# Patient Record
Sex: FEMALE | Race: WHITE | NOT HISPANIC OR LATINO | Employment: UNEMPLOYED | URBAN - METROPOLITAN AREA
[De-identification: names, ages, dates, MRNs, and addresses within clinical notes are randomized per-mention and may not be internally consistent; named-entity substitution may affect disease eponyms.]

---

## 2019-05-17 ENCOUNTER — APPOINTMENT (OUTPATIENT)
Dept: RADIOLOGY | Facility: IMAGING CENTER | Age: 54
End: 2019-05-17
Attending: NURSE PRACTITIONER
Payer: COMMERCIAL

## 2019-05-17 ENCOUNTER — OCCUPATIONAL MEDICINE (OUTPATIENT)
Dept: URGENT CARE | Facility: CLINIC | Age: 54
End: 2019-05-17
Payer: COMMERCIAL

## 2019-05-17 VITALS
BODY MASS INDEX: 21.27 KG/M2 | SYSTOLIC BLOOD PRESSURE: 136 MMHG | OXYGEN SATURATION: 92 % | TEMPERATURE: 99.1 F | DIASTOLIC BLOOD PRESSURE: 78 MMHG | RESPIRATION RATE: 16 BRPM | WEIGHT: 144 LBS | HEART RATE: 101 BPM

## 2019-05-17 DIAGNOSIS — S29.011A CHEST WALL MUSCLE STRAIN, INITIAL ENCOUNTER: Primary | ICD-10-CM

## 2019-05-17 DIAGNOSIS — R07.89 LEFT-SIDED CHEST WALL PAIN: ICD-10-CM

## 2019-05-17 PROCEDURE — 71046 X-RAY EXAM CHEST 2 VIEWS: CPT | Mod: TC,29 | Performed by: NURSE PRACTITIONER

## 2019-05-17 PROCEDURE — 99204 OFFICE O/P NEW MOD 45 MIN: CPT | Mod: 29 | Performed by: NURSE PRACTITIONER

## 2019-05-17 ASSESSMENT — ENCOUNTER SYMPTOMS
CONSTITUTIONAL NEGATIVE: 1
RESPIRATORY NEGATIVE: 1
NEUROLOGICAL NEGATIVE: 1
CARDIOVASCULAR NEGATIVE: 1
PALPITATIONS: 0
SHORTNESS OF BREATH: 0

## 2019-05-17 NOTE — LETTER
[facility] [place] [address] [address]  Phone:  [phone] - Fax:  [phone]   [facility] Progress Report and Disability Certification  Date of Service: 5/17/2019   No Show:  No  Date / Time of Next Visit: 5/21/2019 10:40 am   Claim Information   Patient Name: Chiara Pacheco  Claim Number:     Employer:   [employer] Date of Injury: 5/15/2019     Insurer / TPA: [employer]  ID / SSN:     Occupation: Lead  Diagnosis: The encounter diagnosis was Left-sided chest wall pain.    Medical Information   Related to Industrial Injury?   Comments:?    Subjective Complaints:  DOI 5/15/2019:    Patient reporting reproducible left-sided chest wall pain with deep breathing and   coughing.  She states she works for [employer] and was lifting some boxes.  She started   to feel pain at the left side of her chest the following morning.  Has not taken anything   for the pain.  She states she went to [facility] yesterday and was cleared medically but   Worker's Comp paperwork was not initiated.  Reports that the pain has been improving since   yesterday.  Denies previous injury or second job.  Reports history of chronic cough, daily smoker.   Objective Findings: Constitutional: She is oriented to person, place, and time. She appears well-developed and   well-nourished. She is cooperative.  Non-toxic appearance. No distress.   HENT:   Right Ear: External ear normal.   Left Ear: External ear normal.   Nose: Nose normal.   Mouth/Throat: Mucous membranes are normal.   Eyes: Conjunctivae and EOM are normal.   Neck: Normal range of motion.   Cardiovascular: Regular rhythm, normal heart sounds and normal pulses.    Pulmonary/Chest: Effort normal and breath sounds normal. No respiratory distress. She has   no decreased breath sounds. She exhibits no tenderness, no edema and no swelling.   Abdominal: Bowel sounds are normal.   Musculoskeletal: Normal range of motion.  She exhibits no deformity.   Neurological: She is alert and oriented to person, place, and time. She has normal   strength. No sensory deficit.   Skin: Skin is warm, dry and intact. Capillary refill takes less than 2 seconds.   Psychiatric: She has a normal mood and affect. Her behavior is normal.   Vitals reviewed.   Pre-Existing Condition(s):     Assessment:   Initial Visit    Status: Additional Care Required  Permanent Disability:No    Plan:      Diagnostics:      Comments:  X-ray of chest with right upper lobe atelectasis, otherwise negative for acute process.    Reproducible chest wall pain improving today compared to yesterday.  Patient advised close   monitoring and RICE and OTC acetaminophen/ibuprofen as needed fo  r the pain.  Encouraged   deep breathing.  Follow-up in 4 days, return sooner if symptoms worsen.    Disability Information   Status: Released to Restricted Duty    From:  5/17/2019  Through: 5/21/2019 Restrictions are: Temporary   Physical Restrictions   Sitting:    Standing:    Stooping:    Bending:      Squatting:    Walking:    Climbing:    Pushing:  < or = to 2 hrs/day   Pulling:  < or = to 2 hrs/day Other:    Reaching Above Shoulder (L):   Reaching Above Shoulder (R):       Reaching Below Shoulder (L):    Reaching Below Shoulder (R):      Not to exceed Weight Limits   Carrying(hrs):   Weight Limit(lb): < or = to 10 pounds Lifting(hrs):   Weight  Limit(lb): < or = to 10 pounds   Comments:      Repetitive Actions   Hands: i.e. Fine Manipulations from Grasping:     Feet: i.e. Operating Foot Controls:     Driving / Operate Machinery:     Physician Name: TED Pace Physician Signature: MONICA De La Torre e-Signature: Dr. Jesus Alberto Lowe, Medical Director   Clinic Name / Location: [address] [place] [address] Clinic Phone Number: Dept: [phone]   Appointment Time: 3:00 Pm Visit Start Time: 4:22 PM   Check-In Time:   3:10 Pm Visit Discharge Time:  5:28 PM   Original-Treating Physician or Chiropractor    Page 2-Insurer/TPA    Page 3-Employer    Page 4-Employee

## 2019-05-17 NOTE — LETTER
EMPLOYEE’S CLAIM FOR COMPENSATION/ REPORT OF INITIAL TREATMENT  FORM C-4    EMPLOYEE’S CLAIM - PROVIDE ALL INFORMATION REQUESTED   First Name  Chiara Last Name  Tara Birthdate                    1965                Sex  female Claim Number   Home Address  [address] Age  53 y.o. Height   Weight  65.3 kg (144 lb) [facility]     [facility] [place]  [address] Telephone  [phone] (home)    Mailing Address  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 (if applicable)  Lifting boxes    How did this injury or occupational disease occur? (Be specific an answer in detail. Use additional sheet if necessary)  She thinks by lifting heavy boxes   If you believe that you have an occupational disease, when did you first have knowledge of the disability and it relationship to your employment?  n/a Witnesses to the Accident  n/a      Nature of Injury or Occupational Disease  Strain  Part(s) of Body Injured or Affected  Chest, Defer, Defer    I certify that the above is true and correct to the best of my knowledge and that I have provided this information in order to obtain the benefits of [place]  [place]  I hereby authorize any physician, chiropractor, surgeon, practitioner, or other person, any hospital, including [facility] or [facility] hospital, any medical service organization, any insurance company, or other institution or organization to release to each other, any medical or other information, including benefits paid or payable, pertinent to this injury or disease, except information relative to diagnosis, treatment and/or counseling for AIDS, psychological conditions, alcohol or controlled substances, for which I must give specific authorization.  A Photostat of this authorization shall be as valid as the original.     Date   Place   Employee’s Signature   THIS REPORT MUST BE COMPLETED AND MAILED WITHIN 3 WORKING DAYS OF TREATMENT   Place  [facility]  Name of Facility  [facility]   Date  5/17/2019 Diagnosis  (R07.89) Left-sided chest wall pain  (primary encounter diagnosis) Is there evidence the injured employee was under the influence of alcohol and/or another controlled substance at the time of accident?   Hour  4:22 PM Description of Injury or Disease  The encounter diagnosis was Left-sided chest wall pain. No   Treatment  X-ray of chest with right upper lobe atelectasis, otherwise negative for acute process.    Reproducible chest wall pain improving today compared to yesterday.  Patient advised close   monitoring and RICE and OTC acetaminophen/ibuprofen as needed for the pain.  Encouraged   deep breathing.  Follow-up in 4 days, return sooner if symptoms worsen.  Have you advised the patient to remain off work five days or more? No   X-Ray Findings      If Yes   From Date  To Date      From information given by the employee, together with medical evidence, can you directly connect this injury or occupational disease as job incurred?    Comments:? If No Full Duty  No Modified Duty  Yes   Is additional medical care by a physician indicated?  Yes If Modified Duty, Specify any  "Limitations / Restrictions  Per D39   Do you know of any previous injury or disease contributing to this condition or occupational disease?                            No   Date  5/17/2019 Print Doctor’s Name TED Pace certify the employer’s copy of  this form was mailed on:   Address  [address] [address] Insurer’s Use Only     [facility]  [address]    Provider’s Tax ID Number  [id number] Telephone  Dept: [phone]        e-MONICA Berumen   e-Signature: Dr. Jesus Alberto Lowe, Medical Director Degree  APRN        ORIGINAL-TREATING PHYSICIAN OR CHIROPRACTOR    PAGE 2-INSURER/TPA    PAGE 3-EMPLOYER    PAGE 4-EMPLOYEE             Form C-4 (rev10/07)              BRIEF DESCRIPTION OF RIGHTS AND BENEFITS  (Pursuant to [place] 616C.050)    Notice of Injury or Occupational Disease (Incident Report Form C-1): If an injury or occupational disease (OD) arises out of and in the  course of employment, you must provide written notice to your employer as soon as practicable, but no later than 7 days after the accident or  OD. Your employer shall maintain a sufficient supply of the required forms.    Claim for Compensation (Form C-4): If medical treatment is sought, the form C-4 is available at the place of initial treatment. A completed  \"Claim for Compensation\" (Form C-4) must be filed within 90 days after an accident or OD. The treating physician or chiropractor must,  within 3 working days after treatment, complete and mail to the employer, the employer's insurer and third-party , the Claim for  Compensation.    Medical Treatment: If you require medical treatment for your on-the-job injury or OD, you may be required to select a physician or  chiropractor from a list provided by your workers’ compensation insurer, if it has contracted with an Organization for Managed Care (MCO) or  Preferred Provider Organization (PPO) or providers of health care. If your " employer has not entered into a contract with an MCO or PPO, you  may select a physician or chiropractor from the Panel of Physicians and Chiropractors. Any medical costs related to your industrial injury or  OD will be paid by your insurer.    Temporary Total Disability (TTD): If your doctor has certified that you are unable to work for a period of at least 5 consecutive days, or 5  cumulative days in a 20-day period, or places restrictions on you that your employer does not accommodate, you may be entitled to TTD  compensation.    Temporary Partial Disability (TPD): If the wage you receive upon reemployment is less than the compensation for TTD to which you are  entitled, the insurer may be required to pay you TPD compensation to make up the difference. TPD can only be paid for a maximum of 24  months.    Permanent Partial Disability (PPD): When your medical condition is stable and there is an indication of a PPD as a result of your injury or  OD, within 30 days, your insurer must arrange for an evaluation by a rating physician or chiropractor to determine the degree of your PPD. The  amount of your PPD award depends on the date of injury, the results of the PPD evaluation and your age and wage.    Permanent Total Disability (PTD): If you are medically certified by a treating physician or chiropractor as permanently and totally disabled  and have been granted a PTD status by your insurer, you are entitled to receive monthly benefits not to exceed 66 2/3% of your average  monthly wage. The amount of your PTD payments is subject to reduction if you previously received a PPD award.    Vocational Rehabilitation Services: You may be eligible for vocational rehabilitation services if you are unable to return to the job due to a  permanent physical impairment or permanent restrictions as a result of your injury or occupational disease.    Transportation and Per [place] Reimbursement: You may be eligible for travel expenses  and per [place] associated with medical treatment.    Reopening: You may be able to reopen your claim if your condition worsens after claim closure.    Appeal Process: If you disagree with a written determination issued by the insurer or the insurer does not respond to your request, you may  appeal to the Department of Administration, , by following the instructions contained in your determination letter. You must  appeal the determination within 70 days from the date of the determination letter at [address], [address], [place], [place]  [address], or [address], [place], [place] [address]. If you disagree with the  decision, you may appeal to the  Department of Administration, . You must file your appeal within 30 days from the date of the  decision  letter at [address], [address], [place], [place] [address], or [address] [facility] [address] [facility] [address], [place], [place] [address]. If you  disagree with a decision of an , you may file a petition for judicial review with the District Court. You must do so within 30  days of the Appeal Officer’s decision. You may be represented by an  at your own expense or you may contact the [facility] for possible  representation.    [place]  for Injured Workers (NAIW): If you disagree with a  decision, you may request that NAIW represent you  without charge at an  Hearing. For information regarding denial of benefits, you may contact the [facility] at: [address], [place] [address], [phone], or [address] [facility] [address], [place] [address], [phone]    To File a Complaint with the Division: If you wish to file a complaint with the  of the Division of Industrial Relations (DIR),  please contact the Workers’ Compensation Section, [address] [facility] [address], [place], [place] [address],  telephone [phone], or  [address], [address], [place], [place] [address], telephone [phone].    For assistance with Workers’ Compensation Issues: you may contact the Office of the Governor Consumer Health Assistance, [address] [place] [address], [address], [place], [place] [address], Toll Free 1-364.582.7522, Web site: [email or web address].[email or web address]., E-mail  [email or web address].                                                                                                                                                                                                                                   __________________________________________________________________                                                                   _________________                Employee Name / Signature                                                                                                                                                       Date                                                                                                                                                                                                     D-2 (rev. 10/07)

## 2019-05-17 NOTE — PROGRESS NOTES
Subjective:     Chiara Pacheco is a 53 y.o. female who presents for Rib Injury (NEW WC DOI 5/15/2019 (L) side )    DOI 5/15/2019:    Patient reporting reproducible left-sided chest wall pain with deep breathing and   coughing.  She states she works for [employer] and was lifting some boxes.  She started   to feel pain at the left side of her chest the following morning.  Has not taken anything   for the pain.  She states she went to [facility] yesterday and was cleared medically but   Worker's Comp paperwork was not initiated.  Reports that the pain has been improving since   yesterday.  Denies previous injury or second job.  Reports history of chronic cough, daily smoker.    PMH:  has a past medical history of Hearing loss.    MEDS:   Current Outpatient Prescriptions:   •  hydrocodone-acetaminophen (NORCO) 5-325 MG TABS per tablet, Take 1 Tab by mouth every 8 hours as needed. (Patient not taking: Reported on 5/21/2019), Disp: 20 Tab, Rfl: 0  •  hydrocodone-acetaminophen (NORCO) 5-325 MG TABS per tablet, Take 1-2 Tabs by mouth every 6 hours as needed. (Patient not taking: Reported on 5/21/2019), Disp: 20 Each, Rfl: 0  •  hydrocodone-acetaminophen (VICODIN) 5-500 MG TABS, Take 1-2 Tabs by mouth every four hours as needed for 12 doses. (Patient not taking: Reported on 5/21/2019), Disp: 12 Each, Rfl: 0    ALLERGIES: No Known Allergies    SURGHX: No past surgical history on file.    SOCHX:  reports that she has been smoking.  She has never used smokeless tobacco. She reports that she drinks about 6.0 oz of alcohol per week . She reports that she does not use drugs.     FH: Reviewed with patient, not pertinent to this visit.     Review of Systems   Constitutional: Negative.  Negative for malaise/fatigue.   Respiratory: Negative.  Negative for shortness of breath.    Cardiovascular: Negative.  Negative for palpitations.   Musculoskeletal:        Reproducible left sided chest wall pain with coughing and deep breathing    Neurological: Negative.    All other systems reviewed and are negative.    Objective:     /78 (BP Location: Left arm, Patient Position: Sitting, BP Cuff Size: Adult)   Pulse (!) 101   Temp 37.3 °C (99.1 °F) (Temporal)   Resp 16   Wt 65.3 kg (144 lb)   SpO2 92%   BMI 21.27 kg/m²     Physical Exam   Constitutional: She is oriented to person, place, and time. She appears well-developed and well-nourished. She is cooperative.  Non-toxic appearance. No distress.   HENT:   Right Ear: External ear normal.   Left Ear: External ear normal.   Nose: Nose normal.   Mouth/Throat: Mucous membranes are normal.   Eyes: Conjunctivae and EOM are normal.   Neck: Normal range of motion.   Cardiovascular: Regular rhythm, normal heart sounds and normal pulses.    Pulmonary/Chest: Effort normal and breath sounds normal. No respiratory distress. She has no decreased breath sounds. She exhibits no tenderness, no edema and no swelling.   Abdominal: Bowel sounds are normal.   Musculoskeletal: Normal range of motion. She exhibits no deformity.   Neurological: She is alert and oriented to person, place, and time. She has normal strength. No sensory deficit.   Skin: Skin is warm, dry and intact. Capillary refill takes less than 2 seconds.   Psychiatric: She has a normal mood and affect. Her behavior is normal.   Vitals reviewed.    Chest x-ray:    Narrative     5/17/2019 4:47 PM    HISTORY/REASON FOR EXAM: .  Left-sided chest pain    TECHNIQUE/EXAM DESCRIPTION AND NUMBER OF VIEWS:  Two views of the chest.    COMPARISON:  None.    FINDINGS:  The heart is normal in size.  Right upper lobe linear atelectasis.  No pleural effusions are appreciated.  Hyperexpanded lungs.     Impression     Right upper lobe linear atelectasis.     Reading Provider Reading Date   Rafy Yu M.D. May 17, 2019     Signing Provider Signing Date Signing Time   Rafy Yu M.D. May 17, 2019  4:59 PM     Assessment/Plan:     1. Chest wall muscle strain, initial  encounter  - DX-CHEST-2 VIEWS; Future    X-ray of chest ordered. Radiology report and images reviewed by myself. Per my interpretation: right upper lobe atelectasis, otherwise negative for acute process.     X-ray of chest with right upper lobe atelectasis, otherwise negative for acute process.    Reproducible chest wall pain improving today compared to yesterday.  Patient advised close   monitoring and RICE and OTC acetaminophen/ibuprofen as needed fo  r the pain.  Encouraged   deep breathing.  Follow-up in 4 days, return sooner if symptoms worsen.    Patient advised to: Return in about 4 days (around 5/21/2019) for 1) Symptoms don't improve or worsen, or go to ER, 2) Follow up with primary care in 7-10 days.    Differential diagnosis, natural history, supportive care, and indications for immediate follow-up discussed. All questions answered. Patient agrees with the plan of care.

## 2019-05-21 ENCOUNTER — OCCUPATIONAL MEDICINE (OUTPATIENT)
Dept: URGENT CARE | Facility: CLINIC | Age: 54
End: 2019-05-21
Payer: COMMERCIAL

## 2019-05-21 VITALS
TEMPERATURE: 98.9 F | DIASTOLIC BLOOD PRESSURE: 86 MMHG | RESPIRATION RATE: 20 BRPM | WEIGHT: 144 LBS | BODY MASS INDEX: 21.33 KG/M2 | HEART RATE: 74 BPM | HEIGHT: 69 IN | SYSTOLIC BLOOD PRESSURE: 132 MMHG | OXYGEN SATURATION: 93 %

## 2019-05-21 DIAGNOSIS — R07.89 LEFT-SIDED CHEST WALL PAIN: ICD-10-CM

## 2019-05-21 PROCEDURE — 99213 OFFICE O/P EST LOW 20 MIN: CPT | Mod: 29 | Performed by: PHYSICIAN ASSISTANT

## 2019-05-21 NOTE — LETTER
[facility] Care [place] [id number] [facility] [address] [address]  Phone:  [phone] - Fax:  [phone]   [facility] Progress Report and Disability Certification  Date of Service: 5/21/2019   No Show:  No  Date / Time of Next Visit: 5/28/2019@10:00am   Claim Information   Patient Name: Chiara Pacheco  Claim Number:     Employer:   [employer] Date of Injury: 5/15/2019     Insurer / TPA: [employer]  ID / SSN: [id number]    Occupation: Lead  Diagnosis: The encounter diagnosis was Left-sided chest wall pain.    Medical Information   Related to Industrial Injury?      Subjective Complaints:  DOI 5/15/2019:  Patient reporting reproducible left-sided chest wall pain with deep breathing and coughing.  She states she works for [employer] and was lifting some boxes.  She started to feel pain at the left side of her chest the following morning.  Has not taken anything   for the pain.  She states she went to [place] yesterday and was cleared medically but Worker's Comp paperwork was not initiated.  Reports that the pain has been improving.  Denies previous injury or second job.  Reports history of chronic cough, daily smoker. -Patient comes clinic stating pain has improved.  She approximately 70% improvement since last evaluation.  She denies crepitus.  She denies deeper chest pain or perception of her heart causing her pain.  She denies palpitations of her heart.  She denies shortness of breath.  She denies change in her cough.  She denies left shoulder pain.  She complains of pain to left chest wall with overhead range of motion to left shoulder.  She denies numbness tingling or weakness to left arm hand or shoulder.   Objective Findings: Gen: AOx3; Head: NC AT; Eyes: PERRLA/EOM; Lungs: NLR; Cardiac: RR by periph pulse exam; chest wall: Grossly normal no erythema ecchymosis or edema, no crepitus flail or retraction, area of bony tenderness palpation over rib, no step-off, no  visible deformity; lungs: Clear to auscultation bilaterally; left shoulder: Grossly normal no erythema, ecchymosis, effusion, edema, complain of pain to left chest wall with overhead range of motion, rotator cuff strength 5 out of 5; neuro: N VID, interosseous strength 5 out of 5, radial pulses +2 and equal bilaterally   Pre-Existing Condition(s):     Assessment:   Condition Improved    Status: Additional Care Required  Permanent Disability:No    Plan:   Comments:Restricted duty with no overhead or limited overhead use of left upper extremity, full duty data side, over-the-counter anti-inflammatories, ice and heat, follow-up in 1 week for repeat evaluation and likely full duty if not MMI      Diagnostics:      Comments:  Restricted duty with no overhead or limited overhead use of left upper extremity, full duty data side, over-the-counter anti-inflammatories, ice and heat, follow-up in 1 week for repeat evaluation and likely full duty if not MMI     Disability Information   Status: Released to Restricted Duty    From:  5/21/2019  Through: 5/28/2019 Restrictions are: Temporary   Physical Restrictions   Sitting:    Standing:    Stooping:    Bending:      Squatting:    Walking:    Climbing:    Pushing:      Pulling:    Other:    Reaching Above Shoulder (L): < or = to 1 hr/day Reaching Above Shoulder (R):       Reaching Below Shoulder (L):    Reaching Below Shoulder (R):      Not to exceed Weight Limits   Carrying(hrs):   Weight Limit(lb):   Lifting(hrs):   Weight  Limit(lb):     Comments: Restricted duty with no overhead or limited overhead use of left upper extremity, full duty data side, over-the-counter anti-inflammatories, ice and heat, follow-up in 1 week for repeat evaluation and likely full duty if not MMI     Repetitive Actions   Hands: i.e. Fine Manipulations from Grasping:     Feet: i.e. Operating Foot Controls:     Driving / Operate Machinery:     Physician Name: Luis Mathias P.A.-C. Physician  Signature: ERIK Bonilla P.A.-C. e-Signature: Dr. Jesus Alberto Lowe, Medical Director   Clinic Name / Location: [address] [address] Clinic Phone Number: Dept: [phone]   Appointment Time: 10:30 Am Visit Start Time: 11:04 AM   Check-In Time:  10:29 Am Visit Discharge Time:  11:42am   Original-Treating Physician or Chiropractor    Page 2-Insurer/TPA    Page 3-Employer    Page 4-Employee

## 2019-05-21 NOTE — PROGRESS NOTES
"Subjective:      Chiara Pacheco is a 53 y.o. female who presents with Rib Injury ([facility] DOI-5/16/19,pain still the same)      DOI 5/15/2019:  Patient reporting reproducible left-sided chest wall pain with deep breathing and coughing.  She states she works for [employer] and was lifting some boxes.  She started to feel pain at the left side of her chest the following morning.  Has not taken anything   for the pain.  She states she went to [facility] yesterday and was cleared medically but Worker's Comp paperwork was not initiated.  Reports that the pain has been improving.  Denies previous injury or second job.  Reports history of chronic cough, daily smoker. -Patient comes clinic stating pain has improved.  She approximately 70% improvement since last evaluation.  She denies crepitus.  She denies deeper chest pain or perception of her heart causing her pain.  She denies palpitations of her heart.  She denies shortness of breath.  She denies change in her cough.  She denies left shoulder pain.  She complains of pain to left chest wall with overhead range of motion to left shoulder.  She denies numbness tingling or weakness to left arm hand or shoulder.     Rib Injury         ROS       Objective:     /86 (BP Location: Left arm, Patient Position: Sitting)   Pulse 74   Temp 37.2 °C (98.9 °F) (Temporal)   Resp 20   Ht 1.753 m (5' 9\")   Wt 65.3 kg (144 lb)   SpO2 93%   BMI 21.27 kg/m²      Physical Exam    Gen: AOx3; Head: NC AT; Eyes: PERRLA/EOM; Lungs: NLR; Cardiac: RR by periph pulse exam; chest wall: Grossly normal no erythema ecchymosis or edema, no crepitus flail or retraction, area of bony tenderness palpation over rib, no step-off, no visible deformity; lungs: Clear to auscultation bilaterally; left shoulder: Grossly normal no erythema, ecchymosis, effusion, edema, complain of pain to left chest wall with overhead range of motion, rotator cuff strength 5 out of 5; neuro: N VID, interosseous strength 5 " out of 5, radial pulses +2 and equal bilaterally       Assessment/Plan:     1. Left-sided chest wall pain  Restricted duty with no overhead or limited overhead use of left upper extremity, full duty data side, over-the-counter anti-inflammatories, ice and heat, follow-up in 1 week for repeat evaluation and likely full duty if not MMI

## 2019-05-28 ENCOUNTER — OCCUPATIONAL MEDICINE (OUTPATIENT)
Dept: URGENT CARE | Facility: CLINIC | Age: 54
End: 2019-05-28
Payer: COMMERCIAL

## 2019-05-28 VITALS
HEART RATE: 75 BPM | BODY MASS INDEX: 21.33 KG/M2 | OXYGEN SATURATION: 96 % | HEIGHT: 69 IN | DIASTOLIC BLOOD PRESSURE: 84 MMHG | SYSTOLIC BLOOD PRESSURE: 140 MMHG | RESPIRATION RATE: 16 BRPM | TEMPERATURE: 99 F | WEIGHT: 144 LBS

## 2019-05-28 DIAGNOSIS — R07.89 LEFT-SIDED CHEST WALL PAIN: ICD-10-CM

## 2019-05-28 PROCEDURE — 99213 OFFICE O/P EST LOW 20 MIN: CPT | Performed by: FAMILY MEDICINE

## 2019-05-28 RX ORDER — AMLODIPINE BESYLATE 2.5 MG/1
TABLET ORAL
Refills: 11 | COMMUNITY
Start: 2019-05-06 | End: 2019-11-13

## 2019-05-28 ASSESSMENT — ENCOUNTER SYMPTOMS
BACK PAIN: 0
SHORTNESS OF BREATH: 0
NECK PAIN: 0
HEMOPTYSIS: 0
ABDOMINAL PAIN: 0

## 2019-05-28 NOTE — LETTER
[facility] Care [address] [address]  Phone:  [phone] - Fax:  [phone]   [facility] Progress Report and Disability Certification  Date of Service: 5/28/2019   No Show:  No  Date / Time of Next Visit: 6/11/2019 @ 9:15am Dr Machado    Claim Information   Patient Name: Chiara Pacheco  Claim Number:     Employer:   [employer] Date of Injury: 5/15/2019     Insurer / TPA: [employer]  ID / SSN:     Occupation: Lead  Diagnosis: The encounter diagnosis was Left-sided chest wall pain.    Medical Information   Related to Industrial Injury?   Comments:indeterminate    Subjective Complaints:  DOI: 5/15/2019  Insidious onset of left chest wall pain the day after lifting heavy boxes. Pain has waxed and waned/worse at night and positional. Trying to use a body pillow without help. No change from her visit 1 week ago. Using Aleve with minimal improvement. She has new bruising at her L elbow without known trauma. No pain at elbow.    Objective Findings: Chest: Area of perceived pain anterior axillary line reproduced with internal/external rotation of shoulder. No point tenderness or crepitus. Clear to auscultation.   Pre-Existing Condition(s):     Assessment:   Condition Same    Status: Additional Care Required  Permanent Disability:No    Plan: PT  Comments:initiate PT    Diagnostics:      Comments:       Disability Information   Status: Released to Restricted Duty    From:  5/28/2019  Through: 6/11/2019 Restrictions are:     Physical Restrictions   Sitting:    Standing:    Stooping:    Bending:      Squatting:    Walking:    Climbing:    Pushing:      Pulling:    Other:    Reaching Above Shoulder (L): < or = to 1 hr/day Reaching Above Shoulder (R):       Reaching Below Shoulder (L):    Reaching Below Shoulder (R):      Not to exceed Weight Limits   Carrying(hrs):   Weight Limit(lb): < or = to 10 pounds Lifting(hrs):   Weight  Limit(lb): < or = to 10  luznds   Comments: Restriction is for left upper extremity only      Repetitive Actions   Hands: i.e. Fine Manipulations from Grasping:     Feet: i.e. Operating Foot Controls:     Driving / Operate Machinery:     Physician Name: Antelmo Keller M.D. Physician Signature: ANTELMO Wade M.D. e-Signature: Dr. Jesus Alberto Lowe, Medical Director   Clinic Name / Location: [address] [address] Clinic Phone Number: Dept: [phone]   Appointment Time: 10:00 Am Visit Start Time: 10:15 AM   Check-In Time:  9:27 Am Visit Discharge Time:  10:52am   Original-Treating Physician or Chiropractor    Page 2-Insurer/TPA    Page 3-Employer    Page 4-Employee

## 2019-05-28 NOTE — PROGRESS NOTES
"Subjective:      Chiara Pacheco is a 53 y.o. female who presents with Follow-Up (WC F[facility] (L) side of chest. pt states she is still sore and trouble sleping)      DOI: 5/15/2019  Insidious onset of left chest wall pain the day after lifting heavy boxes. Pain has waxed and waned/worse at night and positional. Trying to use a body pillow without help. No change from her visit 1 week ago. Using Aleve with minimal improvement. She has new bruising at her L elbow without known trauma. No pain at elbow.      HPI    Review of Systems   Respiratory: Negative for hemoptysis and shortness of breath.    Cardiovascular: Negative for leg swelling.   Gastrointestinal: Negative for abdominal pain.   Musculoskeletal: Negative for back pain and neck pain.   Skin: Negative for rash.          Objective:     /84 (BP Location: Left arm, Patient Position: Sitting, BP Cuff Size: Adult)   Pulse 75   Temp 37.2 °C (99 °F) (Temporal)   Resp 16   Ht 1.753 m (5' 9\")   Wt 65.3 kg (144 lb)   SpO2 96%   BMI 21.27 kg/m²      Physical Exam   Constitutional: She is oriented to person, place, and time. She appears well-developed and well-nourished. No distress.   HENT:   Head: Normocephalic.   Cardiovascular: Normal rate, regular rhythm and normal heart sounds.    Neurological: She is alert and oriented to person, place, and time.   Skin: Skin is warm and dry. No rash noted.       Chest: Area of perceived pain anterior axillary line reproduced with internal/external rotation of shoulder. No point tenderness or crepitus. Clear to auscultation.       Assessment/Plan:     1. Left-sided chest wall pain    - REFERRAL TO PHYSICAL THERAPY Reason for Therapy: Eval/Treat/Report    Work relatedness is unclear.  Clinically this does appear consistent with a chest wall strain.  At this point will initiate physical therapy and ask her to follow-up with Dr. Machado.    "

## 2019-07-17 ENCOUNTER — NON-PROVIDER VISIT (OUTPATIENT)
Dept: OCCUPATIONAL MEDICINE | Facility: CLINIC | Age: 54
End: 2019-07-17

## 2019-07-17 DIAGNOSIS — Z02.1 PRE-EMPLOYMENT DRUG SCREENING: ICD-10-CM

## 2019-07-17 LAB
AMP AMPHETAMINE: NORMAL
COC COCAINE: NORMAL
INT CON NEG: NORMAL
INT CON POS: NORMAL
MET METHAMPHETAMINES: NORMAL
OPI OPIATES: NORMAL
PCP PHENCYCLIDINE: NORMAL
POC DRUG COMMENT 753798-OCCUPATIONAL HEALTH: NEGATIVE
THC: NORMAL

## 2019-07-17 PROCEDURE — 80305 DRUG TEST PRSMV DIR OPT OBS: CPT | Performed by: PREVENTIVE MEDICINE

## 2019-11-12 ENCOUNTER — HOSPITAL ENCOUNTER (EMERGENCY)
Facility: MEDICAL CENTER | Age: 54
End: 2019-11-13
Attending: EMERGENCY MEDICINE
Payer: MEDICAID

## 2019-11-12 DIAGNOSIS — F10.929 ALCOHOLIC INTOXICATION WITH COMPLICATION (HCC): ICD-10-CM

## 2019-11-12 LAB — POC BREATHALIZER: 0.25 PERCENT (ref 0–0.01)

## 2019-11-12 PROCEDURE — 302970 POC BREATHALIZER

## 2019-11-12 PROCEDURE — 302970 POC BREATHALIZER: Performed by: EMERGENCY MEDICINE

## 2019-11-12 PROCEDURE — 99285 EMERGENCY DEPT VISIT HI MDM: CPT

## 2019-11-12 RX ORDER — LORAZEPAM 1 MG/1
1 TABLET ORAL ONCE
Status: COMPLETED | OUTPATIENT
Start: 2019-11-13 | End: 2019-11-13

## 2019-11-12 ASSESSMENT — LIFESTYLE VARIABLES
DOES PATIENT WANT TO STOP DRINKING: YES
TOTAL SCORE: 4
HAVE YOU EVER FELT YOU SHOULD CUT DOWN ON YOUR DRINKING: YES
EVER FELT BAD OR GUILTY ABOUT YOUR DRINKING: YES
DOES PATIENT WANT TO TALK TO SOMEONE ABOUT QUITTING: YES
HAVE PEOPLE ANNOYED YOU BY CRITICIZING YOUR DRINKING: YES
EVER HAD A DRINK FIRST THING IN THE MORNING TO STEADY YOUR NERVES TO GET RID OF A HANGOVER: YES
CONSUMPTION TOTAL: INCOMPLETE
TOTAL SCORE: 4
TOTAL SCORE: 4
DO YOU DRINK ALCOHOL: YES

## 2019-11-13 VITALS
HEART RATE: 89 BPM | WEIGHT: 145 LBS | DIASTOLIC BLOOD PRESSURE: 75 MMHG | HEIGHT: 69 IN | OXYGEN SATURATION: 94 % | SYSTOLIC BLOOD PRESSURE: 135 MMHG | TEMPERATURE: 97.2 F | BODY MASS INDEX: 21.48 KG/M2 | RESPIRATION RATE: 18 BRPM

## 2019-11-13 LAB
AMPHET UR QL SCN: NEGATIVE
BARBITURATES UR QL SCN: NEGATIVE
BENZODIAZ UR QL SCN: NEGATIVE
BZE UR QL SCN: NEGATIVE
CANNABINOIDS UR QL SCN: POSITIVE
METHADONE UR QL SCN: NEGATIVE
OPIATES UR QL SCN: NEGATIVE
OXYCODONE UR QL SCN: NEGATIVE
PCP UR QL SCN: NEGATIVE
POC BREATHALIZER: 0.06 PERCENT (ref 0–0.01)
POC BREATHALIZER: 0.11 PERCENT (ref 0–0.01)
PROPOXYPH UR QL SCN: NEGATIVE

## 2019-11-13 PROCEDURE — 700102 HCHG RX REV CODE 250 W/ 637 OVERRIDE(OP)

## 2019-11-13 PROCEDURE — A9270 NON-COVERED ITEM OR SERVICE: HCPCS

## 2019-11-13 PROCEDURE — 80307 DRUG TEST PRSMV CHEM ANLYZR: CPT

## 2019-11-13 PROCEDURE — A9270 NON-COVERED ITEM OR SERVICE: HCPCS | Performed by: EMERGENCY MEDICINE

## 2019-11-13 PROCEDURE — 302970 POC BREATHALIZER: Performed by: EMERGENCY MEDICINE

## 2019-11-13 PROCEDURE — 90791 PSYCH DIAGNOSTIC EVALUATION: CPT

## 2019-11-13 PROCEDURE — 700102 HCHG RX REV CODE 250 W/ 637 OVERRIDE(OP): Performed by: EMERGENCY MEDICINE

## 2019-11-13 RX ORDER — IBUPROFEN 600 MG/1
600 TABLET ORAL ONCE
Status: COMPLETED | OUTPATIENT
Start: 2019-11-13 | End: 2019-11-13

## 2019-11-13 RX ADMIN — IBUPROFEN 600 MG: 600 TABLET ORAL at 08:42

## 2019-11-13 RX ADMIN — LORAZEPAM 1 MG: 1 TABLET ORAL at 00:01

## 2019-11-13 NOTE — ED NOTES
Report received from Brian WALKER, assumed care of patient.  Bed in lowest position.  Sitter outside of room with direct view of Pt.

## 2019-11-13 NOTE — DISCHARGE INSTRUCTIONS
Return to the ER for any worsening symptoms, concerns, or issues  Establish care with a primary care provider  Do not drink alcohol in excess

## 2019-11-13 NOTE — ED NOTES
Patient resting in bed, sleeping, no signs of pain or distress, unlabored breathing noted, repositons self occasionally, sitter in line of sight performing direct observation, bed in low position, no cough noted, on room air, frequent rounding performed, safety room features in place.    No urine output noted at this time.

## 2019-11-13 NOTE — CONSULTS
"[facility]   TRIAGE ASSESSMENT    Name: Chiara Pacheco  MRN: [id number]  : 1965  Age: 54 y.o.  Date of assessment: 2019  PCP: Pcp Pt States None  Persons in attendance: Patient    CHIEF COMPLAINT/PRESENTING ISSUE (as stated by pt):  Pt brought to triage by neighbor. Per neighbor she tried to take her to [place] but Pt unable to fill out paper work due to intoxication and was sent here.   Pt reported drinking 1/5 of whisky a day.    Neighbor reports finding Pt in home with knife attempting to cut wrist. Pt has superficial laceration on left wrist.  Pt was allowed to sober overnight in the ER.  She was seen for psych eval upon reaching legal sobriety.  Pt says she does not recall what upset her to the point of feeling suicidal.  She denies any major life stressors lately and says life is going \"pretty good, actually.\"  She denies any ongoing SI or any prior to getting intoxicated last night.  Chief Complaint   Patient presents with   • Detox        CURRENT LIVING SITUATION/SOCIAL SUPPORT: She lives alone in an apartment in [place].  Works for [employer].  She reports adequate social support from friends, her neighbor, her father and sister.    BEHAVIORAL HEALTH TREATMENT HISTORY  Does patient/parent report a history of prior behavioral health treatment for patient?   No:   She denies any past psych dx and none in past charting, (though that is limited.)  SAFETY ASSESSMENT - SELF  Does patient acknowledge current or past symptoms of dangerousness to self? no  Does parent/significant other report patient has current or past symptoms of dangerousness to self? N\A  Does presenting problem suggest symptoms of dangerousness to self? No   Pt denies any current SI, doesn't recall why she was SI last night while intoxicated; denies any past SA.    SAFETY ASSESSMENT - OTHERS  Does patient acknowledge current or past symptoms of aggressive behavior or risk to others? no  Does parent/significant other report " "patient has current or past symptoms of aggressive behavior or risk to others?  N\A  Does presenting problem suggest symptoms of dangerousness to others? No    Crisis Safety Plan completed and copy given to patient? N\A    ABUSE/NEGLECT SCREENING  Does patient report feeling “unsafe” in his/her home, or afraid of anyone?  no  Does patient report any history of physical, sexual, or emotional abuse?  no  Does parent or significant other report any of the above? N\A  Is there evidence of neglect by self?  no  Is there evidence of neglect by a caregiver? no  Does the patient/parent report any history of CPS/APS/police involvement related to suspected abuse/neglect or domestic violence? no  Based on the information provided during the current assessment, is a mandated report of suspected abuse/neglect being made?  No    SUBSTANCE USE SCREENING  Yes:  [place] all substances used in the past 30 days:      Last Use Amount   [x]   Alcohol yesterday Pt reports drinking \"one shot a day after work\" and sometimes more on the weekend.  Reported to triage, while notably intoxicated, that she drinks a fifth a day.    [x]   Marijuana     []   Heroin     []   Prescription Opioids  (used without prescription, for    recreation, or in excess of prescribed amount)     []   Other Prescription  (used without prescription, for    recreation, or in excess of prescribed amount)     []   Cocaine      []   Methamphetamine     []   \"\" drugs (ectasy, MDMA)     []   Other substances        UDS results: +canna  Breathalyzer results: 0.251-0.107-0.06    What consequences does the patient associate with any of the above substance use and or addictive behaviors? None    Risk factors for detox (check all that apply):  []  Seizures   []  Diaphoretic (sweating)   []  Tremors   []  Hallucinations   []  Increased blood pressure   []  Decreased blood pressure   []  Other   []  None      [x] Patient education on risk factors for detoxification and " instructed to return to ER as needed.      MENTAL STATUS   Participation: Active verbal participation, Attentive, Engaged and Open to feedback  Grooming: Casual  Orientation: Alert and Fully Oriented  Behavior: Calm  Eye contact: Good  Mood: Euthymic  Affect: Flexible  Thought process: Logical and Goal-directed  Thought content: Within normal limits  Speech: Rate within normal limits and Volume within normal limits  Perception: Within normal limits  Memory:  No gross evidence of memory deficits  Insight: Adequate  Judgment:  Adequate  Other:    Collateral information: past visits  Source:  [] Significant other present in person:   [] Significant other by telephone  [] [facility]   [] [facility] Nursing Staff  [x] [facility]  [] Other:     [] Unable to complete full assessment due to:  [] Acute intoxication  [] Patient declined to participate/engage  [] Patient verbally unresponsive  [] Significant cognitive deficits  [] Significant perceptual distortions or behavioral disorganization  [] Other:      CLINICAL IMPRESSIONS:  Primary:  Alcohol Intoxication  Secondary:  SI-resolved       IDENTIFIED NEEDS/PLAN:  [Trigger DISPOSITION list for any items marked]    []  Imminent safety risk - self [] Imminent safety risk - others   []  Acute substance withdrawal []  Psychosis/Impaired reality testing   []  Mood/anxiety [x]  Substance use/Addictive behavior   []  Maladaptive behaviro []  Parent/child conflict   []  Family/Couples conflict []  Biomedical   []  Housing []  Financial   []   Legal  Occupational/Educational   []  Domestic violence []  Other:     Disposition: Refer to counseling.  She says she will call her insurance company to find a covered provider should she feel the need.    Does patient express agreement with the above plan? yes    Referral appointment(s) scheduled? N\A    Alert team only: Pt to DC to self.  Denies SI, HI and hallucinations; able to care for self.  I have discussed findings and  recommendations with Dr. Vo, who is in agreement with these recommendations.      Lorri Zuleta R.N.  11/13/2019

## 2019-11-13 NOTE — DISCHARGE PLANNING
Alert Team  Pt's friend was concerned that pt was being dc'd.  I educated her on the process and qualifications for a legal hold.  I educated her on the necessity of detox programming being voluntary.  I spent time with them together, encouraging pt to take the concerns of her friends and family to heart.  They thanked me for my time.

## 2019-11-13 NOTE — ED NOTES
All lines and monitors disconnected.  Pt reports no suicidal or homicidal ideation at this time.  Discharge instructions reviewed, questions answered.  Pt ambulates to the lobby, escorted by RN, family and friend.  Pt states all belongings in possession.

## 2019-11-13 NOTE — DISCHARGE PLANNING
"Alert Team  Noted \"life skills\" order for this pt; bedside RN to DC that order, as it is incorrect.  I have entered the correct order, a diet and a STAT breathalyzer.  Pt also still needs to submit UDS.  Pt will be first consult this morning if legally sober.  "

## 2019-11-13 NOTE — ED TRIAGE NOTES
.  Chief Complaint   Patient presents with   • Detox      Pt brought to triage by neighbor. Per neighbor she tried to take her to [place] but Pt unable to fill out paper work and was sent here. Pt reports drinking 1/5 of whisky a day. Last drink was 2130 tonight.    Neighbor reports finding Pt in home with knife attempting to cut wrist. Pt has superficial laceration on left wrist, bleeding controlled. Pt roomed immediately.

## 2019-11-13 NOTE — ED PROVIDER NOTES
1:19 PM  I received signout from Dr. Sunshine earlier in my shift.  She states the patient was intoxicated and had made some superficial cuts/abrasions to her left wrist.  She came in complaining of suicidality.  The patient was not able to be evaluated due to intoxication.  [facility] has now evaluated her as she is now sober.  Patient denies current suicidality.  She denies homicidality.  She is stable for discharge.

## 2019-11-13 NOTE — ED PROVIDER NOTES
ED Provider Note    Patient CARE signed out from nighttime ERP.  Patient's here with alcohol intoxication.  Requesting detox.  She did cause some superficial abrasions to her left wrist.  The patient initially taken to [place] but was unable to sign the weight paperwork so she comes here for further evaluation.  She is intoxicated.  At this point, awaiting sobriety for evaluation by the alert team.      Patient's repeat alcohol, is in the sober range.  The alert team will see the patient.care will be signed off to Dr. Vo for disposition.

## 2019-11-13 NOTE — ED PROVIDER NOTES
ED Provider Note    CHIEF COMPLAINT  Chief Complaint   Patient presents with   • Detox       HPI  Chiara Pacheco is a 54 y.o. female who presents asking for help with her alcohol abuse.  The patient states that she is a known alcoholic and drinks heavily on a daily basis.  The patient has been fed up with her alcohol addiction and did cause some superficial abrasions to her left wrist.  Her neighbor was notified and they took the patient to [place] but she cannot sign paperwork and therefore they told the neighbor to take her to the emerge department for further evaluation.  The patient will not tell me if she is truly suicidal at this time.  She does admit to significant alcohol abuse and intoxication.  She states she would like help with her alcohol abuse.  She also utilizes marijuana but otherwise denies other recreational drugs.  She does not have any current medical complaints.    REVIEW OF SYSTEMS  See HPI for further details. All other systems are negative.     PAST MEDICAL HISTORY  Past Medical History:   Diagnosis Date   • Hearing loss        FAMILY HISTORY  [unfilled]    SOCIAL HISTORY  Social History     Socioeconomic History   • Marital status: Single     Spouse name: Not on file   • Number of children: Not on file   • Years of education: Not on file   • Highest education level: Not on file   Occupational History   • Not on file   Social Needs   • Financial resource strain: Not on file   • Food insecurity:     Worry: Not on file     Inability: Not on file   • Transportation needs:     Medical: Not on file     Non-medical: Not on file   Tobacco Use   • Smoking status: Current Every Day Smoker   • Smokeless tobacco: Never Used   Substance and Sexual Activity   • Alcohol use: Yes     Alcohol/week: 6.0 oz     Types: 10 Shots of liquor per week   • Drug use: No   • Sexual activity: Not on file   Lifestyle   • Physical activity:     Days per week: Not on file     Minutes per session: Not on file   • Stress:  Not on file   Relationships   • Social connections:     Talks on phone: Not on file     Gets together: Not on file     Attends Buddhist service: Not on file     Active member of club or organization: Not on file     Attends meetings of clubs or organizations: Not on file     Relationship status: Not on file   • Intimate partner violence:     Fear of current or ex partner: Not on file     Emotionally abused: Not on file     Physically abused: Not on file     Forced sexual activity: Not on file   Other Topics Concern   • Not on file   Social History Narrative   • Not on file       SURGICAL HISTORY  No past surgical history on file.    CURRENT MEDICATIONS  Home Medications     Reviewed by Negrita Camarena R.N. (Registered Nurse) on 11/12/19 at 2313  Med List Status: Not Addressed   Medication Last Dose Status   amLODIPine (NORVASC) 2.5 MG Tab  Active   hydrocodone-acetaminophen (NORCO) 5-325 MG TABS per tablet  Active   hydrocodone-acetaminophen (NORCO) 5-325 MG TABS per tablet  Active   hydrocodone-acetaminophen (VICODIN) 5-500 MG TABS  Active                ALLERGIES  No Known Allergies    PHYSICAL EXAM  VITAL SIGNS: /93   Pulse 97   Temp 36.2 °C (97.2 °F) (Temporal)   Resp 16   SpO2 94%  Room air O2: 94    Constitutional: Tearful and intoxicated.   HENT: Normocephalic, Atraumatic, Bilateral external ears normal, Oropharynx moist, No oral exudates, Nose normal.   Eyes: PERRLA, EOMI, bilateral conjunctival injection, No discharge.   Neck: Normal range of motion, No tenderness, Supple, No stridor.   Lymphatic: No lymphadenopathy noted.   Cardiovascular: Normal heart rate, Normal rhythm, No murmurs, No rubs, No gallops.   Thorax & Lungs: Normal breath sounds, No respiratory distress, No wheezing, No chest tenderness.   Abdomen: Bowel sounds normal, Soft, No tenderness, No masses, No pulsatile masses.   Skin: Multiple superficial abrasions to the palmar aspect of the right wrist  Back: No tenderness, No CVA  tenderness.   Extremities: Intact distal pulses, No edema, No tenderness, No cyanosis, No clubbing.   Musculoskeletal: Good range of motion in all major joints. No tenderness to palpation or major deformities noted.   Neurologic: Alert & oriented x 3, Normal motor function, Normal sensory function, No focal deficits noted.   Psychiatric: Depressed affect, the patient will not tell me if she currently continues to be suicidal.      COURSE & MEDICAL DECISION MAKING  Pertinent Labs & Imaging studies reviewed. (See chart for details)  This is a 54-year-old female who presents the emergency department intoxicated with suicidal ideation.  The patient significantly intoxicated and therefore she will not build to be evaluated by life skills until the morning.  The patient is medically stable.  She was pretty anxious and therefore we did give her some Ativan orally to help her out.  On repeat exam she is resting comfortably.  She will be evaluated by life skills in the morning and the patient be signed out to my partner.  As for the superficial abrasions these will heal via secondary intention.  FINAL IMPRESSION  1.  Alcohol abuse and intoxication  2.  Suicidal ideation  3.  Superficial abrasions to the left wrist         Electronically signed by: Abhi Ramirez, 11/12/2019 11:44 PM

## 2019-11-13 NOTE — ED NOTES
"Patient in no apparent distress, resting comfortably on gurney.  Pt denies SI or HI at this time, states \" I'm ready to go home. \"    "

## 2019-11-13 NOTE — ED NOTES
Patient resting in bed, sleeping, no signs of pain or distress, unlabored breathing noted on room air, sitter in hallway performing direct observation, repositions self occasionally, bed in low position, safety room features in place, no cough noted, frequent rounding performed, will continue to assess patient.    No urine output noted at this time.

## 2019-12-21 ENCOUNTER — HOSPITAL ENCOUNTER (EMERGENCY)
Facility: MEDICAL CENTER | Age: 54
End: 2019-12-22
Attending: EMERGENCY MEDICINE
Payer: MEDICAID

## 2019-12-21 ENCOUNTER — APPOINTMENT (OUTPATIENT)
Dept: RADIOLOGY | Facility: MEDICAL CENTER | Age: 54
End: 2019-12-21
Attending: EMERGENCY MEDICINE
Payer: MEDICAID

## 2019-12-21 DIAGNOSIS — S05.11XA PERIORBITAL CONTUSION OF RIGHT EYE, INITIAL ENCOUNTER: ICD-10-CM

## 2019-12-21 PROCEDURE — 99284 EMERGENCY DEPT VISIT MOD MDM: CPT

## 2019-12-21 RX ORDER — PROPARACAINE HYDROCHLORIDE 5 MG/ML
2 SOLUTION/ DROPS OPHTHALMIC ONCE
Status: DISCONTINUED | OUTPATIENT
Start: 2019-12-21 | End: 2019-12-22 | Stop reason: HOSPADM

## 2019-12-22 VITALS
SYSTOLIC BLOOD PRESSURE: 128 MMHG | HEART RATE: 87 BPM | DIASTOLIC BLOOD PRESSURE: 78 MMHG | HEIGHT: 68 IN | TEMPERATURE: 97.8 F | RESPIRATION RATE: 18 BRPM | BODY MASS INDEX: 22.73 KG/M2 | WEIGHT: 150 LBS | OXYGEN SATURATION: 94 %

## 2019-12-22 PROCEDURE — 99284 EMERGENCY DEPT VISIT MOD MDM: CPT

## 2019-12-22 PROCEDURE — 70450 CT HEAD/BRAIN W/O DYE: CPT

## 2019-12-22 PROCEDURE — 70480 CT ORBIT/EAR/FOSSA W/O DYE: CPT

## 2019-12-22 NOTE — PROGRESS NOTES
Patient did not have 2 hearing aide batteries in CT. No one touched her hearing aides in or out of the denture cup. Patient refused to have anyone to touch them.

## 2019-12-22 NOTE — ED TRIAGE NOTES
Pt to ED with EMS for facial trauma. Per EMS pt called after waking up. Pt does not remember trauma. Large hematoma to right eye. +ETOH.

## 2019-12-22 NOTE — ED PROVIDER NOTES
"ED Provider Note    CHIEF COMPLAINT  Chief Complaint   Patient presents with   • Fall   • Eye Injury        HPI    Primary care provider: Pcp Pt States None   History obtained from: Patient  History limited by: None     Chiara Pacheco is a 54 y.o. female who presents to the ED by EMS complaining of right eye pain and swelling and noted to have bruising.  Patient states that she \"woke up like this\" and denies any known injury or trauma.  She does not know how she may have injured her eye.  Patient reports drinking \"too much\" alcohol.  She does not wear contact lenses.  She wears reading glasses at times.  She denies pain anywhere else or any other potential injury including neck pain/chest pain/abdominal pain/back pain.  She denies shortness of breath/difficulty breathing/nausea/vomiting/weakness or sensory change.  She is not on any blood thinners.    REVIEW OF SYSTEMS  Please see HPI for pertinent positives/negatives.  All other systems reviewed and are negative.     PAST MEDICAL HISTORY  Past Medical History:   Diagnosis Date   • Hearing loss         SURGICAL HISTORY  History reviewed. No pertinent surgical history.     SOCIAL HISTORY  Social History     Tobacco Use   • Smoking status: Current Every Day Smoker   • Smokeless tobacco: Never Used   Substance and Sexual Activity   • Alcohol use: Yes     Alcohol/week: 6.0 oz     Types: 10 Shots of liquor per week   • Drug use: No   • Sexual activity: Not on file        FAMILY HISTORY  History reviewed. No pertinent family history.     CURRENT MEDICATIONS  Home Medications    **Home medications have not yet been reviewed for this encounter**          ALLERGIES  No Known Allergies     PHYSICAL EXAM  VITAL SIGNS: /78   Pulse 87   Temp 36.6 °C (97.8 °F)   Resp 18   Ht 1.727 m (5' 8\")   Wt 68 kg (150 lb)   LMP  (LMP Unknown)   SpO2 94%   BMI 22.81 kg/m²  @CIERRA[[id number]::@     Pulse ox interpretation: 92% I interpret this pulse ox as normal       Constitutional: " Well developed, well nourished, alert in no apparent distress, nontoxic appearance   HENT: Diffuse right periorbital swelling and ecchymosis with tenderness to palpation, normocephalic, bilateral external ears normal, no hemotympanum bilaterally, oropharynx moist and clear, airway patent, nose non TTP with no hematoma/bleeding/drainage, midface stable, no malocclusion, no left side periorbital swelling/bruising, no mastoid swelling/bruising   Eyes: Unable to visualize right eye due to diffuse swelling and pain and patient refusing to allow exam, left pupil round and reactive to light, conjunctiva without erythema, no discharge, no icterus   Neck: Soft and supple, trachea midline, no stridor, no swelling/bruising, no midline C-spine tenderness, no stepoffs, no LAD, no JVD, good ROM without restrictions or discomfort  Cardiovascular: Regular rate and rhythm, no murmurs/rubs/gallops, strong distal pulses and good perfusion   Thorax & Lungs: No respiratory distress, CTAB with equal BS bilaterally, no chest tenderness  Abdomen: Soft, nontender, nondistended, no G/R, normal BS, pelvis stable   Back: Nontender to palpation  Extremities: No cyanosis, no edema, no gross deformity, good ROM at all joints, no tenderness, intact distal pulses with brisk cap refill   Skin: Warm, dry, no pallor/cyanosis, no rash noted   Lymphatic: No lymphadenopathy noted   Neuro: A/O times 3, GCS15, no focal deficits noted, sensation intact to touch, equal strength bilateral UE/LE   Psychiatric: Cooperative, slightly anxious, no signs of active hallucinations or delusions      DIAGNOSTIC STUDIES / PROCEDURES        LABS  All labs reviewed by me.     Results for orders placed or performed during the hospital encounter of 11/12/19   URINE DRUG SCREEN (TRIAGE)   Result Value Ref Range    Amphetamines Urine Negative Negative    Barbiturates Negative Negative    Benzodiazepines Negative Negative    Cocaine Metabolite Negative Negative    Methadone  Negative Negative    Opiates Negative Negative    Oxycodone Negative Negative    Phencyclidine -Pcp Negative Negative    Propoxyphene Negative Negative    Cannabinoid Metab Positive (A) Negative   POC BREATHALIZER   Result Value Ref Range    POC Breathalizer 0.251 (A) 0.00 - 0.01 Percent   POC BREATHALIZER   Result Value Ref Range    POC Breathalizer 0.107 (A) 0.00 - 0.01 Percent   POC BREATHALIZER   Result Value Ref Range    POC Breathalizer 0.060 (A) 0.00 - 0.01 Percent        RADIOLOGY  The radiologist's interpretation of all radiological studies have been reviewed by me.     CT-HEAD W/O   Final Result      1.  No CT evidence of acute infarct, hemorrhage or mass. No acute intracranial injury.   2.  Orbits are discussed separately.      [id number] W/O PLUS RECONS   Final Result      Large subcutaneous hematoma involving the right frontal scalp and right periorbital region. No orbital fractures. No injury to the right globe.             COURSE & MEDICAL DECISION MAKING  Nursing notes, VS, PMSFHx reviewed in chart.     Review of past medical records shows the patient was last seen in this ED November 12, 2019 requesting detox from her alcohol abuse.      Differential diagnoses considered include but are not limited to: Contusion, concussion/post-concussion syndrome, Fx, intracranial hemorrhage, retrobulbar hematoma       History and physical exam as above.  Imaging studies with findings as above.  Due to the swelling and pain, patient is refusing to allow me to examine her right eye.  Patient otherwise in no acute distress and nontoxic in appearance without evidence for other traumatic injuries and without any focal neurological findings.  She was advised on using ice to help minimize swelling and using acetaminophen as needed for pain.  She was advised to limit her alcohol use.  Return to ED precautions were given and she was advised on outpatient follow-up.  Patient verbalized understanding and agreed with  plan of care with no further questions or concerns.      The patient is referred to a primary physician for blood pressure management, diabetic screening, and for all other preventative health concerns.       FINAL IMPRESSION  1. Periorbital contusion of right eye, initial encounter Acute   2. Alcoholism /alcohol abuse (HCC) Chronic          DISPOSITION  Patient will be discharged home in stable condition.       FOLLOW UP  Please follow-up with your doctor    Call in 1 day      [facility], Emergency Dept  [facility] [facility] [address] [phone]    If symptoms worsen         OUTPATIENT MEDICATIONS  There are no discharge medications for this patient.         Electronically signed by: Malcolm Hernandez, 12/21/2019 11:17 PM      Portions of this record were made with voice recognition software.  Despite my review, spelling/grammar/context errors may still remain.  Interpretation of this chart should be taken in this context.

## 2019-12-31 ENCOUNTER — HOSPITAL ENCOUNTER (EMERGENCY)
Dept: HOSPITAL 8 - ED | Age: 54
Discharge: HOME | End: 2019-12-31
Payer: COMMERCIAL

## 2019-12-31 VITALS — BODY MASS INDEX: 21.62 KG/M2 | HEIGHT: 69 IN | WEIGHT: 145.95 LBS

## 2019-12-31 VITALS — DIASTOLIC BLOOD PRESSURE: 52 MMHG | SYSTOLIC BLOOD PRESSURE: 126 MMHG

## 2019-12-31 DIAGNOSIS — S09.8XXA: Primary | ICD-10-CM

## 2019-12-31 DIAGNOSIS — F10.10: ICD-10-CM
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
## 2019-12-31 DIAGNOSIS — I10: ICD-10-CM
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
## 2019-12-31 LAB
ALBUMIN SERPL-MCNC: 3.7 G/DL (ref 3.4–5)
ALP SERPL-CCNC: 92 U/L (ref 45–117)
ALT SERPL-CCNC: 139 U/L (ref 12–78)
ANION GAP SERPL CALC-SCNC: 5 MMOL/L (ref 5–15)
BASOPHILS # BLD AUTO: 0.04 X10^3/UL (ref 0–0.1)
BASOPHILS NFR BLD AUTO: 1 % (ref 0–1)
BILIRUB SERPL-MCNC: 0.9 MG/DL (ref 0.2–1)
CALCIUM SERPL-MCNC: 9.5 MG/DL (ref 8.5–10.1)
CHLORIDE SERPL-SCNC: 103 MMOL/L (ref 98–107)
CREAT SERPL-MCNC: 0.84 MG/DL (ref 0.55–1.02)
EOSINOPHIL # BLD AUTO: 0.19 X10^3/UL (ref 0–0.4)
EOSINOPHIL NFR BLD AUTO: 2 % (ref 1–7)
ERYTHROCYTE [DISTWIDTH] IN BLOOD BY AUTOMATED COUNT: 13.1 % (ref 9.6–15.2)
LYMPHOCYTES # BLD AUTO: 2.12 X10^3/UL (ref 1–3.4)
LYMPHOCYTES NFR BLD AUTO: 28 % (ref 22–44)
MCH RBC QN AUTO: 34.1 PG (ref 27–34.8)
MCHC RBC AUTO-ENTMCNC: 34 G/DL (ref 32.4–35.8)
MCV RBC AUTO: 100.4 FL (ref 80–100)
MD: NO
MONOCYTES # BLD AUTO: 0.67 X10^3/UL (ref 0.2–0.8)
MONOCYTES NFR BLD AUTO: 9 % (ref 2–9)
NEUTROPHILS # BLD AUTO: 4.68 X10^3/UL (ref 1.8–6.8)
NEUTROPHILS NFR BLD AUTO: 61 % (ref 42–75)
PLATELET # BLD AUTO: 258 X10^3/UL (ref 130–400)
PMV BLD AUTO: 7.8 FL (ref 7.4–10.4)
PROT SERPL-MCNC: 7.2 G/DL (ref 6.4–8.2)
RBC # BLD AUTO: 4.85 X10^6/UL (ref 3.82–5.3)

## 2019-12-31 PROCEDURE — 99284 EMERGENCY DEPT VISIT MOD MDM: CPT

## 2019-12-31 PROCEDURE — 85025 COMPLETE CBC W/AUTO DIFF WBC: CPT

## 2019-12-31 PROCEDURE — 80307 DRUG TEST PRSMV CHEM ANLYZR: CPT

## 2019-12-31 PROCEDURE — 80053 COMPREHEN METABOLIC PANEL: CPT

## 2019-12-31 PROCEDURE — 36415 COLL VENOUS BLD VENIPUNCTURE: CPT

## 2019-12-31 PROCEDURE — 70486 CT MAXILLOFACIAL W/O DYE: CPT

## 2019-12-31 PROCEDURE — 70450 CT HEAD/BRAIN W/O DYE: CPT

## 2020-10-21 ENCOUNTER — HOSPITAL ENCOUNTER (EMERGENCY)
Dept: HOSPITAL 8 - ED | Age: 55
Discharge: HOME | End: 2020-10-21
Payer: MEDICAID

## 2020-10-21 VITALS — BODY MASS INDEX: 24.49 KG/M2 | WEIGHT: 165.35 LBS | HEIGHT: 69 IN

## 2020-10-21 VITALS — SYSTOLIC BLOOD PRESSURE: 146 MMHG | DIASTOLIC BLOOD PRESSURE: 71 MMHG

## 2020-10-21 DIAGNOSIS — R11.0: ICD-10-CM
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
## 2020-10-21 DIAGNOSIS — S09.90XA: Primary | ICD-10-CM

## 2020-10-21 PROCEDURE — 99285 EMERGENCY DEPT VISIT HI MDM: CPT

## 2020-10-21 PROCEDURE — 93005 ELECTROCARDIOGRAM TRACING: CPT

## 2020-10-21 PROCEDURE — 70450 CT HEAD/BRAIN W/O DYE: CPT

## 2020-10-21 PROCEDURE — 72125 CT NECK SPINE W/O DYE: CPT

## 2023-02-18 ENCOUNTER — HOSPITAL ENCOUNTER (INPATIENT)
Facility: MEDICAL CENTER | Age: 58
LOS: 9 days | DRG: 368 | End: 2023-02-27
Attending: EMERGENCY MEDICINE | Admitting: STUDENT IN AN ORGANIZED HEALTH CARE EDUCATION/TRAINING PROGRAM
Payer: COMMERCIAL

## 2023-02-18 ENCOUNTER — APPOINTMENT (OUTPATIENT)
Dept: RADIOLOGY | Facility: MEDICAL CENTER | Age: 58
DRG: 368 | End: 2023-02-18
Attending: EMERGENCY MEDICINE
Payer: COMMERCIAL

## 2023-02-18 DIAGNOSIS — S89.301A DISPLACED PHYSEAL FRACTURE OF DISTAL END OF RIGHT FIBULA, INITIAL ENCOUNTER: ICD-10-CM

## 2023-02-18 DIAGNOSIS — K92.2 UGIB (UPPER GASTROINTESTINAL BLEED): ICD-10-CM

## 2023-02-18 DIAGNOSIS — R57.8 HEMORRHAGIC SHOCK (HCC): ICD-10-CM

## 2023-02-18 PROBLEM — F10.939 ALCOHOL WITHDRAWAL (HCC): Status: ACTIVE | Noted: 2023-02-18

## 2023-02-18 PROBLEM — E87.29 HIGH ANION GAP METABOLIC ACIDOSIS: Status: ACTIVE | Noted: 2023-02-18

## 2023-02-18 PROBLEM — R74.01 TRANSAMINITIS: Status: ACTIVE | Noted: 2023-02-18

## 2023-02-18 PROBLEM — E87.20 LACTIC ACIDOSIS: Status: ACTIVE | Noted: 2023-02-18

## 2023-02-18 LAB
ABO GROUP BLD: NORMAL
ALBUMIN SERPL BCP-MCNC: 3.8 G/DL (ref 3.2–4.9)
ALBUMIN/GLOB SERPL: 1.4 G/DL
ALP SERPL-CCNC: 102 U/L (ref 30–99)
ALT SERPL-CCNC: 92 U/L (ref 2–50)
AMMONIA PLAS-SCNC: 86 UMOL/L (ref 11–45)
ANION GAP SERPL CALC-SCNC: 25 MMOL/L (ref 7–16)
APTT PPP: 25.4 SEC (ref 24.7–36)
AST SERPL-CCNC: 107 U/L (ref 12–45)
BARCODED ABORH UBTYP: 5100
BARCODED PRD CODE UBPRD: NORMAL
BARCODED UNIT NUM UBUNT: NORMAL
BASOPHILS # BLD AUTO: 0.9 % (ref 0–1.8)
BASOPHILS # BLD: 0.11 K/UL (ref 0–0.12)
BILIRUB SERPL-MCNC: 2.9 MG/DL (ref 0.1–1.5)
BLD GP AB SCN SERPL QL: NORMAL
BUN SERPL-MCNC: 21 MG/DL (ref 8–22)
CALCIUM ALBUM COR SERPL-MCNC: 9.6 MG/DL (ref 8.5–10.5)
CALCIUM SERPL-MCNC: 9.4 MG/DL (ref 8.5–10.5)
CHLORIDE SERPL-SCNC: 100 MMOL/L (ref 96–112)
CO2 SERPL-SCNC: 17 MMOL/L (ref 20–33)
COMPONENT R 8504R: NORMAL
CREAT SERPL-MCNC: 0.96 MG/DL (ref 0.5–1.4)
EOSINOPHIL # BLD AUTO: 0.17 K/UL (ref 0–0.51)
EOSINOPHIL NFR BLD: 1.5 % (ref 0–6.9)
ERYTHROCYTE [DISTWIDTH] IN BLOOD BY AUTOMATED COUNT: 50.3 FL (ref 35.9–50)
ETHANOL BLD-MCNC: 71 MG/DL
GFR SERPLBLD CREATININE-BSD FMLA CKD-EPI: 69 ML/MIN/1.73 M 2
GLOBULIN SER CALC-MCNC: 2.8 G/DL (ref 1.9–3.5)
GLUCOSE SERPL-MCNC: 196 MG/DL (ref 65–99)
HCT VFR BLD AUTO: 41 % (ref 37–47)
HGB BLD-MCNC: 13.6 G/DL (ref 12–16)
IMM GRANULOCYTES # BLD AUTO: 0.09 K/UL (ref 0–0.11)
IMM GRANULOCYTES NFR BLD AUTO: 0.8 % (ref 0–0.9)
INR PPP: 1.49 (ref 0.87–1.13)
LACTATE SERPL-SCNC: 12.6 MMOL/L (ref 0.5–2)
LACTATE SERPL-SCNC: 12.9 MMOL/L (ref 0.5–2)
LIPASE SERPL-CCNC: 38 U/L (ref 11–82)
LYMPHOCYTES # BLD AUTO: 3.51 K/UL (ref 1–4.8)
LYMPHOCYTES NFR BLD: 30.2 % (ref 22–41)
MCH RBC QN AUTO: 33.5 PG (ref 27–33)
MCHC RBC AUTO-ENTMCNC: 33.2 G/DL (ref 33.6–35)
MCV RBC AUTO: 101 FL (ref 81.4–97.8)
MONOCYTES # BLD AUTO: 0.62 K/UL (ref 0–0.85)
MONOCYTES NFR BLD AUTO: 5.3 % (ref 0–13.4)
NEUTROPHILS # BLD AUTO: 7.11 K/UL (ref 2–7.15)
NEUTROPHILS NFR BLD: 61.3 % (ref 44–72)
NRBC # BLD AUTO: 0.02 K/UL
NRBC BLD-RTO: 0.2 /100 WBC
PLATELET # BLD AUTO: 179 K/UL (ref 164–446)
PMV BLD AUTO: 10.4 FL (ref 9–12.9)
POTASSIUM SERPL-SCNC: 4.3 MMOL/L (ref 3.6–5.5)
PRODUCT TYPE UPROD: NORMAL
PROT SERPL-MCNC: 6.6 G/DL (ref 6–8.2)
PROTHROMBIN TIME: 17.7 SEC (ref 12–14.6)
RBC # BLD AUTO: 4.06 M/UL (ref 4.2–5.4)
RH BLD: NORMAL
SODIUM SERPL-SCNC: 142 MMOL/L (ref 135–145)
UNIT STATUS USTAT: NORMAL
WBC # BLD AUTO: 11.6 K/UL (ref 4.8–10.8)

## 2023-02-18 PROCEDURE — 82140 ASSAY OF AMMONIA: CPT

## 2023-02-18 PROCEDURE — 82077 ASSAY SPEC XCP UR&BREATH IA: CPT

## 2023-02-18 PROCEDURE — HZ2ZZZZ DETOXIFICATION SERVICES FOR SUBSTANCE ABUSE TREATMENT: ICD-10-PCS | Performed by: EMERGENCY MEDICINE

## 2023-02-18 PROCEDURE — 99223 1ST HOSP IP/OBS HIGH 75: CPT | Performed by: STUDENT IN AN ORGANIZED HEALTH CARE EDUCATION/TRAINING PROGRAM

## 2023-02-18 PROCEDURE — 83690 ASSAY OF LIPASE: CPT

## 2023-02-18 PROCEDURE — 99285 EMERGENCY DEPT VISIT HI MDM: CPT

## 2023-02-18 PROCEDURE — 94760 N-INVAS EAR/PLS OXIMETRY 1: CPT

## 2023-02-18 PROCEDURE — 80053 COMPREHEN METABOLIC PANEL: CPT

## 2023-02-18 PROCEDURE — 87077 CULTURE AEROBIC IDENTIFY: CPT

## 2023-02-18 PROCEDURE — 86900 BLOOD TYPING SEROLOGIC ABO: CPT

## 2023-02-18 PROCEDURE — 85610 PROTHROMBIN TIME: CPT

## 2023-02-18 PROCEDURE — 87522 HEPATITIS C REVRS TRNSCRPJ: CPT

## 2023-02-18 PROCEDURE — 99292 CRITICAL CARE ADDL 30 MIN: CPT | Performed by: INTERNAL MEDICINE

## 2023-02-18 PROCEDURE — 96365 THER/PROPH/DIAG IV INF INIT: CPT

## 2023-02-18 PROCEDURE — 96376 TX/PRO/DX INJ SAME DRUG ADON: CPT

## 2023-02-18 PROCEDURE — 76705 ECHO EXAM OF ABDOMEN: CPT

## 2023-02-18 PROCEDURE — 36415 COLL VENOUS BLD VENIPUNCTURE: CPT

## 2023-02-18 PROCEDURE — 85730 THROMBOPLASTIN TIME PARTIAL: CPT

## 2023-02-18 PROCEDURE — 96368 THER/DIAG CONCURRENT INF: CPT

## 2023-02-18 PROCEDURE — 700105 HCHG RX REV CODE 258: Performed by: EMERGENCY MEDICINE

## 2023-02-18 PROCEDURE — 770000 HCHG ROOM/CARE - INTERMEDIATE ICU *

## 2023-02-18 PROCEDURE — 700101 HCHG RX REV CODE 250: Performed by: INTERNAL MEDICINE

## 2023-02-18 PROCEDURE — 86901 BLOOD TYPING SEROLOGIC RH(D): CPT

## 2023-02-18 PROCEDURE — 96366 THER/PROPH/DIAG IV INF ADDON: CPT

## 2023-02-18 PROCEDURE — 86850 RBC ANTIBODY SCREEN: CPT

## 2023-02-18 PROCEDURE — 700111 HCHG RX REV CODE 636 W/ 250 OVERRIDE (IP): Performed by: EMERGENCY MEDICINE

## 2023-02-18 PROCEDURE — C9113 INJ PANTOPRAZOLE SODIUM, VIA: HCPCS | Performed by: EMERGENCY MEDICINE

## 2023-02-18 PROCEDURE — 99291 CRITICAL CARE FIRST HOUR: CPT | Performed by: INTERNAL MEDICINE

## 2023-02-18 PROCEDURE — 80074 ACUTE HEPATITIS PANEL: CPT

## 2023-02-18 PROCEDURE — 83605 ASSAY OF LACTIC ACID: CPT

## 2023-02-18 PROCEDURE — 85025 COMPLETE CBC W/AUTO DIFF WBC: CPT

## 2023-02-18 PROCEDURE — 96375 TX/PRO/DX INJ NEW DRUG ADDON: CPT

## 2023-02-18 PROCEDURE — 87040 BLOOD CULTURE FOR BACTERIA: CPT

## 2023-02-18 RX ORDER — PROMETHAZINE HYDROCHLORIDE 25 MG/1
12.5-25 TABLET ORAL EVERY 4 HOURS PRN
Status: DISCONTINUED | OUTPATIENT
Start: 2023-02-18 | End: 2023-02-19

## 2023-02-18 RX ORDER — LORAZEPAM 2 MG/ML
1.5 INJECTION INTRAMUSCULAR
Status: DISCONTINUED | OUTPATIENT
Start: 2023-02-18 | End: 2023-02-18

## 2023-02-18 RX ORDER — OCTREOTIDE ACETATE 100 UG/ML
50 INJECTION, SOLUTION INTRAVENOUS; SUBCUTANEOUS ONCE
Status: COMPLETED | OUTPATIENT
Start: 2023-02-18 | End: 2023-02-19

## 2023-02-18 RX ORDER — LORAZEPAM 2 MG/ML
2 INJECTION INTRAMUSCULAR EVERY 4 HOURS
Status: DISCONTINUED | OUTPATIENT
Start: 2023-02-18 | End: 2023-02-19

## 2023-02-18 RX ORDER — DEXMEDETOMIDINE HYDROCHLORIDE 4 UG/ML
.1-1 INJECTION, SOLUTION INTRAVENOUS CONTINUOUS
Status: DISCONTINUED | OUTPATIENT
Start: 2023-02-18 | End: 2023-02-19

## 2023-02-18 RX ORDER — ONDANSETRON 2 MG/ML
4 INJECTION INTRAMUSCULAR; INTRAVENOUS ONCE
Status: COMPLETED | OUTPATIENT
Start: 2023-02-18 | End: 2023-02-18

## 2023-02-18 RX ORDER — LORAZEPAM 2 MG/ML
2 INJECTION INTRAMUSCULAR ONCE
Status: COMPLETED | OUTPATIENT
Start: 2023-02-18 | End: 2023-02-18

## 2023-02-18 RX ORDER — SODIUM CHLORIDE, SODIUM LACTATE, POTASSIUM CHLORIDE, CALCIUM CHLORIDE 600; 310; 30; 20 MG/100ML; MG/100ML; MG/100ML; MG/100ML
1000 INJECTION, SOLUTION INTRAVENOUS ONCE
Status: COMPLETED | OUTPATIENT
Start: 2023-02-18 | End: 2023-02-18

## 2023-02-18 RX ORDER — LORAZEPAM 2 MG/ML
0.5 INJECTION INTRAMUSCULAR EVERY 4 HOURS PRN
Status: DISCONTINUED | OUTPATIENT
Start: 2023-02-18 | End: 2023-02-18

## 2023-02-18 RX ORDER — LORAZEPAM 2 MG/ML
1-2 INJECTION INTRAMUSCULAR
Status: DISCONTINUED | OUTPATIENT
Start: 2023-02-18 | End: 2023-02-19

## 2023-02-18 RX ORDER — LORAZEPAM 2 MG/1
4 TABLET ORAL
Status: DISCONTINUED | OUTPATIENT
Start: 2023-02-18 | End: 2023-02-18

## 2023-02-18 RX ORDER — LORAZEPAM 1 MG/1
1 TABLET ORAL EVERY 4 HOURS PRN
Status: DISCONTINUED | OUTPATIENT
Start: 2023-02-18 | End: 2023-02-18

## 2023-02-18 RX ORDER — PROCHLORPERAZINE EDISYLATE 5 MG/ML
5-10 INJECTION INTRAMUSCULAR; INTRAVENOUS EVERY 4 HOURS PRN
Status: DISCONTINUED | OUTPATIENT
Start: 2023-02-18 | End: 2023-02-27 | Stop reason: HOSPADM

## 2023-02-18 RX ORDER — LORAZEPAM 2 MG/ML
2 INJECTION INTRAMUSCULAR
Status: DISCONTINUED | OUTPATIENT
Start: 2023-02-18 | End: 2023-02-18

## 2023-02-18 RX ORDER — LORAZEPAM 1 MG/1
0.5 TABLET ORAL EVERY 4 HOURS PRN
Status: DISCONTINUED | OUTPATIENT
Start: 2023-02-18 | End: 2023-02-18

## 2023-02-18 RX ORDER — ONDANSETRON 4 MG/1
4 TABLET, ORALLY DISINTEGRATING ORAL EVERY 4 HOURS PRN
Status: DISCONTINUED | OUTPATIENT
Start: 2023-02-18 | End: 2023-02-19

## 2023-02-18 RX ORDER — CEFTRIAXONE 1 G/1
1000 INJECTION, POWDER, FOR SOLUTION INTRAMUSCULAR; INTRAVENOUS ONCE
Status: COMPLETED | OUTPATIENT
Start: 2023-02-18 | End: 2023-02-18

## 2023-02-18 RX ORDER — FOLIC ACID 1 MG/1
1 TABLET ORAL DAILY
Status: DISCONTINUED | OUTPATIENT
Start: 2023-02-19 | End: 2023-02-19

## 2023-02-18 RX ORDER — PROMETHAZINE HYDROCHLORIDE 25 MG/1
12.5-25 SUPPOSITORY RECTAL EVERY 4 HOURS PRN
Status: DISCONTINUED | OUTPATIENT
Start: 2023-02-18 | End: 2023-02-27 | Stop reason: HOSPADM

## 2023-02-18 RX ORDER — LORAZEPAM 2 MG/ML
1 INJECTION INTRAMUSCULAR
Status: DISCONTINUED | OUTPATIENT
Start: 2023-02-18 | End: 2023-02-18

## 2023-02-18 RX ORDER — SODIUM CHLORIDE, SODIUM LACTATE, POTASSIUM CHLORIDE, AND CALCIUM CHLORIDE .6; .31; .03; .02 G/100ML; G/100ML; G/100ML; G/100ML
30 INJECTION, SOLUTION INTRAVENOUS ONCE
Status: COMPLETED | OUTPATIENT
Start: 2023-02-18 | End: 2023-02-19

## 2023-02-18 RX ORDER — GAUZE BANDAGE 2" X 2"
100 BANDAGE TOPICAL DAILY
Status: DISCONTINUED | OUTPATIENT
Start: 2023-02-19 | End: 2023-02-18

## 2023-02-18 RX ORDER — ONDANSETRON 2 MG/ML
4 INJECTION INTRAMUSCULAR; INTRAVENOUS EVERY 4 HOURS PRN
Status: DISCONTINUED | OUTPATIENT
Start: 2023-02-18 | End: 2023-02-27 | Stop reason: HOSPADM

## 2023-02-18 RX ORDER — LORAZEPAM 2 MG/1
2 TABLET ORAL
Status: DISCONTINUED | OUTPATIENT
Start: 2023-02-18 | End: 2023-02-18

## 2023-02-18 RX ADMIN — ONDANSETRON 4 MG: 2 INJECTION INTRAMUSCULAR; INTRAVENOUS at 22:25

## 2023-02-18 RX ADMIN — SODIUM CHLORIDE, POTASSIUM CHLORIDE, SODIUM LACTATE AND CALCIUM CHLORIDE 1000 ML: 600; 310; 30; 20 INJECTION, SOLUTION INTRAVENOUS at 20:57

## 2023-02-18 RX ADMIN — PANTOPRAZOLE SODIUM 8 MG/HR: 40 INJECTION, POWDER, FOR SOLUTION INTRAVENOUS at 21:30

## 2023-02-18 RX ADMIN — PANTOPRAZOLE SODIUM 80 MG: 40 INJECTION, POWDER, FOR SOLUTION INTRAVENOUS at 21:36

## 2023-02-18 RX ADMIN — SODIUM CHLORIDE, POTASSIUM CHLORIDE, SODIUM LACTATE AND CALCIUM CHLORIDE 2178 ML: 600; 310; 30; 20 INJECTION, SOLUTION INTRAVENOUS at 21:15

## 2023-02-18 RX ADMIN — LORAZEPAM 2 MG: 2 INJECTION INTRAMUSCULAR; INTRAVENOUS at 22:25

## 2023-02-18 RX ADMIN — CEFTRIAXONE SODIUM 1000 MG: 1 INJECTION, POWDER, FOR SOLUTION INTRAMUSCULAR; INTRAVENOUS at 21:14

## 2023-02-18 RX ADMIN — ONDANSETRON 4 MG: 2 INJECTION INTRAMUSCULAR; INTRAVENOUS at 20:30

## 2023-02-18 RX ADMIN — PANTOPRAZOLE SODIUM 8 MG/HR: 40 INJECTION, POWDER, FOR SOLUTION INTRAVENOUS at 21:32

## 2023-02-18 RX ADMIN — THIAMINE HYDROCHLORIDE: 100 INJECTION, SOLUTION INTRAMUSCULAR; INTRAVENOUS at 23:13

## 2023-02-18 ASSESSMENT — ENCOUNTER SYMPTOMS
MYALGIAS: 0
BLURRED VISION: 0
VOMITING: 1
FEVER: 0
NAUSEA: 1
COUGH: 0
DIZZINESS: 0
CHILLS: 0
PALPITATIONS: 0
ABDOMINAL PAIN: 1
HEARTBURN: 0
BRUISES/BLEEDS EASILY: 0
DEPRESSION: 0
HEADACHES: 0
NECK PAIN: 0
HEMOPTYSIS: 0
DOUBLE VISION: 0

## 2023-02-19 ENCOUNTER — APPOINTMENT (OUTPATIENT)
Dept: RADIOLOGY | Facility: MEDICAL CENTER | Age: 58
DRG: 368 | End: 2023-02-19
Attending: INTERNAL MEDICINE
Payer: COMMERCIAL

## 2023-02-19 ENCOUNTER — APPOINTMENT (OUTPATIENT)
Dept: RADIOLOGY | Facility: MEDICAL CENTER | Age: 58
DRG: 368 | End: 2023-02-19
Attending: STUDENT IN AN ORGANIZED HEALTH CARE EDUCATION/TRAINING PROGRAM
Payer: COMMERCIAL

## 2023-02-19 ENCOUNTER — APPOINTMENT (OUTPATIENT)
Dept: CARDIOLOGY | Facility: MEDICAL CENTER | Age: 58
DRG: 368 | End: 2023-02-19
Attending: STUDENT IN AN ORGANIZED HEALTH CARE EDUCATION/TRAINING PROGRAM
Payer: COMMERCIAL

## 2023-02-19 PROBLEM — B19.20 HEPATITIS C: Status: ACTIVE | Noted: 2023-02-19

## 2023-02-19 PROBLEM — R57.8 HEMORRHAGIC SHOCK (HCC): Status: ACTIVE | Noted: 2023-02-19

## 2023-02-19 PROBLEM — K80.20 CHOLELITHIASIS: Status: ACTIVE | Noted: 2023-02-19

## 2023-02-19 PROBLEM — E83.42 HYPOMAGNESEMIA: Status: ACTIVE | Noted: 2023-02-19

## 2023-02-19 PROBLEM — J96.01 ACUTE RESPIRATORY FAILURE WITH HYPOXIA (HCC): Status: ACTIVE | Noted: 2023-02-19

## 2023-02-19 PROBLEM — D69.6 THROMBOCYTOPENIA (HCC): Status: ACTIVE | Noted: 2023-02-19

## 2023-02-19 LAB
ABO + RH BLD: NORMAL
ANION GAP SERPL CALC-SCNC: 21 MMOL/L (ref 7–16)
BARCODED ABORH UBTYP: 5100
BARCODED ABORH UBTYP: 6200
BARCODED ABORH UBTYP: 6200
BARCODED ABORH UBTYP: 7300
BARCODED ABORH UBTYP: 8400
BARCODED PRD CODE UBPRD: NORMAL
BARCODED UNIT NUM UBUNT: NORMAL
BASE EXCESS BLDA CALC-SCNC: -4 MMOL/L (ref -4–3)
BASOPHILS # BLD AUTO: 0.4 % (ref 0–1.8)
BASOPHILS # BLD: 0.04 K/UL (ref 0–0.12)
BLOOD CULTURE HOLD CXBCH: NORMAL
BLOOD CULTURE HOLD CXBCH: NORMAL
BODY TEMPERATURE: ABNORMAL DEGREES
BREATHS SETTING VENT: 18
BUN SERPL-MCNC: 19 MG/DL (ref 8–22)
CALCIUM SERPL-MCNC: 8.8 MG/DL (ref 8.5–10.5)
CFT BLD TEG: 4.7 MIN (ref 4.6–9.1)
CFT BLD TEG: 5.8 MIN (ref 4.6–9.1)
CFT P HPASE BLD TEG: 4.7 MIN (ref 4.3–8.3)
CFT P HPASE BLD TEG: 5.2 MIN (ref 4.3–8.3)
CHLORIDE SERPL-SCNC: 108 MMOL/L (ref 96–112)
CLOT ANGLE BLD TEG: 68.7 DEGREES (ref 63–78)
CLOT ANGLE BLD TEG: 72.2 DEGREES (ref 63–78)
CLOT LYSIS 30M P MA LENFR BLD TEG: 0 % (ref 0–2.6)
CLOT LYSIS 30M P MA LENFR BLD TEG: 0 % (ref 0–2.6)
CO2 BLDA-SCNC: 24 MMOL/L (ref 20–33)
CO2 SERPL-SCNC: 20 MMOL/L (ref 20–33)
COMPONENT F 8504F: NORMAL
COMPONENT FT 8504FT: NORMAL
COMPONENT P 8504P: NORMAL
COMPONENT R 8504R: NORMAL
CREAT SERPL-MCNC: 0.92 MG/DL (ref 0.5–1.4)
CT.EXTRINSIC BLD ROTEM: 1.3 MIN (ref 0.8–2.1)
CT.EXTRINSIC BLD ROTEM: 1.8 MIN (ref 0.8–2.1)
DELSYS IDSYS: ABNORMAL
EKG IMPRESSION: NORMAL
EOSINOPHIL # BLD AUTO: 0.02 K/UL (ref 0–0.51)
EOSINOPHIL NFR BLD: 0.2 % (ref 0–6.9)
ERYTHROCYTE [DISTWIDTH] IN BLOOD BY AUTOMATED COUNT: 49 FL (ref 35.9–50)
FERRITIN SERPL-MCNC: 833 NG/ML (ref 10–291)
FOLATE SERPL-MCNC: >40 NG/ML
GFR SERPLBLD CREATININE-BSD FMLA CKD-EPI: 73 ML/MIN/1.73 M 2
GLUCOSE BLD STRIP.AUTO-MCNC: 117 MG/DL (ref 65–99)
GLUCOSE BLD STRIP.AUTO-MCNC: 122 MG/DL (ref 65–99)
GLUCOSE BLD STRIP.AUTO-MCNC: 123 MG/DL (ref 65–99)
GLUCOSE SERPL-MCNC: 137 MG/DL (ref 65–99)
HAV IGM SERPL QL IA: ABNORMAL
HBV CORE IGM SER QL: ABNORMAL
HBV SURFACE AG SER QL: ABNORMAL
HCO3 BLDA-SCNC: 22.6 MMOL/L (ref 17–25)
HCT VFR BLD AUTO: 30.3 % (ref 37–47)
HCT VFR BLD AUTO: 30.6 % (ref 37–47)
HCT VFR BLD AUTO: 31 % (ref 37–47)
HCT VFR BLD AUTO: 32.5 % (ref 37–47)
HCT VFR BLD AUTO: 37.5 % (ref 37–47)
HCV AB SER QL: REACTIVE
HGB BLD-MCNC: 10.9 G/DL (ref 12–16)
HGB BLD-MCNC: 11 G/DL (ref 12–16)
HGB BLD-MCNC: 11.1 G/DL (ref 12–16)
HGB BLD-MCNC: 11.6 G/DL (ref 12–16)
HGB BLD-MCNC: 12.6 G/DL (ref 12–16)
HGB BLD-MCNC: 7.4 G/DL (ref 12–16)
HOROWITZ INDEX BLDA+IHG-RTO: 102 MM[HG]
IMM GRANULOCYTES # BLD AUTO: 0.31 K/UL (ref 0–0.11)
IMM GRANULOCYTES NFR BLD AUTO: 2.9 % (ref 0–0.9)
INR PPP: 1.73 (ref 0.87–1.13)
IRON SATN MFR SERPL: 90 % (ref 15–55)
IRON SERPL-MCNC: 153 UG/DL (ref 40–170)
LACTATE SERPL-SCNC: 10.9 MMOL/L (ref 0.5–2)
LACTATE SERPL-SCNC: 13.3 MMOL/L (ref 0.5–2)
LACTATE SERPL-SCNC: 2.1 MMOL/L (ref 0.5–2)
LDH SERPL L TO P-CCNC: 233 U/L (ref 107–266)
LYMPHOCYTES # BLD AUTO: 0.66 K/UL (ref 1–4.8)
LYMPHOCYTES NFR BLD: 6.2 % (ref 22–41)
MAGNESIUM SERPL-MCNC: 1.2 MG/DL (ref 1.5–2.5)
MCF BLD TEG: 50.5 MM (ref 52–69)
MCF BLD TEG: 52.2 MM (ref 52–69)
MCF.PLATELET INHIB BLD ROTEM: 16.1 MM (ref 15–32)
MCF.PLATELET INHIB BLD ROTEM: 18.2 MM (ref 15–32)
MCH RBC QN AUTO: 30.7 PG (ref 27–33)
MCHC RBC AUTO-ENTMCNC: 33.6 G/DL (ref 33.6–35)
MCV RBC AUTO: 91.5 FL (ref 81.4–97.8)
MODE IMODE: ABNORMAL
MONOCYTES # BLD AUTO: 0.25 K/UL (ref 0–0.85)
MONOCYTES NFR BLD AUTO: 2.3 % (ref 0–13.4)
NEUTROPHILS # BLD AUTO: 9.36 K/UL (ref 2–7.15)
NEUTROPHILS NFR BLD: 88 % (ref 44–72)
NRBC # BLD AUTO: 0.02 K/UL
NRBC BLD-RTO: 0.2 /100 WBC
O2/TOTAL GAS SETTING VFR VENT: 60 %
PA AA BLD-ACNC: 30.3 % (ref 0–11)
PA AA BLD-ACNC: ABNORMAL % (ref 0–11)
PA ADP BLD-ACNC: 27.2 % (ref 0–17)
PA ADP BLD-ACNC: ABNORMAL % (ref 0–17)
PCO2 BLDA: 46.9 MMHG (ref 26–37)
PCO2 TEMP ADJ BLDA: 45.4 MMHG (ref 26–37)
PEEP END EXPIRATORY PRESSURE IPEEP: 8 CMH20
PH BLDA: 7.29 [PH] (ref 7.4–7.5)
PH TEMP ADJ BLDA: 7.3 [PH] (ref 7.4–7.5)
PHOSPHATE SERPL-MCNC: 3.3 MG/DL (ref 2.5–4.5)
PLATELET # BLD AUTO: 91 K/UL (ref 164–446)
PMV BLD AUTO: 11 FL (ref 9–12.9)
PO2 BLDA: 61 MMHG (ref 64–87)
PO2 TEMP ADJ BLDA: 58 MMHG (ref 64–87)
POTASSIUM SERPL-SCNC: 4 MMOL/L (ref 3.6–5.5)
PRODUCT TYPE UPROD: NORMAL
PROTHROMBIN TIME: 19.9 SEC (ref 12–14.6)
RBC # BLD AUTO: 4.1 M/UL (ref 4.2–5.4)
SAO2 % BLDA: 88 % (ref 93–99)
SODIUM SERPL-SCNC: 149 MMOL/L (ref 135–145)
SPECIMEN DRAWN FROM PATIENT: ABNORMAL
TEG ALGORITHM TGALG: ABNORMAL
TEG ALGORITHM TGALG: ABNORMAL
TIBC SERPL-MCNC: 170 UG/DL (ref 250–450)
TIDAL VOLUME IVT: 440 ML
TRANSFERRIN SERPL-MCNC: 120 MG/DL (ref 200–370)
UIBC SERPL-MCNC: <17 UG/DL (ref 110–370)
UNIT STATUS USTAT: NORMAL
VIT B12 SERPL-MCNC: 686 PG/ML (ref 211–911)
WBC # BLD AUTO: 10.6 K/UL (ref 4.8–10.8)

## 2023-02-19 PROCEDURE — 99291 CRITICAL CARE FIRST HOUR: CPT | Performed by: STUDENT IN AN ORGANIZED HEALTH CARE EDUCATION/TRAINING PROGRAM

## 2023-02-19 PROCEDURE — 700105 HCHG RX REV CODE 258: Performed by: STUDENT IN AN ORGANIZED HEALTH CARE EDUCATION/TRAINING PROGRAM

## 2023-02-19 PROCEDURE — 99291 CRITICAL CARE FIRST HOUR: CPT | Mod: 25 | Performed by: INTERNAL MEDICINE

## 2023-02-19 PROCEDURE — 93306 TTE W/DOPPLER COMPLETE: CPT

## 2023-02-19 PROCEDURE — 84100 ASSAY OF PHOSPHORUS: CPT

## 2023-02-19 PROCEDURE — 700111 HCHG RX REV CODE 636 W/ 250 OVERRIDE (IP)

## 2023-02-19 PROCEDURE — 99254 IP/OBS CNSLTJ NEW/EST MOD 60: CPT | Mod: 25 | Performed by: SPECIALIST

## 2023-02-19 PROCEDURE — 0W3P8ZZ CONTROL BLEEDING IN GASTROINTESTINAL TRACT, VIA NATURAL OR ARTIFICIAL OPENING ENDOSCOPIC: ICD-10-PCS | Performed by: SPECIALIST

## 2023-02-19 PROCEDURE — 700101 HCHG RX REV CODE 250: Performed by: STUDENT IN AN ORGANIZED HEALTH CARE EDUCATION/TRAINING PROGRAM

## 2023-02-19 PROCEDURE — 85576 BLOOD PLATELET AGGREGATION: CPT | Mod: 91

## 2023-02-19 PROCEDURE — 84466 ASSAY OF TRANSFERRIN: CPT

## 2023-02-19 PROCEDURE — 700102 HCHG RX REV CODE 250 W/ 637 OVERRIDE(OP): Performed by: INTERNAL MEDICINE

## 2023-02-19 PROCEDURE — 82962 GLUCOSE BLOOD TEST: CPT | Mod: 91

## 2023-02-19 PROCEDURE — 700111 HCHG RX REV CODE 636 W/ 250 OVERRIDE (IP): Performed by: INTERNAL MEDICINE

## 2023-02-19 PROCEDURE — 85018 HEMOGLOBIN: CPT | Mod: 91

## 2023-02-19 PROCEDURE — 83605 ASSAY OF LACTIC ACID: CPT | Mod: 91

## 2023-02-19 PROCEDURE — 83010 ASSAY OF HAPTOGLOBIN QUANT: CPT

## 2023-02-19 PROCEDURE — 700111 HCHG RX REV CODE 636 W/ 250 OVERRIDE (IP): Performed by: STUDENT IN AN ORGANIZED HEALTH CARE EDUCATION/TRAINING PROGRAM

## 2023-02-19 PROCEDURE — 99153 MOD SED SAME PHYS/QHP EA: CPT

## 2023-02-19 PROCEDURE — C9113 INJ PANTOPRAZOLE SODIUM, VIA: HCPCS | Performed by: EMERGENCY MEDICINE

## 2023-02-19 PROCEDURE — 03HY32Z INSERTION OF MONITORING DEVICE INTO UPPER ARTERY, PERCUTANEOUS APPROACH: ICD-10-PCS | Performed by: INTERNAL MEDICINE

## 2023-02-19 PROCEDURE — 87522 HEPATITIS C REVRS TRNSCRPJ: CPT

## 2023-02-19 PROCEDURE — 31500 INSERT EMERGENCY AIRWAY: CPT | Mod: GC | Performed by: INTERNAL MEDICINE

## 2023-02-19 PROCEDURE — 770022 HCHG ROOM/CARE - ICU (200)

## 2023-02-19 PROCEDURE — 30233N1 TRANSFUSION OF NONAUTOLOGOUS RED BLOOD CELLS INTO PERIPHERAL VEIN, PERCUTANEOUS APPROACH: ICD-10-PCS | Performed by: INTERNAL MEDICINE

## 2023-02-19 PROCEDURE — 700101 HCHG RX REV CODE 250: Performed by: NURSE PRACTITIONER

## 2023-02-19 PROCEDURE — 85025 COMPLETE CBC W/AUTO DIFF WBC: CPT

## 2023-02-19 PROCEDURE — P9016 RBC LEUKOCYTES REDUCED: HCPCS | Mod: 91

## 2023-02-19 PROCEDURE — 83735 ASSAY OF MAGNESIUM: CPT

## 2023-02-19 PROCEDURE — 83550 IRON BINDING TEST: CPT

## 2023-02-19 PROCEDURE — 5A1945Z RESPIRATORY VENTILATION, 24-96 CONSECUTIVE HOURS: ICD-10-PCS | Performed by: INTERNAL MEDICINE

## 2023-02-19 PROCEDURE — 85347 COAGULATION TIME ACTIVATED: CPT

## 2023-02-19 PROCEDURE — 502240 HCHG MISC OR SUPPLY RC 0272: Performed by: SPECIALIST

## 2023-02-19 PROCEDURE — 700117 HCHG RX CONTRAST REV CODE 255: Performed by: INTERNAL MEDICINE

## 2023-02-19 PROCEDURE — 700111 HCHG RX REV CODE 636 W/ 250 OVERRIDE (IP): Performed by: EMERGENCY MEDICINE

## 2023-02-19 PROCEDURE — 700105 HCHG RX REV CODE 258: Performed by: EMERGENCY MEDICINE

## 2023-02-19 PROCEDURE — 0BH17EZ INSERTION OF ENDOTRACHEAL AIRWAY INTO TRACHEA, VIA NATURAL OR ARTIFICIAL OPENING: ICD-10-PCS | Performed by: INTERNAL MEDICINE

## 2023-02-19 PROCEDURE — 94003 VENT MGMT INPAT SUBQ DAY: CPT

## 2023-02-19 PROCEDURE — 83615 LACTATE (LD) (LDH) ENZYME: CPT

## 2023-02-19 PROCEDURE — 94002 VENT MGMT INPAT INIT DAY: CPT

## 2023-02-19 PROCEDURE — A9270 NON-COVERED ITEM OR SERVICE: HCPCS | Performed by: INTERNAL MEDICINE

## 2023-02-19 PROCEDURE — P9017 PLASMA 1 DONOR FRZ W/IN 8 HR: HCPCS

## 2023-02-19 PROCEDURE — 160207 HCHG ENDO MINUTES - EA ADDL 1 MIN LEVEL 3: Performed by: SPECIALIST

## 2023-02-19 PROCEDURE — 36556 INSERT NON-TUNNEL CV CATH: CPT | Mod: RT,GC | Performed by: INTERNAL MEDICINE

## 2023-02-19 PROCEDURE — C1889 IMPLANT/INSERT DEVICE, NOC: HCPCS | Performed by: SPECIALIST

## 2023-02-19 PROCEDURE — 160202 HCHG ENDO MINUTES - 1ST 30 MINS LEVEL 3: Performed by: SPECIALIST

## 2023-02-19 PROCEDURE — 85384 FIBRINOGEN ACTIVITY: CPT

## 2023-02-19 PROCEDURE — 85610 PROTHROMBIN TIME: CPT

## 2023-02-19 PROCEDURE — 94760 N-INVAS EAR/PLS OXIMETRY 1: CPT

## 2023-02-19 PROCEDURE — P9034 PLATELETS, PHERESIS: HCPCS

## 2023-02-19 PROCEDURE — 94799 UNLISTED PULMONARY SVC/PX: CPT

## 2023-02-19 PROCEDURE — 93005 ELECTROCARDIOGRAM TRACING: CPT | Performed by: INTERNAL MEDICINE

## 2023-02-19 PROCEDURE — 31500 INSERT EMERGENCY AIRWAY: CPT

## 2023-02-19 PROCEDURE — 82746 ASSAY OF FOLIC ACID SERUM: CPT

## 2023-02-19 PROCEDURE — 700105 HCHG RX REV CODE 258: Performed by: INTERNAL MEDICINE

## 2023-02-19 PROCEDURE — 37799 UNLISTED PX VASCULAR SURGERY: CPT

## 2023-02-19 PROCEDURE — 700101 HCHG RX REV CODE 250: Performed by: INTERNAL MEDICINE

## 2023-02-19 PROCEDURE — 86923 COMPATIBILITY TEST ELECTRIC: CPT | Mod: 91

## 2023-02-19 PROCEDURE — 36430 TRANSFUSION BLD/BLD COMPNT: CPT

## 2023-02-19 PROCEDURE — 82607 VITAMIN B-12: CPT

## 2023-02-19 PROCEDURE — 83540 ASSAY OF IRON: CPT

## 2023-02-19 PROCEDURE — 82803 BLOOD GASES ANY COMBINATION: CPT

## 2023-02-19 PROCEDURE — 43255 EGD CONTROL BLEEDING ANY: CPT | Performed by: SPECIALIST

## 2023-02-19 PROCEDURE — 93010 ELECTROCARDIOGRAM REPORT: CPT | Performed by: INTERNAL MEDICINE

## 2023-02-19 PROCEDURE — 99152 MOD SED SAME PHYS/QHP 5/>YRS: CPT

## 2023-02-19 PROCEDURE — 80048 BASIC METABOLIC PNL TOTAL CA: CPT

## 2023-02-19 PROCEDURE — C1751 CATH, INF, PER/CENT/MIDLINE: HCPCS

## 2023-02-19 PROCEDURE — 71045 X-RAY EXAM CHEST 1 VIEW: CPT

## 2023-02-19 PROCEDURE — 85014 HEMATOCRIT: CPT | Mod: 91

## 2023-02-19 PROCEDURE — 160048 HCHG OR STATISTICAL LEVEL 1-5: Performed by: SPECIALIST

## 2023-02-19 PROCEDURE — 36556 INSERT NON-TUNNEL CV CATH: CPT

## 2023-02-19 PROCEDURE — 36620 INSERTION CATHETER ARTERY: CPT | Performed by: INTERNAL MEDICINE

## 2023-02-19 PROCEDURE — 36620 INSERTION CATHETER ARTERY: CPT

## 2023-02-19 PROCEDURE — 82728 ASSAY OF FERRITIN: CPT

## 2023-02-19 RX ORDER — MIDAZOLAM HYDROCHLORIDE 1 MG/ML
2 INJECTION INTRAMUSCULAR; INTRAVENOUS ONCE
Status: DISCONTINUED | OUTPATIENT
Start: 2023-02-19 | End: 2023-02-19

## 2023-02-19 RX ORDER — SODIUM CHLORIDE, SODIUM LACTATE, POTASSIUM CHLORIDE, CALCIUM CHLORIDE 600; 310; 30; 20 MG/100ML; MG/100ML; MG/100ML; MG/100ML
INJECTION, SOLUTION INTRAVENOUS CONTINUOUS
Status: DISCONTINUED | OUTPATIENT
Start: 2023-02-19 | End: 2023-02-22

## 2023-02-19 RX ORDER — PANTOPRAZOLE SODIUM 40 MG/10ML
40 INJECTION, POWDER, LYOPHILIZED, FOR SOLUTION INTRAVENOUS 2 TIMES DAILY
Status: DISCONTINUED | OUTPATIENT
Start: 2023-02-19 | End: 2023-02-19

## 2023-02-19 RX ORDER — MIDODRINE HYDROCHLORIDE 5 MG/1
5 TABLET ORAL
Status: DISCONTINUED | OUTPATIENT
Start: 2023-02-19 | End: 2023-02-19

## 2023-02-19 RX ORDER — FOLIC ACID 1 MG/1
1 TABLET ORAL DAILY
Status: DISPENSED | OUTPATIENT
Start: 2023-02-20 | End: 2023-02-23

## 2023-02-19 RX ORDER — MAGNESIUM SULFATE HEPTAHYDRATE 40 MG/ML
2 INJECTION, SOLUTION INTRAVENOUS ONCE
Status: DISCONTINUED | OUTPATIENT
Start: 2023-02-19 | End: 2023-02-19

## 2023-02-19 RX ORDER — CALCIUM CHLORIDE 100 MG/ML
1 INJECTION INTRAVENOUS; INTRAVENTRICULAR ONCE
Status: COMPLETED | OUTPATIENT
Start: 2023-02-19 | End: 2023-02-19

## 2023-02-19 RX ORDER — PHYTONADIONE 5 MG/1
10 TABLET ORAL DAILY
Status: DISCONTINUED | OUTPATIENT
Start: 2023-02-19 | End: 2023-02-19

## 2023-02-19 RX ORDER — MAGNESIUM SULFATE HEPTAHYDRATE 40 MG/ML
4 INJECTION, SOLUTION INTRAVENOUS ONCE
Status: COMPLETED | OUTPATIENT
Start: 2023-02-19 | End: 2023-02-19

## 2023-02-19 RX ORDER — POLYETHYLENE GLYCOL 3350 17 G/17G
1 POWDER, FOR SOLUTION ORAL
Status: DISCONTINUED | OUTPATIENT
Start: 2023-02-19 | End: 2023-02-24

## 2023-02-19 RX ORDER — SODIUM CHLORIDE 9 MG/ML
INJECTION, SOLUTION INTRAVENOUS CONTINUOUS
Status: DISCONTINUED | OUTPATIENT
Start: 2023-02-19 | End: 2023-02-19

## 2023-02-19 RX ORDER — ROCURONIUM BROMIDE 10 MG/ML
50 INJECTION, SOLUTION INTRAVENOUS ONCE
Status: COMPLETED | OUTPATIENT
Start: 2023-02-19 | End: 2023-02-19

## 2023-02-19 RX ORDER — NOREPINEPHRINE BITARTRATE 0.03 MG/ML
INJECTION, SOLUTION INTRAVENOUS
Status: DISCONTINUED
Start: 2023-02-19 | End: 2023-02-19

## 2023-02-19 RX ORDER — DEXMEDETOMIDINE HYDROCHLORIDE 4 UG/ML
0-1.5 INJECTION, SOLUTION INTRAVENOUS CONTINUOUS
Status: DISCONTINUED | OUTPATIENT
Start: 2023-02-19 | End: 2023-02-19

## 2023-02-19 RX ORDER — PHENYLEPHRINE HCL IN 0.9% NACL 0.5 MG/5ML
100 SYRINGE (ML) INTRAVENOUS
Status: ACTIVE | OUTPATIENT
Start: 2023-02-19 | End: 2023-02-19

## 2023-02-19 RX ORDER — MIDAZOLAM HYDROCHLORIDE 1 MG/ML
5 INJECTION INTRAMUSCULAR; INTRAVENOUS ONCE
Status: COMPLETED | OUTPATIENT
Start: 2023-02-19 | End: 2023-02-19

## 2023-02-19 RX ORDER — PROMETHAZINE HYDROCHLORIDE 25 MG/1
12.5-25 TABLET ORAL EVERY 4 HOURS PRN
Status: DISCONTINUED | OUTPATIENT
Start: 2023-02-19 | End: 2023-02-24

## 2023-02-19 RX ORDER — MIDODRINE HYDROCHLORIDE 5 MG/1
5 TABLET ORAL
Status: DISCONTINUED | OUTPATIENT
Start: 2023-02-19 | End: 2023-02-24

## 2023-02-19 RX ORDER — ROCURONIUM BROMIDE 10 MG/ML
50 INJECTION, SOLUTION INTRAVENOUS ONCE
Status: DISCONTINUED | OUTPATIENT
Start: 2023-02-19 | End: 2023-02-19

## 2023-02-19 RX ORDER — SODIUM CHLORIDE 9 MG/ML
250 INJECTION, SOLUTION INTRAVENOUS ONCE
Status: COMPLETED | OUTPATIENT
Start: 2023-02-19 | End: 2023-02-19

## 2023-02-19 RX ORDER — MIDAZOLAM HYDROCHLORIDE 1 MG/ML
INJECTION INTRAMUSCULAR; INTRAVENOUS
Status: COMPLETED
Start: 2023-02-19 | End: 2023-02-19

## 2023-02-19 RX ORDER — ONDANSETRON 4 MG/1
4 TABLET, ORALLY DISINTEGRATING ORAL EVERY 4 HOURS PRN
Status: DISCONTINUED | OUTPATIENT
Start: 2023-02-19 | End: 2023-02-24

## 2023-02-19 RX ORDER — FAMOTIDINE 20 MG/1
20 TABLET, FILM COATED ORAL EVERY 12 HOURS
Status: DISCONTINUED | OUTPATIENT
Start: 2023-02-19 | End: 2023-02-19

## 2023-02-19 RX ORDER — METOCLOPRAMIDE HYDROCHLORIDE 5 MG/ML
10 INJECTION INTRAMUSCULAR; INTRAVENOUS EVERY 6 HOURS
Status: COMPLETED | OUTPATIENT
Start: 2023-02-19 | End: 2023-02-19

## 2023-02-19 RX ORDER — BISACODYL 10 MG
10 SUPPOSITORY, RECTAL RECTAL
Status: DISCONTINUED | OUTPATIENT
Start: 2023-02-19 | End: 2023-02-24

## 2023-02-19 RX ORDER — ETOMIDATE 2 MG/ML
20 INJECTION INTRAVENOUS ONCE
Status: DISCONTINUED | OUTPATIENT
Start: 2023-02-19 | End: 2023-02-19

## 2023-02-19 RX ORDER — CHLORDIAZEPOXIDE HYDROCHLORIDE 25 MG/1
50 CAPSULE, GELATIN COATED ORAL EVERY 6 HOURS
Status: DISCONTINUED | OUTPATIENT
Start: 2023-02-19 | End: 2023-02-19

## 2023-02-19 RX ORDER — ETOMIDATE 2 MG/ML
20 INJECTION INTRAVENOUS ONCE
Status: COMPLETED | OUTPATIENT
Start: 2023-02-19 | End: 2023-02-19

## 2023-02-19 RX ORDER — AMOXICILLIN 250 MG
2 CAPSULE ORAL 2 TIMES DAILY
Status: DISCONTINUED | OUTPATIENT
Start: 2023-02-19 | End: 2023-02-24

## 2023-02-19 RX ORDER — NOREPINEPHRINE BITARTRATE 0.03 MG/ML
0-1 INJECTION, SOLUTION INTRAVENOUS CONTINUOUS
Status: DISCONTINUED | OUTPATIENT
Start: 2023-02-19 | End: 2023-02-22

## 2023-02-19 RX ORDER — LABETALOL HYDROCHLORIDE 5 MG/ML
10 INJECTION, SOLUTION INTRAVENOUS EVERY 4 HOURS PRN
Status: DISPENSED | OUTPATIENT
Start: 2023-02-19 | End: 2023-02-20

## 2023-02-19 RX ORDER — CHLORDIAZEPOXIDE HYDROCHLORIDE 25 MG/1
25 CAPSULE, GELATIN COATED ORAL EVERY 6 HOURS
Status: DISCONTINUED | OUTPATIENT
Start: 2023-02-20 | End: 2023-02-19

## 2023-02-19 RX ADMIN — PROPOFOL 55 MCG/KG/MIN: 10 INJECTION, EMULSION INTRAVENOUS at 07:53

## 2023-02-19 RX ADMIN — FENTANYL CITRATE 100 MCG: 50 INJECTION, SOLUTION INTRAMUSCULAR; INTRAVENOUS at 03:07

## 2023-02-19 RX ADMIN — FAMOTIDINE 20 MG: 10 INJECTION, SOLUTION INTRAVENOUS at 05:25

## 2023-02-19 RX ADMIN — MIDAZOLAM HYDROCHLORIDE 5 MG: 1 INJECTION INTRAMUSCULAR; INTRAVENOUS at 03:08

## 2023-02-19 RX ADMIN — ETOMIDATE 20 MG: 2 INJECTION INTRAVENOUS at 02:27

## 2023-02-19 RX ADMIN — SODIUM CHLORIDE 250 ML: 9 INJECTION, SOLUTION INTRAVENOUS at 01:30

## 2023-02-19 RX ADMIN — SODIUM BICARBONATE 100 MEQ: 84 INJECTION, SOLUTION INTRAVENOUS at 03:05

## 2023-02-19 RX ADMIN — PHYTONADIONE 10 MG: 10 INJECTION, EMULSION INTRAMUSCULAR; INTRAVENOUS; SUBCUTANEOUS at 04:36

## 2023-02-19 RX ADMIN — THIAMINE HYDROCHLORIDE 500 MG: 100 INJECTION, SOLUTION INTRAMUSCULAR; INTRAVENOUS at 05:37

## 2023-02-19 RX ADMIN — SODIUM CHLORIDE, POTASSIUM CHLORIDE, SODIUM LACTATE AND CALCIUM CHLORIDE: 600; 310; 30; 20 INJECTION, SOLUTION INTRAVENOUS at 21:06

## 2023-02-19 RX ADMIN — PROPOFOL 55 MCG/KG/MIN: 10 INJECTION, EMULSION INTRAVENOUS at 12:36

## 2023-02-19 RX ADMIN — NOREPINEPHRINE BITARTRATE 0.1 MCG/KG/MIN: 1 INJECTION INTRAVENOUS at 02:38

## 2023-02-19 RX ADMIN — METOCLOPRAMIDE 10 MG: 5 INJECTION, SOLUTION INTRAMUSCULAR; INTRAVENOUS at 05:26

## 2023-02-19 RX ADMIN — CEFTRIAXONE SODIUM 1000 MG: 10 INJECTION, POWDER, FOR SOLUTION INTRAVENOUS at 20:08

## 2023-02-19 RX ADMIN — THIAMINE HYDROCHLORIDE 500 MG: 100 INJECTION, SOLUTION INTRAMUSCULAR; INTRAVENOUS at 20:09

## 2023-02-19 RX ADMIN — FENTANYL CITRATE 100 MCG: 50 INJECTION, SOLUTION INTRAMUSCULAR; INTRAVENOUS at 13:26

## 2023-02-19 RX ADMIN — PROPOFOL 30 MCG/KG/MIN: 10 INJECTION, EMULSION INTRAVENOUS at 03:33

## 2023-02-19 RX ADMIN — ETOMIDATE 20 MG: 2 INJECTION INTRAVENOUS at 02:31

## 2023-02-19 RX ADMIN — PANTOPRAZOLE SODIUM 8 MG/HR: 40 INJECTION, POWDER, FOR SOLUTION INTRAVENOUS at 08:53

## 2023-02-19 RX ADMIN — MIDODRINE HYDROCHLORIDE 5 MG: 5 TABLET ORAL at 17:28

## 2023-02-19 RX ADMIN — OCTREOTIDE ACETATE 50 MCG/HR: 200 INJECTION, SOLUTION INTRAVENOUS; SUBCUTANEOUS at 02:04

## 2023-02-19 RX ADMIN — MAGNESIUM SULFATE HEPTAHYDRATE 4 G: 40 INJECTION, SOLUTION INTRAVENOUS at 05:19

## 2023-02-19 RX ADMIN — MIDODRINE HYDROCHLORIDE 5 MG: 5 TABLET ORAL at 11:19

## 2023-02-19 RX ADMIN — HUMAN ALBUMIN MICROSPHERES AND PERFLUTREN 3 ML: 10; .22 INJECTION, SOLUTION INTRAVENOUS at 21:00

## 2023-02-19 RX ADMIN — FENTANYL CITRATE 100 MCG: 50 INJECTION, SOLUTION INTRAMUSCULAR; INTRAVENOUS at 15:38

## 2023-02-19 RX ADMIN — THIAMINE HYDROCHLORIDE 500 MG: 100 INJECTION, SOLUTION INTRAMUSCULAR; INTRAVENOUS at 08:54

## 2023-02-19 RX ADMIN — FENTANYL CITRATE 100 MCG: 50 INJECTION, SOLUTION INTRAMUSCULAR; INTRAVENOUS at 03:25

## 2023-02-19 RX ADMIN — OCTREOTIDE ACETATE 50 MCG: 100 INJECTION, SOLUTION INTRAVENOUS; SUBCUTANEOUS at 01:52

## 2023-02-19 RX ADMIN — PROPOFOL 55 MCG/KG/MIN: 10 INJECTION, EMULSION INTRAVENOUS at 17:29

## 2023-02-19 RX ADMIN — LORAZEPAM 2 MG: 2 INJECTION INTRAMUSCULAR; INTRAVENOUS at 02:27

## 2023-02-19 RX ADMIN — Medication 1 APPLICATOR: at 05:25

## 2023-02-19 RX ADMIN — METOCLOPRAMIDE 10 MG: 5 INJECTION, SOLUTION INTRAMUSCULAR; INTRAVENOUS at 01:38

## 2023-02-19 RX ADMIN — METOCLOPRAMIDE 10 MG: 5 INJECTION, SOLUTION INTRAMUSCULAR; INTRAVENOUS at 17:28

## 2023-02-19 RX ADMIN — METOCLOPRAMIDE 10 MG: 5 INJECTION, SOLUTION INTRAMUSCULAR; INTRAVENOUS at 11:19

## 2023-02-19 RX ADMIN — CALCIUM CHLORIDE 1 G: 100 INJECTION INTRAVENOUS; INTRAVENTRICULAR at 03:15

## 2023-02-19 RX ADMIN — Medication 1 APPLICATOR: at 18:00

## 2023-02-19 RX ADMIN — PANTOPRAZOLE SODIUM 8 MG/HR: 40 INJECTION, POWDER, FOR SOLUTION INTRAVENOUS at 18:44

## 2023-02-19 RX ADMIN — ROCURONIUM BROMIDE 50 MG: 10 INJECTION, SOLUTION INTRAVENOUS at 02:31

## 2023-02-19 RX ADMIN — SODIUM CHLORIDE, POTASSIUM CHLORIDE, SODIUM LACTATE AND CALCIUM CHLORIDE: 600; 310; 30; 20 INJECTION, SOLUTION INTRAVENOUS at 11:44

## 2023-02-19 RX ADMIN — FENTANYL CITRATE 100 MCG: 50 INJECTION, SOLUTION INTRAMUSCULAR; INTRAVENOUS at 07:56

## 2023-02-19 RX ADMIN — FENTANYL CITRATE 100 MCG: 50 INJECTION, SOLUTION INTRAMUSCULAR; INTRAVENOUS at 11:43

## 2023-02-19 RX ADMIN — FENTANYL CITRATE 100 MCG: 50 INJECTION, SOLUTION INTRAMUSCULAR; INTRAVENOUS at 21:04

## 2023-02-19 RX ADMIN — PROPOFOL 55 MCG/KG/MIN: 10 INJECTION, EMULSION INTRAVENOUS at 21:05

## 2023-02-19 RX ADMIN — LABETALOL HYDROCHLORIDE 10 MG: 5 INJECTION, SOLUTION INTRAVENOUS at 22:49

## 2023-02-19 RX ADMIN — SENNOSIDES AND DOCUSATE SODIUM 2 TABLET: 50; 8.6 TABLET ORAL at 17:28

## 2023-02-19 RX ADMIN — THIAMINE HYDROCHLORIDE 500 MG: 100 INJECTION, SOLUTION INTRAMUSCULAR; INTRAVENOUS at 14:23

## 2023-02-19 RX ADMIN — MIDAZOLAM HYDROCHLORIDE 5 MG: 1 INJECTION, SOLUTION INTRAMUSCULAR; INTRAVENOUS at 03:08

## 2023-02-19 ASSESSMENT — PAIN DESCRIPTION - PAIN TYPE
TYPE: ACUTE PAIN
TYPE: OTHER (COMMENT)

## 2023-02-19 ASSESSMENT — ENCOUNTER SYMPTOMS
VOMITING: 0
CHILLS: 0
NAUSEA: 0
SPUTUM PRODUCTION: 0
BLOOD IN STOOL: 0
FOCAL WEAKNESS: 0
ABDOMINAL PAIN: 0
ROS GI COMMENTS: HEMATEMESIS
STRIDOR: 0
BLURRED VISION: 0
DIZZINESS: 1
COUGH: 0
MYALGIAS: 0
SHORTNESS OF BREATH: 1
FEVER: 0
SENSORY CHANGE: 0

## 2023-02-19 ASSESSMENT — FIBROSIS 4 INDEX
FIB4 SCORE: 3.49
FIB4 SCORE: 3.49

## 2023-02-19 NOTE — ASSESSMENT & PLAN NOTE
Likely due to EtOH abuse, GIB and hepatic insufficiency -improving  Discontinue monitoring post resuscitation

## 2023-02-19 NOTE — CARE PLAN
The patient is Watcher - Medium risk of patient condition declining or worsening    Shift Goals  Clinical Goals: hgb monitoring, CIWA, heamodynamic stability  Patient Goals: Rest comfort  Family Goals: MARTINE    Progress made toward(s) clinical / shift goals:      Problem: Knowledge Deficit - Standard  Goal: Patient and family/care givers will demonstrate understanding of plan of care, disease process/condition, diagnostic tests and medications  Outcome: Progressing       Patient is not progressing towards the following goals:   Bloody emesis while on IMCU. Stabilized and bedside EGD completed     Problem: Risk for Aspiration  Goal: Patient's risk for aspiration will be absent or decrease  Outcome: Not Progressing

## 2023-02-19 NOTE — PROCEDURES
"Arterial Line Insertion    Date/Time: 2/19/2023 2:45 AM  Performed by: Lief Allen Jr., D.O.  Authorized by: Leif Allen Jr., D.O.   Consent: The procedure was performed in an emergent situation.  Patient identity confirmed: hospital-assigned identification number and arm band  Time out: Immediately prior to procedure a \"time out\" was called to verify the correct patient, procedure, equipment, support staff and site/side marked as required.  Preparation: Patient was prepped and draped in the usual sterile fashion.  Indications: multiple ABGs, respiratory failure and hemodynamic monitoring  Location: right radial    Sedation:  Patient sedated: yes    Kris's test normal: yes  Needle gauge: 20  Seldinger technique: Seldinger technique used  Number of attempts: 1  Post-procedure: line sutured and dressing applied  Patient tolerance: patient tolerated the procedure well with no immediate complications            "

## 2023-02-19 NOTE — PROGRESS NOTES
0215- patient with large bloody emesis, patient pale and tachycardic. Dr. Goodson called to bedside, Dr. Allen at bedside. Plan for transfer to ICU, STAT CT abdomen, intubation, central and art line. 2 units RBC ordered  0225- preparing to intubate patient. 200 mcg rosangela given. 0227 ativan given for nausea   0228- RSI meds given. See MAR. 7.5 ETT inserted, positive color change, bilateral breath sounds present. 1 unit RBC started. Patient hypotensive in the 60s. Red Box ordered via [place].   0237- levophed drip initiated, 1L bolus NS given.   0245- arterial line and MAC CVC inserted. OG inserted by MD. X-ray at bedside. Levo titrated, see MAR  0310- Red box completed   0318- per MD, no CT at this time as GI will be doing an EGD at bedside

## 2023-02-19 NOTE — ASSESSMENT & PLAN NOTE
Intubated for airway protection 2/19  Continue full mechanical ventilatory support with increasing mandatory minute ventilation  Titrate FiO2 to goal SPO2 greater than 92%  RT/O2 protocol  Continue sedation with propofol drip and as needed fentanyl for pain  ABCDEF bundle, no plans to extubate today pending repeat EGD tomorrow

## 2023-02-19 NOTE — ASSESSMENT & PLAN NOTE
RUQ US with: Increased echogenicity and enlargement of the liver are likely due to hepatic steatosis.  Hx of EtOH abuse  Concerning for early cirrhosis  Avoid hepatotoxins  Monitor synthetic function and LFTs

## 2023-02-19 NOTE — PROGRESS NOTES
4 Eyes Skin Assessment Completed by Jillian ROBERTSON RN and Charleen PEMBERTON RN following the completion of EGD procedure.     Head WDL - though hair is significantly matted, thus assessment beneath all hair is difficult.   Ears Redness to the tops of bilateral ears, and behind the ears where hearing aides normally sit. Blanching.   Nose WDL - bloody drainage from bilateral nares, minimal.   Mouth WDL - bloody mucosa, scant.   Neck Redness and Blanching.  Breast/Chest WDL.  Shoulder Blades WDL.  Spine Redness and Blanching - appears to be from prolonged positioning in one place during period of HD instability.   (R) Arm/Elbow/Hand Redness, Bruising, and Scab (circular scab to forearm).  (L) Arm/Elbow/Hand Redness and Bruising (scattered).   Abdomen WDL, old scar noted to RLQ.   Groin WDL.  Scrotum/Coccyx/Buttocks WDL.  (R) Leg Redness and Scab (redness [blanching] to entirety of leg and heels, round scab to posterior thigh).   (L) Leg Redness and Blanching (heel/ankle/lower leg).  (R) Heel/Foot/Toe Redness and Blanching.  (L) Heel/Foot/Toe Redness and Blanching.    Devices In Places ECG, Blood Pressure Cuff, Pulse Ox, Hernandez, Arterial Line, SCD's, ET Tube, OG/NG, and Central Line.  Interventions In Place Sacral Mepilex, TAP System, Pillows, Q2 Turns, and Low Air Loss Mattress.  Possible Skin Injury No.  Pictures Uploaded Into Epic Yes.  Wound Consult Placed N/A.  RN Wound Prevention Protocol Ordered Yes.    Pt belongings include: 2 hearing aides (locked in labeled specimen cup in top drawer of pt ).

## 2023-02-19 NOTE — ASSESSMENT & PLAN NOTE
GI following-s/p EGD-gastropathy, gastric ulcers, Esmer-Aparicio tear.  Status post cauterization  Avoid NSAID's  Hold all anticoagulants   Status post Protonix and octreotide drips.   prilosec po BID

## 2023-02-19 NOTE — ASSESSMENT & PLAN NOTE
S/p  IV Precedex drip in the IMCU   Ativan as needed  High-dose thiamine  Electrolytes were replaced  Folic acid and multivitamins  Seizure precautions

## 2023-02-19 NOTE — PROGRESS NOTES
0348: Dr. Craft bedside to perform emergent EGD. EGD technician prepping equipment. Dr. Allen and resident, Dr. Gee also bedside. 2-MD consent (Tiffany/Luana) obtained for procedure.     0358: Procedure start.    0450: Procedure end.

## 2023-02-19 NOTE — PROGRESS NOTES
"Date of Service: 2/19/23     Procedure:  Right IJ MAC central line placement     Indication: Hypovolemic shock, pressor support, critical care venous access     Physician:  Dr. Juan Daniel Gee MD, Dr. James Tadeo MD     Post Procedure Diagnosis:  1.  Shock  2. Blood loss anemia       Narrative:    Attending Dr. Allen was present for entire procedure    Emergency two physician consent was obtained and \"time out\" was performed.      Sterile technique utilized, sterile gown, gloves, and bouffant worn. Patient prepped and draped in usual fashion, catheter was flushed ahead of time, using ultrasound guidance vein was identified and then cannulated with needle, blood confirmed on aspiration and ultrasound confirmed inside the lumen. Small nick at the level of the skin with scalpel. Catheter with dilator advanced, wire placed through without resistance, confirmed in place with ultrasound, dilator and then wire removed. Both ports aspirated and flushed without difficulty. Dressed in usual fashion. Confirmed placement with xray.      "

## 2023-02-19 NOTE — CARE PLAN
Problem: Ventilation  Goal: Ability to achieve and maintain unassisted ventilation or tolerate decreased levels of ventilator support  Description: Target End Date:  4 days     Document on Vent flowsheet    1.  Support and monitor invasive and noninvasive mechanical ventilation  2.  Monitor ventilator weaning response  3.  Perform ventilator associated pneumonia prevention interventions  4.  Manage ventilation therapy by monitoring diagnostic test results  Outcome: Not Met  Note:    Ventilator Daily Summary    Vent Day #1    Ventilator settings changed this shift:Decreased FIO2 to 55%    Weaning trials:none    Respiratory Procedures:    Plan: Continue current ventilator settings and wean mechanical ventilation as tolerated per physician orders.

## 2023-02-19 NOTE — PROGRESS NOTES
IMCU cross cover note:    I was paged by IMCU RN for large volume hematemesis.  Patient was admitted earlier by my colleague for suspected upper GI bleed in setting of alcohol abuse.  He has been treated with Protonix drip, octreotide drip and on Precedex drip.  Initial hemoglobin in ER was 13.6, repeated to 7.4 in less than 4 hours with active bleeding.  Patient is also tachycardic, borderline hypotension and becoming more altered.    Impression:  -Hemorrhagic shock secondary to severe GI bleed  -Acute active GI bleed  -Acute blood loss anemia  -Severe lactic acidosis in setting of alcohol abuse 12.9>12.6>13.3  -Coagulopathy  -Severe hypomagnesemia 1.2    Plan:  -Stat 1 unit PRBC transfusion  -Continue Protonix drip, octreotide drip, Precedex drip  -Anemia work-up including TEG, coags  -Vitamin K 10 mg [id number] for coagulopathy with INR 1.49 with active GI bleed  -replace electrolytes  -Discussed with IR attending --ordered CT abdomen pelvis with liver protocol for evaluation of embolization  -Discussed with ICU attending --planned for emergent intubation, may consider GI consult emergently if CT AP nondiagnostic for embolization by IR  -Transfer order to ICU placed  -Defer further plan of care to ICU team    Critical care time: 40 minutes spent in chart review, aggressive medical management, coordinating care with patient/RN/pharmacy/consulting providers/ancillary staff.

## 2023-02-19 NOTE — CONSULTS
Critical Care Consultation    Date of consult: 2/18/2023    Referring Physician  Shanice Hill M.D.    Reason for Consultation  UGIB, Lactic Acidosis, EtOH withdrawal    History of Presenting Illness  57 y.o. female with a pmhx of EtOH abuse, hearing loss who presented 2/18/2023 with hematemesis, she had a coffee-ground emesis at home then a 30 mL bright red emesis in the ER. Her Hg returned at 13.6 with a normal BUN. She denies NSAID use or prior GIB. She does admit to drinking 1 pint of EtOH a day and her serum alcohol was 71 in the ER. She has been tachycardic and tremulous in the ER suggesting EtOH withdrawal and has been given benzos in the ER. [facility] in the ED for IMCU admission. GI consultation has been deferred until the AM given small volume hematemesis and stable BP.    Addendum: Patient had an episode of decompensation in the IMCU requiring intubation, MTP and emergent GI consultation for EGD.     Code Status  Full Code    Review of Systems  Review of Systems   Constitutional:  Negative for chills and fever.   Eyes:  Negative for blurred vision.   Respiratory:  Positive for shortness of breath. Negative for cough, sputum production and stridor.    Cardiovascular:  Negative for chest pain.   Gastrointestinal:  Negative for abdominal pain, blood in stool, melena, nausea and vomiting.        Hematemesis   Genitourinary:  Negative for dysuria.   Musculoskeletal:  Negative for myalgias.   Skin:  Negative for rash.   Neurological:  Positive for dizziness. Negative for sensory change and focal weakness.     Past Medical History   has a past medical history of Hearing loss.    Surgical History   has no past surgical history on file.    Family History  family history is not on file.    Social History   reports that she has been smoking. She has never used smokeless tobacco. She reports current alcohol use of about 6.0 oz per week. She reports that she does not use  drugs.    Medications  Home Medications       Reviewed by Phil Pereira (Pharmacy Tech) on 02/18/23 at 2048  Med List Status: Complete     Medication Last Dose Status        Patient Jabier Taking any Medications                         Current Facility-Administered Medications   Medication Dose Route Frequency Provider Last Rate Last Admin    pantoprazole (Protonix) 80 mg in  mL Infusion  8 mg/hr Intravenous Continuous Shanice Hill M.D. 25 mL/hr at 02/18/23 2132 8 mg/hr at 02/18/23 2132    octreotide (Sandostatin) injection 50 mcg  50 mcg Intravenous Once ROSALBA Crowley Jr.ORoshan        octreotide (Sandostatin) 1,250 mcg in  mL Infusion  50 mcg/hr Intravenous Continuous Leif Allen Jr., D.O.        dexmedetomidine (PRECEDEX) 400 mcg/100mL NS premix infusion  0.1-1 mcg/kg/hr (Ideal) Intravenous Continuous ROSALBA Crowley Jr.ORoshan        LORazepam (ATIVAN) injection 2 mg  2 mg Intravenous Q4HRS Leif Allen Jr., D.O.        LORazepam (ATIVAN) injection 1-2 mg  1-2 mg Intravenous Q2HRS PRN Leif Allen Jr., D.O.        detox IV 1000 mL (D5LR + magnesium 1 g + thiamine 100 mg + folic acid 1 mg) infusion   Intravenous Once Leif Allen Jr., D.O.        And    [START ON 2/19/2023] thiamine (Vitamin B-1) tablet 100 mg  100 mg Oral DAILY Leif Allen Jr., D.O.        And    [START ON 2/19/2023] multivitamin tablet 1 Tablet  1 Tablet Oral DAILY Leif Allen Jr., D.O.        And    [START ON 2/19/2023] folic acid (FOLVITE) tablet 1 mg  1 mg Oral DAILY ROSALBA Crowley Jr.O.         No current outpatient medications on file.       Allergies  No Known Allergies    Vital Signs last 24 hours  Temp:  [35.8 °C (96.4 °F)-36.5 °C (97.7 °F)] 36.5 °C (97.7 °F)  Pulse:  [114-121] 121  Resp:  [20] 20  BP: ()/(43-61) 91/43  SpO2:  [91 %-98 %] 91 %    Physical Exam  Physical Exam  Vitals and nursing note reviewed.   Constitutional:       Appearance: She is ill-appearing.   HENT:      Head:  Normocephalic and atraumatic.      Right Ear: External ear normal.      Left Ear: External ear normal.      Ears:      Comments: Hearing aids in place     Nose: Nose normal.      Mouth/Throat:      Mouth: Mucous membranes are moist.      Pharynx: Oropharynx is clear.   Eyes:      Extraocular Movements: Extraocular movements intact.      Conjunctiva/sclera: Conjunctivae normal.   Cardiovascular:      Rate and Rhythm: Regular rhythm. Tachycardia present.      Pulses: Normal pulses.   Pulmonary:      Effort: Pulmonary effort is normal.      Breath sounds: Normal breath sounds.   Abdominal:      General: There is no distension.      Palpations: Abdomen is soft.      Tenderness: There is no abdominal tenderness. There is no guarding or rebound.   Musculoskeletal:         General: Normal range of motion.      Cervical back: Neck supple.   Skin:     General: Skin is warm and dry.      Capillary Refill: Capillary refill takes less than 2 seconds.      Coloration: Skin is not pale.   Neurological:      General: No focal deficit present.      Mental Status: She is alert and oriented to person, place, and time. Mental status is at baseline.   Psychiatric:         Mood and Affect: Mood normal.         Behavior: Behavior normal.       Fluids    Intake/Output Summary (Last 24 hours) at 2/18/2023 2246  Last data filed at 2/18/2023 2000  Gross per 24 hour   Intake --   Output 200 ml   Net -200 ml       Laboratory  Recent Results (from the past 48 hour(s))   CBC WITH DIFFERENTIAL    Collection Time: 02/18/23  8:13 PM   Result Value Ref Range    WBC 11.6 (H) 4.8 - 10.8 K/uL    RBC 4.06 (L) 4.20 - 5.40 M/uL    Hemoglobin 13.6 12.0 - 16.0 g/dL    Hematocrit 41.0 37.0 - 47.0 %    .0 (H) 81.4 - 97.8 fL    MCH 33.5 (H) 27.0 - 33.0 pg    MCHC 33.2 (L) 33.6 - 35.0 g/dL    RDW 50.3 (H) 35.9 - 50.0 fL    Platelet Count 179 164 - 446 K/uL    MPV 10.4 9.0 - 12.9 fL    Neutrophils-Polys 61.30 44.00 - 72.00 %    Lymphocytes 30.20 22.00 -  41.00 %    Monocytes 5.30 0.00 - 13.40 %    Eosinophils 1.50 0.00 - 6.90 %    Basophils 0.90 0.00 - 1.80 %    Immature Granulocytes 0.80 0.00 - 0.90 %    Nucleated RBC 0.20 /100 WBC    Neutrophils (Absolute) 7.11 2.00 - 7.15 K/uL    Lymphs (Absolute) 3.51 1.00 - 4.80 K/uL    Monos (Absolute) 0.62 0.00 - 0.85 K/uL    Eos (Absolute) 0.17 0.00 - 0.51 K/uL    Baso (Absolute) 0.11 0.00 - 0.12 K/uL    Immature Granulocytes (abs) 0.09 0.00 - 0.11 K/uL    NRBC (Absolute) 0.02 K/uL   COMP METABOLIC PANEL    Collection Time: 02/18/23  8:13 PM   Result Value Ref Range    Sodium 142 135 - 145 mmol/L    Potassium 4.3 3.6 - 5.5 mmol/L    Chloride 100 96 - 112 mmol/L    Co2 17 (L) 20 - 33 mmol/L    Anion Gap 25.0 (H) 7.0 - 16.0    Glucose 196 (H) 65 - 99 mg/dL    Bun 21 8 - 22 mg/dL    Creatinine 0.96 0.50 - 1.40 mg/dL    Calcium 9.4 8.5 - 10.5 mg/dL    AST(SGOT) 107 (H) 12 - 45 U/L    ALT(SGPT) 92 (H) 2 - 50 U/L    Alkaline Phosphatase 102 (H) 30 - 99 U/L    Total Bilirubin 2.9 (H) 0.1 - 1.5 mg/dL    Albumin 3.8 3.2 - 4.9 g/dL    Total Protein 6.6 6.0 - 8.2 g/dL    Globulin 2.8 1.9 - 3.5 g/dL    A-G Ratio 1.4 g/dL   LIPASE    Collection Time: 02/18/23  8:13 PM   Result Value Ref Range    Lipase 38 11 - 82 U/L   LACTIC ACID    Collection Time: 02/18/23  8:13 PM   Result Value Ref Range    Lactic Acid 12.9 (HH) 0.5 - 2.0 mmol/L   PROTHROMBIN TIME (INR)    Collection Time: 02/18/23  8:13 PM   Result Value Ref Range    PT 17.7 (H) 12.0 - 14.6 sec    INR 1.49 (H) 0.87 - 1.13   APTT    Collection Time: 02/18/23  8:13 PM   Result Value Ref Range    APTT 25.4 24.7 - 36.0 sec   COD (ADULT)    Collection Time: 02/18/23  8:13 PM   Result Value Ref Range    ABO Grouping Only O     Rh Grouping Only POS     Antibody Screen-Cod NEG    DIAGNOSTIC ALCOHOL    Collection Time: 02/18/23  8:13 PM   Result Value Ref Range    Diagnostic Alcohol 71.0 (H) <10.1 mg/dL   CORRECTED CALCIUM    Collection Time: 02/18/23  8:13 PM   Result Value Ref Range     Correct Calcium 9.6 8.5 - 10.5 mg/dL   ESTIMATED GFR    Collection Time: 02/18/23  8:13 PM   Result Value Ref Range    GFR (CKD-EPI) 69 >60 mL/min/1.73 m 2   AMMONIA    Collection Time: 02/18/23  8:35 PM   Result Value Ref Range    Ammonia 86 (H) 11 - 45 umol/L       Imaging  US-RUQ   Final Result      1.  There is cholelithiasis.      2.  Increased echogenicity and enlargement of the liver are likely due to hepatic steatosis.          Assessment/Plan  * UGIB (upper gastrointestinal bleed)- (present on admission)  Assessment & Plan  Initially small volume with episode of 700 mL hematemesis in IMCU  Intubated for airway protection  EGD performed emergently by Dr Craft: gastric ulcers and small, flat esophageal varices  Protonix, octreotide, rocephin  Trend Hg, transfuse PRN for Hg <7 or active bleeding  IR available for GDA embolization if needed    Hepatitis C- (present on admission)  Assessment & Plan  HCV Ab positive  HCV quant by NAAT ordered    Hypomagnesemia- (present on admission)  Assessment & Plan  Replete to maintain >2    Acute respiratory failure with hypoxia (HCC)  Assessment & Plan  Intubated for airway protection due to UGIB  RT/O2 protocols  Daily and PRN ABGs  Titration of ventilator therapy based on ABGs and patient's status  Sedation as tolerated/indicated  Daily CXR  HOB >30 degrees and peridex for VAP prevention  Pepcid for GI prophylaxis  SAT/SBT when able (ABCDEF Bundle)  Early mobility    Cholelithiasis- (present on admission)  Assessment & Plan  No evidence of cholecystitis    Transaminitis- (present on admission)  Assessment & Plan  RUQ US with: Increased echogenicity and enlargement of the liver are likely due to hepatic steatosis.  Hx of EtOH abuse  Concerning for early cirrhosis    Lactic acidosis- (present on admission)  Assessment & Plan  Likely due to EtOH abuse, GIB and hepatic insufficiency  Continue resuscitation and trend, will likely be slow to clear    Alcohol withdrawal (HCC)-  (present on admission)  Assessment & Plan  Initially started on scheduled ativan and precedex  Sedate with propofol on vent  Vitamins  Seizure precautions        Discussed patient condition and risk of morbidity and/or mortality with Hospitalist, RN, RT, Pharmacy, Code status disscussed, Charge nurse / hot rounds, Patient, GI, and ERP .      The patient remains critically ill.  Critical care time = 110 minutes in directly providing and coordinating critical care and extensive data review.  No time overlap and excludes procedures.

## 2023-02-19 NOTE — ASSESSMENT & PLAN NOTE
Due to alcohol abuse and consumption  Monitor with platelet count greater than 50,000 in the setting of acute bleeding

## 2023-02-19 NOTE — PROCEDURES
"Date of Service: 2/19/2023     Procedure:  Endotracheal intubation     Indication: Respiratory failure, altered mental status     Physician:  Dr. Juan Daniel Gee MD, Dr. Tiffany FRANKLIN     Post Procedure Diagnosis:  1.  Respiratory failure  2.  Altered mental status     Narrative:    Attending Dr. Allen was present for entire procedure    Emergency two physician consent was obtained and \"time out\" was performed.       Patient was paralyzed and sedated with rocuronium and etomidate, Ativan was also given for agitation. Ambu-bag and Yankauer suction made ready and available - RT at bedside with Dr. Armenta.  After induction, [place] scope was advanced beyond the tongue with good visualization of vocal cords, 7.5 endotracheal tube advanced, appeared in place on video monitor, with appropriate good color change on breaths, auscultation of the chest suggested appropriate placement, end tidal appropriate on monitor. Balloon engaged.  Endotracheal tube secured in place.  X-ray suggested appropriate placement of ET tube.          "

## 2023-02-19 NOTE — PROGRESS NOTES
Called to bedside patient actively vomiting blood, altered mental status difficult to redirect due to her hard of hearing status. Rolling around in bed, appears very pale, blood pressures dropping. Attempts to discuss risks and benefits had to be deferred as patient continued to clinically deteriorate, GI urgently consulted, patient sedated paralyzed and intubated, red box ordered and blood products administered, right central line and arterial line placed. Patient required levophed support during this time. After red box blood products blood pressures stabilized somewhat, patient transferred to SICU. There GI specialist Dr. Neumann was able to emergently EGD our patient, finding actively bleeding gastric ulcers, gastropathy, gastric varices, GE junction ulcers and likely lower esophageal rangel enrique tearx2. The largest bleeding ulcers were injected and then cauterized, after watchful waiting no further active bleeds noted in stomach or esophagus.    Plan:  Hemodynamic monitoring  Close regular H-H follow-up  Continue octreotide and pantoprazole drip  When able stool h. Pylori  Blood pressure goal SBP between 90 and 130, titrate levophed as necessary, if above 130 SBP on no pressors and on octreotide drip, do not need to use medications to keep below 130 SBP.

## 2023-02-19 NOTE — ASSESSMENT & PLAN NOTE
Continue current sedation with propofol drip  Vitamin supplementation  Eventual alcohol cessation education and resources to be provided when appropriate

## 2023-02-19 NOTE — PROGRESS NOTES
Late entry  1210 Patient arrived to [facility] at approx this time, report rec'd from  Sarah WALKER in ED. Patient shuffled from stretcher to bed. Patient [place], hearing aid present in L ear, patient states she Reads lips as she is partially deaf. RASS 0/-1, Oriented x2 not to time or situation. Speech impediment noted, Smile even, SEEMA 3+ bilaterally, follows commands. RL bolus under pressure, BP improving  Right lower arm IV infiltrated, IV removed, warm compress applied ,     [id number] Dr Goodson by to see patient states can give octreotide single dose until IV access established Protonix infusion as per orders.    2 new IV sites intitated ultrasound guided, blood cultures x2 set sent to lab 30 minutes apart. Repear BW sent to lab    0115 rec'd call from lab stating hgb decreased to 7.4, Dr Goodson notified    0130 250 ml bolus given as per MD orders for hypotension    0152 octreotide given  0200 Orders rec'd for 1 unit PRBC's  0215 RASS +3 Restless irregular breathing, patient had episode projectile hematemesis Desmond dark red, greater than 600 ml. Charge RN called to bedside, Dr Goodson informed and Called to bedside

## 2023-02-19 NOTE — ASSESSMENT & PLAN NOTE
Initially small volume with episode of 700 mL hematemesis in IMCU  Intubated for airway protection  EGD performed emergently by Dr Craft: gastric ulcers and small, flat esophageal varices  Continue Protonix, octreotide, rocephin x7 days  Trend Hg, transfuse PRN for Hg <7 or active bleeding  IR available for GDA embolization if needed  Keep n.p.o. for now pending repeat EGD tomorrow

## 2023-02-19 NOTE — PROCEDURES
OPERATIVE REPORT    PATIENT:   Chiara Pacheco   1965       PREOPERATIVE DIAGNOSES/INDICATIONS: Large volume hematemesis    POSTOPERATIVE DIAGNOSIS: Grade 1 varices, portal hypertensive gastropathy, oozing, esophagitis, Esmer Aparicio tear, multiple gastric ulcers    PROCEDURE:  ESOPHAGOGASTRODUODENOSCOPY    PHYSICIAN:  Shanice Craft MD    ANESTHESIA:  Per anesthesiologist.    LOCATION: [facility]    CONSENT:  OBTAINED. The risks, benefits and alternatives of the procedure were discussed in details. The risks include and are not limited to bleeding, infection, perforation, missed lesions, and sedations risks (cardiopulmonary compromise and allergic reaction to medications).    DESCRIPTION: The patient presented to the procedure room.  After routine checkup was performed, patient was brought into the endoscopy suite.  Patient was placed on his left lateral decubitus position. A bite block was placed in patient's mouth. Patient was sedated by anesthesia.  Vital signs were monitored throughout the procedure.  Oxygenation support was provided throughout the procedure. Upper endoscope was inserted into patient's mouth and advanced to the second portion of the duodenum under direct visualization.      Once the site was reached and examined, the upper endoscope was withdrawn.  Retroflexion was made within the stomach.  The stomach was decompressed, scope was withdrawn and the procedure was terminated.    The patient tolerated the procedure well.  There were no immediate complications.    OPERATIVE FINDINGS:    1. Esophagus: There were 2 columns of Grade 1 Varices. Completely flattened and there were no red vivian spots. Distal esophagitis with a Esmer Aparicio tear and ulceration. Did not treat tear as it had stopped bleeding and it was close to varix  2. Stomach: very large clot in proximal stomach. Initially fresh blood as well. Clots removed and oozing portal hypertensive gastropathy present. Multiple small  ulcers in proximal stomach body. Oozing. S/p Bicap  3. Duodenum: normal.     IMPRESSION/RECOMMENDATIONS:  Grade 1 varices, flattened, cant treat  Oozing portal hypertension gastropathy  Oozing small gastric ulcers  Esmer enrique tear  Esophagitis with ulceration  PPI. Octreotide, serial h/h   Needs EGD as outpt or possibly repeat in hospital to assess varices as they may be larger after volume resuscitation      This note has been transcribed with digital voice recognition software and although it has been reviewed may contain grammatical or word errors

## 2023-02-19 NOTE — ED NOTES
Med rec  updated and complete. Allergies reviewed. Pt denies taking medications.    Home pharmacy [facility] [phone]

## 2023-02-19 NOTE — ED TRIAGE NOTES
"Chief Complaint   Patient presents with    Upper GI Bleed     BIB [facility] from home. Pt c/o vomiting blood for the last 3-4 hours. +etoh          Pt vomited 200cc of heath blood during triage process.    States she drinks 1/2 pint of vodka daily    Pt not answering many questions during triage process    /59   Pulse (!) 121   Temp (!) 35.8 °C (96.4 °F) (Temporal)   Resp 20   Ht 1.727 m (5' 8\")   Wt 72.6 kg (160 lb)   LMP  (LMP Unknown)   SpO2 98%   BMI 24.33 kg/m²     "

## 2023-02-19 NOTE — H&P
Hospital Medicine History & Physical Note    Date of Service  2/18/2023    Primary Care Physician  Pcp Pt States None    Consultants  critical care    Specialist Names: Dr. NILAY Allen    Code Status  Full Code    Chief Complaint  Chief Complaint   Patient presents with    Upper GI Bleed     BIB [facility] from home. Pt c/o vomiting blood for the last 3-4 hours. +etoh            History of Presenting Illness  Chiara Pacheco is a 57 y.o. female past medical history of nicotine dependence, alcohol abuse, hard of hearing who presented 2/18/2023 with coffee-ground emesis noted at home with abdominal pain.  She denies any dark tarry stool or bloody stools.  Patient reports drinking approximately a liter of alcohol every day.  History is difficult to obtain as patient is hard of hearing and her hearing aids are not functioning properly.  In the ER, her hemoglobin resulted at 13.6, lactic acid 12.6, anion gap metabolic acidosis, elevated LFTs and alcohol level of 71.  Given her elevated lactic acid and labile blood pressures critical care was consulted and recommended IMCU placement at this time under care of hospitalist.  She will be admitted for upper GI bleed and alcohol withdrawal.    I discussed the plan of care with patient.    Review of Systems  Review of Systems   Constitutional:  Negative for chills and fever.   HENT:  Negative for hearing loss and tinnitus.    Eyes:  Negative for blurred vision and double vision.   Respiratory:  Negative for cough and hemoptysis.    Cardiovascular:  Negative for chest pain and palpitations.   Gastrointestinal:  Positive for abdominal pain, nausea and vomiting. Negative for heartburn.   Genitourinary:  Negative for dysuria and urgency.   Musculoskeletal:  Negative for myalgias and neck pain.   Skin:  Negative for rash.   Neurological:  Negative for dizziness and headaches.   Endo/Heme/Allergies:  Does not bruise/bleed easily.   Psychiatric/Behavioral:  Negative for depression and  suicidal ideas.      Past Medical History   has a past medical history of Hearing loss.    Surgical History  reviewed    Family History    Family history reviewed with patient. There is no family history that is pertinent to the chief complaint.     Social History   reports that she has been smoking. She has never used smokeless tobacco. She reports current alcohol use of about 6.0 oz per week. She reports that she does not use drugs.    Allergies  No Known Allergies    Medications  None       Physical Exam  Temp:  [35.8 °C (96.4 °F)-36.5 °C (97.7 °F)] 36.5 °C (97.7 °F)  Pulse:  [114-127] 117  Resp:  [16-20] 16  BP: ()/(43-61) 89/52  SpO2:  [91 %-99 %] 95 %  Blood Pressure: (!) 89/52   Temperature: 36.5 °C (97.7 °F)   Pulse: (!) 117   Respiration: 16   Pulse Oximetry: 95 %       Physical Exam  Vitals and nursing note reviewed.   Constitutional:       Appearance: Normal appearance. She is ill-appearing.   HENT:      Head: Normocephalic and atraumatic.      Right Ear: Tympanic membrane normal.      Left Ear: Tympanic membrane normal.      Ears:      Comments: Hard of hearing      Nose: Nose normal.      Mouth/Throat:      Mouth: Mucous membranes are dry.      Pharynx: Oropharynx is clear.      Comments: Dried blood noted in oral cavity   Eyes:      Extraocular Movements: Extraocular movements intact.      Pupils: Pupils are equal, round, and reactive to light.   Cardiovascular:      Rate and Rhythm: Normal rate and regular rhythm.      Pulses: Normal pulses.      Heart sounds: Normal heart sounds.   Pulmonary:      Effort: Pulmonary effort is normal.      Breath sounds: Normal breath sounds.   Abdominal:      General: Bowel sounds are normal. There is no distension.      Palpations: Abdomen is soft. There is no mass.   Genitourinary:     Rectum: Guaiac result negative.      Comments: Rectal exam negative for blood brown stool  Musculoskeletal:         General: Normal range of motion.      Cervical back: Neck  supple.      Comments: tremulous   Skin:     General: Skin is warm.      Capillary Refill: Capillary refill takes more than 3 seconds.      Coloration: Skin is pale.   Neurological:      General: No focal deficit present.      Mental Status: She is alert and oriented to person, place, and time. Mental status is at baseline.      Cranial Nerves: No cranial nerve deficit.      Sensory: No sensory deficit.   Psychiatric:         Mood and Affect: Mood normal.         Behavior: Behavior normal.       Laboratory:  Recent Labs     02/18/23 2013   WBC 11.6*   RBC 4.06*   HEMOGLOBIN 13.6   HEMATOCRIT 41.0   .0*   MCH 33.5*   MCHC 33.2*   RDW 50.3*   PLATELETCT 179   MPV 10.4     Recent Labs     02/18/23 2013   SODIUM 142   POTASSIUM 4.3   CHLORIDE 100   CO2 17*   GLUCOSE 196*   BUN 21   CREATININE 0.96   CALCIUM 9.4     Recent Labs     02/18/23 2013   ALTSGPT 92*   ASTSGOT 107*   ALKPHOSPHAT 102*   TBILIRUBIN 2.9*   LIPASE 38   GLUCOSE 196*     Recent Labs     02/18/23 2013   APTT 25.4   INR 1.49*     No results for input(s): NTPROBNP in the last 72 hours.      No results for input(s): TROPONINT in the last 72 hours.    Imaging:  US-RUQ   Final Result      1.  There is cholelithiasis.      2.  Increased echogenicity and enlargement of the liver are likely due to hepatic steatosis.          no X-Ray or EKG requiring interpretation    Assessment/Plan:  Justification for Admission Status  I anticipate this patient will require at least two midnights for appropriate medical management, necessitating inpatient admission because upper GI bleed and alcohol withdrawal requiring stepdown unit, telemetry monitoring and GI consultation in a.m.    Patient will need a Intermediate Care (Adult and Pediatrics) bed on MEDICAL service .  The need is secondary to see above.    * UGIB (upper gastrointestinal bleed)- (present on admission)  Assessment & Plan  N.p.o.  Serial abdominal exams  Serial hemoglobin hematocrit every 8 hours.   Transfuse less than 7  IV antiemetics  Continue with Protonix and octreotide, gtt.  Rocephin 1 g every 24 hours  GI consultation in a.m.  Avoid NSAID's  Hold all anticoagulants     Transaminitis  Assessment & Plan  Likely secondary to alcohol abuse  Check hepatitis panel  Trend  Avoid hepatotoxic agents    Lactic acidosis  Assessment & Plan  High-dose thiamine  Trend    High anion gap metabolic acidosis  Assessment & Plan  Likely secondary to alcohol ingestion, dehydration    Resuscitated with IV fluids  Monitor    Alcohol withdrawal (HCC)  Assessment & Plan  Received approximately 3 L of IV fluids  Continue with Precedex, gtt.  Ativan as needed  High-dose thiamine  Check magnesium and phosphorus   Folic acid and multivitamins  Seizure precautions        VTE prophylaxis: SCDs/TEDs and pharmacologic prophylaxis contraindicated due to upper GI bleed

## 2023-02-19 NOTE — ED PROVIDER NOTES
ED Provider Note    CHIEF COMPLAINT  Chief Complaint   Patient presents with    Upper GI Bleed     BIB [facility] from home. Pt c/o vomiting blood for the last 3-4 hours. +etoh            HPI/ROS  LIMITATION TO HISTORY   Select: Intoxication  OUTSIDE HISTORIAN(S):  EMS presents to the emergency department for hematemesis began 3-4 hours prior to arrival.    Chiara Pacheco is a 57 y.o. female who presents emerged part with chief complaint hematemesis.  The patient states she started having bright red blood about 3 to 4 hours prior to arrival.  She endorses mild epigastric pain she has not had dark or tarry stools or bloody stools before she is never had vomiting of blood before.  She is an alcoholic she drinks half pint of vodka every day.  States she last drink around noon today.  She has not stopped drinking on the withdrawals in a very long time.  She still feeling nauseated glucose was in normal limits for EMS prior to arrival.  She states she does not know if she has a history of cirrhosis she is never been started on medications before      On arrival to the ED patient had emesis was approximately 200 cc of bright red blood per nursing staff I did not witness this    PAST MEDICAL HISTORY   has a past medical history of Hearing loss.    SURGICAL HISTORY  patient denies any surgical history    FAMILY HISTORY  History reviewed. No pertinent family history.    SOCIAL HISTORY  Social History     Tobacco Use    Smoking status: Every Day    Smokeless tobacco: Never   Substance and Sexual Activity    Alcohol use: Yes     Alcohol/week: 6.0 oz     Types: 10 Shots of liquor per week    Drug use: No    Sexual activity: Not on file       CURRENT MEDICATIONS  Home Medications       Reviewed by Sarah Mancini R.N. (Registered Nurse) on 02/18/23 at 2005  Med List Status: Not Addressed     Medication Last Dose Status        Patient Jabier Taking any Medications                           ALLERGIES  No Known  "Allergies    PHYSICAL EXAM  VITAL SIGNS: /59   Pulse (!) 121   Resp 20   Ht 1.727 m (5' 8\")   Wt 72.6 kg (160 lb)   LMP  (LMP Unknown)   SpO2 98%   BMI 24.33 kg/m²    Pulse Ox Interpretation:   Pulse Ox is normal  Constitutional: Alert in minimal distress  HENT: Normocephalic atraumatic, dry mucous membranes with blood stains around the mouth  Eyes: PER, Conjunctiva normal, Non-icteric.   Neck: Normal range of motion, No tenderness, Supple, No stridor.   Cardiovascular: regular rhythm tachycardic no murmurs.   Thorax & Lungs: Normal breath sounds, No respiratory distress, No wheezing, No chest tenderness.   Abdomen: Bowel sounds normal, Soft, No tenderness, No pulsatile masses. No peritoneal signs.  Guaiac brown stool negative guaiac   Skin: Warm, Dry, No erythema, No rash.   Back: No bony tenderness, No CVA tenderness.   Extremities/MSK: Intact equal distal pulses, No edema, No tenderness, No cyanosis, no major deformities noted  Neurologic: Alert and oriented x3, No focal deficits noted.       DIAGNOSTIC STUDIES / PROCEDURES  EKG  I have independently interpreted this EKG  No results found for this or any previous visit.      LABS  Labs Reviewed   CBC WITH DIFFERENTIAL - Abnormal; Notable for the following components:       Result Value    WBC 11.6 (*)     RBC 4.06 (*)     .0 (*)     MCH 33.5 (*)     MCHC 33.2 (*)     RDW 50.3 (*)     All other components within normal limits    Narrative:     Indicate which anticoagulants the patient is on:->UNKNOWN   COMP METABOLIC PANEL - Abnormal; Notable for the following components:    Co2 17 (*)     Anion Gap 25.0 (*)     Glucose 196 (*)     AST(SGOT) 107 (*)     ALT(SGPT) 92 (*)     Alkaline Phosphatase 102 (*)     Total Bilirubin 2.9 (*)     All other components within normal limits    Narrative:     Indicate which anticoagulants the patient is on:->UNKNOWN   LACTIC ACID - Abnormal; Notable for the following components:    Lactic Acid 12.9 (*)     All " "other components within normal limits    Narrative:     Indicate which anticoagulants the patient is on:->UNKNOWN   PROTHROMBIN TIME - Abnormal; Notable for the following components:    PT 17.7 (*)     INR 1.49 (*)     All other components within normal limits    Narrative:     Indicate which anticoagulants the patient is on:->UNKNOWN   AMMONIA - Abnormal; Notable for the following components:    Ammonia 86 (*)     All other components within normal limits   DIAGNOSTIC ALCOHOL - Abnormal; Notable for the following components:    Diagnostic Alcohol 71.0 (*)     All other components within normal limits    Narrative:     Indicate which anticoagulants the patient is on:->UNKNOWN   LACTIC ACID - Abnormal; Notable for the following components:    Lactic Acid 12.6 (*)     All other components within normal limits   LIPASE    Narrative:     Indicate which anticoagulants the patient is on:->UNKNOWN   APTT    Narrative:     Indicate which anticoagulants the patient is on:->UNKNOWN   COD (ADULT)   CORRECTED CALCIUM    Narrative:     Indicate which anticoagulants the patient is on:->UNKNOWN   ESTIMATED GFR    Narrative:     Indicate which anticoagulants the patient is on:->UNKNOWN   BLOOD CULTURE    Narrative:     Per Hospital Policy: Only change Specimen Src: to \"Line\" if  specified by physician order.   BLOOD CULTURE    Narrative:     Per Hospital Policy: Only change Specimen Src: to \"Line\" if  specified by physician order.   ABO RH CONFIRM   MAGNESIUM   PHOSPHORUS   HGB   LACTIC ACID         RADIOLOGY  I have independently interpreted the diagnostic imaging associated with this visit and am waiting the final reading from the radiologist.   My preliminary interpretation is a follows:   RUQ US - no evidence of ascites     Radiologist interpretation:   US-RUQ   Final Result      1.  There is cholelithiasis.      2.  Increased echogenicity and enlargement of the liver are likely due to hepatic steatosis.            COURSE & " MEDICAL DECISION MAKING    ED Observation Status? No; Patient does not meet criteria for ED Observation.     INITIAL ASSESSMENT, COURSE AND PLAN  Care Narrative: Patient presents emergency department with chief complaint of upper GI bleed.  Fortunately she is guaiac negative with light brown stool without melena I believe this to started prior to arrival.  She is not hemodynamically unstable but she is tachycardic.  I suspect this is likely secondary to alcoholic possible gastritis no history of esophageal varices she is never been scoped for never had an upper GI bleed.  She will be treated with IV fluids antiemetics Rocephin for likely sepsis and a PPI.    8:57 PM  Patient's lactic acid came back at 12.    10:25 PM  Patient is showing worsening signs of withdrawal heart rate is increasing rather than decreasing with IV fluid she will be treated with another round of Zofran Ativan and continued IV fluids.    Repeat lactate is still 12.  Her blood pressure is a little bit more soft especially in light of she is lying on her side and with the benzodiazepines I think she be a good candidate for the IMCU at this time.      10:43 PM   Spoke w dr Allen ICU and he has accepted the patient to the IMCU    11:20 PM  Spoke w Dr Boyle who is excepted the patient as well.      I did not speak with GI at this time because the patient is not having severe active continued vomiting she has not had any more episodes since arrival she had normal stool    HYDRATION: Based on the patient's presentation of Acute Vomiting, Sepsis, and Tachycardia the patient was given IV fluids. IV Hydration was used because oral hydration was not as rapid as required. Upon recheck following hydration, the patient was showing some improvement .      ADDITIONAL PROBLEM LIST  Alcohol intoxication  Alcohol withdrawal  Upper GI bleed  Sepsis  Elevated LFTs    DISPOSITION AND DISCUSSIONS    Patient presents with an upper GI bleed with hematemesis prior to  arrival.  She is likely going to alcohol withdrawal as well as in the setting of acute GI bleed.  She has an alcoholic hepatitis without evidence of ascites on examination.  She was treated with Rocephin more for the sepsis rather than SBP prophylaxis that she has of ascites.  She is never had a history of varices before which is why she is not started on octreotide.  She was given IV fluids resuscitation for both sepsis and the GI bleed and vomiting.  She was treated with antiemetics and has not had further emesis since arrival.  However she is still in critical condition her vital signs are still mildly unstable and she will be admitted to our stepdown unit      I have discussed management of the patient with the following physicians and POLA's: I spoke with Dr. Allen and Dr Boyle     CRITICAL CARE  The very real possibilty of a deterioration of this patient's condition required the highest level of my preparedness for sudden, emergent intervention.  I provided critical care services, which included medication orders, frequent reevaluations of the patient's condition and response to treatment, ordering and reviewing test results, and discussing the case with various consultants.  The critical care time associated with the care of the patient was 35 minutes. Review chart for interventions. This time is exclusive of any other billable procedures.       FINAL DIAGNOSIS  Alcohol intoxication  Alcohol withdrawal  Upper GI bleed  Sepsis  Elevated LFTs    Critical care time 35 min        Electronically signed by: Shanice Hill M.D., 2/18/2023 8:12 PM

## 2023-02-19 NOTE — HOSPITAL COURSE
"\"57 y.o. female with a pmhx of EtOH abuse, hearing loss who presented 2/18/2023 with hematemesis, she had a coffee-ground emesis at home then a 30 mL bright red emesis in the ER. Her Hg returned at 13.6 with a normal BUN. She denies NSAID use or prior GIB. She does admit to drinking 1 pint of EtOH a day and her serum alcohol was 71 in the ER. She has been tachycardic and tremulous in the ER suggesting EtOH withdrawal and has been given benzos in the ER. [facility] has been consulted in the ED for IMCU admission. GI consultation has been deferred until the AM given small volume hematemesis and stable BP.     Addendum: Patient had an episode of decompensation in the IMCU requiring intubation, MTP and emergent GI consultation for EGD.\" - Dr. Allen 2/19 2/19 - intubated, EGD, MTP  "

## 2023-02-19 NOTE — PROGRESS NOTES
"Critical Care Progress Note    Date of admission  2/18/2023    Chief Complaint  57 y.o. female admitted 2/18/2023 with UGIB    Hospital Course  \"57 y.o. female with a pmhx of EtOH abuse, hearing loss who presented 2/18/2023 with hematemesis, she had a coffee-ground emesis at home then a 30 mL bright red emesis in the ER. Her Hg returned at 13.6 with a normal BUN. She denies NSAID use or prior GIB. She does admit to drinking 1 pint of EtOH a day and her serum alcohol was 71 in the ER. She has been tachycardic and tremulous in the ER suggesting EtOH withdrawal and has been given benzos in the ER. [facility] has been consulted in the ED for IMCU admission. GI consultation has been deferred until the AM given small volume hematemesis and stable BP.     Addendum: Patient had an episode of decompensation in the IMCU requiring intubation, MTP and emergent GI consultation for EGD.\" - Dr. Allen 2/19 2/19 - intubated, EGD, MTP    Interval Problem Update  Reviewed last 24 hour events:   - intubated and CVL placed for resuscitation   - AF, WBC unchanged at 11   - NSR, SBP    - 2L hematemesis s/p red box + 1 pRBC   - EGD with ulcers, MW tear and non-bleeding varices   - Joyce, not yet following   - off and on levophed gtt   - plts down to 91   - prop @ 55, fent pushes   - Na up to 149   - LA down to 11--> 2   - OGT to LCWS without any output   - good UOP   - VD # 1, increased rate and PEEP after ABG, moderate secretions   - PPI and octreotide gtts   - day 2/7 rocephin    Review of Systems  Review of Systems   Unable to perform ROS: Intubated      Vital Signs for last 24 hours   Temp:  [35.8 °C (96.4 °F)-36.5 °C (97.7 °F)] 36.2 °C (97.1 °F)  Pulse:  [] 107  Resp:  [9-68] 18  BP: ()/(39-72) 144/66  SpO2:  [84 %-100 %] 95 %    Respiratory Information for the last 24 hours  Vent Mode: APVCMV  Rate (breaths/min): 20  Vt Target (mL): 400  PEEP/CPAP: 10  MAP: 14  Control VTE (exp VT): 430    Physical Exam "   Physical Exam  Vitals and nursing note reviewed.   Constitutional:       General: She is in acute distress.      Appearance: Normal appearance. She is well-developed and overweight. She is not toxic-appearing.      Interventions: She is sedated, intubated and restrained.   HENT:      Head: Normocephalic and atraumatic.      Nose: Nose normal. No congestion.      Comments: Nasogastric tube in place     Mouth/Throat:      Mouth: Mucous membranes are moist.      Comments: Endotracheal tube in place, dried blood in oropharynx  Eyes:      General: No scleral icterus.     Pupils: Pupils are equal, round, and reactive to light.      Comments: Pale conjunctiva   Neck:      Vascular: No JVD.      Trachea: No tracheal deviation.      Comments: Right IJ introducer catheter in place without surrounding hematoma  Cardiovascular:      Rate and Rhythm: Normal rate and regular rhythm. Occasional Extrasystoles are present.     Chest Wall: PMI is not displaced.      Pulses:           Radial pulses are 1+ on the right side and 1+ on the left side.      Heart sounds: Normal heart sounds. No murmur heard.     Comments: Intermittent needs for norepinephrine drip  Pulmonary:      Effort: No tachypnea or accessory muscle usage. She is intubated.      Breath sounds: Examination of the left-lower field reveals rhonchi. Rhonchi present. No wheezing or rales.      Comments: Good compliance  Abdominal:      General: Bowel sounds are normal. There is no distension.      Palpations: Abdomen is soft.      Tenderness: There is no abdominal tenderness. There is no guarding or rebound.   Genitourinary:     Comments: Hernandez catheter in place  Musculoskeletal:         General: No tenderness.      Cervical back: Neck supple. No tenderness.      Right lower leg: No edema.      Left lower leg: No edema.      Comments: NILAY Oquendo   Skin:     General: Skin is warm and dry.      Capillary Refill: Capillary refill takes less than 2 seconds.       Coloration: Skin is pale.      Findings: No rash.   Neurological:      General: No focal deficit present.      Mental Status: She is lethargic.      Cranial Nerves: No cranial nerve deficit.      Comments: Moves all extremities purposefully, not following commands, no facial droop       Medications  Current Facility-Administered Medications   Medication Dose Route Frequency Provider Last Rate Last Admin    thiamine (B-1) 500 mg in dextrose 5% 100 mL IVPB  500 mg Intravenous TID Bolivar Goodson M.D. 200 mL/hr at 02/19/23 0537 500 mg at 02/19/23 0537    midodrine (PROAMATINE) tablet 5 mg  5 mg Oral TID WITH MEALS Michael Boyle M.D.        metoclopramide (REGLAN) injection 10 mg  10 mg Intravenous Q6HRS Bolivar Goodson M.D.   10 mg at 02/19/23 0526    magnesium sulfate IVPB premix 4 g  4 g Intravenous Once Bolivar Goodson M.D. 25 mL/hr at 02/19/23 0519 4 g at 02/19/23 0519    phenylephrine (DAMIAN-SYNEPHRINE) 100 mcg/mL inj (IV Push Syringe) 100 mcg  100 mcg Intravenous Q15 MIN PRN Leif Allen Jr., BAL.ORoshan        norepinephrine (Levophed) 8 mg in 250 mL NS infusion (premix)  0-1 mcg/kg/min (Ideal) Intravenous Continuous BAL Crowley Jr..O.   Stopped at 02/19/23 0259    Respiratory Therapy Consult   Nebulization Continuous RT Leif Allen Jr., D.O.        famotidine (PEPCID) tablet 20 mg  20 mg Enteral Tube Q12HRS BAL Crowley Jr..O.        Or    famotidine (PEPCID) injection 20 mg  20 mg Intravenous Q12HRS Leif Allen Jr., D.O.   20 mg at 02/19/23 0525    senna-docusate (PERICOLACE or SENOKOT S) 8.6-50 MG per tablet 2 Tablet  2 Tablet Enteral Tube BID Leif Allen Jr., D.O.        And    polyethylene glycol/lytes (MIRALAX) PACKET 1 Packet  1 Packet Enteral Tube QDAY PRN BAL Crowley Jr..ORoshan        And    magnesium hydroxide (MILK OF MAGNESIA) suspension 30 mL  30 mL Enteral Tube QDAY PRN Leif M Tiffany Jr., D.O.        And    bisacodyl (DULCOLAX) suppository 10 mg  10 mg Rectal QDAY MAEVE GHOTRA  Tiffany Razo D.O.        MD Alert...ICU Electrolyte Replacement per Pharmacy   Other PHARMACY TO DOSE Leif Allen Jr., D.O.        lidocaine (XYLOCAINE) 1 % injection 2 mL  2 mL Tracheal Tube Q30 MIN PRN ROSALBA Crowley Jr.O.        fentaNYL (SUBLIMAZE) injection 100 mcg  100 mcg Intravenous Q15 MIN PRN ROSALBA Crowley Jr.ORoshan   100 mcg at 02/19/23 0325    And    fentaNYL (SUBLIMAZE) injection 200 mcg  200 mcg Intravenous Q15 MIN PRN ROSALBA Crowley Jr.ORoshan        And    fentaNYL (SUBLIMAZE) 50 mcg/mL in 50mL (Continuous Infusion)   Intravenous Continuous Leif Allen Jr., D.O.        And    propofol (DIPRIVAN) injection  0-80 mcg/kg/min (Ideal) Intravenous Continuous ROSALBA Crowley Jr.O. 11.5 mL/hr at 02/19/23 0333 30 mcg/kg/min at 02/19/23 0333    insulin regular (HumuLIN R,NovoLIN R) injection  1-6 Units Subcutaneous Q6HRS BAL Crowley Jr..ORoshan        And    dextrose 10 % BOLUS 25 g  25 g Intravenous Q15 MIN PRN ROSALBA Crowley Jr.O.        FENTANYL CITRATE (PF) 0.05 MG/ML INJ SOLN (WRAPPED)             Nozin nasal  swab  1 Applicator Each Nostril BID ROSALBA Crowley Jr.O.   1 Applicator at 02/19/23 0525    pantoprazole (Protonix) 80 mg in  mL Infusion  8 mg/hr Intravenous Continuous Shanice Hill M.D.   Stopped at 02/18/23 2133    octreotide (Sandostatin) 1,250 mcg in  mL Infusion  50 mcg/hr Intravenous Continuous BAL Crowley Jr..O. 10 mL/hr at 02/19/23 0204 50 mcg/hr at 02/19/23 0204    LORazepam (ATIVAN) injection 1-2 mg  1-2 mg Intravenous Q2HRS PRN ROSALBA Crowley Jr.O.        multivitamin tablet 1 Tablet  1 Tablet Oral DAILY Leif Allen Jr., D.O.        And    folic acid (FOLVITE) tablet 1 mg  1 mg Oral DAILY ROSALBA Crowley Jr.O.        ondansetron (ZOFRAN) syringe/vial injection 4 mg  4 mg Intravenous Q4HRS PRN Michael Boyle M.D.        ondansetron (ZOFRAN ODT) dispertab 4 mg  4 mg Oral Q4HRS PRN Michael Boyle M.D.         promethazine (PHENERGAN) tablet 12.5-25 mg  12.5-25 mg Oral Q4HRS PRN Michael Boyle M.D.        promethazine (PHENERGAN) suppository 12.5-25 mg  12.5-25 mg Rectal Q4HRS PRN Michael Boyle M.D.        prochlorperazine (COMPAZINE) injection 5-10 mg  5-10 mg Intravenous Q4HRS PRN Michael Boyle M.D.        cefTRIAXone (Rocephin) syringe 1,000 mg  1,000 mg Intravenous Q24HRS Michael Boyle M.D.        Pharmacy Consult Request   Other PHARMACY TO DOSE Michael Boyle M.D.           Fluids    Intake/Output Summary (Last 24 hours) at 2/19/2023 0616  Last data filed at 2/19/2023 0304  Gross per 24 hour   Intake 1894.93 ml   Output 3039 ml   Net -1144.07 ml       Laboratory  Recent Labs     02/19/23  0519   ISTATAPH 7.291*   ISTATAPCO2 46.9*   ISTATAPO2 61*   ISTATATCO2 24   [id number] 88*   ISTATARTHCO3 22.6   ISTATARTBE -4   ISTATTEMP 97.2 F   ISTATFIO2 60   ISTATSPEC Arterial   ISTATAPHTC 7.302*   [id number] 58*         Recent Labs     02/18/23 2013 02/19/23  0111 02/19/23  0515   SODIUM 142  --  149*   POTASSIUM 4.3  --  4.0   CHLORIDE 100  --  108   CO2 17*  --  20   BUN 21  --  19   CREATININE 0.96  --  0.92   MAGNESIUM  --  1.2*  --    PHOSPHORUS  --  3.3  --    CALCIUM 9.4  --  8.8     Recent Labs     02/18/23 2013 02/19/23  0515   ALTSGPT 92*  --    ASTSGOT 107*  --    ALKPHOSPHAT 102*  --    TBILIRUBIN 2.9*  --    LIPASE 38  --    GLUCOSE 196* 137*     Recent Labs     02/18/23 2013 02/19/23  0515   WBC 11.6* 10.6   NEUTSPOLYS 61.30 88.00*   LYMPHOCYTES 30.20 6.20*   MONOCYTES 5.30 2.30   EOSINOPHILS 1.50 0.20   BASOPHILS 0.90 0.40   ASTSGOT 107*  --    ALTSGPT 92*  --    ALKPHOSPHAT 102*  --    TBILIRUBIN 2.9*  --      Recent Labs     02/18/23 2013 02/19/23  0115 02/19/23  0150 02/19/23  0515   RBC 4.06*  --   --  4.10*   HEMOGLOBIN 13.6 7.4*  --  12.6   HEMATOCRIT 41.0  --   --  37.5   PLATELETCT 179  --   --  91*   PROTHROMBTM 17.7*  --   --   --    APTT 25.4  --   --   --    INR 1.49*  --   --   --     FERRITIN  --   --  833.0*  --        Imaging  X-Ray:  I have personally reviewed the images and compared with prior images. and My impression is: No acute cardiopulmonary process with endotracheal tube/gastric tube/right IJ cordis in good position  Ultrasound:  Reviewed    Assessment/Plan  * UGIB (upper gastrointestinal bleed)- (present on admission)  Assessment & Plan  Initially small volume with episode of 700 mL hematemesis in IMCU  Intubated for airway protection  EGD performed emergently by Dr Craft: gastric ulcers and small, flat esophageal varices  Continue Protonix, octreotide, rocephin x7 days  Trend Hg, transfuse PRN for Hg <7 or active bleeding  IR available for GDA embolization if needed  Keep n.p.o. for now pending repeat EGD tomorrow    Hemorrhagic shock (HCC)  Assessment & Plan  Due to UGIB from ulcers status post endoscopic therapy 2/19  Status post massive transfusion protocol 2/19  Continue serial H/H with conservative transfusion strategy  Monitor coagulopathy with TEG/platelet mapping  Continue to titrate norepinephrine drip as needed to maintain mean arterial pressure greater than 65    Acute respiratory failure with hypoxia (HCC)  Assessment & Plan  Intubated for airway protection 2/19  Continue full mechanical ventilatory support with increasing mandatory minute ventilation  Titrate FiO2 to goal SPO2 greater than 92%  RT/O2 protocol  Continue sedation with propofol drip and as needed fentanyl for pain  ABCDEF bundle, no plans to extubate today pending repeat EGD tomorrow    Transaminitis- (present on admission)  Assessment & Plan  RUQ US with: Increased echogenicity and enlargement of the liver are likely due to hepatic steatosis.  Hx of EtOH abuse  Concerning for early cirrhosis  Avoid hepatotoxins  Monitor synthetic function and LFTs    Lactic acidosis- (present on admission)  Assessment & Plan  Likely due to EtOH abuse, GIB and hepatic insufficiency -improving  Discontinue monitoring post  resuscitation    High anion gap metabolic acidosis- (present on admission)  Assessment & Plan  Improving with resuscitation  Monitor    Alcohol withdrawal (HCC)- (present on admission)  Assessment & Plan  Continue current sedation with propofol drip  Vitamin supplementation  Eventual alcohol cessation education and resources to be provided when appropriate    Thrombocytopenia (HCC)  Assessment & Plan  Due to alcohol abuse and consumption  Monitor with platelet count greater than 50,000 in the setting of acute bleeding    Hepatitis C- (present on admission)  Assessment & Plan  HCV Ab positive  HCV quant by NAAT ordered    Hypomagnesemia- (present on admission)  Assessment & Plan  Replete and monitor closely    Cholelithiasis- (present on admission)  Assessment & Plan  No evidence of cholecystitis on imaging         VTE:  Contraindicated  Ulcer: PPI  Lines: Central Line  Ongoing indication addressed, Arterial Line  Ongoing indication addressed, and Hernandez Catheter  Ongoing indication addressed    I have performed a physical exam and reviewed and updated ROS and Plan today (2/19/2023). In review of yesterday's note (2/18/2023), there are no changes except as documented above.     Patient is critically ill today requiring active management of her mechanical ventilatory support as well as titration of sedative drips with intermittent vasopressor support. High risk of deterioration and worsening vital organ dysfunction and death without the above critical care interventions.    Discussed patient condition and risk of morbidity and/or mortality with RN, RT, Pharmacy, Charge nurse / hot rounds, Patient, and GI. Critical care time = 46 minutes in directly providing and coordinating critical care and extensive data review.  No time overlap and excludes procedures.    Please note that this dictation was created using voice recognition software. I have made every reasonable attempt to correct obvious errors, but there may be  errors of grammar and possibly content that I did not discover before finalizing the note.

## 2023-02-19 NOTE — ASSESSMENT & PLAN NOTE
Due to UGIB from ulcers status post endoscopic therapy 2/19  Status post massive transfusion protocol 2/19  Continue serial H/H with conservative transfusion strategy  Monitor coagulopathy with TEG/platelet mapping  Continue to titrate norepinephrine drip as needed to maintain mean arterial pressure greater than 65

## 2023-02-20 ENCOUNTER — APPOINTMENT (OUTPATIENT)
Dept: RADIOLOGY | Facility: MEDICAL CENTER | Age: 58
DRG: 368 | End: 2023-02-20
Attending: INTERNAL MEDICINE
Payer: COMMERCIAL

## 2023-02-20 LAB
ANION GAP SERPL CALC-SCNC: 8 MMOL/L (ref 7–16)
BACTERIA BLD CULT: ABNORMAL
BACTERIA BLD CULT: ABNORMAL
BASE EXCESS BLDA CALC-SCNC: 6 MMOL/L (ref -4–3)
BASOPHILS # BLD AUTO: 0.6 % (ref 0–1.8)
BASOPHILS # BLD: 0.06 K/UL (ref 0–0.12)
BODY TEMPERATURE: ABNORMAL DEGREES
BREATHS SETTING VENT: 20
BUN SERPL-MCNC: 25 MG/DL (ref 8–22)
CALCIUM SERPL-MCNC: 7.4 MG/DL (ref 8.5–10.5)
CHLORIDE SERPL-SCNC: 110 MMOL/L (ref 96–112)
CO2 BLDA-SCNC: 29 MMOL/L (ref 20–33)
CO2 SERPL-SCNC: 27 MMOL/L (ref 20–33)
CREAT SERPL-MCNC: 0.84 MG/DL (ref 0.5–1.4)
DELSYS IDSYS: ABNORMAL
END TIDAL CARBON DIOXIDE IECO2: 24 MMHG
EOSINOPHIL # BLD AUTO: 0.35 K/UL (ref 0–0.51)
EOSINOPHIL NFR BLD: 3.4 % (ref 0–6.9)
ERYTHROCYTE [DISTWIDTH] IN BLOOD BY AUTOMATED COUNT: 53.7 FL (ref 35.9–50)
GFR SERPLBLD CREATININE-BSD FMLA CKD-EPI: 81 ML/MIN/1.73 M 2
GLUCOSE BLD STRIP.AUTO-MCNC: 113 MG/DL (ref 65–99)
GLUCOSE BLD STRIP.AUTO-MCNC: 79 MG/DL (ref 65–99)
GLUCOSE BLD STRIP.AUTO-MCNC: 91 MG/DL (ref 65–99)
GLUCOSE SERPL-MCNC: 119 MG/DL (ref 65–99)
HCO3 BLDA-SCNC: 28 MMOL/L (ref 17–25)
HCT VFR BLD AUTO: 29.1 % (ref 37–47)
HCT VFR BLD AUTO: 30.9 % (ref 37–47)
HCT VFR BLD AUTO: 31 % (ref 37–47)
HGB BLD-MCNC: 10.5 G/DL (ref 12–16)
HGB BLD-MCNC: 10.9 G/DL (ref 12–16)
HGB BLD-MCNC: 9.8 G/DL (ref 12–16)
HOROWITZ INDEX BLDA+IHG-RTO: 291 MM[HG]
IMM GRANULOCYTES # BLD AUTO: 0.09 K/UL (ref 0–0.11)
IMM GRANULOCYTES NFR BLD AUTO: 0.9 % (ref 0–0.9)
LV EJECT FRACT  99904: 75
LV EJECT FRACT MOD 2C 99903: 75.97
LV EJECT FRACT MOD 4C 99902: 76.47
LV EJECT FRACT MOD BP 99901: 76.56
LYMPHOCYTES # BLD AUTO: 2.71 K/UL (ref 1–4.8)
LYMPHOCYTES NFR BLD: 26 % (ref 22–41)
MAGNESIUM SERPL-MCNC: 1.9 MG/DL (ref 1.5–2.5)
MCH RBC QN AUTO: 31.4 PG (ref 27–33)
MCHC RBC AUTO-ENTMCNC: 35.2 G/DL (ref 33.6–35)
MCV RBC AUTO: 89.3 FL (ref 81.4–97.8)
MODE IMODE: ABNORMAL
MONOCYTES # BLD AUTO: 0.58 K/UL (ref 0–0.85)
MONOCYTES NFR BLD AUTO: 5.6 % (ref 0–13.4)
NEUTROPHILS # BLD AUTO: 6.62 K/UL (ref 2–7.15)
NEUTROPHILS NFR BLD: 63.5 % (ref 44–72)
NRBC # BLD AUTO: 0 K/UL
NRBC BLD-RTO: 0 /100 WBC
O2/TOTAL GAS SETTING VFR VENT: 45 %
PCO2 BLDA: 29 MMHG (ref 26–37)
PCO2 TEMP ADJ BLDA: 28.1 MMHG (ref 26–37)
PEEP END EXPIRATORY PRESSURE IPEEP: 10 CMH20
PH BLDA: 7.59 [PH] (ref 7.4–7.5)
PH TEMP ADJ BLDA: 7.6 [PH] (ref 7.4–7.5)
PHOSPHATE SERPL-MCNC: 1.5 MG/DL (ref 2.5–4.5)
PLATELET # BLD AUTO: 76 K/UL (ref 164–446)
PMV BLD AUTO: 11.5 FL (ref 9–12.9)
PO2 BLDA: 131 MMHG (ref 64–87)
PO2 TEMP ADJ BLDA: 127 MMHG (ref 64–87)
POTASSIUM SERPL-SCNC: 3.3 MMOL/L (ref 3.6–5.5)
RBC # BLD AUTO: 3.47 M/UL (ref 4.2–5.4)
SAO2 % BLDA: 99 % (ref 93–99)
SIGNIFICANT IND 70042: ABNORMAL
SITE SITE: ABNORMAL
SODIUM SERPL-SCNC: 145 MMOL/L (ref 135–145)
SOURCE SOURCE: ABNORMAL
SPECIMEN DRAWN FROM PATIENT: ABNORMAL
TIDAL VOLUME IVT: 400 ML
WBC # BLD AUTO: 10.4 K/UL (ref 4.8–10.8)

## 2023-02-20 PROCEDURE — 99291 CRITICAL CARE FIRST HOUR: CPT | Performed by: INTERNAL MEDICINE

## 2023-02-20 PROCEDURE — 82962 GLUCOSE BLOOD TEST: CPT

## 2023-02-20 PROCEDURE — 43235 EGD DIAGNOSTIC BRUSH WASH: CPT | Performed by: SPECIALIST

## 2023-02-20 PROCEDURE — 0DJ08ZZ INSPECTION OF UPPER INTESTINAL TRACT, VIA NATURAL OR ARTIFICIAL OPENING ENDOSCOPIC: ICD-10-PCS | Performed by: SPECIALIST

## 2023-02-20 PROCEDURE — A9270 NON-COVERED ITEM OR SERVICE: HCPCS | Performed by: INTERNAL MEDICINE

## 2023-02-20 PROCEDURE — 94799 UNLISTED PULMONARY SVC/PX: CPT

## 2023-02-20 PROCEDURE — 85025 COMPLETE CBC W/AUTO DIFF WBC: CPT

## 2023-02-20 PROCEDURE — 94003 VENT MGMT INPAT SUBQ DAY: CPT

## 2023-02-20 PROCEDURE — 700111 HCHG RX REV CODE 636 W/ 250 OVERRIDE (IP): Performed by: INTERNAL MEDICINE

## 2023-02-20 PROCEDURE — 85018 HEMOGLOBIN: CPT | Mod: 91

## 2023-02-20 PROCEDURE — 770022 HCHG ROOM/CARE - ICU (200)

## 2023-02-20 PROCEDURE — 700105 HCHG RX REV CODE 258: Performed by: INTERNAL MEDICINE

## 2023-02-20 PROCEDURE — 700102 HCHG RX REV CODE 250 W/ 637 OVERRIDE(OP): Performed by: INTERNAL MEDICINE

## 2023-02-20 PROCEDURE — 160202 HCHG ENDO MINUTES - 1ST 30 MINS LEVEL 3: Performed by: SPECIALIST

## 2023-02-20 PROCEDURE — C9113 INJ PANTOPRAZOLE SODIUM, VIA: HCPCS | Performed by: EMERGENCY MEDICINE

## 2023-02-20 PROCEDURE — 160048 HCHG OR STATISTICAL LEVEL 1-5: Performed by: SPECIALIST

## 2023-02-20 PROCEDURE — 83735 ASSAY OF MAGNESIUM: CPT

## 2023-02-20 PROCEDURE — 93306 TTE W/DOPPLER COMPLETE: CPT | Mod: 26 | Performed by: INTERNAL MEDICINE

## 2023-02-20 PROCEDURE — 700105 HCHG RX REV CODE 258: Performed by: EMERGENCY MEDICINE

## 2023-02-20 PROCEDURE — 82803 BLOOD GASES ANY COMBINATION: CPT

## 2023-02-20 PROCEDURE — 700105 HCHG RX REV CODE 258: Performed by: STUDENT IN AN ORGANIZED HEALTH CARE EDUCATION/TRAINING PROGRAM

## 2023-02-20 PROCEDURE — 700111 HCHG RX REV CODE 636 W/ 250 OVERRIDE (IP): Performed by: STUDENT IN AN ORGANIZED HEALTH CARE EDUCATION/TRAINING PROGRAM

## 2023-02-20 PROCEDURE — 700101 HCHG RX REV CODE 250: Performed by: INTERNAL MEDICINE

## 2023-02-20 PROCEDURE — 700101 HCHG RX REV CODE 250: Performed by: STUDENT IN AN ORGANIZED HEALTH CARE EDUCATION/TRAINING PROGRAM

## 2023-02-20 PROCEDURE — 85014 HEMATOCRIT: CPT | Mod: 91

## 2023-02-20 PROCEDURE — 80048 BASIC METABOLIC PNL TOTAL CA: CPT

## 2023-02-20 PROCEDURE — 37799 UNLISTED PX VASCULAR SURGERY: CPT

## 2023-02-20 PROCEDURE — 71045 X-RAY EXAM CHEST 1 VIEW: CPT

## 2023-02-20 PROCEDURE — 84100 ASSAY OF PHOSPHORUS: CPT

## 2023-02-20 PROCEDURE — 700111 HCHG RX REV CODE 636 W/ 250 OVERRIDE (IP): Performed by: EMERGENCY MEDICINE

## 2023-02-20 RX ORDER — MAGNESIUM SULFATE HEPTAHYDRATE 40 MG/ML
2 INJECTION, SOLUTION INTRAVENOUS ONCE
Status: COMPLETED | OUTPATIENT
Start: 2023-02-20 | End: 2023-02-20

## 2023-02-20 RX ORDER — LORAZEPAM 2 MG/ML
1-2 INJECTION INTRAMUSCULAR
Status: DISCONTINUED | OUTPATIENT
Start: 2023-02-20 | End: 2023-02-27 | Stop reason: HOSPADM

## 2023-02-20 RX ORDER — DEXMEDETOMIDINE HYDROCHLORIDE 4 UG/ML
.1-1 INJECTION, SOLUTION INTRAVENOUS CONTINUOUS
Status: DISCONTINUED | OUTPATIENT
Start: 2023-02-20 | End: 2023-02-22

## 2023-02-20 RX ADMIN — LORAZEPAM 2 MG: 2 INJECTION INTRAMUSCULAR; INTRAVENOUS at 15:42

## 2023-02-20 RX ADMIN — THIAMINE HYDROCHLORIDE 500 MG: 100 INJECTION, SOLUTION INTRAMUSCULAR; INTRAVENOUS at 16:15

## 2023-02-20 RX ADMIN — PROPOFOL 55 MCG/KG/MIN: 10 INJECTION, EMULSION INTRAVENOUS at 06:01

## 2023-02-20 RX ADMIN — LORAZEPAM 2 MG: 2 INJECTION INTRAMUSCULAR; INTRAVENOUS at 17:58

## 2023-02-20 RX ADMIN — POTASSIUM PHOSPHATE, MONOBASIC AND POTASSIUM PHOSPHATE, DIBASIC 30 MMOL: 224; 236 INJECTION, SOLUTION, CONCENTRATE INTRAVENOUS at 08:43

## 2023-02-20 RX ADMIN — PROPOFOL 60 MCG/KG/MIN: 10 INJECTION, EMULSION INTRAVENOUS at 11:23

## 2023-02-20 RX ADMIN — FOLIC ACID 1 MG: 1 TABLET ORAL at 06:00

## 2023-02-20 RX ADMIN — OCTREOTIDE ACETATE 50 MCG/HR: 200 INJECTION, SOLUTION INTRAVENOUS; SUBCUTANEOUS at 03:39

## 2023-02-20 RX ADMIN — PROPOFOL 55 MCG/KG/MIN: 10 INJECTION, EMULSION INTRAVENOUS at 01:52

## 2023-02-20 RX ADMIN — CEFTRIAXONE SODIUM 1000 MG: 10 INJECTION, POWDER, FOR SOLUTION INTRAVENOUS at 20:15

## 2023-02-20 RX ADMIN — THIAMINE HYDROCHLORIDE 500 MG: 100 INJECTION, SOLUTION INTRAMUSCULAR; INTRAVENOUS at 20:19

## 2023-02-20 RX ADMIN — SODIUM CHLORIDE, POTASSIUM CHLORIDE, SODIUM LACTATE AND CALCIUM CHLORIDE: 600; 310; 30; 20 INJECTION, SOLUTION INTRAVENOUS at 11:42

## 2023-02-20 RX ADMIN — THIAMINE HYDROCHLORIDE 500 MG: 100 INJECTION, SOLUTION INTRAMUSCULAR; INTRAVENOUS at 11:05

## 2023-02-20 RX ADMIN — Medication 1 APPLICATOR: at 18:19

## 2023-02-20 RX ADMIN — Medication 1 APPLICATOR: at 06:00

## 2023-02-20 RX ADMIN — SENNOSIDES AND DOCUSATE SODIUM 2 TABLET: 50; 8.6 TABLET ORAL at 04:56

## 2023-02-20 RX ADMIN — LORAZEPAM 2 MG: 2 INJECTION INTRAMUSCULAR; INTRAVENOUS at 23:58

## 2023-02-20 RX ADMIN — DEXMEDETOMIDINE 0.2 MCG/KG/HR: 200 INJECTION, SOLUTION INTRAVENOUS at 16:55

## 2023-02-20 RX ADMIN — MIDODRINE HYDROCHLORIDE 5 MG: 5 TABLET ORAL at 07:30

## 2023-02-20 RX ADMIN — MAGNESIUM SULFATE HEPTAHYDRATE 2 G: 40 INJECTION, SOLUTION INTRAVENOUS at 08:41

## 2023-02-20 RX ADMIN — THERA TABS 1 TABLET: TAB at 06:00

## 2023-02-20 RX ADMIN — PANTOPRAZOLE SODIUM 8 MG/HR: 40 INJECTION, POWDER, FOR SOLUTION INTRAVENOUS at 04:56

## 2023-02-20 RX ADMIN — PANTOPRAZOLE SODIUM 8 MG/HR: 40 INJECTION, POWDER, FOR SOLUTION INTRAVENOUS at 15:48

## 2023-02-20 NOTE — CARE PLAN
The patient is Watcher - Medium risk of patient condition declining or worsening    Shift Goals  Clinical Goals: stable H/H, hemodynamic stability  Patient Goals: kely  Family Goals: kely      Problem: Knowledge Deficit - Standard  Goal: Patient and family/care givers will demonstrate understanding of plan of care, disease process/condition, diagnostic tests and medications  Outcome: Progressing     Problem: Optimal Care for Alcohol Withdrawal  Goal: Optimal Care for the alcohol withdrawal patient  Outcome: Progressing     Problem: Seizure Precautions  Goal: Implementation of seizure precautions  Outcome: Progressing     Problem: Lifestyle Changes  Goal: Patient's ability to identify lifestyle changes and available resources to help reduce recurrence of condition will improve  Outcome: Progressing     Problem: Psychosocial  Goal: Patient's level of anxiety will decrease  Outcome: Progressing  Goal: Spiritual and cultural needs incorporated into hospitalization  Outcome: Progressing     Problem: Risk for Aspiration  Goal: Patient's risk for aspiration will be absent or decrease  Outcome: Progressing     Problem: Pain - Standard  Goal: Alleviation of pain or a reduction in pain to the patient’s comfort goal  Outcome: Progressing     Problem: Skin Integrity  Goal: Skin integrity is maintained or improved  Outcome: Progressing     Problem: Fall Risk  Goal: Patient will remain free from falls  Outcome: Progressing     Problem: Safety - Medical Restraint  Goal: Remains free of injury from restraints (Restraint for Interference with Medical Device)  Outcome: Progressing  Goal: Free from restraint(s) (Restraint for Interference with Medical Device)  Outcome: Progressing

## 2023-02-20 NOTE — CARE PLAN
Problem: Optimal Care for Alcohol Withdrawal  Goal: Optimal Care for the alcohol withdrawal patient  Outcome: Progressing     Problem: Seizure Precautions  Goal: Implementation of seizure precautions  Outcome: Progressing     Problem: Lifestyle Changes  Goal: Patient's ability to identify lifestyle changes and available resources to help reduce recurrence of condition will improve  Outcome: Progressing     Problem: Pain - Standard  Goal: Alleviation of pain or a reduction in pain to the patient’s comfort goal  Outcome: Progressing     Problem: Skin Integrity  Goal: Skin integrity is maintained or improved  Outcome: Progressing     Problem: Safety - Medical Restraint  Goal: Remains free of injury from restraints (Restraint for Interference with Medical Device)  Outcome: Progressing  Goal: Free from restraint(s) (Restraint for Interference with Medical Device)  Outcome: Progressing   The patient is Watcher - Medium risk of patient condition declining or worsening    Shift Goals  Clinical Goals: stable H/H, hemodynamic stability  Patient Goals: kely  Family Goals: kely    Progress made toward(s) clinical / shift goals:  unable to provide education, hgb more stable tonight, hemodynamically progressing.     Patient is not progressing towards the following goals:

## 2023-02-20 NOTE — RESPIRATORY CARE
Extubation    Cuff leak noted Yes  Stridor present No     Pt extubated per Dr. Tadeo. Pt became quite agitated during and following extubation. Placed on a 4L NC, and currently calm with no s/s distress.

## 2023-02-20 NOTE — CARE PLAN
Vent Day 2    CMV 14, vt 400cc, peep 8, Fio2 35%    No SBT at this time.        Problem: Ventilation  Goal: Ability to achieve and maintain unassisted ventilation or tolerate decreased levels of ventilator support  Description: Target End Date:  4 days     Document on Vent flowsheet    1.  Support and monitor invasive and noninvasive mechanical ventilation  2.  Monitor ventilator weaning response  3.  Perform ventilator associated pneumonia prevention interventions  4.  Manage ventilation therapy by monitoring diagnostic test results  Outcome: Progressing

## 2023-02-20 NOTE — PROGRESS NOTES
"Critical Care Progress Note    Date of admission  2/18/2023    Chief Complaint  57 y.o. female admitted 2/18/2023 with UGIB    Hospital Course  \"57 y.o. female with a pmhx of EtOH abuse, hearing loss who presented 2/18/2023 with hematemesis, she had a coffee-ground emesis at home then a 30 mL bright red emesis in the ER. Her Hg returned at 13.6 with a normal BUN. She denies NSAID use or prior GIB. She does admit to drinking 1 pint of EtOH a day and her serum alcohol was 71 in the ER. She has been tachycardic and tremulous in the ER suggesting EtOH withdrawal and has been given benzos in the ER. [facility] has been consulted in the ED for IMCU admission. GI consultation has been deferred until the AM given small volume hematemesis and stable BP.  Addendum: Patient had an episode of decompensation in the IMCU requiring intubation, MTP and emergent GI consultation for EGD.\" - Dr. Allen 2/19 2/19 - intubated, EGD, MTP (EGD with gastric ulcers, MW tear and non-bleeding varices - extensive cautery)  2/20 -VD #2, lungs mostly clear, sedated, plan for repeat EGD today    Interval Problem Update  Reviewed last 24 hour events:  Patient is legally deaf  H/o EtOH  Tmax 99.9  Sedated, propofol, RASS -2, repeat EGD today  W/d's all ext  Octreotide, Protonix infusions  Norepinephrine  VD #2: 14/400/8/30%  7.52/29/131  CXR: ETT okay, right IJ resuscitation catheter in place, lungs mostly clear  I/O = 2338/1573  Hemoglobin stable 10.9  Platelets 76  NGT to LWS, minimal o/p  C3 day 2, ? Strep BCx1      Review of Systems  Review of Systems   Unable to perform ROS: Intubated      Vital Signs for last 24 hours   Pulse:  [] 74  Resp:  [9-39] 16  BP: ()/(43-59) 99/56  SpO2:  [99 %-100 %] 100 %    Respiratory Information for the last 24 hours  Vent Mode: APVCMV  Rate (breaths/min): 14  Vt Target (mL): 400  PEEP/CPAP: 8  MAP: 11  Control VTE (exp VT): 380    Physical Exam   Physical Exam  Vitals and nursing note " reviewed.   Constitutional:       Appearance: Normal appearance. She is well-developed and overweight.      Interventions: She is sedated and restrained.      Comments: Sedated, full ventilator support   HENT:      Head: Normocephalic and atraumatic.      Nose: Nose normal. No congestion.      Comments: Nasogastric tube in place     Mouth/Throat:      Mouth: Mucous membranes are moist.      Comments: ETT in place  Eyes:      Pupils: Pupils are equal, round, and reactive to light.      Comments: Pale conjunctiva   Neck:      Vascular: No JVD.      Trachea: No tracheal deviation.      Comments: Right IJ introducer catheter in place without surrounding hematoma  Cardiovascular:      Rate and Rhythm: Normal rate and regular rhythm.      Chest Wall: PMI is not displaced.   Pulmonary:      Breath sounds: No wheezing or rales.   Abdominal:      General: Bowel sounds are normal. There is no distension.      Palpations: Abdomen is soft.      Tenderness: There is no abdominal tenderness. There is no guarding or rebound.   Genitourinary:     Comments: Hernandez catheter in place  Musculoskeletal:         General: No swelling or tenderness.      Cervical back: Neck supple. No tenderness.   Skin:     General: Skin is warm and dry.      Capillary Refill: Capillary refill takes less than 2 seconds.   Neurological:      General: No focal deficit present.      Mental Status: She is lethargic.      Cranial Nerves: No cranial nerve deficit.      Comments: Heavily sedated, withdraws all extremities       Medications  Current Facility-Administered Medications   Medication Dose Route Frequency Provider Last Rate Last Admin    thiamine (B-1) 500 mg in dextrose 5% 100 mL IVPB  500 mg Intravenous TID Bolivar Goodson M.D. 200 mL/hr at 02/19/23 2009 500 mg at 02/19/23 2009    norepinephrine (Levophed) 8 mg in 250 mL NS infusion (premix)  0-1 mcg/kg/min (Ideal) Intravenous Continuous Leif Allen Jr., D.O. 4.8 mL/hr at 02/20/23 0400 0.04  mcg/kg/min at 02/20/23 0400    Respiratory Therapy Consult   Nebulization Continuous RT Leif Allen Jr., D.O.        senna-docusate (PERICOLACE or SENOKOT S) 8.6-50 MG per tablet 2 Tablet  2 Tablet Enteral Tube BID ROSALBA Crowley Jr.ORoshan   2 Tablet at 02/20/23 0456    And    polyethylene glycol/lytes (MIRALAX) PACKET 1 Packet  1 Packet Enteral Tube QDAY PRN Leif Allen Jr., D.O.        And    magnesium hydroxide (MILK OF MAGNESIA) suspension 30 mL  30 mL Enteral Tube QDAY PRN Leif Allen Jr., D.O.        And    bisacodyl (DULCOLAX) suppository 10 mg  10 mg Rectal QDAY PRN Leif Allen Jr., D.O.        MD Alert...ICU Electrolyte Replacement per Pharmacy   Other PHARMACY TO DOSE Leif Allen Jr., D.O.        lidocaine (XYLOCAINE) 1 % injection 2 mL  2 mL Tracheal Tube Q30 MIN PRN Leif Allen Jr., D.O.        fentaNYL (SUBLIMAZE) injection 100 mcg  100 mcg Intravenous Q15 MIN PRN Leif Allen Jr., D.O.   100 mcg at 02/19/23 2104    And    fentaNYL (SUBLIMAZE) injection 200 mcg  200 mcg Intravenous Q15 MIN PRN Leif Allen Jr., D.O.        And    fentaNYL (SUBLIMAZE) 50 mcg/mL in 50mL (Continuous Infusion)   Intravenous Continuous Leif Allen Jr., D.O.        And    propofol (DIPRIVAN) injection  0-80 mcg/kg/min (Ideal) Intravenous Continuous ROSALBA Crowley Jr.O. 21.1 mL/hr at 02/20/23 0601 55 mcg/kg/min at 02/20/23 0601    insulin regular (HumuLIN R,NovoLIN R) injection  1-6 Units Subcutaneous Q6HRS Leif Allen Jr., D.O.        And    dextrose 10 % BOLUS 25 g  25 g Intravenous Q15 MIN PRN ROSALBA Crowley Jr.ORoshan        Nozin nasal  swab  1 Applicator Each Nostril BID Leif Allen Jr., D.O.   1 Applicator at 02/20/23 0600    midodrine (PROAMATINE) tablet 5 mg  5 mg Enteral Tube TID WITH MEALS Jose M Gonda, M.D.   5 mg at 02/20/23 0730    ondansetron (ZOFRAN ODT) dispertab 4 mg  4 mg Enteral Tube Q4HRS PRN Jeremy M Gonda, M.D.        multivitamin tablet 1 Tablet   1 Tablet Enteral Tube DAILY Jeremy M Gonda, M.D.   1 Tablet at 02/20/23 0600    And    folic acid (FOLVITE) tablet 1 mg  1 mg Enteral Tube DAILY Jeremy M Gonda, M.D.   1 mg at 02/20/23 0600    Nozin nasal  swab  1 Applicator Each Nostril BID Jeremy M Gonda, M.D.   1 Applicator at 02/19/23 1800    promethazine (PHENERGAN) tablet 12.5-25 mg  12.5-25 mg Enteral Tube Q4HRS PRN Jeremy M Gonda, M.D.        lactated ringers infusion   Intravenous Continuous Jeremy M Gonda, M.D. 83 mL/hr at 02/19/23 2106 New Bag at 02/19/23 2106    labetalol (NORMODYNE/TRANDATE) injection 10 mg  10 mg Intravenous Q4HRS PRN Kathleen STERN Latona   10 mg at 02/19/23 2249    pantoprazole (Protonix) 80 mg in  mL Infusion  8 mg/hr Intravenous Continuous Shanice Hill M.D. 25 mL/hr at 02/20/23 0456 8 mg/hr at 02/20/23 0456    octreotide (Sandostatin) 1,250 mcg in  mL Infusion  50 mcg/hr Intravenous Continuous Leif Allen Jr., D.ORoshan 10 mL/hr at 02/20/23 0339 50 mcg/hr at 02/20/23 0339    ondansetron (ZOFRAN) syringe/vial injection 4 mg  4 mg Intravenous Q4HRS PRN Michael Boyle M.D.        promethazine (PHENERGAN) suppository 12.5-25 mg  12.5-25 mg Rectal Q4HRS PRN Michael Boyle M.D.        prochlorperazine (COMPAZINE) injection 5-10 mg  5-10 mg Intravenous Q4HRS PRN Michael Boyle M.D.        cefTRIAXone (Rocephin) syringe 1,000 mg  1,000 mg Intravenous Q24HRS Michael Boyle M.D.   1,000 mg at 02/19/23 2008    Pharmacy Consult Request   Other PHARMACY TO DOSE Michael Boyle M.D.           Fluids    Intake/Output Summary (Last 24 hours) at 2/20/2023 0645  Last data filed at 2/20/2023 0000  Gross per 24 hour   Intake 2338.73 ml   Output 765 ml   Net 1573.73 ml         Laboratory  Recent Labs     02/19/23  0519 02/20/23  0407   ISTATAPH 7.291* 7.592*   ISTATAPCO2 46.9* 29.0   ISTATAPO2 61* 131*   ISTATATCO2 24 29   [id number] 88* 99   ISTATARTHCO3 22.6 28.0*   ISTATARTBE -4 6*   ISTATTEMP 97.2 F 97.3 F   ISTATFIO2 60 45    [facility]terial [facility]terial   ISTATAPHTC 7.302* 7.603*   [id number] 58* 127*           Recent Labs     02/18/23 2013 02/19/23 0111 02/19/23 0515 02/20/23  0500   SODIUM 142  --  149* 145   POTASSIUM 4.3  --  4.0 3.3*   CHLORIDE 100  --  108 110   CO2 17*  --  20 27   BUN 21  --  19 25*   CREATININE 0.96  --  0.92 0.84   MAGNESIUM  --  1.2*  --  1.9   PHOSPHORUS  --  3.3  --  1.5*   CALCIUM 9.4  --  8.8 7.4*       Recent Labs     02/18/23 2013 02/19/23 0515 02/20/23  0500   ALTSGPT 92*  --   --    ASTSGOT 107*  --   --    ALKPHOSPHAT 102*  --   --    TBILIRUBIN 2.9*  --   --    LIPASE 38  --   --    GLUCOSE 196* 137* 119*       Recent Labs     02/18/23 2013 02/19/23 0515 02/20/23  0500   WBC 11.6* 10.6 10.4   NEUTSPOLYS 61.30 88.00* 63.50   LYMPHOCYTES 30.20 6.20* 26.00   MONOCYTES 5.30 2.30 5.60   EOSINOPHILS 1.50 0.20 3.40   BASOPHILS 0.90 0.40 0.60   ASTSGOT 107*  --   --    ALTSGPT 92*  --   --    ALKPHOSPHAT 102*  --   --    TBILIRUBIN 2.9*  --   --        Recent Labs     02/18/23 2013 02/19/23 0111 02/19/23 0115 02/19/23  0150 02/19/23  0515 02/19/23  0930 02/19/23  1750 02/19/23  2250 02/20/23  0500   RBC 4.06*  --   --   --  4.10*  --   --   --  3.47*   HEMOGLOBIN 13.6  --    < >  --  12.6   < > 11.1* 10.9* 10.9*   HEMATOCRIT 41.0  --   --   --  37.5   < > 31.0* 30.3* 31.0*   PLATELETCT 179  --   --   --  91*  --   --   --  76*   PROTHROMBTM 17.7*  --   --   --  19.9*  --   --   --   --    APTT 25.4  --   --   --   --   --   --   --   --    INR 1.49*  --   --   --  1.73*  --   --   --   --    IRON  --  153  --   --   --   --   --   --   --    FERRITIN  --   --   --  833.0*  --   --   --   --   --    TOTIRONBC  --  170*  --   --   --   --   --   --   --     < > = values in this interval not displayed.         Imaging  X-Ray:  I have personally reviewed the images and compared with prior images. and My impression is: No acute cardiopulmonary process with endotracheal tube/gastric tube/right IJ  cordis in good position  Ultrasound:  Reviewed    Assessment/Plan  * UGIB (upper gastrointestinal bleed)- (present on admission)  Assessment & Plan  Initially small volume with episode of 700 mL hematemesis in IMCU  Intubated for airway protection  EGD performed emergently 2/19: gastric ulcers and small, flat esophageal varices  Continue Protonix, octreotide, rocephin x7 days  Trend Hg, transfuse PRN for Hg <7 or active bleeding  IR available for GDA embolization if needed  Plan for repeat EGD today    Thrombocytopenia (HCC)  Assessment & Plan  Due to alcohol abuse and consumption  Monitor with platelet count greater than 50,000 in the setting of acute bleeding    Hemorrhagic shock (HCC)  Assessment & Plan  Due to UGIB from ulcers status post endoscopic therapy 2/19  Status post massive transfusion protocol 2/19  Continue serial H/H with conservative transfusion strategy  Monitor coagulopathy with TEG/platelet mapping  Titrated norepinephrine as needed    Hepatitis C- (present on admission)  Assessment & Plan  HCV Ab positive  HCV quant by NAAT ordered    Hypomagnesemia- (present on admission)  Assessment & Plan  Replete and monitor closely    Acute respiratory failure with hypoxia (HCC)  Assessment & Plan  Intubated for airway protection 2/19  Continue full mechanical ventilatory support with increasing mandatory minute ventilation  Titrate FiO2 to goal SPO2 greater than 92%  RT/O2 protocol  Continue sedation with propofol drip and as needed fentanyl for pain  ABCDEF bundle, hold SAT/SBT until repeat EGD completed 2/20    Cholelithiasis- (present on admission)  Assessment & Plan  No evidence of cholecystitis on imaging    Transaminitis- (present on admission)  Assessment & Plan  RUQ US with: Increased echogenicity and enlargement of the liver are likely due to hepatic steatosis.  Hx of EtOH abuse  Concerning for early cirrhosis  Avoid hepatotoxins  Monitor synthetic function and LFTs    Lactic acidosis- (present on  admission)  Assessment & Plan  Likely due to EtOH abuse, GIB and hepatic insufficiency -improving  Discontinue monitoring post resuscitation    High anion gap metabolic acidosis- (present on admission)  Assessment & Plan  Improving with resuscitation  Monitor    Alcohol withdrawal (HCC)- (present on admission)  Assessment & Plan  Continue current sedation with propofol drip  Vitamin supplementation  Eventual alcohol cessation education and resources to be provided when appropriate         VTE:  Contraindicated  Ulcer: PPI  Lines: Central Line  Ongoing indication addressed, Arterial Line  Ongoing indication addressed, and Hernandez Catheter  Ongoing indication addressed    I have performed a physical exam and reviewed and updated ROS and Plan today (2/20/2023). In review of yesterday's note (2/19/2023), there are no changes except as documented above.     Discussed patient condition and risk of morbidity and/or mortality with RN, RT, Pharmacy, Charge nurse / hot rounds, Patient, and GI. Critical care time = 55 minutes in directly providing and coordinating critical care and extensive data review.  No time overlap and excludes procedures.

## 2023-02-20 NOTE — DISCHARGE PLANNING
Care Transition Team Assessment    LSW spoke with pt's S/O, Valentin, to complete assessment. Valentin reported pt's home address is now [address] [place]. Pt lives with Valentin and another roommate in a mobile home that has 3-4 steps to enter. Prior to this hospitalization pt was independent with all ADLs and IADLs. Pt does not use any DME at baseline and drives independently. Valentin is pt's main support.    Pt is currently unemployed and has no source of income. Valentin believes the pt receives food stamps. Valentin denied any SA or MH concerns for pt, however per chart review pt has a history of etoh. Pt does not have an advance directive to Valentin's knowledge. Valentin reported the pt has a father, Shane, however does not have any contact information for Shane nor know his last name. Valentin is not aware of any other family for pt.    DARYLK completed, however no results.    Information Source  Orientation Level: Unable to assess  Information Given By: Significant Other  Informant's Name: Valentin Orozco  Who is responsible for making decisions for patient? : Legal next of kin    Readmission Evaluation  Is this a readmission?: No    Elopement Risk  Legal Hold: No    Discharge Preparedness  What is your plan after discharge?: Home with help  What are your discharge supports?: Partner  Prior Functional Level: Ambulatory, Drives Self, Independent with Activities of Daily Living, Independent with Medication Management  Difficulity with ADLs: None  Difficulity with IADLs: None    Functional Assesment  Prior Functional Level: Ambulatory, Drives Self, Independent with Activities of Daily Living, Independent with Medication Management    Finances  Financial Barriers to Discharge: Yes  Average Monthly Income: 0 $  Source of Income: None  Prescription Coverage: Yes    Vision / Hearing Impairment  Hearing Impairment: Both Ears, Hearing Device(s) Available    Advance Directive  Advance Directive?: None    Domestic Abuse  Have you ever been the  victim of abuse or violence?: No    Psychological Assessment  History of Substance Abuse: Alcohol  History of Psychiatric Problems: No  Non-compliant with Treatment: No  Newly Diagnosed Illness: No    Discharge Risks or Barriers  Discharge risks or barriers?: No PCP, Substance abuse  Patient risk factors: Lack of outside supports, No PCP, Substance abuse    Anticipated Discharge Information  Discharge Disposition: Discharged to home/self care (01)  Discharge Address: [address] [place] [address] [place] [id number]

## 2023-02-21 ENCOUNTER — APPOINTMENT (OUTPATIENT)
Dept: RADIOLOGY | Facility: MEDICAL CENTER | Age: 58
DRG: 368 | End: 2023-02-21
Attending: NURSE PRACTITIONER
Payer: COMMERCIAL

## 2023-02-21 ENCOUNTER — APPOINTMENT (OUTPATIENT)
Dept: RADIOLOGY | Facility: MEDICAL CENTER | Age: 58
DRG: 368 | End: 2023-02-21
Attending: INTERNAL MEDICINE
Payer: COMMERCIAL

## 2023-02-21 LAB
ANION GAP SERPL CALC-SCNC: 5 MMOL/L (ref 7–16)
BASOPHILS # BLD AUTO: 0.7 % (ref 0–1.8)
BASOPHILS # BLD: 0.04 K/UL (ref 0–0.12)
BUN SERPL-MCNC: 18 MG/DL (ref 8–22)
CALCIUM SERPL-MCNC: 7.5 MG/DL (ref 8.5–10.5)
CHLORIDE SERPL-SCNC: 110 MMOL/L (ref 96–112)
CO2 SERPL-SCNC: 29 MMOL/L (ref 20–33)
CREAT SERPL-MCNC: 0.67 MG/DL (ref 0.5–1.4)
EOSINOPHIL # BLD AUTO: 0.29 K/UL (ref 0–0.51)
EOSINOPHIL NFR BLD: 5 % (ref 0–6.9)
ERYTHROCYTE [DISTWIDTH] IN BLOOD BY AUTOMATED COUNT: 57.3 FL (ref 35.9–50)
GFR SERPLBLD CREATININE-BSD FMLA CKD-EPI: 102 ML/MIN/1.73 M 2
GLUCOSE BLD STRIP.AUTO-MCNC: 84 MG/DL (ref 65–99)
GLUCOSE BLD STRIP.AUTO-MCNC: 89 MG/DL (ref 65–99)
GLUCOSE SERPL-MCNC: 94 MG/DL (ref 65–99)
HAPTOGLOB SERPL-MCNC: <10 MG/DL (ref 30–200)
HCT VFR BLD AUTO: 28.9 % (ref 37–47)
HCT VFR BLD AUTO: 29.6 % (ref 37–47)
HCT VFR BLD AUTO: 30.1 % (ref 37–47)
HCV RNA SERPL NAA+PROBE-ACNC: ABNORMAL IU/ML
HCV RNA SERPL NAA+PROBE-ACNC: ABNORMAL IU/ML
HCV RNA SERPL NAA+PROBE-LOG IU: 3.66 LOG IU/ML
HCV RNA SERPL NAA+PROBE-LOG IU: 4.81 LOG IU/ML
HCV RNA SERPL QL NAA+PROBE: DETECTED
HCV RNA SERPL QL NAA+PROBE: DETECTED
HGB BLD-MCNC: 10.1 G/DL (ref 12–16)
HGB BLD-MCNC: 9.8 G/DL (ref 12–16)
HGB BLD-MCNC: 9.9 G/DL (ref 12–16)
IMM GRANULOCYTES # BLD AUTO: 0.06 K/UL (ref 0–0.11)
IMM GRANULOCYTES NFR BLD AUTO: 1 % (ref 0–0.9)
LYMPHOCYTES # BLD AUTO: 1.25 K/UL (ref 1–4.8)
LYMPHOCYTES NFR BLD: 21.5 % (ref 22–41)
MAGNESIUM SERPL-MCNC: 1.8 MG/DL (ref 1.5–2.5)
MCH RBC QN AUTO: 30.9 PG (ref 27–33)
MCHC RBC AUTO-ENTMCNC: 33.4 G/DL (ref 33.6–35)
MCV RBC AUTO: 92.5 FL (ref 81.4–97.8)
MONOCYTES # BLD AUTO: 0.41 K/UL (ref 0–0.85)
MONOCYTES NFR BLD AUTO: 7 % (ref 0–13.4)
NEUTROPHILS # BLD AUTO: 3.77 K/UL (ref 2–7.15)
NEUTROPHILS NFR BLD: 64.8 % (ref 44–72)
NRBC # BLD AUTO: 0 K/UL
NRBC BLD-RTO: 0 /100 WBC
PHOSPHATE SERPL-MCNC: 2.7 MG/DL (ref 2.5–4.5)
PLATELET # BLD AUTO: 44 K/UL (ref 164–446)
PMV BLD AUTO: 11.3 FL (ref 9–12.9)
POTASSIUM SERPL-SCNC: 3.8 MMOL/L (ref 3.6–5.5)
RBC # BLD AUTO: 3.2 M/UL (ref 4.2–5.4)
SODIUM SERPL-SCNC: 144 MMOL/L (ref 135–145)
WBC # BLD AUTO: 5.8 K/UL (ref 4.8–10.8)

## 2023-02-21 PROCEDURE — 700105 HCHG RX REV CODE 258: Performed by: EMERGENCY MEDICINE

## 2023-02-21 PROCEDURE — 99233 SBSQ HOSP IP/OBS HIGH 50: CPT | Performed by: STUDENT IN AN ORGANIZED HEALTH CARE EDUCATION/TRAINING PROGRAM

## 2023-02-21 PROCEDURE — 700105 HCHG RX REV CODE 258: Performed by: INTERNAL MEDICINE

## 2023-02-21 PROCEDURE — 71045 X-RAY EXAM CHEST 1 VIEW: CPT

## 2023-02-21 PROCEDURE — 770000 HCHG ROOM/CARE - INTERMEDIATE ICU *

## 2023-02-21 PROCEDURE — 85014 HEMATOCRIT: CPT

## 2023-02-21 PROCEDURE — 84100 ASSAY OF PHOSPHORUS: CPT

## 2023-02-21 PROCEDURE — C9113 INJ PANTOPRAZOLE SODIUM, VIA: HCPCS | Performed by: INTERNAL MEDICINE

## 2023-02-21 PROCEDURE — 82962 GLUCOSE BLOOD TEST: CPT

## 2023-02-21 PROCEDURE — 83735 ASSAY OF MAGNESIUM: CPT

## 2023-02-21 PROCEDURE — 700111 HCHG RX REV CODE 636 W/ 250 OVERRIDE (IP): Performed by: INTERNAL MEDICINE

## 2023-02-21 PROCEDURE — A9270 NON-COVERED ITEM OR SERVICE: HCPCS | Performed by: INTERNAL MEDICINE

## 2023-02-21 PROCEDURE — 700111 HCHG RX REV CODE 636 W/ 250 OVERRIDE (IP): Performed by: EMERGENCY MEDICINE

## 2023-02-21 PROCEDURE — 700101 HCHG RX REV CODE 250: Performed by: STUDENT IN AN ORGANIZED HEALTH CARE EDUCATION/TRAINING PROGRAM

## 2023-02-21 PROCEDURE — 76705 ECHO EXAM OF ABDOMEN: CPT

## 2023-02-21 PROCEDURE — 85025 COMPLETE CBC W/AUTO DIFF WBC: CPT

## 2023-02-21 PROCEDURE — 700105 HCHG RX REV CODE 258: Performed by: STUDENT IN AN ORGANIZED HEALTH CARE EDUCATION/TRAINING PROGRAM

## 2023-02-21 PROCEDURE — 85018 HEMOGLOBIN: CPT

## 2023-02-21 PROCEDURE — 700102 HCHG RX REV CODE 250 W/ 637 OVERRIDE(OP): Performed by: INTERNAL MEDICINE

## 2023-02-21 PROCEDURE — 99291 CRITICAL CARE FIRST HOUR: CPT | Performed by: INTERNAL MEDICINE

## 2023-02-21 PROCEDURE — 700101 HCHG RX REV CODE 250: Performed by: INTERNAL MEDICINE

## 2023-02-21 PROCEDURE — C9113 INJ PANTOPRAZOLE SODIUM, VIA: HCPCS | Performed by: EMERGENCY MEDICINE

## 2023-02-21 PROCEDURE — 92610 EVALUATE SWALLOWING FUNCTION: CPT

## 2023-02-21 PROCEDURE — 700111 HCHG RX REV CODE 636 W/ 250 OVERRIDE (IP): Performed by: STUDENT IN AN ORGANIZED HEALTH CARE EDUCATION/TRAINING PROGRAM

## 2023-02-21 PROCEDURE — 80048 BASIC METABOLIC PNL TOTAL CA: CPT

## 2023-02-21 RX ORDER — POTASSIUM CHLORIDE 14.9 MG/ML
20 INJECTION INTRAVENOUS ONCE
Status: COMPLETED | OUTPATIENT
Start: 2023-02-21 | End: 2023-02-21

## 2023-02-21 RX ORDER — MAGNESIUM SULFATE HEPTAHYDRATE 40 MG/ML
2 INJECTION, SOLUTION INTRAVENOUS ONCE
Status: COMPLETED | OUTPATIENT
Start: 2023-02-21 | End: 2023-02-21

## 2023-02-21 RX ORDER — PANTOPRAZOLE SODIUM 40 MG/10ML
40 INJECTION, POWDER, LYOPHILIZED, FOR SOLUTION INTRAVENOUS 2 TIMES DAILY
Status: DISCONTINUED | OUTPATIENT
Start: 2023-02-21 | End: 2023-02-22

## 2023-02-21 RX ADMIN — SENNOSIDES AND DOCUSATE SODIUM 2 TABLET: 50; 8.6 TABLET ORAL at 17:54

## 2023-02-21 RX ADMIN — POTASSIUM CHLORIDE 20 MEQ: 14.9 INJECTION, SOLUTION INTRAVENOUS at 10:56

## 2023-02-21 RX ADMIN — LORAZEPAM 1 MG: 2 INJECTION INTRAMUSCULAR; INTRAVENOUS at 05:33

## 2023-02-21 RX ADMIN — OCTREOTIDE ACETATE 50 MCG/HR: 200 INJECTION, SOLUTION INTRAVENOUS; SUBCUTANEOUS at 06:42

## 2023-02-21 RX ADMIN — PANTOPRAZOLE SODIUM 8 MG/HR: 40 INJECTION, POWDER, FOR SOLUTION INTRAVENOUS at 02:02

## 2023-02-21 RX ADMIN — CEFTRIAXONE SODIUM 1000 MG: 10 INJECTION, POWDER, FOR SOLUTION INTRAVENOUS at 22:13

## 2023-02-21 RX ADMIN — MAGNESIUM SULFATE HEPTAHYDRATE 2 G: 40 INJECTION, SOLUTION INTRAVENOUS at 08:49

## 2023-02-21 RX ADMIN — LORAZEPAM 1 MG: 2 INJECTION INTRAMUSCULAR; INTRAVENOUS at 12:43

## 2023-02-21 RX ADMIN — DEXMEDETOMIDINE 0.4 MCG/KG/HR: 200 INJECTION, SOLUTION INTRAVENOUS at 07:37

## 2023-02-21 RX ADMIN — THIAMINE HYDROCHLORIDE 500 MG: 100 INJECTION, SOLUTION INTRAMUSCULAR; INTRAVENOUS at 15:49

## 2023-02-21 RX ADMIN — PANTOPRAZOLE SODIUM 40 MG: 40 INJECTION, POWDER, FOR SOLUTION INTRAVENOUS at 17:54

## 2023-02-21 RX ADMIN — MIDODRINE HYDROCHLORIDE 5 MG: 5 TABLET ORAL at 17:54

## 2023-02-21 RX ADMIN — ONDANSETRON 4 MG: 2 INJECTION INTRAMUSCULAR; INTRAVENOUS at 12:30

## 2023-02-21 RX ADMIN — THIAMINE HYDROCHLORIDE 500 MG: 100 INJECTION, SOLUTION INTRAMUSCULAR; INTRAVENOUS at 10:32

## 2023-02-21 ASSESSMENT — PAIN DESCRIPTION - PAIN TYPE
TYPE: ACUTE PAIN

## 2023-02-21 ASSESSMENT — FIBROSIS 4 INDEX: FIB4 SCORE: 11.57

## 2023-02-21 NOTE — THERAPY
"Speech Language Pathology   Clinical Swallow Evaluation     Patient Name: Chiara Pacheco  AGE:  57 y.o., SEX:  female  Medical Record #: [id number]  Today's Date: 2/21/2023          HPI:56 YO female admitted 2/19 2/2 hematemesis.     PMHx: hearing loss, EtOH abuse. No [facility].     CMHx: UGIB, thrombocytopenia, hemorrhagic shock, hep C, hypomagenesemia, acute respiratory failure with hypoxia, cholelithiasis, transaminitis, lactic acidosis, high anoin gap metabolic acidosis, alcohol withdrawal.     Intubated 2/19-2/20    CXR 2/21 \"Hypoventilatory change with interstitial prominence could be due to atelectasis or edema with small effusions.\"    Level of Consciousness: Drowsy, Lethargic  Affect/Behavior: Confused, Tearful  Follows Directives: Yes - simple commands only  Orientation: Self  Hearing: Hearing impaired, has bilateral hearing aids, batteries dead, boyfriend to bring new ones  Vision: Wears corrective lenses    Prior Living Situation & Level of Function:  Unable to provide PLOF.    Oral Mechanism Evaluation  Facial Symmetry: Equal  Facial Sensation: Pt did not follow commands to assess  Labial Observations: Pt did not follow commands to assess  Lingual Observations: Pt did not follow commands for assessment  Dentition: Fair  Comments:    Voice  Quality: Hoarse  Resonance: WFL  Intensity: Soft  Cough: WFL  Comments:    Current Method of Nutrition   NPO until cleared by speech pathology    Subjective  Pt asleep upon entrance, easily awakened. Pt required max verbal, tactile, gestural, and written cues to follow directions and participate in session. Pt emotionally labile and intermittently crying.      Assessment  Positioning: Rivas's (60-90 degrees)  Bolus Administration: SLP  Oxygen Requirements:  4 L Nasal Cannula  Factor(s) Affecting Performance: Impaired endurance, Impaired mental status, Impaired command following    Swallowing Trials  Ice: WFL  Thin Liquid (TN0): WFL  Liquidised (LQ3): " WFL  Pureed (PU4): WFL  Minced & Moist (MM5): Impaired    Comments:  Impaired oral acceptance, requiring verbal and tactile cues for appropriate jaw opening, functional bolus stripping from utensil. Timely swallow initiation, presumed complete hyolaryngeal elevation to palpation and vocal quality remained clear. Subtle throat clear appreciated with self-fed (with [place] assistance) consecutive sips of thins by cup. Pt demonstrated prolonged mastication of minced and moist with moderate oral residue appreciated. Pt required 1:1 feeding assistance, with self-fed attempt pt demonstrated impaired proprioception and inadequate  for holding of the cup. No other s/sx of aspiration appreciated. Pt [place] with bilateral hearing aids, however, batteries are dead (boyfriend to bring replacements). Pt benefited from written communication but did require long amount of time to read simple phrases. Pt did not demonstrate ability to coordinate drinking through straw.  Current barriers to an advanced diet include impaired awareness/alertness and lethargy.        Clinical Impressions  Clinical signs of oropharyngeal dysphagia include prolonged/inefficient mastication, oral residue after the swallow, and coughing. Dysphagia is likely acute related to endotracheal intubation, hemorrhagic shock, UGIB, acute respiratory failure, and alcohol withdrawal. Instrumental swallow study is not indicated to objectively assess swallow function and further direct POC.     Recommendations  PUREE/ THINS DIET  Instrumentation: None indicated at this time  Swallowing Instructions & Precautions:   Supervision: 1:1 feeding with constant supervision  Positioning: Fully upright and midline during oral intake  Medication: Whole with puree  Strategies: Small bites/sips, Slow rate of intake, No straws  Oral Care: Q4h  4. Anticipate diet upgrade with improved mentation/awareness.      SLP following.       Plan    Recommend Speech Therapy 3 times per week until  "therapy goals are met for the following treatments:  Dysphagia Training and Patient / Family / Caregiver Education.    Discharge Recommendations: (P) Recommend post-acute placement for additional speech therapy services prior to discharge home    Objective       02/21/23 1145   Charge Group   SLP Oral Pharyngeal Evaluation Oral Pharyngeal Evaluation   Total Treatment Time   SLP Time Spent Yes   SLP Oral Pharyngeal Evaluation (Mins) 23   SLP Total Time Spent 23   Initial Contact Note    Initial Contact Note  Order Received and Verified, Speech Therapy Evaluation in Progress with Full Report to Follow.   Vitals   O2 (LPM) 4   O2 Delivery Device Nasal Cannula   Pain 0 - 10 Group   Therapist Pain Assessment Post Activity Pain Same as Prior to Activity;Nurse Notified  (c/o ankle pain, no rating)   Prior Level Of Function   Communication Unknown   Swallow Unknown   Dentition Intact   Dentures None   Hearing Impaired Both Ears   Hearing Aid Right;Left  (batteries dead, boyfriend to bring replacements)   Vision Wears Corrective Lenses   Patient's Primary Language English   Dysphagia Rating   Nutritional Liquid Intake Rating Scale Non thickened beverages   Nutritional Food Intake Rating Scale Total oral diet of a single consistency   Patient / Family Goals   Patient / Family Goal #1 \"I want my boyfriend\"   Short Term Goals   Short Term Goal # 1 Pt will consume a diet of PU/TN with no s/sx of aspiration and min cues.   Education Group   Education Provided Dysphagia   Dysphagia Patient Response Patient;Acceptance;Demonstration;Explanation;Verbal Demonstration;Reinforcement Needed   Problem List   Problem List Dysphagia   Anticipated Discharge Needs   Discharge Recommendations Recommend post-acute placement for additional speech therapy services prior to discharge home   Therapy Recommendations Upon DC Dysphagia Training;Community Re-Integration;Patient / Family / Caregiver Education   Interdisciplinary Plan of Care " Collaboration   IDT Collaboration with  Nursing   Patient Position at End of Therapy Seated;In Bed;Bed Alarm On;Wrist Restraints Applied;Call Light within Reach;Tray Table within Reach   Collaboration Comments RN aware of results/recs

## 2023-02-21 NOTE — CARE PLAN
Problem: Pain - Standard  Goal: Alleviation of pain or a reduction in pain to the patient’s comfort goal  Outcome: Progressing     Problem: Skin Integrity  Goal: Skin integrity is maintained or improved  Outcome: Progressing     Problem: Safety - Medical Restraint  Goal: Remains free of injury from restraints (Restraint for Interference with Medical Device)  Outcome: Progressing  Goal: Free from restraint(s) (Restraint for Interference with Medical Device)  Outcome: Progressing   The patient is Watcher - Medium risk of patient condition declining or worsening    Shift Goals  Clinical Goals:  (stable H/H, repeat EGD, extubate)  Patient Goals: MARTINE  Family Goals: MARTINE    Progress made toward(s) clinical / shift goals:  Pt extubated with resolved GIB, ETOH w/d now focus of treatment.    Patient is not progressing towards the following goals:

## 2023-02-21 NOTE — PROGRESS NOTES
"Critical Care Progress Note    Date of admission  2/18/2023    Chief Complaint  57 y.o. female admitted 2/18/2023 with UGIB    Hospital Course  \"57 y.o. female with a pmhx of EtOH abuse, hearing loss who presented 2/18/2023 with hematemesis, she had a coffee-ground emesis at home then a 30 mL bright red emesis in the ER. Her Hg returned at 13.6 with a normal BUN. She denies NSAID use or prior GIB. She does admit to drinking 1 pint of EtOH a day and her serum alcohol was 71 in the ER. She has been tachycardic and tremulous in the ER suggesting EtOH withdrawal and has been given benzos in the ER. [facility] has been consulted in the ED for IMCU admission. GI consultation has been deferred until the AM given small volume hematemesis and stable BP.  Addendum: Patient had an episode of decompensation in the IMCU requiring intubation, MTP and emergent GI consultation for EGD.\" - Dr. Allen 2/19 2/19 - intubated, EGD, MTP (EGD with gastric ulcers, MW tear and non-bleeding varices - extensive cautery)  2/20 -VD #2, lungs mostly clear, sedated, plan for repeat EGD today improved, no active bleeding; extubated  2/21 -     Interval Problem Update  Reviewed last 24 hour events:  Legally deaf; follows with squeezing and moving all ext  Drowsy, on dex 0.4, agitated/restlessnes   SR/SB  Off norepi, normotensive  Afebrile  I/O = 4439/1490  4 lpm oxygen, PEP as able  Remove art line  Protonix - change to BID  Octreotide - stop  SLP eval  LR 83, advance diet as greg  C3  Replace Mg  Replace K      Review of Systems  Review of Systems   Unable to perform ROS: Intubated      Vital Signs for last 24 hours   Pulse:  [] 63  Resp:  [0-44] 0  BP: ()/(41-84) 109/64  SpO2:  [91 %-100 %] 99 %    Respiratory Information for the last 24 hours  Vent Mode: APVCMV  Rate (breaths/min): 14  Vt Target (mL): 400  PEEP/CPAP: 8  MAP: 10  Control VTE (exp VT): 405    Physical Exam   Physical Exam  Vitals and nursing note reviewed. "   Constitutional:       Appearance: Normal appearance. She is well-developed and overweight.      Interventions: She is sedated and restrained.   HENT:      Head: Normocephalic and atraumatic.      Nose: Nose normal. No congestion.      Mouth/Throat:      Mouth: Mucous membranes are moist.   Eyes:      Pupils: Pupils are equal, round, and reactive to light.      Comments: Pale conjunctiva   Neck:      Vascular: No JVD.      Trachea: No tracheal deviation.      Comments: Right IJ introducer catheter in place without surrounding hematoma  Cardiovascular:      Rate and Rhythm: Normal rate and regular rhythm.      Chest Wall: PMI is not displaced.   Pulmonary:      Breath sounds: Rhonchi present. No wheezing or rales.   Abdominal:      General: Bowel sounds are normal. There is no distension.      Palpations: Abdomen is soft.      Tenderness: There is no abdominal tenderness. There is no guarding or rebound.   Genitourinary:     Comments: Hernandez catheter in place  Musculoskeletal:         General: No swelling or tenderness.      Cervical back: Neck supple. No tenderness.   Skin:     General: Skin is warm and dry.      Capillary Refill: Capillary refill takes less than 2 seconds.   Neurological:      General: No focal deficit present.      Mental Status: She is lethargic.      Cranial Nerves: No cranial nerve deficit.      Comments: Nausea, intermittently restless/agitated, on dexmedetomidine currently       Medications  Current Facility-Administered Medications   Medication Dose Route Frequency Provider Last Rate Last Admin    magnesium sulfate IVPB premix 2 g  2 g Intravenous Once James Tadeo M.D.        dexmedetomidine (PRECEDEX) 400 mcg/100mL NS premix infusion  0.1-1 mcg/kg/hr (Ideal) Intravenous Continuous James Tadeo M.D. 6.4 mL/hr at 02/21/23 0737 0.4 mcg/kg/hr at 02/21/23 0737    LORazepam (ATIVAN) injection 1-2 mg  1-2 mg Intravenous Q2HRS PRN James Tadeo M.D.   1 mg at 02/21/23 0533    thiamine  (B-1) 500 mg in dextrose 5% 100 mL IVPB  500 mg Intravenous TID Bolivar Goodson M.D.   Stopped at 02/20/23 2049    norepinephrine (Levophed) 8 mg in 250 mL NS infusion (premix)  0-1 mcg/kg/min (Ideal) Intravenous Continuous ROSALBA Crowley Jr.ORoshan   Stopped at 02/21/23 0645    Respiratory Therapy Consult   Nebulization Continuous RT Leif Allen Jr., D.O.        senna-docusate (PERICOLACE or SENOKOT S) 8.6-50 MG per tablet 2 Tablet  2 Tablet Enteral Tube BID ROSALBA Crowley Jr.ORoshan   2 Tablet at 02/20/23 0456    And    polyethylene glycol/lytes (MIRALAX) PACKET 1 Packet  1 Packet Enteral Tube QDAY PRN Leif Allen Jr., D.O.        And    magnesium hydroxide (MILK OF MAGNESIA) suspension 30 mL  30 mL Enteral Tube QDAY PRN Leif Allen Jr., D.O.        And    bisacodyl (DULCOLAX) suppository 10 mg  10 mg Rectal QDAY PRN Leif Allen Jr., D.O.        MD Alert...ICU Electrolyte Replacement per Pharmacy   Other PHARMACY TO DOSE Leif Allen Jr., D.O.        lidocaine (XYLOCAINE) 1 % injection 2 mL  2 mL Tracheal Tube Q30 MIN PRN Leif Allen Jr., D.O.        fentaNYL (SUBLIMAZE) injection 100 mcg  100 mcg Intravenous Q15 MIN PRN ROSALBA Crowley Jr.ORoshan   100 mcg at 02/19/23 2104    And    fentaNYL (SUBLIMAZE) injection 200 mcg  200 mcg Intravenous Q15 MIN PRN Leif Allen Jr., D.O.        And    fentaNYL (SUBLIMAZE) 50 mcg/mL in 50mL (Continuous Infusion)   Intravenous Continuous ROSALBA Crowley Jr.ORoshan   Held at 02/19/23 0345    And    propofol (DIPRIVAN) injection  0-80 mcg/kg/min (Ideal) Intravenous Continuous ROSALBA Crowley Jr.ORoshan   Stopped at 02/20/23 1331    insulin regular (HumuLIN R,NovoLIN R) injection  1-6 Units Subcutaneous Q6HRS Leif Allen Jr., D.O.        And    dextrose 10 % BOLUS 25 g  25 g Intravenous Q15 MIN PRN ROSALBA Crowley Jr.ORoshan Huang nasal  swab  1 Applicator Each Nostril BID Leif Allen Jr., D.O.   1 Applicator at 02/20/23 0600     midodrine (PROAMATINE) tablet 5 mg  5 mg Enteral Tube TID WITH MEALS Jeremy M Gonda, M.D.   5 mg at 02/20/23 0730    ondansetron (ZOFRAN ODT) dispertab 4 mg  4 mg Enteral Tube Q4HRS PRN Jeremy M Gonda, M.D.        multivitamin tablet 1 Tablet  1 Tablet Enteral Tube DAILY Jeremy M Gonda, M.D.   1 Tablet at 02/20/23 0600    And    folic acid (FOLVITE) tablet 1 mg  1 mg Enteral Tube DAILY Jeremy M Gonda, M.D.   1 mg at 02/20/23 0600    Nozin nasal  swab  1 Applicator Each Nostril BID Jeremy M Gonda, M.D.   1 Applicator at 02/20/23 1819    promethazine (PHENERGAN) tablet 12.5-25 mg  12.5-25 mg Enteral Tube Q4HRS PRN Jeremy M Gonda, M.D.        lactated ringers infusion   Intravenous Continuous Jeremy M Gonda, M.D. 83 mL/hr at 02/21/23 0600 Rate Verify at 02/21/23 0600    pantoprazole (Protonix) 80 mg in  mL Infusion  8 mg/hr Intravenous Continuous Shanice Hill M.D. 25 mL/hr at 02/21/23 0600 8 mg/hr at 02/21/23 0600    octreotide (Sandostatin) 1,250 mcg in  mL Infusion  50 mcg/hr Intravenous Continuous Leif Allen Jr., D.ORoshan 10 mL/hr at 02/21/23 0642 50 mcg/hr at 02/21/23 0642    ondansetron (ZOFRAN) syringe/vial injection 4 mg  4 mg Intravenous Q4HRS PRN Michael Boyle M.D.        promethazine (PHENERGAN) suppository 12.5-25 mg  12.5-25 mg Rectal Q4HRS PRN Michael Boyle M.D.        prochlorperazine (COMPAZINE) injection 5-10 mg  5-10 mg Intravenous Q4HRS PRN Michael Boyle M.D.        cefTRIAXone (Rocephin) syringe 1,000 mg  1,000 mg Intravenous Q24HRS Michael Boyle M.D.   1,000 mg at 02/20/23 2015    Pharmacy Consult Request   Other PHARMACY TO DOSE Michael Boyle M.D.           Fluids    Intake/Output Summary (Last 24 hours) at 2/21/2023 0758  Last data filed at 2/21/2023 0600  Gross per 24 hour   Intake 3466.45 ml   Output 1490 ml   Net 1976.45 ml         Laboratory  Recent Labs     02/19/23  0519 02/20/23  0407   ISTATAPH 7.291* 7.592*   ISTATAPCO2 46.9* 29.0   ISTATAPO2 61* 131*    ISTATATCO2 24 29   [id number] 88* 99   ISTATARTHCO3 22.6 28.0*   ISTATARTBE -4 6*   ISTATTEMP 97.2 F 97.3 F   ISTATFIO2 60 45   ISTATSPEC Arterial Arterial   ISTATAPHTC 7.302* 7.603*   [id number] 58* 127*           Recent Labs     02/19/23 0111 02/19/23 0515 02/20/23  0500 02/21/23  0448   SODIUM  --  149* 145 144   POTASSIUM  --  4.0 3.3* 3.8   CHLORIDE  --  108 110 110   CO2  --  20 27 29   BUN  --  19 25* 18   CREATININE  --  0.92 0.84 0.67   MAGNESIUM 1.2*  --  1.9 1.8   PHOSPHORUS 3.3  --  1.5* 2.7   CALCIUM  --  8.8 7.4* 7.5*       Recent Labs     02/18/23 2013 02/19/23 0515 02/20/23  0500 02/21/23  0448   ALTSGPT 92*  --   --   --    ASTSGOT 107*  --   --   --    ALKPHOSPHAT 102*  --   --   --    TBILIRUBIN 2.9*  --   --   --    LIPASE 38  --   --   --    GLUCOSE 196* 137* 119* 94       Recent Labs     02/18/23 2013 02/19/23 0515 02/20/23  0500 02/21/23  0448   WBC 11.6* 10.6 10.4 5.8   NEUTSPOLYS 61.30 88.00* 63.50 64.80   LYMPHOCYTES 30.20 6.20* 26.00 21.50*   MONOCYTES 5.30 2.30 5.60 7.00   EOSINOPHILS 1.50 0.20 3.40 5.00   BASOPHILS 0.90 0.40 0.60 0.70   ASTSGOT 107*  --   --   --    ALTSGPT 92*  --   --   --    ALKPHOSPHAT 102*  --   --   --    TBILIRUBIN 2.9*  --   --   --        Recent Labs     02/18/23 2013 02/19/23 0111 02/19/23 0115 02/19/23 0150 02/19/23 0515 02/19/23  0930 02/20/23  0500 02/20/23  1530 02/20/23  2314 02/21/23  1748   RBC 4.06*  --   --   --  4.10*  --  3.47*  --   --  3.20*   HEMOGLOBIN 13.6  --    < >  --  12.6   < > 10.9* 10.5* 9.8* 9.9*   HEMATOCRIT 41.0  --   --   --  37.5   < > 31.0* 30.9* 29.1* 29.6*   PLATELETCT 179  --   --   --  91*  --  76*  --   --  44*   PROTHROMBTM 17.7*  --   --   --  19.9*  --   --   --   --   --    APTT 25.4  --   --   --   --   --   --   --   --   --    INR 1.49*  --   --   --  1.73*  --   --   --   --   --    IRON  --  153  --   --   --   --   --   --   --   --    FERRITIN  --   --   --  833.0*  --   --   --   --   --   --     [facility]  --  170*  --   --   --   --   --   --   --   --     < > = values in this interval not displayed.         Imaging  X-Ray:  I have personally reviewed the images and compared with prior images. and My impression is: No acute cardiopulmonary process with endotracheal tube/gastric tube/right IJ cordis in good position  Ultrasound:  Reviewed    Assessment/Plan  * UGIB (upper gastrointestinal bleed)- (present on admission)  Assessment & Plan  Initially small volume with episode of 700 mL hematemesis in IMCU  Intubated for airway protection  EGD performed emergently 2/19: gastric ulcers and small, flat esophageal varices  On Protonix, octreotide, rocephin x7 days  Trend Hg, transfuse PRN for Hg <7 or active bleeding  IR available for GDA embolization if needed  Repeat EGD 2/20, no active bleeding, notable ESO varices, gastric varix noted, okay to change Protonix to twice daily, DC octreotide    Thrombocytopenia (HCC)  Assessment & Plan  Due to alcohol abuse and consumption  Monitor with platelet count greater than 50,000 in the setting of acute bleeding    Hemorrhagic shock (HCC)  Assessment & Plan  Due to UGIB from ulcers status post endoscopic therapy 2/19  Status post massive transfusion protocol 2/19  Continue serial H/H with conservative transfusion strategy  Monitor coagulopathy with TEG/platelet mapping  Remains off norepinephrine     Hepatitis C- (present on admission)  Assessment & Plan  HCV Ab positive  HCV quant by NAAT ordered    Hypomagnesemia- (present on admission)  Assessment & Plan  Replete and monitor closely    Acute respiratory failure with hypoxia (HCC)  Assessment & Plan  Intubated for airway protection 2/19  Extubated 2/20  Continue titrated FiO2, I-S, Pep if needed, mobility as tolerated    Cholelithiasis- (present on admission)  Assessment & Plan  No evidence of cholecystitis on imaging    Transaminitis- (present on admission)  Assessment & Plan  RUQ US with: Increased echogenicity and  enlargement of the liver are likely due to hepatic steatosis.  Hx of EtOH abuse  Concerning for early cirrhosis  Avoid hepatotoxins  Monitor synthetic function and LFTs    Lactic acidosis- (present on admission)  Assessment & Plan  Likely due to EtOH abuse, GIB and hepatic insufficiency -improving  Discontinue monitoring post resuscitation    High anion gap metabolic acidosis- (present on admission)  Assessment & Plan  Improving with resuscitation  Monitor    Alcohol withdrawal (HCC)- (present on admission)  Assessment & Plan  Dexmedetomidine, as needed Ativan  Vitamin supplementation  Eventual alcohol cessation education and resources to be provided when appropriate         VTE:  Contraindicated  Ulcer: PPI  Lines: Central Line  Ongoing indication addressed, Arterial Line  Ongoing indication addressed, and Hernandez Catheter  Ongoing indication addressed    I have performed a physical exam and reviewed and updated ROS and Plan today (2/21/2023). In review of yesterday's note (2/20/2023), there are no changes except as documented above.     Discussed patient condition and risk of morbidity and/or mortality with RN, RT, Pharmacy, Charge nurse / hot rounds, Patient, and GI.  Patient remains critically ill.  I have titrated multiple medications as above.  Critical care time = 35 minutes, no overlap.

## 2023-02-21 NOTE — CARE PLAN
Problem: Ventilation  Goal: Ability to achieve and maintain unassisted ventilation or tolerate decreased levels of ventilator support  Description: Target End Date:  4 days     Document on Vent flowsheet    1.  Support and monitor invasive and noninvasive mechanical ventilation  2.  Monitor ventilator weaning response  3.  Perform ventilator associated pneumonia prevention interventions  4.  Manage ventilation therapy by monitoring diagnostic test results  Outcome: Met     Problem: Hyperinflation  Goal: Prevent or improve atelectasis  Description: Target End Date:  3 to 4 days    1. Instruct incentive spirometry usage  2.  Perform hyperinflation therapy as indicated  Outcome: Progressing     PEP QID

## 2023-02-21 NOTE — CARE PLAN
The patient is Stable - Low risk of patient condition declining or worsening    Shift Goals  Clinical Goals: RASS score, mobility, safety  Patient Goals: MARTINE  Family Goals: MARTINE    Progress made toward(s) clinical / shift goals:    Problem: Knowledge Deficit - Standard  Goal: Patient and family/care givers will demonstrate understanding of plan of care, disease process/condition, diagnostic tests and medications  Outcome: Progressing     Problem: Pain - Standard  Goal: Alleviation of pain or a reduction in pain to the patient’s comfort goal  Outcome: Progressing     Problem: Safety - Medical Restraint  Goal: Remains free of injury from restraints (Restraint for Interference with Medical Device)  Outcome: Progressing  Goal: Free from restraint(s) (Restraint for Interference with Medical Device)  Outcome: Progressing       Patient is not progressing towards the following goals:

## 2023-02-22 ENCOUNTER — APPOINTMENT (OUTPATIENT)
Dept: RADIOLOGY | Facility: MEDICAL CENTER | Age: 58
DRG: 368 | End: 2023-02-22
Attending: HOSPITALIST
Payer: COMMERCIAL

## 2023-02-22 ENCOUNTER — APPOINTMENT (OUTPATIENT)
Dept: RADIOLOGY | Facility: MEDICAL CENTER | Age: 58
DRG: 368 | End: 2023-02-22
Attending: STUDENT IN AN ORGANIZED HEALTH CARE EDUCATION/TRAINING PROGRAM
Payer: COMMERCIAL

## 2023-02-22 LAB
AMMONIA PLAS-SCNC: 28 UMOL/L (ref 11–45)
ANION GAP SERPL CALC-SCNC: 5 MMOL/L (ref 7–16)
BASOPHILS # BLD AUTO: 0.6 % (ref 0–1.8)
BASOPHILS # BLD: 0.03 K/UL (ref 0–0.12)
BUN SERPL-MCNC: 15 MG/DL (ref 8–22)
CALCIUM SERPL-MCNC: 7.6 MG/DL (ref 8.5–10.5)
CHLORIDE SERPL-SCNC: 110 MMOL/L (ref 96–112)
CO2 SERPL-SCNC: 27 MMOL/L (ref 20–33)
CREAT SERPL-MCNC: 0.57 MG/DL (ref 0.5–1.4)
EOSINOPHIL # BLD AUTO: 0.21 K/UL (ref 0–0.51)
EOSINOPHIL NFR BLD: 4 % (ref 0–6.9)
ERYTHROCYTE [DISTWIDTH] IN BLOOD BY AUTOMATED COUNT: 57.1 FL (ref 35.9–50)
GFR SERPLBLD CREATININE-BSD FMLA CKD-EPI: 106 ML/MIN/1.73 M 2
GLUCOSE SERPL-MCNC: 90 MG/DL (ref 65–99)
HCT VFR BLD AUTO: 28.8 % (ref 37–47)
HGB BLD-MCNC: 9.6 G/DL (ref 12–16)
IMM GRANULOCYTES # BLD AUTO: 0.03 K/UL (ref 0–0.11)
IMM GRANULOCYTES NFR BLD AUTO: 0.6 % (ref 0–0.9)
LYMPHOCYTES # BLD AUTO: 1.01 K/UL (ref 1–4.8)
LYMPHOCYTES NFR BLD: 19.2 % (ref 22–41)
MAGNESIUM SERPL-MCNC: 1.8 MG/DL (ref 1.5–2.5)
MCH RBC QN AUTO: 31.7 PG (ref 27–33)
MCHC RBC AUTO-ENTMCNC: 33.3 G/DL (ref 33.6–35)
MCV RBC AUTO: 95 FL (ref 81.4–97.8)
MONOCYTES # BLD AUTO: 0.41 K/UL (ref 0–0.85)
MONOCYTES NFR BLD AUTO: 7.8 % (ref 0–13.4)
NEUTROPHILS # BLD AUTO: 3.56 K/UL (ref 2–7.15)
NEUTROPHILS NFR BLD: 67.8 % (ref 44–72)
NRBC # BLD AUTO: 0 K/UL
NRBC BLD-RTO: 0 /100 WBC
PHOSPHATE SERPL-MCNC: 2.7 MG/DL (ref 2.5–4.5)
PLATELET # BLD AUTO: 54 K/UL (ref 164–446)
PMV BLD AUTO: 11.7 FL (ref 9–12.9)
POTASSIUM SERPL-SCNC: 4.2 MMOL/L (ref 3.6–5.5)
RBC # BLD AUTO: 3.03 M/UL (ref 4.2–5.4)
SODIUM SERPL-SCNC: 142 MMOL/L (ref 135–145)
TRIGL SERPL-MCNC: 147 MG/DL (ref 0–149)
WBC # BLD AUTO: 5.3 K/UL (ref 4.8–10.8)

## 2023-02-22 PROCEDURE — A9270 NON-COVERED ITEM OR SERVICE: HCPCS | Performed by: INTERNAL MEDICINE

## 2023-02-22 PROCEDURE — 80048 BASIC METABOLIC PNL TOTAL CA: CPT

## 2023-02-22 PROCEDURE — 700102 HCHG RX REV CODE 250 W/ 637 OVERRIDE(OP): Performed by: INTERNAL MEDICINE

## 2023-02-22 PROCEDURE — 84100 ASSAY OF PHOSPHORUS: CPT

## 2023-02-22 PROCEDURE — A9270 NON-COVERED ITEM OR SERVICE: HCPCS | Performed by: HOSPITALIST

## 2023-02-22 PROCEDURE — 97535 SELF CARE MNGMENT TRAINING: CPT

## 2023-02-22 PROCEDURE — 700102 HCHG RX REV CODE 250 W/ 637 OVERRIDE(OP): Performed by: HOSPITALIST

## 2023-02-22 PROCEDURE — 770001 HCHG ROOM/CARE - MED/SURG/GYN PRIV*

## 2023-02-22 PROCEDURE — 99291 CRITICAL CARE FIRST HOUR: CPT | Performed by: HOSPITALIST

## 2023-02-22 PROCEDURE — C9113 INJ PANTOPRAZOLE SODIUM, VIA: HCPCS | Performed by: INTERNAL MEDICINE

## 2023-02-22 PROCEDURE — 700111 HCHG RX REV CODE 636 W/ 250 OVERRIDE (IP): Performed by: INTERNAL MEDICINE

## 2023-02-22 PROCEDURE — 700105 HCHG RX REV CODE 258: Performed by: INTERNAL MEDICINE

## 2023-02-22 PROCEDURE — 700101 HCHG RX REV CODE 250: Performed by: INTERNAL MEDICINE

## 2023-02-22 PROCEDURE — 82140 ASSAY OF AMMONIA: CPT

## 2023-02-22 PROCEDURE — 83735 ASSAY OF MAGNESIUM: CPT

## 2023-02-22 PROCEDURE — 94669 MECHANICAL CHEST WALL OSCILL: CPT

## 2023-02-22 PROCEDURE — 700111 HCHG RX REV CODE 636 W/ 250 OVERRIDE (IP): Performed by: HOSPITALIST

## 2023-02-22 PROCEDURE — 85025 COMPLETE CBC W/AUTO DIFF WBC: CPT

## 2023-02-22 PROCEDURE — 84478 ASSAY OF TRIGLYCERIDES: CPT

## 2023-02-22 RX ORDER — ACETAMINOPHEN 325 MG/1
650 TABLET ORAL EVERY 4 HOURS PRN
Status: DISCONTINUED | OUTPATIENT
Start: 2023-02-22 | End: 2023-02-27 | Stop reason: HOSPADM

## 2023-02-22 RX ORDER — OMEPRAZOLE 20 MG/1
20 CAPSULE, DELAYED RELEASE ORAL 2 TIMES DAILY
Status: DISCONTINUED | OUTPATIENT
Start: 2023-02-22 | End: 2023-02-27 | Stop reason: HOSPADM

## 2023-02-22 RX ORDER — OXYCODONE HYDROCHLORIDE 5 MG/1
5 TABLET ORAL EVERY 4 HOURS PRN
Status: DISCONTINUED | OUTPATIENT
Start: 2023-02-22 | End: 2023-02-27 | Stop reason: HOSPADM

## 2023-02-22 RX ORDER — MAGNESIUM SULFATE HEPTAHYDRATE 40 MG/ML
2 INJECTION, SOLUTION INTRAVENOUS ONCE
Status: COMPLETED | OUTPATIENT
Start: 2023-02-22 | End: 2023-02-22

## 2023-02-22 RX ADMIN — OMEPRAZOLE 20 MG: 20 CAPSULE, DELAYED RELEASE ORAL at 18:12

## 2023-02-22 RX ADMIN — MAGNESIUM SULFATE HEPTAHYDRATE 2 G: 40 INJECTION, SOLUTION INTRAVENOUS at 08:35

## 2023-02-22 RX ADMIN — OXYCODONE 5 MG: 5 TABLET ORAL at 22:31

## 2023-02-22 RX ADMIN — MIDODRINE HYDROCHLORIDE 5 MG: 5 TABLET ORAL at 18:12

## 2023-02-22 RX ADMIN — MIDODRINE HYDROCHLORIDE 5 MG: 5 TABLET ORAL at 13:06

## 2023-02-22 RX ADMIN — THERA TABS 1 TABLET: TAB at 05:15

## 2023-02-22 RX ADMIN — PANTOPRAZOLE SODIUM 40 MG: 40 INJECTION, POWDER, FOR SOLUTION INTRAVENOUS at 05:15

## 2023-02-22 RX ADMIN — MIDODRINE HYDROCHLORIDE 5 MG: 5 TABLET ORAL at 08:35

## 2023-02-22 RX ADMIN — FOLIC ACID 1 MG: 1 TABLET ORAL at 05:15

## 2023-02-22 RX ADMIN — SENNOSIDES AND DOCUSATE SODIUM 2 TABLET: 50; 8.6 TABLET ORAL at 05:15

## 2023-02-22 RX ADMIN — SENNOSIDES AND DOCUSATE SODIUM 2 TABLET: 50; 8.6 TABLET ORAL at 18:12

## 2023-02-22 RX ADMIN — DEXMEDETOMIDINE 0.4 MCG/KG/HR: 200 INJECTION, SOLUTION INTRAVENOUS at 05:58

## 2023-02-22 ASSESSMENT — PAIN DESCRIPTION - PAIN TYPE
TYPE: ACUTE PAIN

## 2023-02-22 ASSESSMENT — ENCOUNTER SYMPTOMS
FEVER: 0
CHILLS: 0
NAUSEA: 0
SHORTNESS OF BREATH: 0
VOMITING: 0

## 2023-02-22 NOTE — PROCEDURES
OPERATIVE REPORT    PATIENT:   Chiara Pacheco   1965       PREOPERATIVE DIAGNOSES/INDICATIONS:hematemesis    POSTOPERATIVE DIAGNOSIS: Gastric varix, portal hypertension gastropathy, gastric ulcer and esophagitis, not bleeding, Esmer aparicio tear, clip still in place and no bleeding    PROCEDURE:  ESOPHAGOGASTRODUODENOSCOPY    PHYSICIAN:  Shanice Craft MD    ANESTHESIA:  Per anesthesiologist.    LOCATION: [facility]    CONSENT:  OBTAINED. The risks, benefits and alternatives of the procedure were discussed in details. The risks include and are not limited to bleeding, infection, perforation, missed lesions, and sedations risks (cardiopulmonary compromise and allergic reaction to medications).    DESCRIPTION: The patient presented to the procedure room.  After routine checkup was performed, patient was brought into the endoscopy suite.  Patient was placed on his left lateral decubitus position. A bite block was placed in patient's mouth. Patient was sedated by anesthesia.  Vital signs were monitored throughout the procedure.  Oxygenation support was provided throughout the procedure. Upper endoscope was inserted into patient's mouth and advanced to the second portion of the duodenum under direct visualization.      Once the site was reached and examined, the upper endoscope was withdrawn.  Retroflexion was made within the stomach.  The stomach was decompressed, scope was withdrawn and the procedure was terminated.    The patient tolerated the procedure well.  There were no immediate complications.    OPERATIVE FINDINGS:    1. Esophagus: Esophagitis with ulceration, no bleeding, improving, No varices  2. Stomach: Gastric varix, portal hypertensive gastropathy. No oozing. Clip seen still present at Esmer Aparicio tear(seen on retroflexion). Gastric ulcer, no bleeding, healing  3. Duodenum: normal    IMPRESSION/RECOMMENDATIONS:  Erosive esophagitis - continue PPI  Esmer Aparicio tear with clip still present and  healing  Portal hypertensive gastropathy  Gastric varix - usually don't see gastric varices without esophageal. Would recommend US with doppler to rule out splenic vein thrombosis. Beta blockade   Gastric ulcers - no bleeding, healing  Would check ammonia as well as we dont have anyway to assess her status at this time    This note has been transcribed with digital voice recognition software and although it has been reviewed may contain grammatical or word errors

## 2023-02-22 NOTE — PROGRESS NOTES
4 Eyes Skin Assessment Completed by DENISE Torres and DENISE Saez.    Head WDL  Ears Redness and Blanching        Nose WDL  Mouth WDL  Neck WDL  Breast/Chest Redness and Blanching  Shoulder Blades WDL  Spine WDL  (R) Arm/Elbow/Hand Redness, Bruising, and Scab    (L) Arm/Elbow/Hand Redness, Bruising, and Scab  Abdomen WDL  Groin WDL  Scrotum/Coccyx/Buttocks Redness and Blanching  (R) Leg Bruising and Swelling  (L) Leg WDL  (R) Heel/Foot/Toe Bruising and Swelling  (L) Heel/Foot/Toe WDL          Devices In Places ECG, Tele Box, Blood Pressure Cuff, Pulse Ox, Hernandez, Central Line, and Nasal Cannula      Interventions In Place Gray Ear Foams, NC W/Ear Foams, Sacral Mepilex, TAP System, Q2 Turns, Low Air Loss Mattress, and Heels Loaded W/Pillows    Possible Skin Injury No    Pictures Uploaded Into Epic Yes  Wound Consult Placed N/A  RN Wound Prevention Protocol Ordered Yes

## 2023-02-22 NOTE — THERAPY
Occupational Therapy Contact Note    Patient Name: Chiara Pacheco  Age:  57 y.o., Sex:  female  Medical Record #: [id number]  Today's Date: 2/22/2023 02/22/23 1144   Interdisciplinary Plan of Care Collaboration   Collaboration Comments OT evaluation attempted, pt is deaf therefore communication strategies attempted including writing notes and use of iPad . Pt initially stated she is able to communicate using ASL however with  she did not appear able to understand the signs or sign back. Pt unable to participate at this time d/t hearing impairment, unsuccessful attempt at using  and pts perseveration on needing her bluetooth hearing aids which her spouse plans to bring in. Will re-attempt functional evaluation of ADL independence when pt has her hearing aids.

## 2023-02-22 NOTE — PROGRESS NOTES
Cortis sheath removed from R neck. Manual pressure applied x10 minutes. Pt instructed to lay flat for 30 minutes and avoid turning head. No complications. Site is soft, CDI.

## 2023-02-22 NOTE — THERAPY
"Physical Therapy Contact Note    Patient Name: Chiara Pacheco  Age:  57 y.o., Sex:  female  Medical Record #: [id number]  Today's Date: 2/22/2023 02/22/23 1141   Interdisciplinary Plan of Care Collaboration   Collaboration Comments PT evaluation attempted.  Per RN pt is deaf however family has not brought her headphones to the hospital.  Attempted to communicate via writing, pt read therapist's note and stated \"I don't follow.\"  Asked pt if she speaks ASL, she stated yes however when ASL  provided pt was unable to understand her and stated she minimally converses with sign.  Pt perseverative on getting her hearing aids from family.  Due to communication difficulties will HOLD PT eval at this time and follow up once pt's hearing aids are available.       "

## 2023-02-22 NOTE — ASSESSMENT & PLAN NOTE
Due to UGIB from ulcers status post endoscopic therapy 2/19  Status post massive transfusion protocol 2/19  Status post IV norepinephrine infuson and\    On 2/24/2023 midodrine has been discontinued

## 2023-02-22 NOTE — PROGRESS NOTES
Hospital Medicine Daily Progress Note    Date of Service  2/21/2023    Chief Complaint  Chiara Pacheco is a 57 y.o. female admitted 2/18/2023 with GI bleed    Hospital Course  Chiara Pacheco is a 57 y.o. female past medical history of nicotine dependence, alcohol abuse, hard of hearing who presented 2/18/2023 with coffee-ground emesis noted at home with abdominal pain.  She denies any dark tarry stool or bloody stools.  Patient reports drinking approximately a liter of alcohol every day.  History is difficult to obtain as patient is hard of hearing and her hearing aids are not functioning properly.  In the ER, her hemoglobin resulted at 13.6, lactic acid 12.6, anion gap metabolic acidosis, elevated LFTs and alcohol level of 71.  Given her elevated lactic acid and labile blood pressures critical care was consulted and recommended IMCU placement at this time under care of hospitalist.  She will be admitted for upper GI bleed and alcohol withdrawal.    Patient decompensated in the ICU on 2/19 requiring intubation multiple pressors and emergent GI consultation for EGD.  EGD was performed on 2/19 with multiple gastric ulcers, Esmer-Aparicio tear and nonbleeding varices seen.  Extensive cautery was performed.  Patient was extubated on 2/20.    Interval Problem Update  Patient is less confused today.  She is legally deaf and unable to participate in review of systems.  Continuing antibiotics.  Patient was switched to Protonix 40 mg twice daily.  She was weaned off of Levophed today.  Patient transferring to IMCU.  Hemoglobin was 10.1 and has been stable.    I have discussed this patient's plan of care and discharge plan at IDT rounds today with Case Management, Nursing, Nursing leadership, and other members of the IDT team.    Consultants/Specialty  critical care and GI    Code Status  Full Code    Disposition  Patient is not medically cleared for discharge.   Anticipate discharge to to home with close  outpatient follow-up.  I have placed the appropriate orders for post-discharge needs.    Review of Systems  Review of Systems   Unable to perform ROS: Medical condition      Physical Exam  Pulse:  [] 60  Resp:  [0-36] 12  BP: ()/(41-69) 106/52  SpO2:  [91 %-100 %] 100 %    Physical Exam  Vitals reviewed.   Constitutional:       General: She is not in acute distress.     Appearance: She is ill-appearing. She is not toxic-appearing.   HENT:      Head: Normocephalic and atraumatic.      Ears:      Comments: deaf  Eyes:      General: No scleral icterus.        Right eye: No discharge.         Left eye: No discharge.   Cardiovascular:      Rate and Rhythm: Normal rate and regular rhythm.   Pulmonary:      Effort: Pulmonary effort is normal. No respiratory distress.      Breath sounds: Normal breath sounds. No wheezing.   Abdominal:      General: Abdomen is flat. There is no distension.      Tenderness: There is no abdominal tenderness.   Musculoskeletal:         General: No tenderness.      Right lower leg: No edema.      Left lower leg: No edema.   Skin:     General: Skin is warm and dry.      Coloration: Skin is not jaundiced.   Neurological:      Mental Status: She is alert and oriented to person, place, and time.      Cranial Nerves: No cranial nerve deficit.   Psychiatric:         Mood and Affect: Mood normal.       Fluids    Intake/Output Summary (Last 24 hours) at 2/21/2023 1702  Last data filed at 2/21/2023 1400  Gross per 24 hour   Intake 2839.05 ml   Output 1225 ml   Net 1614.05 ml       Laboratory  Recent Labs     02/19/23  0515 02/19/23  0930 02/20/23  0500 02/20/23  1530 02/20/23  2314 02/21/23  0448 02/21/23  1423   WBC 10.6  --  10.4  --   --  5.8  --    RBC 4.10*  --  3.47*  --   --  3.20*  --    HEMOGLOBIN 12.6   < > 10.9*   < > 9.8* 9.9* 10.1*   HEMATOCRIT 37.5   < > 31.0*   < > 29.1* 29.6* 30.1*   MCV 91.5  --  89.3  --   --  92.5  --    MCH 30.7  --  31.4  --   --  30.9  --    MCHC 33.6   --  35.2*  --   --  33.4*  --    RDW 49.0  --  53.7*  --   --  57.3*  --    PLATELETCT 91*  --  76*  --   --  44*  --    MPV 11.0  --  11.5  --   --  11.3  --     < > = values in this interval not displayed.     Recent Labs     02/19/23  0515 02/20/23  0500 02/21/23  0448   SODIUM 149* 145 144   POTASSIUM 4.0 3.3* 3.8   CHLORIDE 108 110 110   CO2 20 27 29   GLUCOSE 137* 119* 94   BUN 19 25* 18   CREATININE 0.92 0.84 0.67   CALCIUM 8.8 7.4* 7.5*     Recent Labs     02/18/23 2013 02/19/23  0515   APTT 25.4  --    INR 1.49* 1.73*               Imaging  DX-CHEST-PORTABLE (1 VIEW)   Final Result      Hypoventilatory change with interstitial prominence could be due to atelectasis or edema with small effusions.      DX-CHEST-PORTABLE (1 VIEW)   Final Result      Stable chest x-ray findings.      EC-ECHOCARDIOGRAM COMPLETE W/ CONT   Final Result      DX-ABDOMEN FOR TUBE PLACEMENT   Final Result         1.  Nonspecific bowel gas pattern in the upper abdomen.   2.  Nasogastric tube tip terminates overlying the expected location of the gastric fundus.   3.  Hazy interstitial pulmonary parenchymal prominence suggesting chronic underlying lung disease or interstitial edema.      DX-CHEST-PORTABLE (1 VIEW)   Final Result         No acute cardiac or pulmonary abnormality is identified.   Line and tube placements are noted.      US-RUQ   Final Result      1.  There is cholelithiasis.      2.  Increased echogenicity and enlargement of the liver are likely due to hepatic steatosis.           Assessment/Plan  * UGIB (upper gastrointestinal bleed)- (present on admission)  Assessment & Plan  Serial abdominal exams  Serial hemoglobin hematocrit every 8 hours.  Transfuse less than 7  IV antiemetics  Continue with Protonix and octreotide, gtt.  Rocephin 1 g every 24 hours  Avoid NSAID's  Hold all anticoagulants   GI following-s/p EGD-gastropathy, gastric ulcers, Esmer-Aparicio tear.  Status post cauterization    Hemorrhagic shock  (HCC)  Assessment & Plan  Due to UGIB from ulcers status post endoscopic therapy 2/19  Status post massive transfusion protocol 2/19  Continue serial H/H with conservative transfusion strategy  Monitor coagulopathy with TEG/platelet mapping  Remains off norepinephrine     resolved    Hepatitis C- (present on admission)  Assessment & Plan  HCV Ab positive  HCV quant by NAAT ordered    Acute respiratory failure with hypoxia (HCC)  Assessment & Plan  Intubated on 2/19  Extubated 2/20  Improving currently on 4L    Transaminitis- (present on admission)  Assessment & Plan  Likely secondary to alcohol abuse  Check hepatitis panel  Trend  Avoid hepatotoxic agents    Lactic acidosis- (present on admission)  Assessment & Plan  High-dose thiamine  Trend    High anion gap metabolic acidosis- (present on admission)  Assessment & Plan  Likely secondary to alcohol ingestion, dehydration    Resuscitated with IV fluids  Monitor    Alcohol withdrawal (HCC)- (present on admission)  Assessment & Plan  Received approximately 3 L of IV fluids  Continue with Precedex, gtt.  Ativan as needed  High-dose thiamine  Check magnesium and phosphorus   Folic acid and multivitamins  Seizure precautions         VTE prophylaxis: SCDs/TEDs    I have performed a physical exam and reviewed and updated ROS and Plan today (2/21/2023). In review of yesterday's note (2/20/2023), there are no changes except as documented above.

## 2023-02-22 NOTE — PROGRESS NOTES
Hospital Medicine Daily Progress Note    Date of Service  2/22/2023    Chief Complaint  Chiara Pacheco is a 57 y.o. female admitted 2/18/2023 with vomiting blood.    Hospital Course  Ms. Pacheco is a 57 y.o. female past medical history of nicotine dependence, alcohol abuse, hard of hearing who presented 2/18/2023 with coffee-ground emesis noted at home with abdominal pain.  She denies any dark tarry stool or bloody stools.  Patient reports drinking approximately a liter of alcohol every day.  History is difficult to obtain as patient is hard of hearing and her hearing aids are not functioning properly.  In the ER, her hemoglobin resulted at 13.6, lactic acid 12.6, anion gap metabolic acidosis, elevated LFTs and alcohol level of 71.  Given her elevated lactic acid and labile blood pressures critical care was consulted and recommended IMCU placement at this time under care of hospitalist.  She will be admitted for upper GI bleed and alcohol withdrawal.     Patient decompensated in the ICU on 2/19 requiring intubation multiple pressors and emergent GI consultation for EGD.  EGD was performed on 2/19 with multiple gastric ulcers, Esmer-Aparicio tear and nonbleeding varices seen.  Extensive cautery was performed.  Patient was extubated on 2/20.    Interval Problem Update  2/21: From Dr. Lucas: Patient is less confused today.  She is legally deaf and unable to participate in review of systems.  Continuing antibiotics.  Patient was switched to Protonix 40 mg twice daily.  She was weaned off of Levophed today.  Patient transferring to IMCU.  Hemoglobin was 10.1 and has been stable.  2/22: Ms. Pacheco was evaluated and examined in the IMCU. Hb this morning is 9.6. Her blood pressure has been low. She remains on an IV Precedex drip. She is communicating via writing. Her boyfriend is bringing in her hearing aids.     I have discussed this patient's plan of care and discharge plan at IDT rounds today with Case Management,  Nursing, Nursing leadership, and other members of the IDT team.    Consultants/Specialty  critical care  GI  Code Status  Full Code    Disposition  Patient is not medically cleared for discharge.   Anticipate discharge to to home with close outpatient follow-up.  I have placed the appropriate orders for post-discharge needs.    Review of Systems  Review of Systems   Constitutional:  Negative for chills and fever.   Respiratory:  Negative for shortness of breath.    Cardiovascular:  Negative for chest pain.   Gastrointestinal:  Negative for nausea and vomiting.   All other systems reviewed and are negative.     Physical Exam  Temp:  [36.1 °C (97 °F)-36.3 °C (97.3 °F)] 36.1 °C (97 °F)  Pulse:  [] 59  Resp:  [10-51] 17  BP: ()/(48-63) 117/63  SpO2:  [92 %-100 %] 99 %    Physical Exam  Vitals and nursing note reviewed.   Constitutional:       Appearance: She is ill-appearing. She is not toxic-appearing.   HENT:      Head: Normocephalic and atraumatic.      Mouth/Throat:      Mouth: Mucous membranes are dry.      Pharynx: Oropharynx is clear.   Eyes:      General: No scleral icterus.     Conjunctiva/sclera: Conjunctivae normal.   Cardiovascular:      Rate and Rhythm: Normal rate and regular rhythm.   Pulmonary:      Effort: Pulmonary effort is normal.      Breath sounds: Normal breath sounds.   Abdominal:      General: There is no distension.      Palpations: Abdomen is soft.      Tenderness: There is no abdominal tenderness.   Musculoskeletal:      Cervical back: Normal range of motion and neck supple.      Right lower leg: No edema.      Left lower leg: No edema.   Skin:     General: Skin is warm and dry.      Coloration: Skin is pale.   Neurological:      General: No focal deficit present.      Mental Status: She is alert and oriented to person, place, and time.   Psychiatric:      Comments: She is compliant with exam       Fluids    Intake/Output Summary (Last 24 hours) at 2/22/2023 8378  Last data filed  at 2/22/2023 0700  Gross per 24 hour   Intake 1868.72 ml   Output 1340 ml   Net 528.72 ml       Laboratory  Recent Labs     02/20/23  0500 02/20/23  1530 02/21/23  0448 02/21/23  1423 02/21/23  2215 02/22/23  0410   WBC 10.4  --  5.8  --   --  5.3   RBC 3.47*  --  3.20*  --   --  3.03*   HEMOGLOBIN 10.9*   < > 9.9* 10.1* 9.8* 9.6*   HEMATOCRIT 31.0*   < > 29.6* 30.1* 28.9* 28.8*   MCV 89.3  --  92.5  --   --  95.0   MCH 31.4  --  30.9  --   --  31.7   MCHC 35.2*  --  33.4*  --   --  33.3*   RDW 53.7*  --  57.3*  --   --  57.1*   PLATELETCT 76*  --  44*  --   --  54*   MPV 11.5  --  11.3  --   --  11.7    < > = values in this interval not displayed.     Recent Labs     02/20/23  0500 02/21/23  0448 02/22/23  0410   SODIUM 145 144 142   POTASSIUM 3.3* 3.8 4.2   CHLORIDE 110 110 110   CO2 27 29 27   GLUCOSE 119* 94 90   BUN 25* 18 15   CREATININE 0.84 0.67 0.57   CALCIUM 7.4* 7.5* 7.6*             Recent Labs     02/22/23  0410   TRIGLYCERIDE 147       Imaging  US-SPLEEN   Final Result      The spleen is at upper limit of normal. No thrombosis identified.      DX-CHEST-PORTABLE (1 VIEW)   Final Result      Hypoventilatory change with interstitial prominence could be due to atelectasis or edema with small effusions.      DX-CHEST-PORTABLE (1 VIEW)   Final Result      Stable chest x-ray findings.      EC-ECHOCARDIOGRAM COMPLETE W/ CONT   Final Result      DX-ABDOMEN FOR TUBE PLACEMENT   Final Result         1.  Nonspecific bowel gas pattern in the upper abdomen.   2.  Nasogastric tube tip terminates overlying the expected location of the gastric fundus.   3.  Hazy interstitial pulmonary parenchymal prominence suggesting chronic underlying lung disease or interstitial edema.      DX-CHEST-PORTABLE (1 VIEW)   Final Result         No acute cardiac or pulmonary abnormality is identified.   Line and tube placements are noted.      US-RUQ   Final Result      1.  There is cholelithiasis.      2.  Increased echogenicity and  enlargement of the liver are likely due to hepatic steatosis.           Assessment/Plan  * UGIB (upper gastrointestinal bleed)- (present on admission)  Assessment & Plan  GI following-s/p EGD-gastropathy, gastric ulcers, Esmer-Aparicio tear.  Status post cauterization  Avoid NSAID's  Hold all anticoagulants   Status post Protonix and octreotide drips.  Start prilosec BID    Hemorrhagic shock (HCC)  Assessment & Plan  Due to UGIB from ulcers status post endoscopic therapy 2/19  Status post massive transfusion protocol 2/19  Status post IV norepinephrine infuson and is now on midodrine 5 mg TID due to ongoing hypotension        Alcohol withdrawal (HCC)- (present on admission)  Assessment & Plan  She has been requiring an IV Precedex drip in the IMCU which will be titrated.  Ativan as needed  High-dose thiamine  Electrolytes were replaced  Folic acid and multivitamins  Seizure precautions    Acute respiratory failure with hypoxia (HCC)  Assessment & Plan  Intubated on 2/19  Extubated 2/20  Improving currently on 3 liters nasal cannula oxygen    Thrombocytopenia (HCC)- (present on admission)  Assessment & Plan  Likely bone marrow suppression in the setting of alcohol     Hepatitis C- (present on admission)  Assessment & Plan  HCV Ab positive  HCV quant by NAAT ordered    Hypomagnesemia- (present on admission)  Assessment & Plan  Replacement was given    Cholelithiasis- (present on admission)  Assessment & Plan  No evidence of cholecystitis on imaging    Transaminitis- (present on admission)  Assessment & Plan  Likely secondary to alcohol abuse  Check hepatitis panel  Trend  Avoid hepatotoxic agents    Lactic acidosis- (present on admission)  Assessment & Plan  Resolved with IV fluids    High anion gap metabolic acidosis- (present on admission)  Assessment & Plan  Likely secondary to alcohol ingestion, dehydration    Resuscitated with IV fluids  Monitor         VTE prophylaxis: SCDs/TEDs    I have performed a physical  exam and reviewed and updated ROS and Plan today (2/22/2023). In review of yesterday's note (2/21/2023), there are no changes except as documented above.    Ms. Pacheco is critically ill. 34 minutes of critical care time were spent with patient, nursing, pharmacy and in specific management of acute, severe alcohol detox requiring active titration of an IV Precedex drip. See orders.

## 2023-02-22 NOTE — PROGRESS NOTES
Gastroenterology Progress Note               Author:  TED Beck Date & Time Created: 2/21/2023 4:58 PM       Patient ID:  Name:             Chiara Pacheco  YOB: 1965  Age:                 57 y.o.  female  MRN:               [id number]        Medical Decision Making, by Problem:  Active Hospital Problems    Diagnosis     Cholelithiasis [K80.20]     Acute respiratory failure with hypoxia (HCC) [J96.01]     Hypomagnesemia [E83.42]     Hepatitis C [B19.20]     Hemorrhagic shock (HCC) [R57.8]     Thrombocytopenia (HCC) [D69.6]     UGIB (upper gastrointestinal bleed) [K92.2]     Alcohol withdrawal (HCC) [F10.939]     High anion gap metabolic acidosis [E87.29]     Lactic acidosis [E87.20]     Transaminitis [R74.01]            Presenting Chief Complaint:  Hematemesis      HISTORY OF PRESENT ILLNESS:  Chiara Pacheco is a 57 y.o. female presents emerged part with chief complaint hematemesis.  The patient states she started having bright red blood about 3 to 4 hours prior to arrival.  She endorses mild epigastric pain she has not had dark or tarry stools or bloody stools before she is never had vomiting of blood before.  She is an alcoholic she drinks half pint of vodka every day.      Around 2 am the patient started vomiting bright red blood. She dropped her pressures. I was called to come in and scope. She was started on IV PPI, octreotide. She required intubation and Levophed.     Patient underwent emergent EGD on 2/19/2023 during which time grade 1 varices were observed but flattened.  Additionally, there was oozing portal hypertension gastropathy, oozing gastric ulcers, Esmer-Aparicio tear, and esophagitis with ulceration.  Patient underwent subsequent EGD 2/20/2023 after fluid resuscitation to reassess varices.  No further banding was indicated.    Interval History:  2/21/2023: Patient seen at bedside, hearing aids are not functioning.  Patient tangential.  No further  episodes of hematemesis or melena reported.  Hemoglobin stable.        Hospital Medications:  Current Facility-Administered Medications   Medication Dose Frequency Provider Last Rate Last Admin    pantoprazole (Protonix) injection 40 mg  40 mg BID James Tadeo M.D.        dexmedetomidine (PRECEDEX) 400 mcg/100mL NS premix infusion  0.1-1 mcg/kg/hr (Ideal) Continuous James Tadeo M.D. 3.2 mL/hr at 02/21/23 1201 0.2 mcg/kg/hr at 02/21/23 1201    LORazepam (ATIVAN) injection 1-2 mg  1-2 mg Q2HRS PRN James Tadeo M.D.   1 mg at 02/21/23 1243    norepinephrine (Levophed) 8 mg in 250 mL NS infusion (premix)  0-1 mcg/kg/min (Ideal) Continuous Leif Allen Jr., D.O.   Stopped at 02/21/23 0645    Respiratory Therapy Consult   Continuous RT Leif Allen Jr., D.O.        senna-docusate (PERICOLACE or SENOKOT S) 8.6-50 MG per tablet 2 Tablet  2 Tablet BID ROSALBA Crowley Jr.O.   2 Tablet at 02/20/23 0456    And    polyethylene glycol/lytes (MIRALAX) PACKET 1 Packet  1 Packet QDAY PRN Leif Allen Jr., D.O.        And    magnesium hydroxide (MILK OF MAGNESIA) suspension 30 mL  30 mL QDAY PRN Leif Allen Jr., D.O.        And    bisacodyl (DULCOLAX) suppository 10 mg  10 mg QDAY PRN Leif Allen Jr., D.O.        MD Alert...ICU Electrolyte Replacement per Pharmacy   PHARMACY TO DOSE Leif Allen Jr., D.O.        lidocaine (XYLOCAINE) 1 % injection 2 mL  2 mL Q30 MIN PRN Leif Allen Jr., D.O.        fentaNYL (SUBLIMAZE) injection 100 mcg  100 mcg Q15 MIN PRN Leif Allen Jr., D.O.   100 mcg at 02/19/23 2104    And    fentaNYL (SUBLIMAZE) injection 200 mcg  200 mcg Q15 MIN PRN Leif Allen Jr., D.O.        And    fentaNYL (SUBLIMAZE) 50 mcg/mL in 50mL (Continuous Infusion)   Continuous Leif Allen Jr., D.O.   Held at 02/19/23 0345    And    propofol (DIPRIVAN) injection  0-80 mcg/kg/min (Ideal) Continuous Leif Allen Jr., D.O.   Stopped at 02/20/23 1331    Nozin nasal  swab   "1 Applicator BID Leif Allen Jr., D.O.   1 Applicator at 02/20/23 0600    midodrine (PROAMATINE) tablet 5 mg  5 mg TID WITH MEALS Jeremy M Gonda, M.D.   5 mg at 02/20/23 0730    ondansetron (ZOFRAN ODT) dispertab 4 mg  4 mg Q4HRS PRN Jeremy M Gonda, M.D.        multivitamin tablet 1 Tablet  1 Tablet DAILY Jeremy M Gonda, M.D.   1 Tablet at 02/20/23 0600    And    folic acid (FOLVITE) tablet 1 mg  1 mg DAILY Jeremy M Gonda, M.D.   1 mg at 02/20/23 0600    Nozin nasal  swab  1 Applicator BID Jeremy M Gonda, M.D.   1 Applicator at 02/20/23 1819    promethazine (PHENERGAN) tablet 12.5-25 mg  12.5-25 mg Q4HRS PRN Jeremy M Gonda, M.D.        lactated ringers infusion   Continuous Jeremy M Gonda, M.D. 83 mL/hr at 02/21/23 0600 Rate Verify at 02/21/23 0600    ondansetron (ZOFRAN) syringe/vial injection 4 mg  4 mg Q4HRS PRN Michael Boyle M.D.   4 mg at 02/21/23 1230    promethazine (PHENERGAN) suppository 12.5-25 mg  12.5-25 mg Q4HRS PRN Michael Boyle M.D.        prochlorperazine (COMPAZINE) injection 5-10 mg  5-10 mg Q4HRS PRN Michael Boyle M.D.        cefTRIAXone (Rocephin) syringe 1,000 mg  1,000 mg Q24HRS Michael Boyle M.D.   1,000 mg at 02/20/23 2015    Pharmacy Consult Request   PHARMACY TO DOSE Michael Boyle M.D.       Last reviewed on 2/18/2023  8:48 PM by Phil Pereira       Review of Systems:  Review of Systems   Unable to perform ROS: Mental acuity   All other systems reviewed and are negative.      Vital signs:  Weight/BMI: Body mass index is 28.02 kg/m².  /52   Pulse 60   Temp 36.2 °C (97.1 °F) (Temporal)   Resp 12   Ht 1.727 m (5' 8\")   Wt 83.6 kg (184 lb 4.9 oz)   SpO2 100%   Vitals:    02/21/23 1100 02/21/23 1200 02/21/23 1300 02/21/23 1400   BP: 102/54 101/57  106/52   Pulse: 61 61 (!) 132 60   Resp: (!) 10 12 20 12   Temp:       TempSrc:  Bladder  Bladder   SpO2: 99% 96% 94% 100%   Weight:       Height:         Oxygen Therapy:  Pulse Oximetry: 100 %, O2 (LPM): 4, O2 " Delivery Device: Nasal Cannula    Intake/Output Summary (Last 24 hours) at 2/21/2023 1658  Last data filed at 2/21/2023 1400  Gross per 24 hour   Intake 2839.05 ml   Output 1225 ml   Net 1614.05 ml         Physical Exam:  Physical Exam  Vitals and nursing note reviewed.   Constitutional:       General: She is not in acute distress.     Appearance: She is ill-appearing.   HENT:      Head: Normocephalic.      Nose: Nose normal.      Mouth/Throat:      Mouth: Mucous membranes are moist.      Pharynx: Oropharynx is clear. No oropharyngeal exudate.   Eyes:      General: No scleral icterus.     Conjunctiva/sclera: Conjunctivae normal.      Pupils: Pupils are equal, round, and reactive to light.   Cardiovascular:      Rate and Rhythm: Normal rate and regular rhythm.      Pulses: Normal pulses.      Heart sounds: Normal heart sounds. No murmur heard.  Pulmonary:      Effort: Pulmonary effort is normal. No respiratory distress.      Breath sounds: Normal breath sounds. No wheezing.   Abdominal:      General: Abdomen is flat. Bowel sounds are normal. There is no distension.      Palpations: Abdomen is soft.      Tenderness: There is no abdominal tenderness. There is no guarding.   Skin:     General: Skin is warm and dry.      Capillary Refill: Capillary refill takes less than 2 seconds.      Coloration: Skin is not jaundiced.   Neurological:      General: No focal deficit present.      Mental Status: She is alert.      Motor: No weakness.   Psychiatric:         Behavior: Behavior normal.         Judgment: Judgment normal.           Labs:  Recent Labs     02/19/23  0111 02/19/23  0515 02/20/23  0500 02/21/23  0448   SODIUM  --  149* 145 144   POTASSIUM  --  4.0 3.3* 3.8   CHLORIDE  --  108 110 110   CO2  --  20 27 29   BUN  --  19 25* 18   CREATININE  --  0.92 0.84 0.67   MAGNESIUM 1.2*  --  1.9 1.8   PHOSPHORUS 3.3  --  1.5* 2.7   CALCIUM  --  8.8 7.4* 7.5*     Recent Labs     02/18/23 2013 02/19/23  0515 02/20/23  0500  02/21/23  0448   ALTSGPT 92*  --   --   --    ASTSGOT 107*  --   --   --    ALKPHOSPHAT 102*  --   --   --    TBILIRUBIN 2.9*  --   --   --    LIPASE 38  --   --   --    GLUCOSE 196* 137* 119* 94     Recent Labs     02/18/23 2013 02/19/23  0515 02/20/23  0500 02/21/23  0448   WBC 11.6* 10.6 10.4 5.8   NEUTSPOLYS 61.30 88.00* 63.50 64.80   LYMPHOCYTES 30.20 6.20* 26.00 21.50*   MONOCYTES 5.30 2.30 5.60 7.00   EOSINOPHILS 1.50 0.20 3.40 5.00   BASOPHILS 0.90 0.40 0.60 0.70   ASTSGOT 107*  --   --   --    ALTSGPT 92*  --   --   --    ALKPHOSPHAT 102*  --   --   --    TBILIRUBIN 2.9*  --   --   --      Recent Labs     02/18/23 2013 02/19/23  0111 02/19/23  0115 02/19/23  0150 02/19/23  0515 02/19/23  0930 02/20/23  0500 02/20/23  1530 02/20/23  2314 02/21/23  0448 02/21/23  1423   RBC 4.06*  --   --   --  4.10*  --  3.47*  --   --  3.20*  --    HEMOGLOBIN 13.6  --    < >  --  12.6   < > 10.9*   < > 9.8* 9.9* 10.1*   HEMATOCRIT 41.0  --   --   --  37.5   < > 31.0*   < > 29.1* 29.6* 30.1*   PLATELETCT 179  --   --   --  91*  --  76*  --   --  44*  --    PROTHROMBTM 17.7*  --   --   --  19.9*  --   --   --   --   --   --    APTT 25.4  --   --   --   --   --   --   --   --   --   --    INR 1.49*  --   --   --  1.73*  --   --   --   --   --   --    IRON  --  153  --   --   --   --   --   --   --   --   --    FERRITIN  --   --   --  833.0*  --   --   --   --   --   --   --    TOTIRONBC  --  170*  --   --   --   --   --   --   --   --   --     < > = values in this interval not displayed.     Recent Results (from the past 24 hour(s))   POCT glucose device results    Collection Time: 02/20/23  6:03 PM   Result Value Ref Range    POC Glucose, Blood 79 65 - 99 mg/dL   HEMOGLOBIN AND HEMATOCRIT    Collection Time: 02/20/23 11:14 PM   Result Value Ref Range    Hemoglobin 9.8 (L) 12.0 - 16.0 g/dL    Hematocrit 29.1 (L) 37.0 - 47.0 %   POCT glucose device results    Collection Time: 02/21/23 12:39 AM   Result Value Ref Range    POC  Glucose, Blood 84 65 - 99 mg/dL   Magnesium    Collection Time: 02/21/23  4:48 AM   Result Value Ref Range    Magnesium 1.8 1.5 - 2.5 mg/dL   Phosphorus    Collection Time: 02/21/23  4:48 AM   Result Value Ref Range    Phosphorus 2.7 2.5 - 4.5 mg/dL   Basic Metabolic Panel (BMP)    Collection Time: 02/21/23  4:48 AM   Result Value Ref Range    Sodium 144 135 - 145 mmol/L    Potassium 3.8 3.6 - 5.5 mmol/L    Chloride 110 96 - 112 mmol/L    Co2 29 20 - 33 mmol/L    Glucose 94 65 - 99 mg/dL    Bun 18 8 - 22 mg/dL    Creatinine 0.67 0.50 - 1.40 mg/dL    Calcium 7.5 (L) 8.5 - 10.5 mg/dL    Anion Gap 5.0 (L) 7.0 - 16.0   CBC with Differential    Collection Time: 02/21/23  4:48 AM   Result Value Ref Range    WBC 5.8 4.8 - 10.8 K/uL    RBC 3.20 (L) 4.20 - 5.40 M/uL    Hemoglobin 9.9 (L) 12.0 - 16.0 g/dL    Hematocrit 29.6 (L) 37.0 - 47.0 %    MCV 92.5 81.4 - 97.8 fL    MCH 30.9 27.0 - 33.0 pg    MCHC 33.4 (L) 33.6 - 35.0 g/dL    RDW 57.3 (H) 35.9 - 50.0 fL    Platelet Count 44 (L) 164 - 446 K/uL    MPV 11.3 9.0 - 12.9 fL    Neutrophils-Polys 64.80 44.00 - 72.00 %    Lymphocytes 21.50 (L) 22.00 - 41.00 %    Monocytes 7.00 0.00 - 13.40 %    Eosinophils 5.00 0.00 - 6.90 %    Basophils 0.70 0.00 - 1.80 %    Immature Granulocytes 1.00 (H) 0.00 - 0.90 %    Nucleated RBC 0.00 /100 WBC    Neutrophils (Absolute) 3.77 2.00 - 7.15 K/uL    Lymphs (Absolute) 1.25 1.00 - 4.80 K/uL    Monos (Absolute) 0.41 0.00 - 0.85 K/uL    Eos (Absolute) 0.29 0.00 - 0.51 K/uL    Baso (Absolute) 0.04 0.00 - 0.12 K/uL    Immature Granulocytes (abs) 0.06 0.00 - 0.11 K/uL    NRBC (Absolute) 0.00 K/uL   ESTIMATED GFR    Collection Time: 02/21/23  4:48 AM   Result Value Ref Range    GFR (CKD-EPI) 102 >60 mL/min/1.73 m 2   POCT glucose device results    Collection Time: 02/21/23  6:10 AM   Result Value Ref Range    POC Glucose, Blood 89 65 - 99 mg/dL   HEMOGLOBIN AND HEMATOCRIT    Collection Time: 02/21/23  2:23 PM   Result Value Ref Range    Hemoglobin 10.1  (L) 12.0 - 16.0 g/dL    Hematocrit 30.1 (L) 37.0 - 47.0 %       Radiology Review:  DX-CHEST-PORTABLE (1 VIEW)   Final Result      Hypoventilatory change with interstitial prominence could be due to atelectasis or edema with small effusions.      DX-CHEST-PORTABLE (1 VIEW)   Final Result      Stable chest x-ray findings.      EC-ECHOCARDIOGRAM COMPLETE W/ CONT   Final Result      DX-ABDOMEN FOR TUBE PLACEMENT   Final Result         1.  Nonspecific bowel gas pattern in the upper abdomen.   2.  Nasogastric tube tip terminates overlying the expected location of the gastric fundus.   3.  Hazy interstitial pulmonary parenchymal prominence suggesting chronic underlying lung disease or interstitial edema.      DX-CHEST-PORTABLE (1 VIEW)   Final Result         No acute cardiac or pulmonary abnormality is identified.   Line and tube placements are noted.      US-RUQ   Final Result      1.  There is cholelithiasis.      2.  Increased echogenicity and enlargement of the liver are likely due to hepatic steatosis.            MDM (Data Review):   -Records reviewed and summarized in current documentation  -I personally reviewed and interpreted the laboratory results  -I personally reviewed the radiology images    Assessment/Recommendations:    IMPRESSION/RECOMMENDATIONS:  Grade 1 esophageal varices  portal hypertension gastropathy  small gastric ulcers  Esmer enrique tear  Esophagitis with ulceration    Continue PPI twice daily.   Recommend nonselective beta-blocker- nadolol or propanolol   Ultrasound spleen with Doppler to assess for splenic thrombosis  Will need follow-up with outpatient GI      Core Quality Measures   Reviewed items::  Labs, Medications and Radiology reports reviewed

## 2023-02-22 NOTE — CARE PLAN
The patient is Watcher - Medium risk of patient condition declining or worsening    Shift Goals  Clinical Goals: RASS score, mobility, safety  Patient Goals: MARTINE  Family Goals: MARTINE    Progress made toward(s) clinical / shift goals:    Problem: Knowledge Deficit - Standard  Goal: Patient and family/care givers will demonstrate understanding of plan of care, disease process/condition, diagnostic tests and medications  Outcome: Progressing     Problem: Optimal Care for Alcohol Withdrawal  Goal: Optimal Care for the alcohol withdrawal patient  Outcome: Progressing     Problem: Seizure Precautions  Goal: Implementation of seizure precautions  Outcome: Progressing     Problem: Risk for Aspiration  Goal: Patient's risk for aspiration will be absent or decrease  Outcome: Progressing     Problem: Pain - Standard  Goal: Alleviation of pain or a reduction in pain to the patient’s comfort goal  Outcome: Progressing       Patient is not progressing towards the following goals:

## 2023-02-23 ENCOUNTER — APPOINTMENT (OUTPATIENT)
Dept: RADIOLOGY | Facility: MEDICAL CENTER | Age: 58
DRG: 368 | End: 2023-02-23
Attending: HOSPITALIST
Payer: COMMERCIAL

## 2023-02-23 PROBLEM — R09.02 HYPOXIA: Status: ACTIVE | Noted: 2023-02-23

## 2023-02-23 LAB
BACTERIA BLD CULT: NORMAL
NT-PROBNP SERPL IA-MCNC: 1012 PG/ML (ref 0–125)
SIGNIFICANT IND 70042: NORMAL
SITE SITE: NORMAL
SOURCE SOURCE: NORMAL

## 2023-02-23 PROCEDURE — 700102 HCHG RX REV CODE 250 W/ 637 OVERRIDE(OP): Performed by: INTERNAL MEDICINE

## 2023-02-23 PROCEDURE — A9270 NON-COVERED ITEM OR SERVICE: HCPCS | Performed by: INTERNAL MEDICINE

## 2023-02-23 PROCEDURE — 99233 SBSQ HOSP IP/OBS HIGH 50: CPT | Performed by: INTERNAL MEDICINE

## 2023-02-23 PROCEDURE — 92526 ORAL FUNCTION THERAPY: CPT

## 2023-02-23 PROCEDURE — 700102 HCHG RX REV CODE 250 W/ 637 OVERRIDE(OP): Performed by: HOSPITALIST

## 2023-02-23 PROCEDURE — A9270 NON-COVERED ITEM OR SERVICE: HCPCS | Performed by: HOSPITALIST

## 2023-02-23 PROCEDURE — 83880 ASSAY OF NATRIURETIC PEPTIDE: CPT

## 2023-02-23 PROCEDURE — 770006 HCHG ROOM/CARE - MED/SURG/GYN SEMI*

## 2023-02-23 PROCEDURE — 71045 X-RAY EXAM CHEST 1 VIEW: CPT

## 2023-02-23 PROCEDURE — 99232 SBSQ HOSP IP/OBS MODERATE 35: CPT | Performed by: HOSPITALIST

## 2023-02-23 PROCEDURE — 36415 COLL VENOUS BLD VENIPUNCTURE: CPT

## 2023-02-23 RX ADMIN — OXYCODONE 5 MG: 5 TABLET ORAL at 06:15

## 2023-02-23 RX ADMIN — MIDODRINE HYDROCHLORIDE 5 MG: 5 TABLET ORAL at 12:47

## 2023-02-23 RX ADMIN — OMEPRAZOLE 20 MG: 20 CAPSULE, DELAYED RELEASE ORAL at 17:26

## 2023-02-23 RX ADMIN — SENNOSIDES AND DOCUSATE SODIUM 2 TABLET: 50; 8.6 TABLET ORAL at 06:04

## 2023-02-23 RX ADMIN — MIDODRINE HYDROCHLORIDE 5 MG: 5 TABLET ORAL at 16:27

## 2023-02-23 RX ADMIN — MIDODRINE HYDROCHLORIDE 5 MG: 5 TABLET ORAL at 08:35

## 2023-02-23 RX ADMIN — OMEPRAZOLE 20 MG: 20 CAPSULE, DELAYED RELEASE ORAL at 06:04

## 2023-02-23 RX ADMIN — SENNOSIDES AND DOCUSATE SODIUM 2 TABLET: 50; 8.6 TABLET ORAL at 17:26

## 2023-02-23 ASSESSMENT — FIBROSIS 4 INDEX: FIB4 SCORE: 11.57

## 2023-02-23 ASSESSMENT — ENCOUNTER SYMPTOMS
COUGH: 0
FEVER: 0
ABDOMINAL PAIN: 0
CHILLS: 0
VOMITING: 0
NAUSEA: 0
SHORTNESS OF BREATH: 0

## 2023-02-23 ASSESSMENT — PAIN DESCRIPTION - PAIN TYPE
TYPE: ACUTE PAIN

## 2023-02-23 NOTE — PROGRESS NOTES
4 Eyes Skin Assessment Completed by Madeleine PEMBERTON RN and DENISE Albright.    Head WDL  Ears Redness and Blanching Right  Nose WDL  Mouth WDL  Neck WDL dry soiled dressing previous PICC  Breast/Chest WDL  Shoulder Blades WDL  Spine WDL  (R) Arm/Elbow/Hand WDL  (L) Arm/Elbow/Hand WDL  Abdomen WDL  Groin WDL  Scrotum/Coccyx/Buttocks WDL  (R) Leg Bruising  (L) Leg WDL  (R) Heel/Foot/Toe Bruising, Swelling, and Edema  (L) Heel/Foot/Toe Bruising, Swelling, and Edema          Devices In Places Blood Pressure Cuff and Pulse Ox      Interventions In Place Gray Ear Foams, NC W/Ear Foams, Sacral Mepilex, and Pillows    Possible Skin Injury No    Pictures Uploaded Into Epic N/A  Wound Consult Placed N/A  RN Wound Prevention Protocol Ordered No  2

## 2023-02-23 NOTE — ASSESSMENT & PLAN NOTE
Likely related to pulmonary edema    X-ray of the chest reviewed by me    Lasix 40 mg IV push x1 given on 2/24/2023 and lasix 20 mg IV push given on 2/25/23    Continue oxygen to maintain a sat greater than 92%    Resolved as of 2/26

## 2023-02-23 NOTE — CARE PLAN
The patient is Stable - Low risk of patient condition declining or worsening    Shift Goals  Clinical Goals: Safety and effective communication  Patient Goals: rest and sleep  Family Goals: MARTINE    Progress made toward(s) clinical / shift goals:  fall precaution in placed and slow communication due to [place] and legally deaf.  Problem: Knowledge Deficit - Standard  Goal: Patient and family/care givers will demonstrate understanding of plan of care, disease process/condition, diagnostic tests and medications  Description: Target End Date:  1-3 days or as soon as patient condition allows    Document in Patient Education    1.  Patient and family/caregiver oriented to unit, equipment, visitation policy and means for communicating concern  2.  Complete/review Learning Assessment  3.  Assess knowledge level of disease process/condition, treatment plan, diagnostic tests and medications  4.  Explain disease process/condition, treatment plan, diagnostic tests and medications  Outcome: Progressing     Problem: Optimal Care for Alcohol Withdrawal  Goal: Optimal Care for the alcohol withdrawal patient  Description: Target End Date:  1 to 3 days    1.  Alcohol history screening done on admission  2.  CIWA-AR score assessment (includes assessment of nausea/vomiting, tremor, sweats, anxiety, agitation, tactile, visual and auditory disturbances, headache and orientation/sensorium).  Document on CIWA flowsheet.  3.  Follow CIWA-AR score protocol  4.  Frequent reorientation  5.  Monitor for fluid and electrolyte imbalance.  6.  Assess for respiratory depression due to sedation (pulse ox)  7.  Consider thiamine, multivitamins, folic acid and magnesium sulfate per provider order  8.  Collaborate with Social Workers/Case Management  9.  Collaborate with mental health  Outcome: Progressing     Problem: Seizure Precautions  Goal: Implementation of seizure precautions  Description: Target End Date:  Prior to discharge or change in level of  care    1.  Padded side rails up at all times  2.  Suction equipment and oxygen delivery system at bedside  3.  Continuous pulse oximeter in use  4.  Implement fall precautions, bed alarm on, bed in lowest position  5.  IV access (per order)  6.  Provide low stimulus environment, avoid exposure to triggers  7.  Instruct patient to use call light/seizure button if having warning signs of impending seizure  Outcome: Progressing     Problem: Risk for Aspiration  Goal: Patient's risk for aspiration will be absent or decrease  Description: Target End Date:  Prior to discharge or change in level of care    1.   Complete dysphagia screening on admission  2.   NPO until dysphagia screening complete or medically cleared  3.   Collaborate with Speech Therapy, Clinical Dietitian and interdisciplinary team  4.   Implement aspiration precautions  5.   Assist patient up to chair for meals  6.   Elevate head of bed 90 degrees if patient is unable to get out of bed  7.   Encourage small bites  8.   Ensure foods/liquids are of appropriate consistency  9.   Assess for any signs/symptoms of aspiration  10. Assess breath sounds and vital signs after oral intake  Outcome: Progressing     Problem: Pain - Standard  Goal: Alleviation of pain or a reduction in pain to the patient’s comfort goal  Description: Target End Date:  Prior to discharge or change in level of care    Document on Vitals flowsheet    1.  Document pain using the appropriate pain scale per order or unit policy  2.  Educate and implement non-pharmacologic comfort measures (i.e. relaxation, distraction, massage, cold/heat therapy, etc.)  3.  Pain management medications as ordered  4.  Reassess pain after pain med administration per policy  5.  If opiods administered assess patient's response to pain medication is appropriate per POSS sedation scale  6.  Follow pain management plan developed in collaboration with patient and interdisciplinary team (including palliative care  or pain specialists if applicable)  Outcome: Progressing       Patient is not progressing towards the following goals:

## 2023-02-23 NOTE — CARE PLAN
The patient is Stable - Low risk of patient condition declining or worsening    Shift Goals  Clinical Goals: safety, wean 02  Patient Goals: sleep  Family Goals: MARTINE    Progress made toward(s) clinical / shift goals:    Problem: Knowledge Deficit - Standard  Goal: Patient and family/care givers will demonstrate understanding of plan of care, disease process/condition, diagnostic tests and medications  Outcome: Progressing     Problem: Seizure Precautions  Goal: Implementation of seizure precautions  Outcome: Progressing     Problem: Pain - Standard  Goal: Alleviation of pain or a reduction in pain to the patient’s comfort goal.. pt denies pain at this time    Outcome: Progressing     Problem: Skin Integrity  Goal: Skin integrity is maintained or improved  Outcome: Progressing       Patient is not progressing towards the following goals:

## 2023-02-23 NOTE — PROGRESS NOTES
Hospital Medicine Daily Progress Note    Date of Service  2/23/2023    Chief Complaint  Chiara Ju Pacheco is a 57 y.o. female admitted 2/18/2023 with vomiting blood.    Hospital Course  Ms. Pacheco is a 57 y.o. female past medical history of nicotine dependence, alcohol abuse, hard of hearing who presented 2/18/2023 with coffee-ground emesis noted at home with abdominal pain.  She denies any dark tarry stool or bloody stools.  Patient reports drinking approximately a liter of alcohol every day.  History is difficult to obtain as patient is hard of hearing and her hearing aids are not functioning properly.  In the ER, her hemoglobin resulted at 13.6, lactic acid 12.6, anion gap metabolic acidosis, elevated LFTs and alcohol level of 71.  Given her elevated lactic acid and labile blood pressures critical care was consulted and recommended IMCU placement at this time under care of hospitalist.  She will be admitted for upper GI bleed and alcohol withdrawal.     Patient decompensated in the ICU on 2/19 requiring intubation multiple pressors and emergent GI consultation for EGD.  EGD was performed on 2/19 with multiple gastric ulcers, Esmer-Aparicio tear and nonbleeding varices seen.  Extensive cautery was performed.  Patient was extubated on 2/20.    Interval Problem Update    2/22: Ms. Pacheco was evaluated and examined in the IMCU. Hb this morning is 9.6. Her blood pressure has been low. She remains on an IV Precedex drip. She is communicating via writing. Her boyfriend is bringing in her hearing aids.     2/23    Case discussed with GI MD Dr. Al-they have signed off    Patient exhibited hypoxia with a sat of 82% on room air.    A stat x-ray of the chest for further evaluation has been ordered    I have discussed this patient's plan of care and discharge plan at IDT rounds today with Case Management, Nursing, Nursing leadership, and other members of the IDT team.    Consultants/Specialty  critical care  GI  Code  Status  Full Code    Disposition  Patient is not medically cleared for discharge.   Anticipate discharge to to home with close outpatient follow-up.  I have placed the appropriate orders for post-discharge needs.    Review of Systems  Review of Systems   Constitutional:  Negative for chills and fever.   Respiratory:  Negative for shortness of breath.    Cardiovascular:  Negative for chest pain.   Gastrointestinal:  Negative for nausea and vomiting.   All other systems reviewed and are negative.     Physical Exam  Temp:  [36.3 °C (97.3 °F)-36.6 °C (97.9 °F)] 36.4 °C (97.6 °F)  Pulse:  [59-87] 74  Resp:  [14-34] 18  BP: ()/(49-83) 116/49  SpO2:  [89 %-99 %] 99 %    Physical Exam  Vitals and nursing note reviewed.   Constitutional:       Appearance: She is not ill-appearing or toxic-appearing.   HENT:      Head: Normocephalic and atraumatic.      Mouth/Throat:      Pharynx: Oropharynx is clear.   Eyes:      General: No scleral icterus.     Conjunctiva/sclera: Conjunctivae normal.   Cardiovascular:      Rate and Rhythm: Normal rate and regular rhythm.   Pulmonary:      Effort: Pulmonary effort is normal.      Breath sounds: Normal breath sounds.   Abdominal:      General: There is no distension.      Palpations: Abdomen is soft.      Tenderness: There is no abdominal tenderness.   Musculoskeletal:      Cervical back: Normal range of motion and neck supple.      Right lower leg: No edema.      Left lower leg: No edema.   Skin:     General: Skin is warm and dry.      Coloration: Skin is pale.   Neurological:      General: No focal deficit present.      Mental Status: She is alert and oriented to person, place, and time.   Psychiatric:         Mood and Affect: Mood normal.       Fluids    Intake/Output Summary (Last 24 hours) at 2/23/2023 1138  Last data filed at 2/23/2023 0842  Gross per 24 hour   Intake 320 ml   Output 350 ml   Net -30 ml         Laboratory  Recent Labs     02/21/23  0448 02/21/23  1421  02/21/23  2215 02/22/23  0410   WBC 5.8  --   --  5.3   RBC 3.20*  --   --  3.03*   HEMOGLOBIN 9.9* 10.1* 9.8* 9.6*   HEMATOCRIT 29.6* 30.1* 28.9* 28.8*   MCV 92.5  --   --  95.0   MCH 30.9  --   --  31.7   MCHC 33.4*  --   --  33.3*   RDW 57.3*  --   --  57.1*   PLATELETCT 44*  --   --  54*   MPV 11.3  --   --  11.7       Recent Labs     02/21/23  0448 02/22/23  0410   SODIUM 144 142   POTASSIUM 3.8 4.2   CHLORIDE 110 110   CO2 29 27   GLUCOSE 94 90   BUN 18 15   CREATININE 0.67 0.57   CALCIUM 7.5* 7.6*               Recent Labs     02/22/23  0410   TRIGLYCERIDE 147         Imaging  IR-US GUIDED PIV   Final Result    Ultrasound-guided PERIPHERAL IV INSERTION performed by    qualified nursing staff as above.      US-SPLEEN   Final Result      The spleen is at upper limit of normal. No thrombosis identified.      DX-CHEST-PORTABLE (1 VIEW)   Final Result      Hypoventilatory change with interstitial prominence could be due to atelectasis or edema with small effusions.      DX-CHEST-PORTABLE (1 VIEW)   Final Result      Stable chest x-ray findings.      EC-ECHOCARDIOGRAM COMPLETE W/ CONT   Final Result      DX-ABDOMEN FOR TUBE PLACEMENT   Final Result         1.  Nonspecific bowel gas pattern in the upper abdomen.   2.  Nasogastric tube tip terminates overlying the expected location of the gastric fundus.   3.  Hazy interstitial pulmonary parenchymal prominence suggesting chronic underlying lung disease or interstitial edema.      DX-CHEST-PORTABLE (1 VIEW)   Final Result         No acute cardiac or pulmonary abnormality is identified.   Line and tube placements are noted.      US-RUQ   Final Result      1.  There is cholelithiasis.      2.  Increased echogenicity and enlargement of the liver are likely due to hepatic steatosis.      DX-CHEST-LIMITED (1 VIEW)    (Results Pending)          Assessment/Plan  * UGIB (upper gastrointestinal bleed)- (present on admission)  Assessment & Plan  GI following-s/p  EGD-gastropathy, gastric ulcers, Esmer-Aparicio tear.  Status post cauterization  Avoid NSAID's  Hold all anticoagulants   Status post Protonix and octreotide drips.   prilosec po BID    Hypoxia  Assessment & Plan  Etiology unclear    May be related to fluids used during resuscitation.    checkBNP    Check x-ray of the chest    Continue oxygen to maintain a sat greater than 92%    Thrombocytopenia (HCC)- (present on admission)  Assessment & Plan  Likely bone marrow suppression in the setting of alcohol     Hemorrhagic shock (HCC)  Assessment & Plan  Due to UGIB from ulcers status post endoscopic therapy 2/19  Status post massive transfusion protocol 2/19  Status post IV norepinephrine infuson and is now on midodrine 5 mg TID due to ongoing hypotension        Hepatitis C- (present on admission)  Assessment & Plan  HCV Ab positive  HCV quant by NAAT ordered    Hypomagnesemia- (present on admission)  Assessment & Plan  Replacement was given    Acute respiratory failure with hypoxia (HCC)  Assessment & Plan  Intubated on 2/19  Extubated 2/20  Improving currently on 3 liters nasal cannula oxygen    Cholelithiasis- (present on admission)  Assessment & Plan  No evidence of cholecystitis on imaging    Transaminitis- (present on admission)  Assessment & Plan  Likely secondary to alcohol abuse    Trend  Avoid hepatotoxic agents    Lactic acidosis- (present on admission)  Assessment & Plan  Resolved with IV fluids    High anion gap metabolic acidosis- (present on admission)  Assessment & Plan  Likely secondary to alcohol ingestion, dehydration    Resuscitated with IV fluids  Monitor    Alcohol withdrawal (HCC)- (present on admission)  Assessment & Plan  She has been requiring an IV Precedex drip in the IMCU which will be titrated.  Ativan as needed  High-dose thiamine  Electrolytes were replaced  Folic acid and multivitamins  Seizure precautions         VTE prophylaxis: SCDs/TEDs    I have performed a physical exam and reviewed  and updated ROS and Plan today (2/23/2023). In review of yesterday's note (2/22/2023), there are no changes except as documented above.

## 2023-02-23 NOTE — PROGRESS NOTES
Assumed care of patient 0700. Received Report from [facility] nurse. Patient A&O 4, on 5 liters nasal canula, Reporting a pain level of 0. Call light within reach, belongings within reach, Fall precautions in place, and bed alarm is on and bed in lowest position. Patient does not have any other needs at this time.

## 2023-02-23 NOTE — THERAPY
Speech Language Pathology  Daily Treatment     Patient Name: Chiara Pacheco  Age:  57 y.o., Sex:  female  Medical Record #: [id number]  Today's Date: 2/23/2023          Assessment    Patient seen on this date for dysphagia management with breakfast tray of purees/thins and PO trials of soft/bite sized and regular solids. Voice/text jose carlos used to facilitate communication. Pt endorsed dislike for current diet, reporting she did not consume most of her breakfast tray d/t not liking the consistency. Pt able to self-feed without difficulty. Adequate bolus acceptance/containment and transit. Timely swallow initiation. One swallow per bolus noted. Appropriate mastication noted with trials of solids. Mild lingual residue with trials of soft/bite sized and regular solids cleared with thin liquid rinse. No overt s/sx of aspiration appreciated, diet advancement is recommended.     Recommendations  Regular solids and thin liquids  Swallowing Instructions & Precautions:   Supervision: Distant supervision   Positioning: Fully upright and midline during oral intake  Medication: Whole with puree  Strategies: Small bites/sips, Slow rate of intake  Oral Care: Q4h          SLP following.        Plan    Continue current treatment plan.    Discharge Recommendations: Recommend post-acute placement for additional speech therapy services prior to discharge home         Objective       02/23/23 0835   Dysphagia    Dysphagia X   Positioning / Behavior Modification Self Monitoring;Modulate Rate or Bite Size   Other Treatments PO trials of PU4, SB6, RG7   Diet / Liquid Recommendation Thin (0);Regular (7)   Nutritional Liquid Intake Rating Scale Non thickened beverages   Nutritional Food Intake Rating Scale Total oral diet with no restrictions   Skilled Intervention Compensatory Strategies;Verbal Cueing   Recommended Route of Medication Administration   Medication Administration  Float Whole with Puree;Whole with Liquid Wash   Patient /  "Family Goals   Patient / Family Goal #1 \"I want my boyfriend\"   Goal #1 Outcome Progressing as expected   Short Term Goals   Short Term Goal # 1 Pt will consume a diet of PU/TN with no s/sx of aspiration and min cues.   Goal Outcome # 1 Goal met, new goal added   Short Term Goal # 1 B  Pt will consume a diet of RG/TN with no s/sx of aspiration and min cues.       "

## 2023-02-23 NOTE — PROGRESS NOTES
TransIn from IMCU per hospital bed, alert and oriented x 4, [place] and legally deaf, sign language and lip reader, v/s stable, on 6l/NC at 95% 02, denies pain and discomfort, call light within reach, bed alarm in placed, Purewick in placed, requested Oter pops, needs attended, will continue to monitor breathing and will titrate 02 as tolerated.

## 2023-02-23 NOTE — PROGRESS NOTES
Gastroenterology Progress Note     Author: Nova Al M.D.   Date & Time Created: 2/23/2023 9:18 AM    Chief Complaint:  hematemesis    Interval History:  2/19/23: EGD:  Grade 1 varices, flattened, cant treat  Oozing portal hypertension gastropathy  Oozing small gastric ulcers  Esmer enrique tear  Esophagitis with ulceration    2/20/23: EGD:  Grade 1 esophageal varices, flattened, cant treat  Oozing portal hypertension gastropathy  Oozing small gastric ulcers  Esmer enrique tear, clip intact  Esophagitis with ulceration    2/21/23: Patient seen at bedside, hearing aids are not functioning.  Patient tangential.  No further episodes of hematemesis or melena reported.  Hemoglobin stable.    2/23/23: eating breakfast, denies odynophagia and dysphagia, denies abd pain, denies any stool or melena this am    Review of Systems:  Review of Systems   Constitutional:  Negative for chills and fever.   HENT:  Positive for hearing loss. Negative for nosebleeds.    Respiratory:  Negative for cough.    Cardiovascular:  Negative for chest pain.   Gastrointestinal:  Negative for abdominal pain and melena.   Skin:  Negative for itching and rash.     Physical Exam:  Physical Exam  Constitutional:       General: She is not in acute distress.     Appearance: She is not ill-appearing or toxic-appearing.   HENT:      Head: Normocephalic and atraumatic.      Mouth/Throat:      Mouth: Mucous membranes are moist.   Eyes:      Pupils: Pupils are equal, round, and reactive to light.   Cardiovascular:      Rate and Rhythm: Normal rate and regular rhythm.   Pulmonary:      Effort: Pulmonary effort is normal.      Breath sounds: Normal breath sounds.   Abdominal:      General: Bowel sounds are normal. There is no distension.      Palpations: Abdomen is soft.      Tenderness: There is no abdominal tenderness.   Musculoskeletal:      Cervical back: Normal range of motion.      Right lower leg: No edema.      Left lower leg: No edema.    Skin:     General: Skin is warm and dry.   Neurological:      Mental Status: She is alert.       Labs:          Recent Labs     23  0448 23  0410   SODIUM 144 142   POTASSIUM 3.8 4.2   CHLORIDE 110 110   CO2 29 27   BUN 18 15   CREATININE 0.67 0.57   MAGNESIUM 1.8 1.8   PHOSPHORUS 2.7 2.7   CALCIUM 7.5* 7.6*     Recent Labs     23  0448 23  0410   GLUCOSE 94 90     Recent Labs     23  1423 23  2215 23  0410   RBC 3.20*  --   --  3.03*   HEMOGLOBIN 9.9* 10.1* 9.8* 9.6*   HEMATOCRIT 29.6* 30.1* 28.9* 28.8*   PLATELETCT 44*  --   --  54*     Recent Labs     23  0410   WBC 5.8 5.3   NEUTSPOLYS 64.80 67.80   LYMPHOCYTES 21.50* 19.20*   MONOCYTES 7.00 7.80   EOSINOPHILS 5.00 4.00   BASOPHILS 0.70 0.60     Hemodynamics:  Temp (24hrs), Av.4 °C (97.6 °F), Min:36.3 °C (97.3 °F), Max:36.6 °C (97.9 °F)  Temperature: 36.4 °C (97.6 °F)  Pulse  Av.3  Min: 45  Max: 162   Blood Pressure: 116/49     Respiratory:    Respiration: 18, Pulse Oximetry: 99 %     Work Of Breathing / Effort: Within Normal Limits  RUL Breath Sounds: Diminished, RML Breath Sounds: Diminished, RLL Breath Sounds: Diminished, JERROD Breath Sounds: Diminished, LLL Breath Sounds: Diminished  Fluids:    Intake/Output Summary (Last 24 hours) at 2023 0918  Last data filed at 2023 0842  Gross per 24 hour   Intake 492.97 ml   Output 550 ml   Net -57.03 ml     Weight: 94.1 kg (207 lb 7.3 oz)  GI/Nutrition:  Orders Placed This Encounter   Procedures    Diet Order Diet: Regular (Meds floated whole, 1:1 feeding)     Standing Status:   Standing     Number of Occurrences:   1     Order Specific Question:   Diet:     Answer:   Regular [1]     Comments:   Meds floated whole, 1:1 feeding     Medical Decision Making, by Problem:  Active Hospital Problems    Diagnosis     *UGIB (upper gastrointestinal bleed) [K92.2]     Cholelithiasis [K80.20]     Acute respiratory failure with hypoxia  (HCC) [J96.01]     Hypomagnesemia [E83.42]     Hepatitis C [B19.20]     Hemorrhagic shock (HCC) [R57.8]     Thrombocytopenia (HCC) [D69.6]     Alcohol withdrawal (HCC) [F10.939]     High anion gap metabolic acidosis [E87.29]     Lactic acidosis [E87.20]     Transaminitis [R74.01]      Impression:   Hematemesis  Acute blood loss anemia  Severe erosive esophagitis  Esmer Aparicio tear, s/p clip  Multiple gastric ulcers  Small esophageal varices, nonbleeding  Portal hypertension on endoscopy but liver contour normal, portal vein diam normal and spleen upper limit of normal.  Thrombocytopenia  Chronic HCV with 69K viral load  ETOH abuse  Deafness    Plan:  Continue omeprazole BID x 8 weeks then QD indefinitely  Needs outpatient follow up for HCV treatment  Alcohol cessation    With no obvious signs of cirrhosis or portal hypertension on imaging and without GI follow up established, I would hold on nonselective beta blocker.  She will need outpatient EGD to establish healing of esophagitis and varices and PHG can be reassessed at that time.    Signing off.  Please call if we can assist.    Time spent reviewing chart and speaking with patient was 45 minutes    Quality-Core Measures

## 2023-02-24 ENCOUNTER — APPOINTMENT (OUTPATIENT)
Dept: RADIOLOGY | Facility: MEDICAL CENTER | Age: 58
DRG: 368 | End: 2023-02-24
Attending: HOSPITALIST
Payer: COMMERCIAL

## 2023-02-24 PROBLEM — S82.401A: Status: ACTIVE | Noted: 2023-02-24

## 2023-02-24 LAB
ALBUMIN SERPL BCP-MCNC: 2.8 G/DL (ref 3.2–4.9)
ALBUMIN/GLOB SERPL: 1.1 G/DL
ALP SERPL-CCNC: 104 U/L (ref 30–99)
ALT SERPL-CCNC: 210 U/L (ref 2–50)
ANION GAP SERPL CALC-SCNC: 11 MMOL/L (ref 7–16)
AST SERPL-CCNC: 136 U/L (ref 12–45)
BILIRUB SERPL-MCNC: 1.6 MG/DL (ref 0.1–1.5)
BUN SERPL-MCNC: 7 MG/DL (ref 8–22)
CALCIUM ALBUM COR SERPL-MCNC: 9.2 MG/DL (ref 8.5–10.5)
CALCIUM SERPL-MCNC: 8.2 MG/DL (ref 8.5–10.5)
CHLORIDE SERPL-SCNC: 105 MMOL/L (ref 96–112)
CO2 SERPL-SCNC: 24 MMOL/L (ref 20–33)
CREAT SERPL-MCNC: 0.36 MG/DL (ref 0.5–1.4)
ERYTHROCYTE [DISTWIDTH] IN BLOOD BY AUTOMATED COUNT: 57 FL (ref 35.9–50)
GFR SERPLBLD CREATININE-BSD FMLA CKD-EPI: 119 ML/MIN/1.73 M 2
GLOBULIN SER CALC-MCNC: 2.5 G/DL (ref 1.9–3.5)
GLUCOSE SERPL-MCNC: 97 MG/DL (ref 65–99)
HCT VFR BLD AUTO: 32 % (ref 37–47)
HGB BLD-MCNC: 10.6 G/DL (ref 12–16)
MCH RBC QN AUTO: 31.5 PG (ref 27–33)
MCHC RBC AUTO-ENTMCNC: 33.1 G/DL (ref 33.6–35)
MCV RBC AUTO: 95.2 FL (ref 81.4–97.8)
PLATELET # BLD AUTO: 70 K/UL (ref 164–446)
PMV BLD AUTO: 11.1 FL (ref 9–12.9)
POTASSIUM SERPL-SCNC: 3.8 MMOL/L (ref 3.6–5.5)
PROT SERPL-MCNC: 5.3 G/DL (ref 6–8.2)
RBC # BLD AUTO: 3.36 M/UL (ref 4.2–5.4)
SODIUM SERPL-SCNC: 140 MMOL/L (ref 135–145)
WBC # BLD AUTO: 6 K/UL (ref 4.8–10.8)

## 2023-02-24 PROCEDURE — 700102 HCHG RX REV CODE 250 W/ 637 OVERRIDE(OP): Performed by: HOSPITALIST

## 2023-02-24 PROCEDURE — 97162 PT EVAL MOD COMPLEX 30 MIN: CPT

## 2023-02-24 PROCEDURE — 99233 SBSQ HOSP IP/OBS HIGH 50: CPT | Performed by: HOSPITALIST

## 2023-02-24 PROCEDURE — 73620 X-RAY EXAM OF FOOT: CPT | Mod: RT

## 2023-02-24 PROCEDURE — A9270 NON-COVERED ITEM OR SERVICE: HCPCS | Performed by: HOSPITALIST

## 2023-02-24 PROCEDURE — 700102 HCHG RX REV CODE 250 W/ 637 OVERRIDE(OP)

## 2023-02-24 PROCEDURE — 36415 COLL VENOUS BLD VENIPUNCTURE: CPT

## 2023-02-24 PROCEDURE — 85027 COMPLETE CBC AUTOMATED: CPT

## 2023-02-24 PROCEDURE — 80053 COMPREHEN METABOLIC PANEL: CPT

## 2023-02-24 PROCEDURE — 700102 HCHG RX REV CODE 250 W/ 637 OVERRIDE(OP): Performed by: INTERNAL MEDICINE

## 2023-02-24 PROCEDURE — A9270 NON-COVERED ITEM OR SERVICE: HCPCS | Performed by: INTERNAL MEDICINE

## 2023-02-24 PROCEDURE — A9270 NON-COVERED ITEM OR SERVICE: HCPCS

## 2023-02-24 PROCEDURE — 73610 X-RAY EXAM OF ANKLE: CPT | Mod: RT

## 2023-02-24 PROCEDURE — 700111 HCHG RX REV CODE 636 W/ 250 OVERRIDE (IP): Performed by: HOSPITALIST

## 2023-02-24 PROCEDURE — 770006 HCHG ROOM/CARE - MED/SURG/GYN SEMI*

## 2023-02-24 RX ORDER — ONDANSETRON 4 MG/1
4 TABLET, ORALLY DISINTEGRATING ORAL EVERY 4 HOURS PRN
Status: DISCONTINUED | OUTPATIENT
Start: 2023-02-24 | End: 2023-02-27 | Stop reason: HOSPADM

## 2023-02-24 RX ORDER — PROMETHAZINE HYDROCHLORIDE 25 MG/1
12.5-25 TABLET ORAL EVERY 4 HOURS PRN
Status: DISCONTINUED | OUTPATIENT
Start: 2023-02-24 | End: 2023-02-27 | Stop reason: HOSPADM

## 2023-02-24 RX ORDER — CHOLECALCIFEROL (VITAMIN D3) 125 MCG
10 CAPSULE ORAL NIGHTLY PRN
Status: DISCONTINUED | OUTPATIENT
Start: 2023-02-24 | End: 2023-02-27 | Stop reason: HOSPADM

## 2023-02-24 RX ORDER — POTASSIUM CHLORIDE 20 MEQ/1
40 TABLET, EXTENDED RELEASE ORAL ONCE
Status: COMPLETED | OUTPATIENT
Start: 2023-02-24 | End: 2023-02-24

## 2023-02-24 RX ORDER — AMOXICILLIN 250 MG
2 CAPSULE ORAL 2 TIMES DAILY
Status: DISCONTINUED | OUTPATIENT
Start: 2023-02-24 | End: 2023-02-27 | Stop reason: HOSPADM

## 2023-02-24 RX ORDER — BISACODYL 10 MG
10 SUPPOSITORY, RECTAL RECTAL
Status: DISCONTINUED | OUTPATIENT
Start: 2023-02-24 | End: 2023-02-27 | Stop reason: HOSPADM

## 2023-02-24 RX ORDER — FUROSEMIDE 10 MG/ML
40 INJECTION INTRAMUSCULAR; INTRAVENOUS ONCE
Status: COMPLETED | OUTPATIENT
Start: 2023-02-24 | End: 2023-02-24

## 2023-02-24 RX ORDER — POLYETHYLENE GLYCOL 3350 17 G/17G
1 POWDER, FOR SOLUTION ORAL
Status: DISCONTINUED | OUTPATIENT
Start: 2023-02-24 | End: 2023-02-27 | Stop reason: HOSPADM

## 2023-02-24 RX ADMIN — OXYCODONE 5 MG: 5 TABLET ORAL at 01:23

## 2023-02-24 RX ADMIN — SENNOSIDES AND DOCUSATE SODIUM 2 TABLET: 50; 8.6 TABLET ORAL at 04:21

## 2023-02-24 RX ADMIN — FUROSEMIDE 40 MG: 10 INJECTION, SOLUTION INTRAMUSCULAR; INTRAVENOUS at 07:35

## 2023-02-24 RX ADMIN — OXYCODONE 5 MG: 5 TABLET ORAL at 09:51

## 2023-02-24 RX ADMIN — OMEPRAZOLE 20 MG: 20 CAPSULE, DELAYED RELEASE ORAL at 04:21

## 2023-02-24 RX ADMIN — OXYCODONE 5 MG: 5 TABLET ORAL at 19:56

## 2023-02-24 RX ADMIN — POTASSIUM CHLORIDE 40 MEQ: 1500 TABLET, EXTENDED RELEASE ORAL at 07:45

## 2023-02-24 RX ADMIN — Medication 10 MG: at 22:13

## 2023-02-24 RX ADMIN — OMEPRAZOLE 20 MG: 20 CAPSULE, DELAYED RELEASE ORAL at 17:47

## 2023-02-24 ASSESSMENT — GAIT ASSESSMENTS
GAIT LEVEL OF ASSIST: MINIMAL ASSIST
DISTANCE (FEET): 12
DEVIATION: STEP TO;DECREASED BASE OF SUPPORT;SHUFFLED GAIT
ASSISTIVE DEVICE: FRONT WHEEL WALKER

## 2023-02-24 ASSESSMENT — COGNITIVE AND FUNCTIONAL STATUS - GENERAL
DRESSING REGULAR UPPER BODY CLOTHING: A LITTLE
SUGGESTED CMS G CODE MODIFIER MOBILITY: CK
WALKING IN HOSPITAL ROOM: A LOT
DRESSING REGULAR LOWER BODY CLOTHING: A LOT
MOBILITY SCORE: 16
MOBILITY SCORE: 15
SUGGESTED CMS G CODE MODIFIER MOBILITY: CK
STANDING UP FROM CHAIR USING ARMS: A LITTLE
MOVING TO AND FROM BED TO CHAIR: A LITTLE
SUGGESTED CMS G CODE MODIFIER DAILY ACTIVITY: CK
DAILY ACTIVITIY SCORE: 16
EATING MEALS: A LITTLE
HELP NEEDED FOR BATHING: A LITTLE
STANDING UP FROM CHAIR USING ARMS: A LOT
WALKING IN HOSPITAL ROOM: A LOT
PERSONAL GROOMING: A LITTLE
TURNING FROM BACK TO SIDE WHILE IN FLAT BAD: A LITTLE
MOVING FROM LYING ON BACK TO SITTING ON SIDE OF FLAT BED: A LOT
TOILETING: A LOT
CLIMB 3 TO 5 STEPS WITH RAILING: A LOT
MOVING FROM LYING ON BACK TO SITTING ON SIDE OF FLAT BED: A LITTLE
CLIMB 3 TO 5 STEPS WITH RAILING: TOTAL

## 2023-02-24 ASSESSMENT — PAIN DESCRIPTION - PAIN TYPE
TYPE: ACUTE PAIN

## 2023-02-24 ASSESSMENT — ENCOUNTER SYMPTOMS
NAUSEA: 0
SHORTNESS OF BREATH: 0
MYALGIAS: 1
CHILLS: 0
VOMITING: 0
FEVER: 0

## 2023-02-24 NOTE — PROGRESS NOTES
Message sent to Dr. Rivera concerning this pt right foot pain. She can barely put any weight on it . I asked for an x-ray to make sure nothing is broken causing the pain. Will follow up withpt anf

## 2023-02-24 NOTE — PROCEDURES
Patient seen per request of Dr Campbell for splinting of right bimalleolar ankle fx.  The indications, risks, benefits, and alternatives of splint application were presented to the patient.  Understanding this the patient wished to proceed with splint application.  The right lower leg was first wrapped with soft roll then a posterior short with stirrup mediglass splint was applied and held in place using ace wraps.  The patient was DNVI with < 2 sec cap refill before and after splint application.  The patient reported good fit and comfort of splint after application.

## 2023-02-24 NOTE — PROGRESS NOTES
Dr. Wright asked me to let the pt know she has a fx of the right tibia. I went in and told her and she acknowledged that.

## 2023-02-24 NOTE — THERAPY
Physical Therapy   Initial Evaluation     Patient Name: Chiara Pacheco  Age:  57 y.o., Sex:  female  Medical Record #: [id number]  Today's Date: 2/24/2023          Assessment  Patient is 57 y.o. female with a diagnosis of Upper GI bleed, alcohol withdrawal. She was admitted to hospital on 2/18, required intubation in the ICU on 2/19, extubated on 2/20.     Pt tolerated session fairly. She is significantly limited by R ankle pain which affects her ability to perform transfers and ambulation. She required MOD A for a transfer to the commode. She requires consistent MIN A for sit to stand transfers and short distance (12') ambulation due to poor weight acceptance of the RLE. She has 3 stairs to enter her home and stairs are not appropriate at this time. She was educated on importance of perform ankle pumps of the RLE.     Pt will benefit from acute PT services to improve her functional mobility, improve her LE strength, cardiovascular endurance, and weight acceptance of the RLE. RN informed of poor weight bearing of the RLE and informed the MD.       Plan    Physical Therapy Initial Treatment Plan   Treatment Plan : Bed Mobility, Equipment, Group Therapy, Gait Training, Manual Therapy, Neuro Re-Education / Balance, Self Care / Home Evaluation, Stair Training, Therapeutic Activities, Therapeutic Exercise  Treatment Frequency: 4 Times per Week  Duration: Until Therapy Goals Met    DC Equipment Recommendations: Unable to determine at this time  Discharge Recommendations: Recommend post-acute placement for additional physical therapy services prior to discharge home       Subjective    Pt is significantly hard of hearing however was able to comprehend verbal information if repeated several times.      Objective         02/24/23 1044   Initial Contact Note    Initial Contact Note Order Received and Verified, Physical Therapy Evaluation in Progress with Full Report to Follow.   Vitals   Pulse (!) 102   Pulse Oximetry 95  %   O2 (LPM) 2   O2 Delivery Device Nasal Cannula   Pain 0 - 10 Group   Location Ankle   Location Orientation Right   Pain Rating Scale (NPRS) 7   Description Aching;Constant   Prior Living Situation   Housing / Facility Motor Home   Steps Into Home 3   Steps In Home 0   Equipment Owned Single Point Cane   Lives with - Patient's Self Care Capacity Significant Other   Prior Level of Functional Mobility   Bed Mobility Independent   Transfer Status Independent   Ambulation Independent   Distance Ambulation (Feet)   (Community)   Stairs Independent   History of Falls   History of Falls Yes   Date of Last Fall   (Date of admission)   Active ROM Lower Body    Comments R ankle AROM/PROM significantly limited by pain   Strength Lower Body   Lower Body Strength  X   Rt Hip Flexion Strength 3- (F-)   Rt Knee Extension Strength 4- (G-)   Rt Ankle Dorsiflexion Strength 1 (T)   Rt Ankle Plantar Flexion Strength 1 (T)   Lt Hip Flexion Strength 4- (G-)   Lt Knee Extension Strength 4- (G-)   Lt Ankle Dorsiflexion Strength 4- (G-)   Balance Assessment   Sitting Balance (Static) Good   Sitting Balance (Dynamic) Good   Standing Balance (Static) Poor +   Standing Balance (Dynamic) Poor +   Weight Shift Sitting Good   Weight Shift Standing Poor   Bed Mobility    Supine to Sit Supervised   Sit to Supine Supervised   Gait Analysis   Gait Level Of Assist Minimal Assist   Assistive Device Front Wheel Walker   Distance (Feet) 12   # of Times Distance was Traveled 2   Deviation Step To;Decreased Base Of Support;Shuffled Gait  (Limited weight bearing through RLE)   Comments Pt ambulated 2 bouts of 12' with FWW and MIN A. Pt demonstrates impaired ability to bear weight through her RLE due to ankle pain. She ambulates with step to gait pattern and requires MIN A when bearing weight through her RLE. She has impaired loading response and weight acceptance of the RLE.   Functional Mobility   Sit to Stand Minimal Assist   Bed, Chair, Wheelchair  Transfer Moderate Assist   Toilet Transfers Moderate Assist   Comments Start of session pt requested to use bathroom. Stand pivot transfer with MOD A from bed to commode. Stand pivot transfer back to bed with MOD A. Pt performed sit to stand transfers with MIN A and FWW throughout session. Post first bout of ambulation O2 at 90% on 2L via NC, HR 115bpm.    How much difficulty does the patient currently have...   Turning over in bed (including adjusting bedclothes, sheets and blankets)? 4   Sitting down on and standing up from a chair with arms (e.g., wheelchair, bedside commode, etc.) 2   Moving from lying on back to sitting on the side of the bed? 4   How much help from another person does the patient currently need...   Moving to and from a bed to a chair (including a wheelchair)? 2   Need to walk in a hospital room? 2   Climbing 3-5 steps with a railing? 1   6 clicks Mobility Score 15   Short Term Goals    Short Term Goal # 1 Pt will perform sit<>stand and stand pivot transfers with LRAD with supervision within 6 visits   Short Term Goal # 2 Pt will ambulate 100' with LRAD with supervision within 6 visits   Short Term Goal # 3 Pt will perform 4 stairs with supervision within 6 visits   Education Group   Education Provided Role of Physical Therapist;Gait Training;Transfer Status   Role of Physical Therapist Patient Response Patient;Eager;Acceptance;Explanation;Verbal Demonstration   Gait Training Patient Response Patient;Acceptance;Explanation;Demonstration;Verbal Demonstration;Reinforcement Needed   Transfer Status Patient Response Reinforcement Needed;Verbal Demonstration;Explanation;Demonstration;Acceptance;Patient   Physical Therapy Initial Treatment Plan    Treatment Plan  Bed Mobility;Equipment;Group Therapy;Gait Training;Manual Therapy;Neuro Re-Education / Balance;Self Care / Home Evaluation;Stair Training;Therapeutic Activities;Therapeutic Exercise   Treatment Frequency 4 Times per Week   Duration Until  Therapy Goals Met   Problem List    Problems Pain;Impaired Bed Mobility;Impaired Transfers;Impaired Ambulation;Functional ROM Deficit;Functional Strength Deficit;Impaired Balance;Impaired Coordination;Decreased Activity Tolerance;Safety Awareness Deficits / Cognition   Anticipated Discharge Equipment and Recommendations   DC Equipment Recommendations Unable to determine at this time   Discharge Recommendations Recommend post-acute placement for additional physical therapy services prior to discharge home   Interdisciplinary Plan of Care Collaboration   IDT Collaboration with  Nursing   Patient Position at End of Therapy In Bed;Bed Alarm On;Call Light within Reach;Tray Table within Reach;Phone within Reach   Session Information   Date / Session Number  2/24 -1 (1/4, 3/2)

## 2023-02-24 NOTE — PROGRESS NOTES
Hospital Medicine Daily Progress Note    Date of Service  2/24/2023    Chief Complaint  Chiara Ju Pacheco is a 57 y.o. female admitted 2/18/2023 with vomiting blood.    Hospital Course  Ms. Pacheco is a 57 y.o. female past medical history of nicotine dependence, alcohol abuse, hard of hearing who presented 2/18/2023 with coffee-ground emesis noted at home with abdominal pain.  She denies any dark tarry stool or bloody stools.  Patient reports drinking approximately a liter of alcohol every day.  History is difficult to obtain as patient is hard of hearing and her hearing aids are not functioning properly.  In the ER, her hemoglobin resulted at 13.6, lactic acid 12.6, anion gap metabolic acidosis, elevated LFTs and alcohol level of 71.  Given her elevated lactic acid and labile blood pressures critical care was consulted and recommended IMCU placement at this time under care of hospitalist.  She will be admitted for upper GI bleed and alcohol withdrawal.     Patient decompensated in the ICU on 2/19 requiring intubation multiple pressors and emergent GI consultation for EGD.  EGD was performed on 2/19 with multiple gastric ulcers, Esmer-Aparicio tear and nonbleeding varices seen.  Extensive cautery was performed.  Patient was extubated on 2/20.    Interval Problem Update    2/22: Ms. Pacheco was evaluated and examined in the IMCU. Hb this morning is 9.6. Her blood pressure has been low. She remains on an IV Precedex drip. She is communicating via writing. Her boyfriend is bringing in her hearing aids.     2/23    Case discussed with GI MD Dr. Al-they have signed off    Patient exhibited hypoxia with a sat of 82% on room air.    A stat x-ray of the chest for further evaluation has been ordered    2/24    Remains hypoxic with sats of 94 on 2 L nasal cannula    X-ray of the chest reviewed by me shows interstitial edema    Ordered Lasix 40 mg IV push x1    Patient complaining of right foot pain.  Is a 6 out of 10  aching.constant.  She is unable to bear any weight on that right foot    Ordered and reviewed an x-ray of the right ankle and showed evidence of a mildly displaced right distal fibular fracture.    As with and consulted Dr. Campbell of orthopedics    I have discussed this patient's plan of care and discharge plan at IDT rounds today with Case Management, Nursing, Nursing leadership, and other members of the IDT team.    Consultants/Specialty  critical care  GI  Code Status  Full Code    Disposition  Patient is not medically cleared for discharge.   Anticipate discharge to to home with close outpatient follow-up.  I have placed the appropriate orders for post-discharge needs.    Review of Systems  Review of Systems   Constitutional:  Negative for chills and fever.   Respiratory:  Negative for shortness of breath.    Cardiovascular:  Negative for chest pain.   Gastrointestinal:  Negative for nausea and vomiting.   Genitourinary:  Negative for dysuria and urgency.   Musculoskeletal:  Positive for joint pain and myalgias.   All other systems reviewed and are negative.     Physical Exam  Temp:  [36.4 °C (97.6 °F)-37 °C (98.6 °F)] 37 °C (98.6 °F)  Pulse:  [] 102  Resp:  [17-18] 18  BP: (125-145)/() 142/85  SpO2:  [94 %-97 %] 95 %    Physical Exam  Vitals and nursing note reviewed.   Constitutional:       Appearance: She is not ill-appearing or toxic-appearing.   HENT:      Head: Normocephalic and atraumatic.      Mouth/Throat:      Pharynx: Oropharynx is clear.   Eyes:      General: No scleral icterus.     Conjunctiva/sclera: Conjunctivae normal.   Cardiovascular:      Rate and Rhythm: Normal rate and regular rhythm.   Pulmonary:      Effort: Pulmonary effort is normal.      Breath sounds: Normal breath sounds.   Abdominal:      General: There is no distension.      Palpations: Abdomen is soft.      Tenderness: There is no abdominal tenderness.   Musculoskeletal:         General: Tenderness (Right lower leg is  tender as well as ankle on the right side.  Of ecchymosis involving the right lower leg and ankle with associated swelling) present.      Cervical back: Normal range of motion and neck supple.      Right lower leg: No edema.      Left lower leg: No edema.   Skin:     General: Skin is warm and dry.      Coloration: Skin is pale.   Neurological:      General: No focal deficit present.      Mental Status: She is alert and oriented to person, place, and time.   Psychiatric:         Mood and Affect: Mood normal.       Fluids    Intake/Output Summary (Last 24 hours) at 2/24/2023 1343  Last data filed at 2/24/2023 0800  Gross per 24 hour   Intake 480 ml   Output --   Net 480 ml         Laboratory  Recent Labs     02/21/23  2215 02/22/23  0410 02/24/23  0146   WBC  --  5.3 6.0   RBC  --  3.03* 3.36*   HEMOGLOBIN 9.8* 9.6* 10.6*   HEMATOCRIT 28.9* 28.8* 32.0*   MCV  --  95.0 95.2   MCH  --  31.7 31.5   MCHC  --  33.3* 33.1*   RDW  --  57.1* 57.0*   PLATELETCT  --  54* 70*   MPV  --  11.7 11.1       Recent Labs     02/22/23  0410 02/24/23  0146   SODIUM 142 140   POTASSIUM 4.2 3.8   CHLORIDE 110 105   CO2 27 24   GLUCOSE 90 97   BUN 15 7*   CREATININE 0.57 0.36*   CALCIUM 7.6* 8.2*               Recent Labs     02/22/23  0410   TRIGLYCERIDE 147         Imaging  DX-ANKLE 3+ VIEWS RIGHT   Final Result      1.  Fracture of the distal fibula   2.  Fracture of the medial malleolus   3.  Osteopenia      DX-FOOT-2- RIGHT   Final Result      Fractures of the distal fibula and medial malleolus   Osteopenia      DX-CHEST-LIMITED (1 VIEW)   Final Result      1.  Mild interstitial pulmonary edema.   2.  Bilateral pleural effusions with associated basilar atelectasis and/or consolidation.      IR-US GUIDED PIV   Final Result    Ultrasound-guided PERIPHERAL IV INSERTION performed by    qualified nursing staff as above.      US-SPLEEN   Final Result      The spleen is at upper limit of normal. No thrombosis identified.       DX-CHEST-PORTABLE (1 VIEW)   Final Result      Hypoventilatory change with interstitial prominence could be due to atelectasis or edema with small effusions.      DX-CHEST-PORTABLE (1 VIEW)   Final Result      Stable chest x-ray findings.      EC-ECHOCARDIOGRAM COMPLETE W/ CONT   Final Result      DX-ABDOMEN FOR TUBE PLACEMENT   Final Result         1.  Nonspecific bowel gas pattern in the upper abdomen.   2.  Nasogastric tube tip terminates overlying the expected location of the gastric fundus.   3.  Hazy interstitial pulmonary parenchymal prominence suggesting chronic underlying lung disease or interstitial edema.      DX-CHEST-PORTABLE (1 VIEW)   Final Result         No acute cardiac or pulmonary abnormality is identified.   Line and tube placements are noted.      US-RUQ   Final Result      1.  There is cholelithiasis.      2.  Increased echogenicity and enlargement of the liver are likely due to hepatic steatosis.             Assessment/Plan  * UGIB (upper gastrointestinal bleed)- (present on admission)  Assessment & Plan  GI following-s/p EGD-gastropathy, gastric ulcers, Esmer-Aparicio tear.  Status post cauterization  Avoid NSAID's  Hold all anticoagulants   Status post Protonix and octreotide drips.   prilosec po BID    Hypoxia  Assessment & Plan  Likely related to pulmonary edema    X-ray of the chest reviewed by me    640 mg IV push x1 given on 2/24/2023    Continue oxygen to maintain a sat greater than 92%    Displaced fracture of right fibula- (present on admission)  Assessment & Plan  Bedrest    N.p.o.    .  Consult Dr. Shannan zavaleta    Thrombocytopenia (HCC)- (present on admission)  Assessment & Plan  Likely bone marrow suppression in the setting of alcohol     Hemorrhagic shock (HCC)  Assessment & Plan  Due to UGIB from ulcers status post endoscopic therapy 2/19  Status post massive transfusion protocol 2/19  Status post IV norepinephrine infuson and\    On 2/24/2023 midodrine has been  discontinued        Hepatitis C- (present on admission)  Assessment & Plan  HCV Ab positive  Outpatient follow-up    Hypomagnesemia- (present on admission)  Assessment & Plan  Replacement was given    Acute respiratory failure with hypoxia (HCC)  Assessment & Plan  Intubated on 2/19  Extubated 2/20  Improving currently on 3 liters nasal cannula oxygen    Cholelithiasis- (present on admission)  Assessment & Plan  No evidence of cholecystitis on imaging    Transaminitis- (present on admission)  Assessment & Plan  Likely secondary to alcohol abuse and hepatitis C     Trend  Avoid hepatotoxic agents    Lactic acidosis- (present on admission)  Assessment & Plan  Resolved with IV fluids    High anion gap metabolic acidosis- (present on admission)  Assessment & Plan  Likely secondary to alcohol ingestion, dehydration    Resuscitated with IV fluids  Monitor    Alcohol withdrawal (HCC)- (present on admission)  Assessment & Plan  She has been requiring an IV Precedex drip in the IMCU which will be titrated.  Ativan as needed  High-dose thiamine  Electrolytes were replaced  Folic acid and multivitamins  Seizure precautions         VTE prophylaxis: SCDs/TEDs    I have performed a physical exam and reviewed and updated ROS and Plan today (2/24/2023). In review of yesterday's note (2/23/2023), there are no changes except as documented above.

## 2023-02-24 NOTE — CARE PLAN
The patient is Stable - Low risk of patient condition declining or worsening    Shift Goals  Clinical Goals: safety  Patient Goals: sleep  Family Goals: MARTINE    Progress made toward(s) clinical / shift goals:    Problem: Knowledge Deficit - Standard  Goal: Patient and family/care givers will demonstrate understanding of plan of care, disease process/condition, diagnostic tests and medications  Outcome: Progressing     Problem: Pain - Standard  Goal: Alleviation of pain or a reduction in pain to the patient’s comfort goal  Outcome: Progressing     Problem: Skin Integrity  Goal: Skin integrity is maintained or improved  Outcome: Progressing     Problem: Fall Risk  Goal: Patient will remain free from falls  Outcome: Progressing

## 2023-02-24 NOTE — CARE PLAN
The patient is Stable - Low risk of patient condition declining or worsening    Shift Goals  Clinical Goals: safety  Patient Goals: sleep  Family Goals: MARTINE    Progress made toward(s) clinical / shift goals:    Problem: Knowledge Deficit - Standard  Goal: Patient and family/care givers will demonstrate understanding of plan of care, disease process/condition, diagnostic tests and medications  Outcome: Progressing     Problem: Optimal Care for Alcohol Withdrawal  Goal: Optimal Care for the alcohol withdrawal patient  Outcome: Progressing     Problem: Seizure Precautions  Goal: Implementation of seizure precautions  Outcome: Progressing     Problem: Lifestyle Changes  Goal: Patient's ability to identify lifestyle changes and available resources to help reduce recurrence of condition will improve  Outcome: Progressing     Problem: Pain - Standard  Goal: Alleviation of pain or a reduction in pain to the patient’s comfort goal  Outcome: Progressing     Problem: Skin Integrity  Goal: Skin integrity is maintained or improved  Outcome: Progressing       Patient is not progressing towards the following goals:

## 2023-02-25 LAB
ALBUMIN SERPL BCP-MCNC: 2.8 G/DL (ref 3.2–4.9)
ALBUMIN/GLOB SERPL: 1.1 G/DL
ALP SERPL-CCNC: 93 U/L (ref 30–99)
ALT SERPL-CCNC: 148 U/L (ref 2–50)
ANION GAP SERPL CALC-SCNC: 8 MMOL/L (ref 7–16)
AST SERPL-CCNC: 85 U/L (ref 12–45)
BILIRUB SERPL-MCNC: 1.3 MG/DL (ref 0.1–1.5)
BUN SERPL-MCNC: 8 MG/DL (ref 8–22)
CALCIUM ALBUM COR SERPL-MCNC: 9.3 MG/DL (ref 8.5–10.5)
CALCIUM SERPL-MCNC: 8.3 MG/DL (ref 8.5–10.5)
CHLORIDE SERPL-SCNC: 101 MMOL/L (ref 96–112)
CO2 SERPL-SCNC: 27 MMOL/L (ref 20–33)
CREAT SERPL-MCNC: 0.4 MG/DL (ref 0.5–1.4)
ERYTHROCYTE [DISTWIDTH] IN BLOOD BY AUTOMATED COUNT: 56.7 FL (ref 35.9–50)
GFR SERPLBLD CREATININE-BSD FMLA CKD-EPI: 116 ML/MIN/1.73 M 2
GLOBULIN SER CALC-MCNC: 2.5 G/DL (ref 1.9–3.5)
GLUCOSE SERPL-MCNC: 116 MG/DL (ref 65–99)
HCT VFR BLD AUTO: 32.3 % (ref 37–47)
HGB BLD-MCNC: 10.8 G/DL (ref 12–16)
MCH RBC QN AUTO: 31 PG (ref 27–33)
MCHC RBC AUTO-ENTMCNC: 33.4 G/DL (ref 33.6–35)
MCV RBC AUTO: 92.8 FL (ref 81.4–97.8)
PLATELET # BLD AUTO: 86 K/UL (ref 164–446)
PMV BLD AUTO: 10.6 FL (ref 9–12.9)
POTASSIUM SERPL-SCNC: 3.4 MMOL/L (ref 3.6–5.5)
PROT SERPL-MCNC: 5.3 G/DL (ref 6–8.2)
RBC # BLD AUTO: 3.48 M/UL (ref 4.2–5.4)
SODIUM SERPL-SCNC: 136 MMOL/L (ref 135–145)
WBC # BLD AUTO: 5.4 K/UL (ref 4.8–10.8)

## 2023-02-25 PROCEDURE — 36415 COLL VENOUS BLD VENIPUNCTURE: CPT

## 2023-02-25 PROCEDURE — 700102 HCHG RX REV CODE 250 W/ 637 OVERRIDE(OP): Performed by: HOSPITALIST

## 2023-02-25 PROCEDURE — A9270 NON-COVERED ITEM OR SERVICE: HCPCS | Performed by: HOSPITALIST

## 2023-02-25 PROCEDURE — 700111 HCHG RX REV CODE 636 W/ 250 OVERRIDE (IP): Performed by: HOSPITALIST

## 2023-02-25 PROCEDURE — 99232 SBSQ HOSP IP/OBS MODERATE 35: CPT | Performed by: HOSPITALIST

## 2023-02-25 PROCEDURE — 770006 HCHG ROOM/CARE - MED/SURG/GYN SEMI*

## 2023-02-25 PROCEDURE — 80053 COMPREHEN METABOLIC PANEL: CPT

## 2023-02-25 PROCEDURE — 94760 N-INVAS EAR/PLS OXIMETRY 1: CPT

## 2023-02-25 PROCEDURE — 85027 COMPLETE CBC AUTOMATED: CPT

## 2023-02-25 RX ORDER — FUROSEMIDE 10 MG/ML
20 INJECTION INTRAMUSCULAR; INTRAVENOUS ONCE
Status: COMPLETED | OUTPATIENT
Start: 2023-02-25 | End: 2023-02-25

## 2023-02-25 RX ORDER — POTASSIUM CHLORIDE 20 MEQ/1
40 TABLET, EXTENDED RELEASE ORAL ONCE
Status: COMPLETED | OUTPATIENT
Start: 2023-02-25 | End: 2023-02-25

## 2023-02-25 RX ADMIN — OMEPRAZOLE 20 MG: 20 CAPSULE, DELAYED RELEASE ORAL at 05:31

## 2023-02-25 RX ADMIN — POTASSIUM CHLORIDE 40 MEQ: 1500 TABLET, EXTENDED RELEASE ORAL at 13:27

## 2023-02-25 RX ADMIN — OXYCODONE 5 MG: 5 TABLET ORAL at 20:05

## 2023-02-25 RX ADMIN — FUROSEMIDE 20 MG: 10 INJECTION, SOLUTION INTRAMUSCULAR; INTRAVENOUS at 13:27

## 2023-02-25 RX ADMIN — OXYCODONE 5 MG: 5 TABLET ORAL at 05:31

## 2023-02-25 RX ADMIN — OMEPRAZOLE 20 MG: 20 CAPSULE, DELAYED RELEASE ORAL at 17:21

## 2023-02-25 RX ADMIN — SENNOSIDES AND DOCUSATE SODIUM 2 TABLET: 50; 8.6 TABLET ORAL at 17:20

## 2023-02-25 RX ADMIN — OXYCODONE 5 MG: 5 TABLET ORAL at 15:05

## 2023-02-25 RX ADMIN — MAGNESIUM HYDROXIDE 30 ML: 400 SUSPENSION ORAL at 05:35

## 2023-02-25 ASSESSMENT — ENCOUNTER SYMPTOMS
MYALGIAS: 1
VOMITING: 0
NAUSEA: 0
SHORTNESS OF BREATH: 0
FEVER: 0
CHILLS: 0

## 2023-02-25 ASSESSMENT — PAIN DESCRIPTION - PAIN TYPE
TYPE: ACUTE PAIN

## 2023-02-25 NOTE — CARE PLAN
The patient is Stable - Low risk of patient condition declining or worsening    Shift Goals  Clinical Goals: Safety with mobility, pain management  Patient Goals: Sleep, pain control  Family Goals: MARTINE    Progress made toward(s) clinical / shift goals:  Patient is AAOx4, she is able to communicate needs. Education provided on safety and using her call light when she needs assistance. Patient demonstrates understanding of education provided. Pain scale in place with pain assessment. Education provided on non-pharmacological ways to relive pain. Bed low, locked and call light in reach.    Problem: Knowledge Deficit - Standard  Goal: Patient and family/care givers will demonstrate understanding of plan of care, disease process/condition, diagnostic tests and medications  Outcome: Progressing   Patient understands POC, demonstrates understanding of education provided  Problem: Pain - Standard  Goal: Alleviation of pain or a reduction in pain to the patient’s comfort goal  Outcome: Progressing   Medicated patient per MAR, will continue to reassess  Problem: Skin Integrity  Goal: Skin integrity is maintained or improved  Outcome: Progressing   Skin remains intact, patient able to mobilize  Problem: Fall Risk  Goal: Patient will remain free from falls  Outcome: Progressing   She remains free from falls, patient has splint on right leg, education provided on safety with mobility    Patient is not progressing towards the following goals: N/A

## 2023-02-25 NOTE — CONSULTS
DATE OF SERVICE:  02/24/2023     ORTHOPEDIC CONSULTATION     REQUESTING PHYSICIAN:  Jagjit Wright MD, hospitalist service     REASON FOR CONSULTATION:  Right ankle fracture.     CHIEF COMPLAINT:  Right ankle pain.     HISTORY OF PRESENT ILLNESS:  The patient is 57 years old.  She is hearing   impaired and communicates with sign language, but also reads lips very well.    She was admitted on 02/18/2023 with an upper gastrointestinal bleed.  She has   a history of alcohol dependence and withdrawal.  She has improved clinically   from that and today is complaining of some right ankle pain and found to have   ecchymosis and Dr. Wright ordered some x-rays, which showed a right bimalleolar   ankle fracture.  I was consulted to provide treatment recommendations.  She   does describe having some pain in the ankle and feeling some clicking when she   tries to move it.  She denies other obvious pain elsewhere in her   extremities.     PAST MEDICAL HISTORY:  ALLERGIES:  No known drug allergies.     OUTPATIENT MEDICATIONS:  None listed.     INPATIENT MEDICATIONS:  Include oxycodone, Prilosec, and Ativan.     PAST MEDICAL DIAGNOSES:  Include hearing impairment.     PAST SURGICAL HISTORY:  Upper GI endoscopy.     SOCIAL HISTORY:  The patient does smoke cigarettes.  Does drink shots of   liquor daily.  Denies illicit drug use.     PHYSICAL EXAMINATION:    VITAL SIGNS:  Temperature 98.6, heart rate 83, respiratory rate 18, blood   pressure 138/80, pulse oximetry 95% on 2 liters nasal cannula.  GENERAL APPEARANCE:  The patient is alert and oriented, pleasant, cooperative,   in no acute distress.  MUSCULOSKELETAL:  Right lower extremity, she has a short leg splint in place.    She is able to flex and extend the toes.  She has sensation intact to light   touch in the toe.  She is nontender to palpation of the leg proximal to the   splint.  There is no evidence of obvious knee effusion.  Left lower and   bilateral upper extremities  are grossly neurovascularly intact without   evidence of obvious traumatic deformity.     DIAGNOSTIC IMAGING:  Plain x-rays of the right ankle and right foot show a   minimally displaced Gray B distal fibula fracture and a nondisplaced medial   malleolus fracture without widening of the ankle mortise or syndesmosis.     ASSESSMENT:  A 57-year-old female with hearing impairment, history of alcohol   dependence and withdrawal symptoms as well as transaminitis.  She is admitted   to the hospitalist service and was found to have a subacute right nondisplaced   bimalleolar ankle fracture about a week old at this point.  She has been   placed into a splint in her hospital room and tolerated that well.     RECOMMENDATIONS:  1.  I discussed these findings with the patient and I feel that she is a good   candidate for nonoperative management just given the overall good alignment of   her fracture despite it being a bimalleolar ankle fracture.  2.  I recommend she be nonweightbearing to the right lower extremity in her   splint.  3.  She can ultimately follow up in my clinic in a week or two for repeat   x-rays and conversion to a short leg cast.  I anticipate the need for casting   for about 4-6 weeks depending on the rate of healing.  If at some point, she   develops fracture displacement or is not tolerating mobilization and   nonoperative management, we could consider surgical intervention, but if she   maintains good alignment of her fractures with nonoperative management, I feel   that this would be in her best interest.  She expressed comfort with this   plan.        ______________________________  MD ANA Kaye/JORGE LUIS    DD:  02/24/2023 18:24  DT:  02/24/2023 18:43    Job#:  [id number]

## 2023-02-25 NOTE — DISCHARGE PLANNING
Care Transition Team Discharge Planning    Anticipated Discharge Information  Discharge Disposition: Discharged to home/self care (01)  Discharge Address: [address] [place] [address]              Discharge Plan:  CM attempted to meet with patient at bedside to get choice for home oxygen. Patient is refusing to go home with oxygen. If she is still requiring oxygen when she is ready to discharge CM will need to meet with her again. Care team may need to educate on the importance of oxygen.

## 2023-02-25 NOTE — CARE PLAN
The patient is Stable - Low risk of patient condition declining or worsening    Shift Goals  Clinical Goals: rest  Patient Goals: rest  Family Goals: MARTINE    Progress made toward(s) clinical / shift goals:  Patient resting.    Patient is not progressing towards the following goals:

## 2023-02-25 NOTE — PROGRESS NOTES
Hospital Medicine Daily Progress Note    Date of Service  2/25/2023    Chief Complaint  Chiara Pacheco is a 57 y.o. female admitted 2/18/2023 with vomiting blood.    Hospital Course  Ms. Pacheco is a 57 y.o. female past medical history of nicotine dependence, alcohol abuse, hard of hearing who presented 2/18/2023 with coffee-ground emesis noted at home with abdominal pain.  She denies any dark tarry stool or bloody stools.  Patient reports drinking approximately a liter of alcohol every day.  History is difficult to obtain as patient is hard of hearing and her hearing aids are not functioning properly.  In the ER, her hemoglobin resulted at 13.6, lactic acid 12.6, anion gap metabolic acidosis, elevated LFTs and alcohol level of 71.  Given her elevated lactic acid and labile blood pressures critical care was consulted and recommended IMCU placement at this time under care of hospitalist.  She will be admitted for upper GI bleed and alcohol withdrawal.     Patient decompensated in the ICU on 2/19 requiring intubation multiple pressors and emergent GI consultation for EGD.  EGD was performed on 2/19 with multiple gastric ulcers, Esmer-Aparicio tear and nonbleeding varices seen.  Extensive cautery was performed.  Patient was extubated on 2/20.    Interval Problem Update    2/25     Patient's right foot is bandaged and wrapped.    Nonweightbearing on the right foot.    Seen by PT and they recommended postacute services.  A skilled facility has been ordered    I have discussed this patient's plan of care and discharge plan at IDT rounds today with Case Management, Nursing, Nursing leadership, and other members of the IDT team.    Consultants/Specialty  critical care  GI  Code Status  Full Code    Disposition  Patient is not medically cleared for discharge.   Anticipate discharge to to home with close outpatient follow-up.  I have placed the appropriate orders for post-discharge needs.    Review of Systems  Review of  Systems   Constitutional:  Negative for chills and fever.   Respiratory:  Negative for shortness of breath.    Cardiovascular:  Negative for chest pain.   Gastrointestinal:  Negative for nausea and vomiting.   Genitourinary:  Negative for dysuria and urgency.   Musculoskeletal:  Positive for joint pain and myalgias.   All other systems reviewed and are negative.     Physical Exam  Temp:  [36.1 °C (97 °F)-37 °C (98.6 °F)] 36.1 °C (97 °F)  Pulse:  [78-88] 78  Resp:  [16-18] 18  BP: (111-138)/(70-85) 125/70  SpO2:  [90 %-95 %] 90 %    Physical Exam  Vitals and nursing note reviewed.   Constitutional:       Appearance: She is not ill-appearing or toxic-appearing.   HENT:      Head: Normocephalic and atraumatic.      Mouth/Throat:      Pharynx: Oropharynx is clear.   Eyes:      General: No scleral icterus.     Conjunctiva/sclera: Conjunctivae normal.   Cardiovascular:      Rate and Rhythm: Normal rate and regular rhythm.   Pulmonary:      Effort: Pulmonary effort is normal.      Breath sounds: Normal breath sounds.   Abdominal:      General: There is no distension.      Palpations: Abdomen is soft.      Tenderness: There is no abdominal tenderness.   Musculoskeletal:         General: Tenderness (Right lower leg is tender as well as ankle on the right side.  Of ecchymosis involving the right lower leg and ankle with associated swelling) present.      Cervical back: Normal range of motion and neck supple.      Right lower leg: No edema.      Left lower leg: No edema.   Skin:     General: Skin is warm and dry.      Coloration: Skin is pale.   Neurological:      General: No focal deficit present.      Mental Status: She is alert and oriented to person, place, and time.   Psychiatric:         Mood and Affect: Mood normal.       Fluids  No intake or output data in the 24 hours ending 02/25/23 1212      Laboratory  Recent Labs     02/24/23  0146 02/25/23  0447   WBC 6.0 5.4   RBC 3.36* 3.48*   HEMOGLOBIN 10.6* 10.8*    HEMATOCRIT 32.0* 32.3*   MCV 95.2 92.8   MCH 31.5 31.0   MCHC 33.1* 33.4*   RDW 57.0* 56.7*   PLATELETCT 70* 86*   MPV 11.1 10.6       Recent Labs     02/24/23  0146 02/25/23  0447   SODIUM 140 136   POTASSIUM 3.8 3.4*   CHLORIDE 105 101   CO2 24 27   GLUCOSE 97 116*   BUN 7* 8   CREATININE 0.36* 0.40*   CALCIUM 8.2* 8.3*                       Imaging  DX-ANKLE 3+ VIEWS RIGHT   Final Result      1.  Fracture of the distal fibula   2.  Fracture of the medial malleolus   3.  Osteopenia      DX-FOOT-2- RIGHT   Final Result      Fractures of the distal fibula and medial malleolus   Osteopenia      DX-CHEST-LIMITED (1 VIEW)   Final Result      1.  Mild interstitial pulmonary edema.   2.  Bilateral pleural effusions with associated basilar atelectasis and/or consolidation.      IR-US GUIDED PIV   Final Result    Ultrasound-guided PERIPHERAL IV INSERTION performed by    qualified nursing staff as above.      US-SPLEEN   Final Result      The spleen is at upper limit of normal. No thrombosis identified.      DX-CHEST-PORTABLE (1 VIEW)   Final Result      Hypoventilatory change with interstitial prominence could be due to atelectasis or edema with small effusions.      DX-CHEST-PORTABLE (1 VIEW)   Final Result      Stable chest x-ray findings.      EC-ECHOCARDIOGRAM COMPLETE W/ CONT   Final Result      DX-ABDOMEN FOR TUBE PLACEMENT   Final Result         1.  Nonspecific bowel gas pattern in the upper abdomen.   2.  Nasogastric tube tip terminates overlying the expected location of the gastric fundus.   3.  Hazy interstitial pulmonary parenchymal prominence suggesting chronic underlying lung disease or interstitial edema.      DX-CHEST-PORTABLE (1 VIEW)   Final Result         No acute cardiac or pulmonary abnormality is identified.   Line and tube placements are noted.      US-RUQ   Final Result      1.  There is cholelithiasis.      2.  Increased echogenicity and enlargement of the liver are likely due to hepatic  steatosis.             Assessment/Plan  * UGIB (upper gastrointestinal bleed)- (present on admission)  Assessment & Plan  GI following-s/p EGD-gastropathy, gastric ulcers, Esmer-Aparicio tear.  Status post cauterization  Avoid NSAID's  Hold all anticoagulants   Status post Protonix and octreotide drips.   prilosec po BID    Hypoxia  Assessment & Plan  Likely related to pulmonary edema    X-ray of the chest reviewed by me    Lasix 40 mg IV push x1 given on 2/24/2023 and lasix 20 mg IV push given on 2/25/23    Continue oxygen to maintain a sat greater than 92%    Displaced fracture of right fibula- (present on admission)  Assessment & Plan  S/p right foot/ankle cast    NOn weight bearing on right foot    Pain control    snf    Thrombocytopenia (HCC)- (present on admission)  Assessment & Plan  Likely bone marrow suppression in the setting of alcohol     Hemorrhagic shock (HCC)  Assessment & Plan  Due to UGIB from ulcers status post endoscopic therapy 2/19  Status post massive transfusion protocol 2/19  Status post IV norepinephrine infuson and\    On 2/24/2023 midodrine has been discontinued        Hepatitis C- (present on admission)  Assessment & Plan  HCV Ab positive  Outpatient follow-up    Hypomagnesemia- (present on admission)  Assessment & Plan  Replacement was given    Acute respiratory failure with hypoxia (HCC)  Assessment & Plan  Intubated on 2/19  Extubated 2/20  Improving currently on 3 liters nasal cannula oxygen    Cholelithiasis- (present on admission)  Assessment & Plan  No evidence of cholecystitis on imaging    Transaminitis- (present on admission)  Assessment & Plan  Likely secondary to alcohol abuse and hepatitis C     Trend  Avoid hepatotoxic agents    Lactic acidosis- (present on admission)  Assessment & Plan  Resolved with IV fluids    High anion gap metabolic acidosis- (present on admission)  Assessment & Plan  Likely secondary to alcohol ingestion, dehydration    Resuscitated with IV  fluids  Monitor    Alcohol withdrawal (HCC)- (present on admission)  Assessment & Plan  She has been requiring an IV Precedex drip in the IMCU which will be titrated.  Ativan as needed  High-dose thiamine  Electrolytes were replaced  Folic acid and multivitamins  Seizure precautions         VTE prophylaxis: SCDs/TEDs    I have performed a physical exam and reviewed and updated ROS and Plan today (2/25/2023). In review of yesterday's note (2/24/2023), there are no changes except as documented above.

## 2023-02-26 PROCEDURE — 700102 HCHG RX REV CODE 250 W/ 637 OVERRIDE(OP): Performed by: HOSPITALIST

## 2023-02-26 PROCEDURE — A9270 NON-COVERED ITEM OR SERVICE: HCPCS | Performed by: HOSPITALIST

## 2023-02-26 PROCEDURE — 770006 HCHG ROOM/CARE - MED/SURG/GYN SEMI*

## 2023-02-26 PROCEDURE — 99231 SBSQ HOSP IP/OBS SF/LOW 25: CPT | Performed by: HOSPITALIST

## 2023-02-26 RX ADMIN — OMEPRAZOLE 20 MG: 20 CAPSULE, DELAYED RELEASE ORAL at 05:06

## 2023-02-26 RX ADMIN — SENNOSIDES AND DOCUSATE SODIUM 2 TABLET: 50; 8.6 TABLET ORAL at 05:06

## 2023-02-26 RX ADMIN — OMEPRAZOLE 20 MG: 20 CAPSULE, DELAYED RELEASE ORAL at 18:03

## 2023-02-26 RX ADMIN — OXYCODONE 5 MG: 5 TABLET ORAL at 01:37

## 2023-02-26 RX ADMIN — OXYCODONE 5 MG: 5 TABLET ORAL at 22:12

## 2023-02-26 RX ADMIN — SENNOSIDES AND DOCUSATE SODIUM 2 TABLET: 50; 8.6 TABLET ORAL at 18:03

## 2023-02-26 RX ADMIN — OXYCODONE 5 MG: 5 TABLET ORAL at 18:08

## 2023-02-26 RX ADMIN — OXYCODONE 5 MG: 5 TABLET ORAL at 09:41

## 2023-02-26 ASSESSMENT — ENCOUNTER SYMPTOMS
EYE DISCHARGE: 0
PHOTOPHOBIA: 0
PALPITATIONS: 0
PSYCHIATRIC NEGATIVE: 1
FEVER: 0
VOMITING: 0
ORTHOPNEA: 0
CHILLS: 0
SPEECH CHANGE: 0
BLURRED VISION: 0
CLAUDICATION: 0
SENSORY CHANGE: 0
HEADACHES: 0
NAUSEA: 0
MYALGIAS: 1
DOUBLE VISION: 0
SHORTNESS OF BREATH: 0
EYE PAIN: 0
TINGLING: 0
DIZZINESS: 0
TREMORS: 0

## 2023-02-26 ASSESSMENT — PAIN DESCRIPTION - PAIN TYPE
TYPE: ACUTE PAIN

## 2023-02-26 NOTE — PROGRESS NOTES
"Received alert and oriented x 4. Check vitals sign and recorded accordingly and due med given per MAR. Monitor sign and symptoms of respiratory distress and treatment given accordingly per MAR.Medicated per MAR and reassessed every 2 hours per protocol. Call light within reach. Bed alarm refused despite health teaching given. Needs attended. Will continue to monitor./81   Pulse 83   Temp 36.2 °C (97.1 °F) (Temporal)   Resp 18   Ht 1.727 m (5' 8\")   Wt 94.1 kg (207 lb 7.3 oz)   LMP  (LMP Unknown)   SpO2 96%   Breastfeeding No   BMI 31.54 kg/m² .   "

## 2023-02-26 NOTE — CARE PLAN
The patient is Stable - Low risk of patient condition declining or worsening    Shift Goals  Clinical Goals: Safety, regular bowel movement and pain control  Patient Goals: Rest and sleep  Family Goals: MARTINE    Progress made toward(s) clinical / shift goals:  Encouraged to call for assistance when getting out of bed.  Problem: Knowledge Deficit - Standard  Goal: Patient and family/care givers will demonstrate understanding of plan of care, disease process/condition, diagnostic tests and medications  Description: Target End Date:  1-3 days or as soon as patient condition allows    Document in Patient Education    1.  Patient and family/caregiver oriented to unit, equipment, visitation policy and means for communicating concern  2.  Complete/review Learning Assessment  3.  Assess knowledge level of disease process/condition, treatment plan, diagnostic tests and medications  4.  Explain disease process/condition, treatment plan, diagnostic tests and medications  Outcome: Progressing     Problem: Optimal Care for Alcohol Withdrawal  Goal: Optimal Care for the alcohol withdrawal patient  Description: Target End Date:  1 to 3 days    1.  Alcohol history screening done on admission  2.  CIWA-AR score assessment (includes assessment of nausea/vomiting, tremor, sweats, anxiety, agitation, tactile, visual and auditory disturbances, headache and orientation/sensorium).  Document on CIWA flowsheet.  3.  Follow CIWA-AR score protocol  4.  Frequent reorientation  5.  Monitor for fluid and electrolyte imbalance.  6.  Assess for respiratory depression due to sedation (pulse ox)  7.  Consider thiamine, multivitamins, folic acid and magnesium sulfate per provider order  8.  Collaborate with Social Workers/Case Management  9.  Collaborate with mental health  Outcome: Progressing     Problem: Seizure Precautions  Goal: Implementation of seizure precautions  Description: Target End Date:  Prior to discharge or change in level of  care    1.  Padded side rails up at all times  2.  Suction equipment and oxygen delivery system at bedside  3.  Continuous pulse oximeter in use  4.  Implement fall precautions, bed alarm on, bed in lowest position  5.  IV access (per order)  6.  Provide low stimulus environment, avoid exposure to triggers  7.  Instruct patient to use call light/seizure button if having warning signs of impending seizure  Outcome: Progressing     Problem: Lifestyle Changes  Goal: Patient's ability to identify lifestyle changes and available resources to help reduce recurrence of condition will improve  Description: Target End Date:  1 to 3 days    1.  Discuss recommended lifestyle changes  2.  Identify available resources and support systems  3.  Consider referral to substance abuse program  Outcome: Progressing     Problem: Psychosocial  Goal: Patient's level of anxiety will decrease  Description: Target End Date:  1-3 days or as soon as patient condition allows    1.  Collaborate with patient and family/caregiver to identify triggers and develop strategies to cope with anxiety  2.  Implement stimuli reduction, calming techniques  3.  Pharmacologic management per provider order  4.  Encourage patient/family/care giver participation  5.  Collaborate with interdisciplinary team including Psychologist or Behavioral Health Team as needed  Outcome: Progressing  Goal: Spiritual and cultural needs incorporated into hospitalization  Description: Target End Date:  End of day 1    1.  Encourage verbalization of feelings, concerns, expectations and needs  2.  Collaborate with Case Management/  3.  Collaborate with Pastoral Care to meet spiritual needs  Outcome: Progressing     Problem: Pain - Standard  Goal: Alleviation of pain or a reduction in pain to the patient’s comfort goal  Description: Target End Date:  Prior to discharge or change in level of care    Document on Vitals flowsheet    1.  Document pain using the  appropriate pain scale per order or unit policy  2.  Educate and implement non-pharmacologic comfort measures (i.e. relaxation, distraction, massage, cold/heat therapy, etc.)  3.  Pain management medications as ordered  4.  Reassess pain after pain med administration per policy  5.  If opiods administered assess patient's response to pain medication is appropriate per POSS sedation scale  6.  Follow pain management plan developed in collaboration with patient and interdisciplinary team (including palliative care or pain specialists if applicable)  Outcome: Progressing       Patient is not progressing towards the following goals:

## 2023-02-26 NOTE — PROGRESS NOTES
Hospital Medicine Daily Progress Note    Date of Service  2/26/2023    Chief Complaint  Chiara Pacheco is a 57 y.o. female admitted 2/18/2023 with vomiting blood.    Hospital Course  Ms. Pacheco is a 57 y.o. female past medical history of nicotine dependence, alcohol abuse, hard of hearing who presented 2/18/2023 with coffee-ground emesis noted at home with abdominal pain.  She denies any dark tarry stool or bloody stools.  Patient reports drinking approximately a liter of alcohol every day.  History is difficult to obtain as patient is hard of hearing and her hearing aids are not functioning properly.  In the ER, her hemoglobin resulted at 13.6, lactic acid 12.6, anion gap metabolic acidosis, elevated LFTs and alcohol level of 71.  Given her elevated lactic acid and labile blood pressures critical care was consulted and recommended IMCU placement at this time under care of hospitalist.  She will be admitted for upper GI bleed and alcohol withdrawal.     Patient decompensated in the ICU on 2/19 requiring intubation multiple pressors and emergent GI consultation for EGD.  EGD was performed on 2/19 with multiple gastric ulcers, Esmer-Aparicio tear and nonbleeding varices seen.  Extensive cautery was performed.  Patient was extubated on 2/20.    Interval Problem Update    2/26    Oxygen has been weaned off today    Patient's right foot is bandaged and wrapped.    Nonweightbearing on the right foot.    SNF pending      I have discussed this patient's plan of care and discharge plan at IDT rounds today with Case Management, Nursing, Nursing leadership, and other members of the IDT team.    Consultants/Specialty  critical care  GI  Code Status  Full Code    Disposition  Patient is medically cleared for discharge.   Anticipate discharge to to home with close outpatient follow-up.  I have placed the appropriate orders for post-discharge needs.    Review of Systems  Review of Systems   Constitutional:  Negative for  chills and fever.   HENT: Negative.     Eyes:  Negative for blurred vision, double vision, photophobia, pain and discharge.   Respiratory:  Negative for shortness of breath.    Cardiovascular:  Negative for chest pain, palpitations, orthopnea and claudication.   Gastrointestinal:  Negative for nausea and vomiting.   Genitourinary:  Negative for dysuria and urgency.   Musculoskeletal:  Positive for joint pain and myalgias.   Neurological:  Negative for dizziness, tingling, tremors, sensory change, speech change and headaches.   Psychiatric/Behavioral: Negative.     All other systems reviewed and are negative.     Physical Exam  Temp:  [36.1 °C (97 °F)-36.7 °C (98 °F)] 36.7 °C (98 °F)  Pulse:  [71-83] 71  Resp:  [16-18] 16  BP: (105-127)/(55-81) 105/55  SpO2:  [91 %-96 %] 96 %    Physical Exam  Vitals and nursing note reviewed.   Constitutional:       Appearance: She is not ill-appearing or toxic-appearing.   HENT:      Head: Normocephalic and atraumatic.      Mouth/Throat:      Pharynx: Oropharynx is clear.   Eyes:      General: No scleral icterus.     Conjunctiva/sclera: Conjunctivae normal.   Cardiovascular:      Rate and Rhythm: Normal rate and regular rhythm.   Pulmonary:      Effort: Pulmonary effort is normal.      Breath sounds: Normal breath sounds.   Abdominal:      General: There is no distension.      Palpations: Abdomen is soft.      Tenderness: There is no abdominal tenderness.   Musculoskeletal:         General: No tenderness.      Cervical back: Normal range of motion and neck supple.      Right lower leg: No edema.      Left lower leg: No edema.      Comments: Right foot /ankle bandaged   Skin:     General: Skin is warm and dry.      Coloration: Skin is not pale.   Neurological:      General: No focal deficit present.      Mental Status: She is alert and oriented to person, place, and time.   Psychiatric:         Mood and Affect: Mood normal.       Fluids    Intake/Output Summary (Last 24 hours) at  2/26/2023 1239  Last data filed at 2/25/2023 2005  Gross per 24 hour   Intake 240 ml   Output 270 ml   Net -30 ml         Laboratory  Recent Labs     02/24/23  0146 02/25/23  0447   WBC 6.0 5.4   RBC 3.36* 3.48*   HEMOGLOBIN 10.6* 10.8*   HEMATOCRIT 32.0* 32.3*   MCV 95.2 92.8   MCH 31.5 31.0   MCHC 33.1* 33.4*   RDW 57.0* 56.7*   PLATELETCT 70* 86*   MPV 11.1 10.6       Recent Labs     02/24/23  0146 02/25/23  0447   SODIUM 140 136   POTASSIUM 3.8 3.4*   CHLORIDE 105 101   CO2 24 27   GLUCOSE 97 116*   BUN 7* 8   CREATININE 0.36* 0.40*   CALCIUM 8.2* 8.3*                       Imaging  DX-ANKLE 3+ VIEWS RIGHT   Final Result      1.  Fracture of the distal fibula   2.  Fracture of the medial malleolus   3.  Osteopenia      DX-FOOT-2- RIGHT   Final Result      Fractures of the distal fibula and medial malleolus   Osteopenia      DX-CHEST-LIMITED (1 VIEW)   Final Result      1.  Mild interstitial pulmonary edema.   2.  Bilateral pleural effusions with associated basilar atelectasis and/or consolidation.      IR-US GUIDED PIV   Final Result    Ultrasound-guided PERIPHERAL IV INSERTION performed by    qualified nursing staff as above.      US-SPLEEN   Final Result      The spleen is at upper limit of normal. No thrombosis identified.      DX-CHEST-PORTABLE (1 VIEW)   Final Result      Hypoventilatory change with interstitial prominence could be due to atelectasis or edema with small effusions.      DX-CHEST-PORTABLE (1 VIEW)   Final Result      Stable chest x-ray findings.      EC-ECHOCARDIOGRAM COMPLETE W/ CONT   Final Result      DX-ABDOMEN FOR TUBE PLACEMENT   Final Result         1.  Nonspecific bowel gas pattern in the upper abdomen.   2.  Nasogastric tube tip terminates overlying the expected location of the gastric fundus.   3.  Hazy interstitial pulmonary parenchymal prominence suggesting chronic underlying lung disease or interstitial edema.      DX-CHEST-PORTABLE (1 VIEW)   Final Result         No acute  cardiac or pulmonary abnormality is identified.   Line and tube placements are noted.      US-RUQ   Final Result      1.  There is cholelithiasis.      2.  Increased echogenicity and enlargement of the liver are likely due to hepatic steatosis.             Assessment/Plan  * UGIB (upper gastrointestinal bleed)- (present on admission)  Assessment & Plan  GI following-s/p EGD-gastropathy, gastric ulcers, Esmer-Aparicio tear.  Status post cauterization  Avoid NSAID's  Hold all anticoagulants   Status post Protonix and octreotide drips.   prilosec po BID    Hypoxia  Assessment & Plan  Likely related to pulmonary edema    X-ray of the chest reviewed by me    Lasix 40 mg IV push x1 given on 2/24/2023 and lasix 20 mg IV push given on 2/25/23    Continue oxygen to maintain a sat greater than 92%    Resolved as of 2/26    Displaced fracture of right fibula- (present on admission)  Assessment & Plan  S/p right foot/ankle cast    NOn weight bearing on right foot    Pain control    snf    Thrombocytopenia (HCC)- (present on admission)  Assessment & Plan  Likely bone marrow suppression in the setting of alcohol     Hemorrhagic shock (HCC)  Assessment & Plan  Due to UGIB from ulcers status post endoscopic therapy 2/19  Status post massive transfusion protocol 2/19  Status post IV norepinephrine infuson and\    On 2/24/2023 midodrine has been discontinued        Hepatitis C- (present on admission)  Assessment & Plan  HCV Ab positive  Outpatient follow-up    Hypomagnesemia- (present on admission)  Assessment & Plan  Replacement was given    Acute respiratory failure with hypoxia (HCC)  Assessment & Plan  Intubated on 2/19  Extubated 2/20  Improving currently on 3 liters nasal cannula oxygen    Cholelithiasis- (present on admission)  Assessment & Plan  No evidence of cholecystitis on imaging    Transaminitis- (present on admission)  Assessment & Plan  Likely secondary to alcohol abuse and hepatitis C     Trend  Avoid hepatotoxic  agents    Lactic acidosis- (present on admission)  Assessment & Plan  Resolved with IV fluids    High anion gap metabolic acidosis- (present on admission)  Assessment & Plan  Likely secondary to alcohol ingestion, dehydration    Resuscitated with IV fluids  Monitor    Alcohol withdrawal (HCC)- (present on admission)  Assessment & Plan  S/p  IV Precedex drip in the IMCU   Ativan as needed  High-dose thiamine  Electrolytes were replaced  Folic acid and multivitamins  Seizure precautions         VTE prophylaxis: SCDs/TEDs    I have performed a physical exam and reviewed and updated ROS and Plan today (2/26/2023). In review of yesterday's note (2/25/2023), there are no changes except as documented above.

## 2023-02-27 VITALS
HEART RATE: 79 BPM | DIASTOLIC BLOOD PRESSURE: 58 MMHG | OXYGEN SATURATION: 90 % | TEMPERATURE: 97.3 F | SYSTOLIC BLOOD PRESSURE: 134 MMHG | HEIGHT: 68 IN | RESPIRATION RATE: 17 BRPM | BODY MASS INDEX: 31.44 KG/M2 | WEIGHT: 207.45 LBS

## 2023-02-27 PROBLEM — R09.02 HYPOXIA: Status: RESOLVED | Noted: 2023-02-23 | Resolved: 2023-02-27

## 2023-02-27 PROBLEM — E87.29 HIGH ANION GAP METABOLIC ACIDOSIS: Status: RESOLVED | Noted: 2023-02-18 | Resolved: 2023-02-27

## 2023-02-27 PROBLEM — R57.8 HEMORRHAGIC SHOCK (HCC): Status: RESOLVED | Noted: 2023-02-19 | Resolved: 2023-02-27

## 2023-02-27 PROBLEM — D69.6 THROMBOCYTOPENIA (HCC): Status: RESOLVED | Noted: 2023-02-19 | Resolved: 2023-02-27

## 2023-02-27 PROBLEM — J96.01 ACUTE RESPIRATORY FAILURE WITH HYPOXIA (HCC): Status: RESOLVED | Noted: 2023-02-19 | Resolved: 2023-02-27

## 2023-02-27 PROBLEM — E83.42 HYPOMAGNESEMIA: Status: RESOLVED | Noted: 2023-02-19 | Resolved: 2023-02-27

## 2023-02-27 PROBLEM — K92.2 UGIB (UPPER GASTROINTESTINAL BLEED): Status: RESOLVED | Noted: 2023-02-18 | Resolved: 2023-02-27

## 2023-02-27 PROBLEM — E87.20 LACTIC ACIDOSIS: Status: RESOLVED | Noted: 2023-02-18 | Resolved: 2023-02-27

## 2023-02-27 PROBLEM — K80.20 CHOLELITHIASIS: Status: RESOLVED | Noted: 2023-02-19 | Resolved: 2023-02-27

## 2023-02-27 PROBLEM — F10.939 ALCOHOL WITHDRAWAL (HCC): Status: RESOLVED | Noted: 2023-02-18 | Resolved: 2023-02-27

## 2023-02-27 PROBLEM — R74.01 TRANSAMINITIS: Status: RESOLVED | Noted: 2023-02-18 | Resolved: 2023-02-27

## 2023-02-27 PROCEDURE — A9270 NON-COVERED ITEM OR SERVICE: HCPCS | Performed by: HOSPITALIST

## 2023-02-27 PROCEDURE — 700102 HCHG RX REV CODE 250 W/ 637 OVERRIDE(OP): Performed by: HOSPITALIST

## 2023-02-27 PROCEDURE — 99239 HOSP IP/OBS DSCHRG MGMT >30: CPT | Performed by: HOSPITALIST

## 2023-02-27 RX ORDER — OMEPRAZOLE 20 MG/1
20 CAPSULE, DELAYED RELEASE ORAL 2 TIMES DAILY
Qty: 60 CAPSULE | Refills: 2 | Status: SHIPPED | OUTPATIENT
Start: 2023-02-27 | End: 2023-03-29

## 2023-02-27 RX ORDER — OXYCODONE HYDROCHLORIDE 5 MG/1
5 TABLET ORAL EVERY 6 HOURS PRN
Qty: 28 TABLET | Refills: 0 | Status: SHIPPED | OUTPATIENT
Start: 2023-02-27 | End: 2023-03-06

## 2023-02-27 RX ADMIN — OMEPRAZOLE 20 MG: 20 CAPSULE, DELAYED RELEASE ORAL at 04:28

## 2023-02-27 RX ADMIN — OXYCODONE 5 MG: 5 TABLET ORAL at 04:28

## 2023-02-27 ASSESSMENT — PAIN DESCRIPTION - PAIN TYPE
TYPE: ACUTE PAIN

## 2023-02-27 NOTE — CARE PLAN
The patient is Stable - Low risk of patient condition declining or worsening    Shift Goals  Clinical Goals: Pain control, ventilation on RA and safety ambulation  Patient Goals: Rest and sleep  Family Goals: MARTINE    Progress made toward(s) clinical / shift goals:  Due med given as ordered and observed ventilation and respiration at RA.   Problem: Knowledge Deficit - Standard  Goal: Patient and family/care givers will demonstrate understanding of plan of care, disease process/condition, diagnostic tests and medications  Description: Target End Date:  1-3 days or as soon as patient condition allows    Document in Patient Education    1.  Patient and family/caregiver oriented to unit, equipment, visitation policy and means for communicating concern  2.  Complete/review Learning Assessment  3.  Assess knowledge level of disease process/condition, treatment plan, diagnostic tests and medications  4.  Explain disease process/condition, treatment plan, diagnostic tests and medications  Outcome: Progressing     Problem: Optimal Care for Alcohol Withdrawal  Goal: Optimal Care for the alcohol withdrawal patient  Description: Target End Date:  1 to 3 days    1.  Alcohol history screening done on admission  2.  CIWA-AR score assessment (includes assessment of nausea/vomiting, tremor, sweats, anxiety, agitation, tactile, visual and auditory disturbances, headache and orientation/sensorium).  Document on CIWA flowsheet.  3.  Follow CIWA-AR score protocol  4.  Frequent reorientation  5.  Monitor for fluid and electrolyte imbalance.  6.  Assess for respiratory depression due to sedation (pulse ox)  7.  Consider thiamine, multivitamins, folic acid and magnesium sulfate per provider order  8.  Collaborate with Social Workers/Case Management  9.  Collaborate with mental health  Outcome: Progressing     Problem: Seizure Precautions  Goal: Implementation of seizure precautions  Description: Target End Date:  Prior to discharge or change  in level of care    1.  Padded side rails up at all times  2.  Suction equipment and oxygen delivery system at bedside  3.  Continuous pulse oximeter in use  4.  Implement fall precautions, bed alarm on, bed in lowest position  5.  IV access (per order)  6.  Provide low stimulus environment, avoid exposure to triggers  7.  Instruct patient to use call light/seizure button if having warning signs of impending seizure  Outcome: Progressing     Problem: Psychosocial  Goal: Patient's level of anxiety will decrease  Description: Target End Date:  1-3 days or as soon as patient condition allows    1.  Collaborate with patient and family/caregiver to identify triggers and develop strategies to cope with anxiety  2.  Implement stimuli reduction, calming techniques  3.  Pharmacologic management per provider order  4.  Encourage patient/family/care giver participation  5.  Collaborate with interdisciplinary team including Psychologist or Behavioral Health Team as needed  Outcome: Progressing  Goal: Spiritual and cultural needs incorporated into hospitalization  Description: Target End Date:  End of day 1    1.  Encourage verbalization of feelings, concerns, expectations and needs  2.  Collaborate with Case Management/  3.  Collaborate with Pastoral Care to meet spiritual needs  Outcome: Progressing     Problem: Pain - Standard  Goal: Alleviation of pain or a reduction in pain to the patient’s comfort goal  Description: Target End Date:  Prior to discharge or change in level of care    Document on Vitals flowsheet    1.  Document pain using the appropriate pain scale per order or unit policy  2.  Educate and implement non-pharmacologic comfort measures (i.e. relaxation, distraction, massage, cold/heat therapy, etc.)  3.  Pain management medications as ordered  4.  Reassess pain after pain med administration per policy  5.  If opiods administered assess patient's response to pain medication is appropriate per  POSS sedation scale  6.  Follow pain management plan developed in collaboration with patient and interdisciplinary team (including palliative care or pain specialists if applicable)  Outcome: Progressing     Problem: Fall Risk  Goal: Patient will remain free from falls  Description: Target End Date:  Prior to discharge or change in level of care    Document interventions on the Shreya Thapa Fall Risk Assessment    1.  Assess for fall risk factors  2.  Implement fall precautions  Outcome: Progressing       Patient is not progressing towards the following goals:

## 2023-02-27 NOTE — PROGRESS NOTES
Received report and assumed care of patient at change of shift. Patient is A&Ox4, on RA, and reports no pain at this time. Patient reports some discomfort of her splint, P.A. notified, will come by to resplint later this morning. Patient assessment completed, bed in lowest position, and call light and personal belongings are within reach. Patient expressed no further needs at this time.

## 2023-02-27 NOTE — PROGRESS NOTES
"Received alert and oriented x 4. Check vitals sign and recorded accordingly and due med given per MAR. Monitor sign and symptoms of respiratory distress and treatment given accordingly per MAR.Medicated per MAR and reassessed every 2 hours per protocol. Call light within reach. Bed alarm in placed. Needs attended. Will continue to monitor./69   Pulse 88   Temp 36.2 °C (97.2 °F) (Temporal)   Resp 16   Ht 1.727 m (5' 8\")   Wt 94.1 kg (207 lb 7.3 oz)   LMP  (LMP Unknown)   SpO2 96%   Breastfeeding No   BMI 31.54 kg/m² .   "

## 2023-02-27 NOTE — DISCHARGE INSTRUCTIONS
Esmer-Aparicio Syndrome    Esmer-Aparicio syndrome refers to bleeding from tears in the lining of the tube that carries food from the mouth to the stomach (esophagus). The tears occur at the entrance to your stomach. Usually the bleeding stops by itself after 24-48 hours. In some cases, surgery may be needed.  What are the causes?  This condition may be caused by severe or long-lasting vomiting or coughing.  What increases the risk?  You are more likely to develop this condition if:  You abuse or drink too much alcohol.  You have certain eating disorders, such as eating a lot of food at once and then vomiting to prevent weight gain (bulimia).  What are the signs or symptoms?  Symptoms of this condition include:  Vomiting bright red or black, coffee-ground-like material.  Abdominal pain.  Having black, tarry stools.  Fainting or experiencing loss of consciousness.  How is this diagnosed?  This condition is diagnosed with an esophagogastroduodenoscopy (EGD). During an EGD procedure, a flexible tube (endoscope) is put into your mouth, passed through your esophagus into your stomach, and then into your small bowel. An EGD will show your health care provider where the tear is.  How is this treated?  Treatment for this condition depends on the severity of the tear or bleeding. It is necessary to stop any bleeding as soon as possible. Treatment includes:  Medicines to treat nausea.  Medicines to help the lining of your esophagus heal.  Having endoscopy to treat an area of bleeding with high heat (coagulation), injections, or surgical clips.  Receiving donated blood (transfusion) to replace lost blood. This is done in cases of severe bleeding.  Having a procedure that involves first doing an angiogram and then blocking blood flow to the bleeding site (embolization).  Having other surgical procedures if initial treatments do not control bleeding.  In some cases, it may be necessary to treat any conditions that may have caused  the tear in the esophagus. This includes:   Treating long-lasting or recurrent vomiting or coughing.  Getting help for alcoholism or an eating disorder.  Follow these instructions at home:  Take over-the-counter and prescription medicines only as told by your health care provider.  You may be told to stop taking aspirin or ibuprofen as these, and other medicines like them, can cause bleeding.  Return to your normal activities as told by your health care provider. Ask your health care provider what activities are safe for you.  Do not use any products that contain nicotine or tobacco, such as cigarettes and e-cigarettes. If you need help quitting, ask your health care provider.  Follow instructions from your health care provider about eating or drinking restrictions.  Do not drink alcohol.  Keep all follow-up visits as told by your health care provider. This is important.  Contact a health care provider if you:  Have nausea or vomiting.  Have abdominal pain.  Have unexplained weight loss.  Need help to stop smoking or drinking alcohol.  Get help right away if you:  Have dizziness that does not go away, or you are light-headed or faint.  Start vomiting again, or you have vomit that is bright red or looks like black coffee grounds.  Have bloody, black, or tarry stools.  Have chest pain.  Cannot eat or drink.  Summary  Esmer-Aparicio syndrome refers to bleeding from tears in the lining of the tube that carries food from the mouth to the stomach (esophagus).  Risk factors include severe or long-lasting coughing or vomiting, and heavy alcohol use.  Take over-the-counter and prescription medicines only as told by your health care provider. You may have to avoid certain medicines that cause bleeding.  This information is not intended to replace advice given to you by your health care provider. Make sure you discuss any questions you have with your health care provider.  Document Released: 05/07/2007 Document Revised:  12/31/2018 Document Reviewed: 12/31/2018  [employer] Patient Education © 2020 [employer]  Upper Gastrointestinal Bleeding    Upper gastrointestinal (GI) bleeding is bleeding from the swallowing tube (esophagus), stomach, or the first part of the small intestine (duodenum). If you have upper GI bleeding, you may vomit blood or have bloody or black stools.  Bleeding can range from mild to serious or even life-threatening. If there is a lot of bleeding, you may need to stay in the hospital.  What are the causes?  This condition may be caused by:  Ulcer disease of the stomach (peptic ulcer) or duodenum. This is the most common cause of GI bleeding.  Inflammation, irritation, or swelling of the esophagus (esophagitis).  A tear in the esophagus.  Cancer of the esophagus, stomach, or duodenum.  An abnormal or weakened blood vessel in one of the upper GI structures.  A bleeding disorder that impairs the formation of blood clots and causes easy bleeding (coagulopathy).  What increases the risk?  The following factors may make you more likely to develop this condition:  Being older than 60 years of age.  Being male.  Having another long-term disease, especially liver or kidney disease.  Having a stomach infection caused by Helicobacter pylori bacteria.  Having frequent or severe vomiting.  Abusing alcohol.  Taking certain medicines for a long time, such as:  NSAIDs.  Anticoagulants.  What are the signs or symptoms?  Symptoms of this condition include:  Vomiting blood.  Black or maroon-colored stools.  Bloody stools.  Weakness or dizziness.  Heartburn.  Abdominal pain.  Difficulty swallowing.  Weight loss.  Yellow eyes or skin (jaundice).  Racing heartbeat.  How is this diagnosed?  This condition may be diagnosed based on:  Your symptoms and medical history.  A physical exam. During the exam, your health care provider will check for signs of blood loss, such as low blood pressure and a rapid pulse.  Tests, such as:  Blood  tests to measure your blood cell count and to check for other signs of blood loss and clotting ability.  Blood tests to check your liver and kidney function.  A chest X-ray to look for a tear in the esophagus.  Endoscopy. In this procedure, a flexible scope is put down your esophagus and into your stomach or duodenum to look for the source of bleeding.  Angiogram. This may be done if the source of bleeding is not found during endoscopy. For an angiogram, X-rays are taken after a dye is injected into your bloodstream.  Nasogastric tube insertion. This is a tube passed through your nose and down into your stomach. It may be connected to a source of gentle suction to see if any blood comes out.  How is this treated?  Treatment for this condition depends on the cause of the bleeding. Active bleeding is treated at the hospital. Treatment may include:  Getting fluids through an IV tube inserted into one of your veins.  Getting blood through an IV tube (blood transfusion).  Getting high doses of medicine through the IV to lower stomach acid. This may be done to treat ulcer disease.  Having endoscopy to treat an area of bleeding with high heat (coagulation), injections, or surgical clips.  Having a procedure that involves first doing an angiogram and then blocking blood flow to the bleeding site (embolization).  Stopping or changing some of your regular medicines for a certain amount of time.  Having other surgical procedures if initial treatments do not control bleeding.  Follow these instructions at home:  Take over-the-counter and prescription medicines only as told by your health care provider. You may need to avoid NSAIDs or other medicines that increase bleeding.  Do not drink alcohol.  Drink enough fluid to keep your urine clear or pale yellow.  Follow instructions from your health care provider about eating or drinking restrictions.  Return to your normal activities as told by your health care provider. Ask your  health care provider what activities are safe for you.  Do not use any tobacco products, such as cigarettes, chewing tobacco, and e-cigarettes. If you need help quitting, ask your health care provider.  Keep all follow-up visits as told by your health care provider. This is important.  Contact a health care provider if:  You have abdominal pain or heartburn.  You have unexplained weight loss.  You have trouble swallowing.  You have frequent vomiting.  You develop jaundice.  You feel weak or dizzy.  You need help to stop smoking or drinking alcohol.  Get help right away if:  You have vomiting with blood.  You have blood in your stools.  You have severe cramps in your back or abdomen.  Your symptoms of upper GI bleeding come back after treatment.  This information is not intended to replace advice given to you by your health care provider. Make sure you discuss any questions you have with your health care provider.  Document Released: 05/04/2017 Document Revised: 11/30/2018 Document Reviewed: 05/04/2017  [employer] Patient Education © 2020 [employer]

## 2023-02-27 NOTE — PROGRESS NOTES
D/c instructions given, educated on worsening s/s. Pt understands and and questions answered. D/c home with boyfriend

## 2023-02-28 NOTE — DISCHARGE SUMMARY
Discharge Summary    CHIEF COMPLAINT ON ADMISSION  Chief Complaint   Patient presents with    Upper GI Bleed     BIB [facility] from home. Pt c/o vomiting blood for the last 3-4 hours. +etoh            Reason for Admission  UGIB (upper gastrointestinal bleed)     Admission Date  2/18/2023    CODE STATUS  Prior    HPI & HOSPITAL COURSE    As per  h+p  Chiara Pacheco is a 57 y.o. female past medical history of nicotine dependence, alcohol abuse, hard of hearing who presented 2/18/2023 with coffee-ground emesis noted at home with abdominal pain.  She denies any dark tarry stool or bloody stools.  Patient reports drinking approximately a liter of alcohol every day.  History is difficult to obtain as patient is hard of hearing and her hearing aids are not functioning properly.  In the ER, her hemoglobin resulted at 13.6, lactic acid 12.6, anion gap metabolic acidosis, elevated LFTs and alcohol level of 71.  Given her elevated lactic acid and labile blood pressures critical care was consulted and recommended IMCU placement at this time under care of hospitalist.  She will be admitted for upper GI bleed and alcohol withdrawal.    ====================================    Patient was in the IMCU and on a Precedex drip for alcohol withdrawal.  She was given IV fluid hydration and her electrolyte replaced.  The patient decompensated on 2/19/2023 requiring intubation and initiation of pressors( levophed) EGD performed at that time showed evidence of Esmer-Aparicio tear nonbleeding gastric varices and gastric ulcers.  Patient was treated with IV Protonix.  Patient was successfully extubated on 220     patient was able to come out of the ICU and was seen by GI for possible GI bleed.  Patient underwent an EGD did not show any evidence of acute bleeding and they recommended patient to be on a PPI.        Once the patient was able to communicate more effectively , the patient was complaining of right ankle pain and was noted  to have a right fibular fracture.  Orthopedic was consulted and  splinted the leg with a placed her in a compressive bandage.  PT OT recommended postacute therapy however patient refused and wanted to go home.    Hospital course complicated by pulm edema related to all the fluid used to resuscitate the patient.  Patient was given oxygen and diuresed with IV Lasix.  We were able to wean the patient down to room air     she was stable for discharge home with crutches and to follow-up with orthopedics and GI in outpatient setting    Patient advised to stay away from alcohol    Therefore, she is discharged in good and stable condition to home with close outpatient follow-up.    The patient met 2-midnight criteria for an inpatient stay at the time of discharge.    Discharge Date  2/27/2023    FOLLOW UP ITEMS POST DISCHARGE      DISCHARGE DIAGNOSES  Principal Problem (Resolved):    UGIB (upper gastrointestinal bleed) POA: Yes  Active Problems:    Hepatitis C POA: Yes    Displaced fracture of right fibula POA: Yes  Resolved Problems:    Hypoxia POA: Clinically Undetermined    Alcohol withdrawal (HCC) POA: Yes    High anion gap metabolic acidosis POA: Yes    Lactic acidosis POA: Yes    Transaminitis POA: Yes    Cholelithiasis POA: Yes    Acute respiratory failure with hypoxia (HCC) POA: No    Hypomagnesemia POA: Yes    Hemorrhagic shock (HCC) POA: No    Thrombocytopenia (HCC) POA: Yes      FOLLOW UP  No future appointments.  Kash Campbell M.D.  [address]  [address]  [facility] [address]  [phone]    Schedule an appointment as soon as possible for a visit in 1 week(s)  She can ultimately follow up in my clinic in a week or two for repeat   x-rays and conversion to a short leg cast.  I anticipate the need for casting   for about 4-6 weeks depending on the rate of healing.  If at some point, she   develops fracture displacement or is not tolerating mobilization and   nonoperative management, we could consider  surgical intervention, but if she maintains good alignment of her fractures with nonoperative management, I feel that this would be in her best interest. She expressed comfort with this plan.      MEDICATIONS ON DISCHARGE     Medication List        START taking these medications        Instructions   omeprazole 20 MG delayed-release capsule  Commonly known as: PRILOSEC   Take 1 Capsule by mouth 2 times a day for 30 days.  Dose: 20 mg     oxyCODONE immediate-release 5 MG Tabs  Commonly known as: ROXICODONE   Take 1 Tablet by mouth every 6 hours as needed for Severe Pain for up to 7 days.  Dose: 5 mg              Allergies  No Known Allergies    DIET  No orders of the defined types were placed in this encounter.      ACTIVITY  As tolerated.  Weight bearing as tolerated    CONSULTATIONS  Dr sutton gi    Dr rebolledo ortho    PROCEDURES  Addendum          Show:Clear all  [x]Written[x]Templated[]Copied    Added by:  [x]Shanice Sutton M.D.    []bruno for details                                                              OPERATIVE REPORT     PATIENT:   Chiara Pacheco   1965         PREOPERATIVE DIAGNOSES/INDICATIONS:hematemesis     POSTOPERATIVE DIAGNOSIS: Gastric varix, portal hypertension gastropathy, gastric ulcer and esophagitis, not bleeding, Esmer enrique tear, clip still in place and no bleeding     PROCEDURE:  ESOPHAGOGASTRODUODENOSCOPY     PHYSICIAN:  Shanice Sutton MD     ANESTHESIA:  Per anesthesiologist.     LOCATION: [facility]     CONSENT:  OBTAINED. The risks, benefits and alternatives of the procedure were discussed in details. The risks include and are not limited to bleeding, infection, perforation, missed lesions, and sedations risks (cardiopulmonary compromise and allergic reaction to medications).     DESCRIPTION: The patient presented to the procedure room.  After routine checkup was performed, patient was brought into the endoscopy suite.  Patient was placed on his left lateral decubitus  position. A bite block was placed in patient's mouth. Patient was sedated by anesthesia.  Vital signs were monitored throughout the procedure.  Oxygenation support was provided throughout the procedure. Upper endoscope was inserted into patient's mouth and advanced to the second portion of the duodenum under direct visualization.       Once the site was reached and examined, the upper endoscope was withdrawn.  Retroflexion was made within the stomach.  The stomach was decompressed, scope was withdrawn and the procedure was terminated.     The patient tolerated the procedure well.  There were no immediate complications.     OPERATIVE FINDINGS:     1. Esophagus: Esophagitis with ulceration, no bleeding, improving, No varices  2. Stomach: Gastric varix, portal hypertensive gastropathy. No oozing. Clip seen still present at Esmer Aparicio tear(seen on retroflexion). Gastric ulcer, no bleeding, healing  3. Duodenum: normal     IMPRESSION/RECOMMENDATIONS:  Erosive esophagitis - continue PPI  Esmer Aparicio tear with clip still present and healing  Portal hypertensive gastropathy  Gastric varix - usually don't see gastric varices without esophageal. Would recommend US with doppler to rule out splenic vein thrombosis. Beta blockade   Gastric ulcers - no bleeding, healing  Would check ammonia as well as we dont have anyway to assess her status at this time     This note has been transcribed with digital voice recognition software and although it has been reviewed may contain grammatical or word errors                 Revision History                                      LABORATORY  Lab Results   Component Value Date    SODIUM 136 02/25/2023    POTASSIUM 3.4 (L) 02/25/2023    CHLORIDE 101 02/25/2023    CO2 27 02/25/2023    GLUCOSE 116 (H) 02/25/2023    BUN 8 02/25/2023    CREATININE 0.40 (L) 02/25/2023        Lab Results   Component Value Date    WBC 5.4 02/25/2023    HEMOGLOBIN 10.8 (L) 02/25/2023    HEMATOCRIT 32.3 (L)  02/25/2023    PLATELETCT 86 (L) 02/25/2023        Total time of the discharge process exceeds 39 minutes.

## 2023-03-07 NOTE — DOCUMENTATION QUERY
"                                                                         [facility]                                                                       Query Response Note      PATIENT:               FELIPE COOK  ACCT #:                  [id number]  MRN:                     [id number]  :                      1965  ADMIT DATE:       2023 7:59 PM  DISCH DATE:        2023 11:07 AM  RESPONDING  PROVIDER #:        [id number]           QUERY TEXT:    In your clinical judgment, can you please clarify the sepsis diagnosis from the ED note.    Please clarify the status of sepsis after further study based on the above clinical indicators.    Note: If you agree with a diagnosis listed above, please remember to include it in your concurrent daily documentation and onto the Discharge Summary.        The patient's Clinical Indicators include:  57 year old female admitted with an UGIB.     [place] \"continuing antibiotics\"     ED note Sergio \"Sepsis\"  \"  She was treated  with Rocephin more for the sepsis rather than SBP prophylaxis that she has of ascites.\"  \" She was given IV fluids resuscitation for both sepsis and the GI bleed  and vomiting. \"  105/59, P121, R 20, 96.4  WBC 11.6    Risk factors: alcoholic hepatitis,  ABLA, lactic acidosis, withdrawal  Treatment: labs, ABX, IVF, US, CXR, Levophed    If you have any questions please contact:  Ellen Antoine RN CDI [facility]  [email or web address]  Ellen Antoine [address]  Options provided:   -- Sepsis ruled out after further study   -- Sepsis is a valid diagnosis this admission   -- Other explanation, please specify   -- Unable to determine      Query created by: Ellen Antoine on 3/3/2023 1:36 PM    RESPONSE TEXT:    Sepsis ruled out after further study          Electronically signed by:  CORNELIA SOSA MD 3/7/2023 10:16 AM              "

## 2023-09-23 ENCOUNTER — HOSPITAL ENCOUNTER (INPATIENT)
Facility: MEDICAL CENTER | Age: 58
LOS: 2 days | DRG: 369 | End: 2023-09-26
Attending: EMERGENCY MEDICINE | Admitting: HOSPITALIST
Payer: COMMERCIAL

## 2023-09-23 ENCOUNTER — ANESTHESIA EVENT (OUTPATIENT)
Dept: SURGERY | Facility: MEDICAL CENTER | Age: 58
DRG: 369 | End: 2023-09-23
Payer: COMMERCIAL

## 2023-09-23 ENCOUNTER — ANESTHESIA (OUTPATIENT)
Dept: SURGERY | Facility: MEDICAL CENTER | Age: 58
DRG: 369 | End: 2023-09-23
Payer: COMMERCIAL

## 2023-09-23 DIAGNOSIS — F10.20 ALCOHOLISM (HCC): ICD-10-CM

## 2023-09-23 DIAGNOSIS — K92.2 UPPER GI BLEED: ICD-10-CM

## 2023-09-23 PROBLEM — I86.4 BLEEDING GASTRIC VARICES: Status: ACTIVE | Noted: 2023-09-23

## 2023-09-23 PROBLEM — K92.0 HEMATEMESIS: Status: ACTIVE | Noted: 2023-09-23

## 2023-09-23 PROBLEM — R74.8 ELEVATED LIVER ENZYMES: Status: ACTIVE | Noted: 2023-09-23

## 2023-09-23 PROBLEM — F10.10 ALCOHOL ABUSE: Status: ACTIVE | Noted: 2023-09-23

## 2023-09-23 LAB
ABO GROUP BLD: NORMAL
ALBUMIN SERPL BCP-MCNC: 3.7 G/DL (ref 3.2–4.9)
ALBUMIN/GLOB SERPL: 1.1 G/DL
ALP SERPL-CCNC: 160 U/L (ref 30–99)
ALT SERPL-CCNC: 73 U/L (ref 2–50)
ANION GAP SERPL CALC-SCNC: 15 MMOL/L (ref 7–16)
AST SERPL-CCNC: 165 U/L (ref 12–45)
BASOPHILS # BLD AUTO: 1.4 % (ref 0–1.8)
BASOPHILS # BLD: 0.08 K/UL (ref 0–0.12)
BILIRUB SERPL-MCNC: 2.8 MG/DL (ref 0.1–1.5)
BLD GP AB SCN SERPL QL: NORMAL
BUN SERPL-MCNC: 15 MG/DL (ref 8–22)
CALCIUM ALBUM COR SERPL-MCNC: 9.1 MG/DL (ref 8.5–10.5)
CALCIUM SERPL-MCNC: 8.9 MG/DL (ref 8.5–10.5)
CHLORIDE SERPL-SCNC: 106 MMOL/L (ref 96–112)
CO2 SERPL-SCNC: 24 MMOL/L (ref 20–33)
CREAT SERPL-MCNC: 0.41 MG/DL (ref 0.5–1.4)
EOSINOPHIL # BLD AUTO: 0.21 K/UL (ref 0–0.51)
EOSINOPHIL NFR BLD: 3.8 % (ref 0–6.9)
ERYTHROCYTE [DISTWIDTH] IN BLOOD BY AUTOMATED COUNT: 56.3 FL (ref 35.9–50)
ETHANOL BLD-MCNC: 194.5 MG/DL
GFR SERPLBLD CREATININE-BSD FMLA CKD-EPI: 115 ML/MIN/1.73 M 2
GLOBULIN SER CALC-MCNC: 3.5 G/DL (ref 1.9–3.5)
GLUCOSE SERPL-MCNC: 132 MG/DL (ref 65–99)
HCT VFR BLD AUTO: 44.5 % (ref 37–47)
HGB BLD-MCNC: 12.8 G/DL (ref 12–16)
HGB BLD-MCNC: 13.3 G/DL (ref 12–16)
HGB BLD-MCNC: 15.1 G/DL (ref 12–16)
IMM GRANULOCYTES # BLD AUTO: 0.01 K/UL (ref 0–0.11)
IMM GRANULOCYTES NFR BLD AUTO: 0.2 % (ref 0–0.9)
INR PPP: 1.37 (ref 0.87–1.13)
LACTATE SERPL-SCNC: 2 MMOL/L (ref 0.5–2)
LYMPHOCYTES # BLD AUTO: 1.88 K/UL (ref 1–4.8)
LYMPHOCYTES NFR BLD: 33.6 % (ref 22–41)
MCH RBC QN AUTO: 34.5 PG (ref 27–33)
MCHC RBC AUTO-ENTMCNC: 33.9 G/DL (ref 32.2–35.5)
MCV RBC AUTO: 101.6 FL (ref 81.4–97.8)
MONOCYTES # BLD AUTO: 0.53 K/UL (ref 0–0.85)
MONOCYTES NFR BLD AUTO: 9.5 % (ref 0–13.4)
NEUTROPHILS # BLD AUTO: 2.88 K/UL (ref 1.82–7.42)
NEUTROPHILS NFR BLD: 51.5 % (ref 44–72)
NRBC # BLD AUTO: 0 K/UL
NRBC BLD-RTO: 0 /100 WBC (ref 0–0.2)
PLATELET # BLD AUTO: 106 K/UL (ref 164–446)
PMV BLD AUTO: 10.8 FL (ref 9–12.9)
POTASSIUM SERPL-SCNC: 4.4 MMOL/L (ref 3.6–5.5)
PROT SERPL-MCNC: 7.2 G/DL (ref 6–8.2)
PROTHROMBIN TIME: 17 SEC (ref 12–14.6)
RBC # BLD AUTO: 4.38 M/UL (ref 4.2–5.4)
RH BLD: NORMAL
SODIUM SERPL-SCNC: 145 MMOL/L (ref 135–145)
WBC # BLD AUTO: 5.6 K/UL (ref 4.8–10.8)

## 2023-09-23 PROCEDURE — 160048 HCHG OR STATISTICAL LEVEL 1-5: Performed by: INTERNAL MEDICINE

## 2023-09-23 PROCEDURE — 06L38CZ OCCLUSION OF ESOPHAGEAL VEIN WITH EXTRALUMINAL DEVICE, VIA NATURAL OR ARTIFICIAL OPENING ENDOSCOPIC: ICD-10-PCS | Performed by: INTERNAL MEDICINE

## 2023-09-23 PROCEDURE — 700111 HCHG RX REV CODE 636 W/ 250 OVERRIDE (IP): Mod: UD | Performed by: HOSPITALIST

## 2023-09-23 PROCEDURE — 160036 HCHG PACU - EA ADDL 30 MINS PHASE I: Performed by: INTERNAL MEDICINE

## 2023-09-23 PROCEDURE — 86901 BLOOD TYPING SEROLOGIC RH(D): CPT

## 2023-09-23 PROCEDURE — 700101 HCHG RX REV CODE 250: Performed by: EMERGENCY MEDICINE

## 2023-09-23 PROCEDURE — 86850 RBC ANTIBODY SCREEN: CPT

## 2023-09-23 PROCEDURE — 700111 HCHG RX REV CODE 636 W/ 250 OVERRIDE (IP): Performed by: STUDENT IN AN ORGANIZED HEALTH CARE EDUCATION/TRAINING PROGRAM

## 2023-09-23 PROCEDURE — G0378 HOSPITAL OBSERVATION PER HR: HCPCS

## 2023-09-23 PROCEDURE — 99222 1ST HOSP IP/OBS MODERATE 55: CPT | Performed by: HOSPITALIST

## 2023-09-23 PROCEDURE — 36415 COLL VENOUS BLD VENIPUNCTURE: CPT

## 2023-09-23 PROCEDURE — 700105 HCHG RX REV CODE 258: Mod: UD | Performed by: HOSPITALIST

## 2023-09-23 PROCEDURE — 160203 HCHG ENDO MINUTES - 1ST 30 MINS LEVEL 4: Performed by: INTERNAL MEDICINE

## 2023-09-23 PROCEDURE — 85027 COMPLETE CBC AUTOMATED: CPT | Mod: XU

## 2023-09-23 PROCEDURE — 700101 HCHG RX REV CODE 250: Mod: UD | Performed by: STUDENT IN AN ORGANIZED HEALTH CARE EDUCATION/TRAINING PROGRAM

## 2023-09-23 PROCEDURE — 99244 OFF/OP CNSLTJ NEW/EST MOD 40: CPT | Performed by: NURSE PRACTITIONER

## 2023-09-23 PROCEDURE — 86900 BLOOD TYPING SEROLOGIC ABO: CPT

## 2023-09-23 PROCEDURE — 85018 HEMOGLOBIN: CPT | Mod: 91,XU

## 2023-09-23 PROCEDURE — 99291 CRITICAL CARE FIRST HOUR: CPT

## 2023-09-23 PROCEDURE — 85055 RETICULATED PLATELET ASSAY: CPT

## 2023-09-23 PROCEDURE — 96368 THER/DIAG CONCURRENT INF: CPT | Mod: XU

## 2023-09-23 PROCEDURE — 80053 COMPREHEN METABOLIC PANEL: CPT

## 2023-09-23 PROCEDURE — HZ2ZZZZ DETOXIFICATION SERVICES FOR SUBSTANCE ABUSE TREATMENT: ICD-10-PCS | Performed by: HOSPITALIST

## 2023-09-23 PROCEDURE — 43244 EGD VARICES LIGATION: CPT | Performed by: INTERNAL MEDICINE

## 2023-09-23 PROCEDURE — 700105 HCHG RX REV CODE 258: Mod: UD | Performed by: STUDENT IN AN ORGANIZED HEALTH CARE EDUCATION/TRAINING PROGRAM

## 2023-09-23 PROCEDURE — 160208 HCHG ENDO MINUTES - EA ADDL 1 MIN LEVEL 4: Performed by: INTERNAL MEDICINE

## 2023-09-23 PROCEDURE — 83605 ASSAY OF LACTIC ACID: CPT

## 2023-09-23 PROCEDURE — C9113 INJ PANTOPRAZOLE SODIUM, VIA: HCPCS | Mod: UD | Performed by: HOSPITALIST

## 2023-09-23 PROCEDURE — 700105 HCHG RX REV CODE 258: Mod: UD | Performed by: EMERGENCY MEDICINE

## 2023-09-23 PROCEDURE — 700111 HCHG RX REV CODE 636 W/ 250 OVERRIDE (IP): Mod: UD | Performed by: EMERGENCY MEDICINE

## 2023-09-23 PROCEDURE — 96365 THER/PROPH/DIAG IV INF INIT: CPT | Mod: XU

## 2023-09-23 PROCEDURE — 85610 PROTHROMBIN TIME: CPT

## 2023-09-23 PROCEDURE — C9113 INJ PANTOPRAZOLE SODIUM, VIA: HCPCS | Mod: UD | Performed by: EMERGENCY MEDICINE

## 2023-09-23 PROCEDURE — 82077 ASSAY SPEC XCP UR&BREATH IA: CPT

## 2023-09-23 PROCEDURE — 96375 TX/PRO/DX INJ NEW DRUG ADDON: CPT | Mod: XU

## 2023-09-23 PROCEDURE — 160035 HCHG PACU - 1ST 60 MINS PHASE I: Performed by: INTERNAL MEDICINE

## 2023-09-23 PROCEDURE — 85025 COMPLETE CBC W/AUTO DIFF WBC: CPT

## 2023-09-23 PROCEDURE — 160009 HCHG ANES TIME/MIN: Performed by: INTERNAL MEDICINE

## 2023-09-23 PROCEDURE — 160002 HCHG RECOVERY MINUTES (STAT): Performed by: INTERNAL MEDICINE

## 2023-09-23 PROCEDURE — 96366 THER/PROPH/DIAG IV INF ADDON: CPT | Mod: XU

## 2023-09-23 RX ORDER — PANTOPRAZOLE SODIUM 40 MG/10ML
80 INJECTION, POWDER, LYOPHILIZED, FOR SOLUTION INTRAVENOUS ONCE
Status: COMPLETED | OUTPATIENT
Start: 2023-09-23 | End: 2023-09-23

## 2023-09-23 RX ORDER — OCTREOTIDE ACETATE 100 UG/ML
100 INJECTION, SOLUTION INTRAVENOUS; SUBCUTANEOUS ONCE
Status: COMPLETED | OUTPATIENT
Start: 2023-09-23 | End: 2023-09-23

## 2023-09-23 RX ORDER — LABETALOL HYDROCHLORIDE 5 MG/ML
10 INJECTION, SOLUTION INTRAVENOUS EVERY 4 HOURS PRN
Status: DISCONTINUED | OUTPATIENT
Start: 2023-09-23 | End: 2023-09-26 | Stop reason: HOSPADM

## 2023-09-23 RX ORDER — LORAZEPAM 2 MG/ML
2 INJECTION INTRAMUSCULAR
Status: DISCONTINUED | OUTPATIENT
Start: 2023-09-23 | End: 2023-09-26 | Stop reason: HOSPADM

## 2023-09-23 RX ORDER — LORAZEPAM 2 MG/ML
1.5 INJECTION INTRAMUSCULAR
Status: DISCONTINUED | OUTPATIENT
Start: 2023-09-23 | End: 2023-09-26 | Stop reason: HOSPADM

## 2023-09-23 RX ORDER — OCTREOTIDE ACETATE 100 UG/ML
50 INJECTION, SOLUTION INTRAVENOUS; SUBCUTANEOUS ONCE
Status: DISCONTINUED | OUTPATIENT
Start: 2023-09-23 | End: 2023-09-23

## 2023-09-23 RX ORDER — LORAZEPAM 2 MG/1
4 TABLET ORAL
Status: DISCONTINUED | OUTPATIENT
Start: 2023-09-23 | End: 2023-09-23

## 2023-09-23 RX ORDER — LORAZEPAM 0.5 MG/1
0.5 TABLET ORAL EVERY 4 HOURS PRN
Status: DISCONTINUED | OUTPATIENT
Start: 2023-09-23 | End: 2023-09-26 | Stop reason: HOSPADM

## 2023-09-23 RX ORDER — LORAZEPAM 2 MG/1
2 TABLET ORAL
Status: DISCONTINUED | OUTPATIENT
Start: 2023-09-23 | End: 2023-09-23

## 2023-09-23 RX ORDER — LORAZEPAM 2 MG/ML
1.5 INJECTION INTRAMUSCULAR
Status: DISCONTINUED | OUTPATIENT
Start: 2023-09-23 | End: 2023-09-23

## 2023-09-23 RX ORDER — ACETAMINOPHEN 325 MG/1
650 TABLET ORAL EVERY 6 HOURS PRN
Status: DISCONTINUED | OUTPATIENT
Start: 2023-09-23 | End: 2023-09-26 | Stop reason: HOSPADM

## 2023-09-23 RX ORDER — LORAZEPAM 2 MG/ML
2 INJECTION INTRAMUSCULAR
Status: DISCONTINUED | OUTPATIENT
Start: 2023-09-23 | End: 2023-09-23

## 2023-09-23 RX ORDER — LORAZEPAM 2 MG/1
2 TABLET ORAL
Status: DISCONTINUED | OUTPATIENT
Start: 2023-09-23 | End: 2023-09-26 | Stop reason: HOSPADM

## 2023-09-23 RX ORDER — FOLIC ACID 1 MG/1
1 TABLET ORAL DAILY
Status: DISCONTINUED | OUTPATIENT
Start: 2023-09-24 | End: 2023-09-26 | Stop reason: HOSPADM

## 2023-09-23 RX ORDER — LORAZEPAM 2 MG/ML
1 INJECTION INTRAMUSCULAR
Status: DISCONTINUED | OUTPATIENT
Start: 2023-09-23 | End: 2023-09-23

## 2023-09-23 RX ORDER — POLYETHYLENE GLYCOL 3350 17 G/17G
1 POWDER, FOR SOLUTION ORAL
Status: DISCONTINUED | OUTPATIENT
Start: 2023-09-23 | End: 2023-09-26 | Stop reason: HOSPADM

## 2023-09-23 RX ORDER — PHENYLEPHRINE HCL IN 0.9% NACL 0.5 MG/5ML
SYRINGE (ML) INTRAVENOUS PRN
Status: DISCONTINUED | OUTPATIENT
Start: 2023-09-23 | End: 2023-09-23 | Stop reason: SURG

## 2023-09-23 RX ORDER — OMEPRAZOLE 20 MG/1
20 CAPSULE, DELAYED RELEASE ORAL
Status: ON HOLD | COMMUNITY
End: 2023-09-26 | Stop reason: SDUPTHER

## 2023-09-23 RX ORDER — PANTOPRAZOLE SODIUM 40 MG/10ML
40 INJECTION, POWDER, LYOPHILIZED, FOR SOLUTION INTRAVENOUS 2 TIMES DAILY
Status: DISCONTINUED | OUTPATIENT
Start: 2023-09-23 | End: 2023-09-26 | Stop reason: HOSPADM

## 2023-09-23 RX ORDER — ONDANSETRON 4 MG/1
4 TABLET, ORALLY DISINTEGRATING ORAL EVERY 4 HOURS PRN
Status: DISCONTINUED | OUTPATIENT
Start: 2023-09-23 | End: 2023-09-26 | Stop reason: HOSPADM

## 2023-09-23 RX ORDER — AMOXICILLIN 250 MG
2 CAPSULE ORAL 2 TIMES DAILY
Status: DISCONTINUED | OUTPATIENT
Start: 2023-09-23 | End: 2023-09-26 | Stop reason: HOSPADM

## 2023-09-23 RX ORDER — LORAZEPAM 2 MG/ML
0.5 INJECTION INTRAMUSCULAR EVERY 4 HOURS PRN
Status: DISCONTINUED | OUTPATIENT
Start: 2023-09-23 | End: 2023-09-23

## 2023-09-23 RX ORDER — ONDANSETRON 2 MG/ML
4 INJECTION INTRAMUSCULAR; INTRAVENOUS ONCE
Status: COMPLETED | OUTPATIENT
Start: 2023-09-23 | End: 2023-09-23

## 2023-09-23 RX ORDER — BISACODYL 10 MG
10 SUPPOSITORY, RECTAL RECTAL
Status: DISCONTINUED | OUTPATIENT
Start: 2023-09-23 | End: 2023-09-26 | Stop reason: HOSPADM

## 2023-09-23 RX ORDER — GAUZE BANDAGE 2" X 2"
100 BANDAGE TOPICAL DAILY
Status: DISCONTINUED | OUTPATIENT
Start: 2023-09-24 | End: 2023-09-26 | Stop reason: HOSPADM

## 2023-09-23 RX ORDER — MIDAZOLAM HYDROCHLORIDE 1 MG/ML
INJECTION INTRAMUSCULAR; INTRAVENOUS PRN
Status: DISCONTINUED | OUTPATIENT
Start: 2023-09-23 | End: 2023-09-23 | Stop reason: SURG

## 2023-09-23 RX ORDER — LORAZEPAM 1 MG/1
1 TABLET ORAL EVERY 4 HOURS PRN
Status: DISCONTINUED | OUTPATIENT
Start: 2023-09-23 | End: 2023-09-26 | Stop reason: HOSPADM

## 2023-09-23 RX ORDER — PROMETHAZINE HYDROCHLORIDE 25 MG/1
12.5-25 TABLET ORAL EVERY 4 HOURS PRN
Status: DISCONTINUED | OUTPATIENT
Start: 2023-09-23 | End: 2023-09-26 | Stop reason: HOSPADM

## 2023-09-23 RX ORDER — IBUPROFEN 200 MG
800 TABLET ORAL EVERY 8 HOURS PRN
Status: ON HOLD | COMMUNITY
End: 2023-09-26

## 2023-09-23 RX ORDER — PROMETHAZINE HYDROCHLORIDE 25 MG/1
12.5-25 SUPPOSITORY RECTAL EVERY 4 HOURS PRN
Status: DISCONTINUED | OUTPATIENT
Start: 2023-09-23 | End: 2023-09-26 | Stop reason: HOSPADM

## 2023-09-23 RX ORDER — LORAZEPAM 1 MG/1
0.5 TABLET ORAL EVERY 4 HOURS PRN
Status: DISCONTINUED | OUTPATIENT
Start: 2023-09-23 | End: 2023-09-23

## 2023-09-23 RX ORDER — LIDOCAINE HYDROCHLORIDE 20 MG/ML
INJECTION, SOLUTION EPIDURAL; INFILTRATION; INTRACAUDAL; PERINEURAL PRN
Status: DISCONTINUED | OUTPATIENT
Start: 2023-09-23 | End: 2023-09-23 | Stop reason: SURG

## 2023-09-23 RX ORDER — LORAZEPAM 2 MG/1
4 TABLET ORAL
Status: DISCONTINUED | OUTPATIENT
Start: 2023-09-23 | End: 2023-09-26 | Stop reason: HOSPADM

## 2023-09-23 RX ORDER — ONDANSETRON 2 MG/ML
4 INJECTION INTRAMUSCULAR; INTRAVENOUS
Status: DISCONTINUED | OUTPATIENT
Start: 2023-09-23 | End: 2023-09-23 | Stop reason: HOSPADM

## 2023-09-23 RX ORDER — SODIUM CHLORIDE, SODIUM LACTATE, POTASSIUM CHLORIDE, CALCIUM CHLORIDE 600; 310; 30; 20 MG/100ML; MG/100ML; MG/100ML; MG/100ML
INJECTION, SOLUTION INTRAVENOUS CONTINUOUS
Status: DISCONTINUED | OUTPATIENT
Start: 2023-09-23 | End: 2023-09-26 | Stop reason: HOSPADM

## 2023-09-23 RX ORDER — LORAZEPAM 1 MG/1
1 TABLET ORAL EVERY 4 HOURS PRN
Status: DISCONTINUED | OUTPATIENT
Start: 2023-09-23 | End: 2023-09-23

## 2023-09-23 RX ORDER — LORAZEPAM 2 MG/ML
1 INJECTION INTRAMUSCULAR
Status: DISCONTINUED | OUTPATIENT
Start: 2023-09-23 | End: 2023-09-26 | Stop reason: HOSPADM

## 2023-09-23 RX ORDER — LORAZEPAM 2 MG/ML
0.5 INJECTION INTRAMUSCULAR EVERY 4 HOURS PRN
Status: DISCONTINUED | OUTPATIENT
Start: 2023-09-23 | End: 2023-09-26 | Stop reason: HOSPADM

## 2023-09-23 RX ORDER — DEXAMETHASONE SODIUM PHOSPHATE 4 MG/ML
INJECTION, SOLUTION INTRA-ARTICULAR; INTRALESIONAL; INTRAMUSCULAR; INTRAVENOUS; SOFT TISSUE PRN
Status: DISCONTINUED | OUTPATIENT
Start: 2023-09-23 | End: 2023-09-23 | Stop reason: SURG

## 2023-09-23 RX ORDER — ONDANSETRON 2 MG/ML
INJECTION INTRAMUSCULAR; INTRAVENOUS PRN
Status: DISCONTINUED | OUTPATIENT
Start: 2023-09-23 | End: 2023-09-23 | Stop reason: SURG

## 2023-09-23 RX ORDER — ONDANSETRON 2 MG/ML
4 INJECTION INTRAMUSCULAR; INTRAVENOUS EVERY 4 HOURS PRN
Status: DISCONTINUED | OUTPATIENT
Start: 2023-09-23 | End: 2023-09-26 | Stop reason: HOSPADM

## 2023-09-23 RX ORDER — SODIUM CHLORIDE, SODIUM LACTATE, POTASSIUM CHLORIDE, CALCIUM CHLORIDE 600; 310; 30; 20 MG/100ML; MG/100ML; MG/100ML; MG/100ML
INJECTION, SOLUTION INTRAVENOUS
Status: DISCONTINUED | OUTPATIENT
Start: 2023-09-23 | End: 2023-09-23 | Stop reason: SURG

## 2023-09-23 RX ORDER — PROCHLORPERAZINE EDISYLATE 5 MG/ML
5-10 INJECTION INTRAMUSCULAR; INTRAVENOUS EVERY 4 HOURS PRN
Status: DISCONTINUED | OUTPATIENT
Start: 2023-09-23 | End: 2023-09-26 | Stop reason: HOSPADM

## 2023-09-23 RX ORDER — SUCCINYLCHOLINE CHLORIDE 20 MG/ML
INJECTION INTRAMUSCULAR; INTRAVENOUS PRN
Status: DISCONTINUED | OUTPATIENT
Start: 2023-09-23 | End: 2023-09-23 | Stop reason: SURG

## 2023-09-23 RX ADMIN — PANTOPRAZOLE SODIUM 40 MG: 40 INJECTION, POWDER, FOR SOLUTION INTRAVENOUS at 17:35

## 2023-09-23 RX ADMIN — PANTOPRAZOLE SODIUM 80 MG: 40 INJECTION, POWDER, FOR SOLUTION INTRAVENOUS at 05:50

## 2023-09-23 RX ADMIN — OCTREOTIDE ACETATE 50 MCG/HR: 200 INJECTION, SOLUTION INTRAVENOUS; SUBCUTANEOUS at 07:45

## 2023-09-23 RX ADMIN — OCTREOTIDE ACETATE 50 MCG/HR: 200 INJECTION, SOLUTION INTRAVENOUS; SUBCUTANEOUS at 05:56

## 2023-09-23 RX ADMIN — Medication 200 MCG: at 09:03

## 2023-09-23 RX ADMIN — ONDANSETRON 4 MG: 2 INJECTION INTRAMUSCULAR; INTRAVENOUS at 08:48

## 2023-09-23 RX ADMIN — PROPOFOL 80 MG: 10 INJECTION, EMULSION INTRAVENOUS at 08:44

## 2023-09-23 RX ADMIN — THIAMINE HYDROCHLORIDE: 100 INJECTION, SOLUTION INTRAMUSCULAR; INTRAVENOUS at 05:45

## 2023-09-23 RX ADMIN — LIDOCAINE HYDROCHLORIDE 100 MG: 20 INJECTION, SOLUTION EPIDURAL; INFILTRATION; INTRACAUDAL at 08:44

## 2023-09-23 RX ADMIN — DEXAMETHASONE SODIUM PHOSPHATE 4 MG: 4 INJECTION INTRA-ARTICULAR; INTRALESIONAL; INTRAMUSCULAR; INTRAVENOUS; SOFT TISSUE at 08:48

## 2023-09-23 RX ADMIN — SUCCINYLCHOLINE CHLORIDE 100 MG: 20 INJECTION, SOLUTION INTRAMUSCULAR; INTRAVENOUS at 08:44

## 2023-09-23 RX ADMIN — SODIUM CHLORIDE, POTASSIUM CHLORIDE, SODIUM LACTATE AND CALCIUM CHLORIDE: 600; 310; 30; 20 INJECTION, SOLUTION INTRAVENOUS at 07:52

## 2023-09-23 RX ADMIN — SODIUM CHLORIDE, POTASSIUM CHLORIDE, SODIUM LACTATE AND CALCIUM CHLORIDE: 600; 310; 30; 20 INJECTION, SOLUTION INTRAVENOUS at 18:33

## 2023-09-23 RX ADMIN — MIDAZOLAM 1 MG: 1 INJECTION, SOLUTION INTRAMUSCULAR; INTRAVENOUS at 08:44

## 2023-09-23 RX ADMIN — SODIUM CHLORIDE, POTASSIUM CHLORIDE, SODIUM LACTATE AND CALCIUM CHLORIDE: 600; 310; 30; 20 INJECTION, SOLUTION INTRAVENOUS at 08:39

## 2023-09-23 RX ADMIN — FENTANYL CITRATE 25 MCG: 50 INJECTION, SOLUTION INTRAMUSCULAR; INTRAVENOUS at 09:06

## 2023-09-23 RX ADMIN — ONDANSETRON 4 MG: 2 INJECTION INTRAMUSCULAR; INTRAVENOUS at 05:46

## 2023-09-23 RX ADMIN — OCTREOTIDE ACETATE 100 MCG: 100 INJECTION, SOLUTION INTRAVENOUS; SUBCUTANEOUS at 05:47

## 2023-09-23 ASSESSMENT — LIFESTYLE VARIABLES
VISUAL DISTURBANCES: NOT PRESENT
HEADACHE, FULLNESS IN HEAD: NOT PRESENT
TOTAL SCORE: 3
EVER FELT BAD OR GUILTY ABOUT YOUR DRINKING: YES
AGITATION: NORMAL ACTIVITY
TREMOR: NO TREMOR
ORIENTATION AND CLOUDING OF SENSORIUM: ORIENTED AND CAN DO SERIAL ADDITIONS
PAROXYSMAL SWEATS: NO SWEAT VISIBLE
HOW MANY TIMES IN THE PAST YEAR HAVE YOU HAD 5 OR MORE DRINKS IN A DAY: 250
ANXIETY: MILDLY ANXIOUS
HEADACHE, FULLNESS IN HEAD: NOT PRESENT
ORIENTATION AND CLOUDING OF SENSORIUM: ORIENTED AND CAN DO SERIAL ADDITIONS
AUDITORY DISTURBANCES: NOT PRESENT
HEADACHE, FULLNESS IN HEAD: NOT PRESENT
ANXIETY: NO ANXIETY (AT EASE)
HEADACHE, FULLNESS IN HEAD: NOT PRESENT
SUBSTANCE_ABUSE: 1
AGITATION: NORMAL ACTIVITY
ON A TYPICAL DAY WHEN YOU DRINK ALCOHOL HOW MANY DRINKS DO YOU HAVE: 15
AUDITORY DISTURBANCES: NOT PRESENT
TOTAL SCORE: 3
TREMOR: NO TREMOR
ORIENTATION AND CLOUDING OF SENSORIUM: ORIENTED AND CAN DO SERIAL ADDITIONS
AUDITORY DISTURBANCES: NOT PRESENT
AUDITORY DISTURBANCES: NOT PRESENT
ANXIETY: NO ANXIETY (AT EASE)
ANXIETY: *
TOTAL SCORE: 3
DOES PATIENT WANT TO TALK TO SOMEONE ABOUT QUITTING: YES
TOTAL SCORE: 0
EVER HAD A DRINK FIRST THING IN THE MORNING TO STEADY YOUR NERVES TO GET RID OF A HANGOVER: YES
NAUSEA AND VOMITING: *
TREMOR: NO TREMOR
ANXIETY: MILDLY ANXIOUS
AGITATION: NORMAL ACTIVITY
NAUSEA AND VOMITING: NO NAUSEA AND NO VOMITING
HAVE PEOPLE ANNOYED YOU BY CRITICIZING YOUR DRINKING: NO
HAVE YOU EVER FELT YOU SHOULD CUT DOWN ON YOUR DRINKING: YES
AUDITORY DISTURBANCES: NOT PRESENT
ORIENTATION AND CLOUDING OF SENSORIUM: ORIENTED AND CAN DO SERIAL ADDITIONS
NAUSEA AND VOMITING: NO NAUSEA AND NO VOMITING
HEADACHE, FULLNESS IN HEAD: NOT PRESENT
ORIENTATION AND CLOUDING OF SENSORIUM: ORIENTED AND CAN DO SERIAL ADDITIONS
ANXIETY: MODERATELY ANXIOUS OR GUARDED, SO ANXIETY IS INFERRED
ORIENTATION AND CLOUDING OF SENSORIUM: ORIENTED AND CAN DO SERIAL ADDITIONS
AGITATION: NORMAL ACTIVITY
PAROXYSMAL SWEATS: NO SWEAT VISIBLE
TOTAL SCORE: 2
VISUAL DISTURBANCES: NOT PRESENT
CONSUMPTION TOTAL: POSITIVE
TOTAL SCORE: 0
DO YOU DRINK ALCOHOL: YES
TREMOR: NO TREMOR
PAROXYSMAL SWEATS: NO SWEAT VISIBLE
AUDITORY DISTURBANCES: NOT PRESENT
VISUAL DISTURBANCES: NOT PRESENT
DOES PATIENT WANT TO STOP DRINKING: YES
TOTAL SCORE: 3
NAUSEA AND VOMITING: MILD NAUSEA WITH NO VOMITING
VISUAL DISTURBANCES: NOT PRESENT
NAUSEA AND VOMITING: NO NAUSEA AND NO VOMITING
PAROXYSMAL SWEATS: NO SWEAT VISIBLE
AVERAGE NUMBER OF DAYS PER WEEK YOU HAVE A DRINK CONTAINING ALCOHOL: 7
TREMOR: NO TREMOR
NAUSEA AND VOMITING: NO NAUSEA AND NO VOMITING
PAROXYSMAL SWEATS: NO SWEAT VISIBLE
HEADACHE, FULLNESS IN HEAD: NOT PRESENT
AGITATION: NORMAL ACTIVITY
VISUAL DISTURBANCES: NOT PRESENT
TOTAL SCORE: 6
TREMOR: NO TREMOR
AGITATION: NORMAL ACTIVITY
TOTAL SCORE: 4
PAROXYSMAL SWEATS: NO SWEAT VISIBLE
VISUAL DISTURBANCES: NOT PRESENT

## 2023-09-23 ASSESSMENT — ENCOUNTER SYMPTOMS
ABDOMINAL PAIN: 1
BACK PAIN: 0
CHILLS: 0
NERVOUS/ANXIOUS: 0
CONSTIPATION: 0
BLOOD IN STOOL: 0
VOMITING: 1
BACK PAIN: 1
SHORTNESS OF BREATH: 0
FEVER: 0
NECK PAIN: 0
SORE THROAT: 0
NAUSEA: 0
WEAKNESS: 1
HEADACHES: 0
DEPRESSION: 0
COUGH: 0
DIARRHEA: 0
HEARTBURN: 1
DIZZINESS: 1
HEARTBURN: 0
ABDOMINAL PAIN: 0
BLURRED VISION: 0
DIZZINESS: 0
NAUSEA: 1

## 2023-09-23 ASSESSMENT — PAIN DESCRIPTION - PAIN TYPE
TYPE: ACUTE PAIN

## 2023-09-23 ASSESSMENT — FIBROSIS 4 INDEX
FIB4 SCORE: 4.55
FIB4 SCORE: 10.2
FIB4 SCORE: 10.2

## 2023-09-23 NOTE — ASSESSMENT & PLAN NOTE
History of alcohol abuse and HCV, also overweight making LAUGHLIN potential component   -Monitor CMP  Daily CMP

## 2023-09-23 NOTE — OR NURSING
Awake and responsive in PACU. Denies pain/nausea. Reports last drink at 0300 this morning.  Vitals stable.  On monitors with alarms audible.    Left message for boyfriend with room number.

## 2023-09-23 NOTE — ED TRIAGE NOTES
Chiara Pacheco  57 y.o. female  Chief Complaint   Patient presents with    Blood in Vomit     BIB [facility] from home residence for dark red blood in vomit for approx 45min. States hx of same, peptic ulcers, alcohol use daily, and gastric reflux.      BIB [facility] for above.     Pt is alert, oriented, and follows commands. Pt speaking in full sentences and responds appropriately to questions. No acute distress noted in triage and respirations are even and unlabored.     Pt to red 08, chart up for ERP. Care assumed.

## 2023-09-23 NOTE — ED PROVIDER NOTES
ED Provider Note    CHIEF COMPLAINT  Chief Complaint   Patient presents with    Blood in Vomit     BIB [facility] from home residence for dark red blood in vomit for approx 45min. States hx of same, peptic ulcers, alcohol use daily, and gastric reflux.        EXTERNAL RECORDS REVIEWED  Admission 2/18/2023 for severe alcohol withdrawal with associated upper GI bleed    HPI/MASOOD Dennisbridget Pacheco is a 57 y.o. female who presents with hematemesis.  Patient with a longstanding history of alcoholism.  Patient with history of peptic ulcers and gastric varices.  Patient last seen for severe alcohol withdrawal, with associated upper GI bleed 2/18/2023 of this year.  Patient reports she continues to drink.  Approximately a pint of hard alcohol a day.  Last drink was at approximately 11 PM yesterday evening.  Patient denies any associated neck or back pain or significant abdominal pain outside of some mild epigastric discomfort that is worse when she vomits.  Patient also reports some black stool for the last day.  Patient reports symptoms are identical to the last time she had her upper GI bleed.  Patient denies use of anticoagulants or bleeding elsewhere.    PAST MEDICAL HISTORY   has a past medical history of Hearing loss.    SURGICAL HISTORY   has a past surgical history that includes Intubation (2/19/2023); upper gi endoscopy,diagnosis (2/19/2023); and upper gi endoscopy,diagnosis (2/20/2023).    FAMILY HISTORY  No family history on file.    SOCIAL HISTORY  Social History     Tobacco Use    Smoking status: Every Day    Smokeless tobacco: Never   Substance and Sexual Activity    Alcohol use: Yes     Alcohol/week: 6.0 oz     Types: 10 Shots of liquor per week    Drug use: No    Sexual activity: Not on file       CURRENT MEDICATIONS  Home Medications    **Home medications have not yet been reviewed for this encounter**         ALLERGIES  No Known Allergies    PHYSICAL EXAM  VITAL SIGNS: /85   Pulse 99   Resp 14   " Ht 1.753 m (5' 9\")   Wt 86.2 kg (190 lb)   LMP  (LMP Unknown)   SpO2 89%   BMI 28.06 kg/m²    Physical Exam  Constitutional:       Appearance: She is well-developed.   HENT:      Head: Normocephalic and atraumatic.   Eyes:      Pupils: Pupils are equal, round, and reactive to light.   Cardiovascular:      Rate and Rhythm: Regular rhythm. Tachycardia present.   Pulmonary:      Effort: Pulmonary effort is normal. No accessory muscle usage or respiratory distress.      Breath sounds: Normal breath sounds.   Abdominal:      General: Bowel sounds are normal.      Palpations: Abdomen is soft. There is no mass.      Tenderness: There is no abdominal tenderness.      Comments: Mild epigastric tenderness without rebound or guarding   Musculoskeletal:         General: Normal range of motion.   Skin:     General: Skin is warm.      Capillary Refill: Capillary refill takes less than 2 seconds.   Neurological:      General: No focal deficit present.      Mental Status: She is alert.   Psychiatric:         Mood and Affect: Mood normal. Mood is not anxious.           DIAGNOSTIC STUDIES / PROCEDURES      LABS  Results for orders placed or performed during the hospital encounter of 09/23/23   CBC WITH DIFFERENTIAL   Result Value Ref Range    WBC 5.6 4.8 - 10.8 K/uL    RBC 4.38 4.20 - 5.40 M/uL    Hemoglobin 15.1 12.0 - 16.0 g/dL    Hematocrit 44.5 37.0 - 47.0 %    .6 (H) 81.4 - 97.8 fL    MCH 34.5 (H) 27.0 - 33.0 pg    MCHC 33.9 32.2 - 35.5 g/dL    RDW 56.3 (H) 35.9 - 50.0 fL    Platelet Count 106 (L) 164 - 446 K/uL    MPV 10.8 9.0 - 12.9 fL    Neutrophils-Polys 51.50 44.00 - 72.00 %    Lymphocytes 33.60 22.00 - 41.00 %    Monocytes 9.50 0.00 - 13.40 %    Eosinophils 3.80 0.00 - 6.90 %    Basophils 1.40 0.00 - 1.80 %    Immature Granulocytes 0.20 0.00 - 0.90 %    Nucleated RBC 0.00 0.00 - 0.20 /100 WBC    Neutrophils (Absolute) 2.88 1.82 - 7.42 K/uL    Lymphs (Absolute) 1.88 1.00 - 4.80 K/uL    Monos (Absolute) 0.53 " 0.00 - 0.85 K/uL    Eos (Absolute) 0.21 0.00 - 0.51 K/uL    Baso (Absolute) 0.08 0.00 - 0.12 K/uL    Immature Granulocytes (abs) 0.01 0.00 - 0.11 K/uL    NRBC (Absolute) 0.00 K/uL   CMP   Result Value Ref Range    Sodium 145 135 - 145 mmol/L    Potassium 4.4 3.6 - 5.5 mmol/L    Chloride 106 96 - 112 mmol/L    Co2 24 20 - 33 mmol/L    Anion Gap 15.0 7.0 - 16.0    Glucose 132 (H) 65 - 99 mg/dL    Bun 15 8 - 22 mg/dL    Creatinine 0.41 (L) 0.50 - 1.40 mg/dL    Calcium 8.9 8.5 - 10.5 mg/dL    Correct Calcium 9.1 8.5 - 10.5 mg/dL    AST(SGOT) 165 (H) 12 - 45 U/L    ALT(SGPT) 73 (H) 2 - 50 U/L    Alkaline Phosphatase 160 (H) 30 - 99 U/L    Total Bilirubin 2.8 (H) 0.1 - 1.5 mg/dL    Albumin 3.7 3.2 - 4.9 g/dL    Total Protein 7.2 6.0 - 8.2 g/dL    Globulin 3.5 1.9 - 3.5 g/dL    A-G Ratio 1.1 g/dL   LACTIC ACID   Result Value Ref Range    Lactic Acid 2.0 0.5 - 2.0 mmol/L   PT/INR   Result Value Ref Range    PT 17.0 (H) 12.0 - 14.6 sec    INR 1.37 (H) 0.87 - 1.13   DIAGNOSTIC ALCOHOL   Result Value Ref Range    Diagnostic Alcohol 194.5 (H) <10.1 mg/dL   COD - Adult (Type and Screen)   Result Value Ref Range    ABO Grouping Only O     Rh Grouping Only POS     Antibody Screen-Cod NEG    ESTIMATED GFR   Result Value Ref Range    GFR (CKD-EPI) 115 >60 mL/min/1.73 m 2   HGB (Hemoglobin) for 48 hours   Result Value Ref Range    Hemoglobin 13.3 12.0 - 16.0 g/dL         COURSE & MEDICAL DECISION MAKING        INITIAL ASSESSMENT, COURSE AND PLAN  Care Narrative: Patient here with upper GI bleed.  Patient with known gastric varices.  We will start patient on octreotide, and also give pantoprazole.  Check basic labs including CMP in case patient may need blood transfusion.  Given patient's longstanding history of alcoholism we will give detox bag with thiamine.  Giving Zofran.  Patient otherwise appears well.  My suspicion of surgical pathology outside of gastric varices is very low here.  Abdominal exam is reassuring.  Patient without  any evidence of alcohol withdrawal at this point of the patient did develop severe alcohol withdrawal on last admission.  Will place patient on CIWA  Patient labs are consistent with decreased liver function, hemoglobin not significantly depressed  I discussed the case with gastroenterology.  They will likely scope patient later this morning.  Patient made NPO.  I discussed case with hospitalist.        DISPOSITION AND DISCUSSIONS  I have discussed management of the patient with the following physicians and POLA's:  GI, Hospitalist    FINAL DIAGNOSIS  1. Upper GI bleed    2. Alcoholism (HCC)

## 2023-09-23 NOTE — ASSESSMENT & PLAN NOTE
Watch for impending withdrawal  [facility] protocol  Thiamine, MTV, and folate  Seizure precautions  Encouraged alcohol cessation  Close monitoring, have discussed with patient today she stated that she is willing to stop drinking alcohol

## 2023-09-23 NOTE — ED NOTES
Ambulated to bathroom with assistance, mild  balance issues observed, successful and appropriate walking mobility with staff.

## 2023-09-23 NOTE — H&P
Hospital Medicine History & Physical Note    Date of Service  9/23/2023    Primary Care Physician  Pcp Pt States None    Consultants  GI    Specialist Names: Dr Al    Code Status  Full Code    Chief Complaint  Chief Complaint   Patient presents with    Blood in Vomit     BIB [facility] from home residence for dark red blood in vomit for approx 45min. States hx of same, peptic ulcers, alcohol use daily, and gastric reflux.        History of Presenting Illness  Chiara Pacheco is a 57 y.o. female with hx of alcohol abuse with prior withdrawal, tobacco use, hard of hearing and prior GI bleed (2/23) who presented 9/23/2023 with blood in vomit.  Had dark red blood in vomit over a 45 minute period of time at home per [facility] report.    Patient seen at bedside she is alert and oriented.  She does admit to using ibuprofen recently for back pain.  She does state a few days of dark tarry stools.  Patient denies any hematochezia.  Patient does admit to heavy drinking we had discussions on goals of care and recommending of cessation of alcohol.  She is aware that GI has been consulted and will be taking her to endoscopy.    I discussed the plan of care with patient.    Review of Systems  Review of Systems   Constitutional:  Negative for fever and malaise/fatigue.   HENT:  Negative for congestion and sore throat.    Respiratory:  Negative for cough and shortness of breath.    Cardiovascular:  Negative for chest pain and leg swelling.   Gastrointestinal:  Positive for abdominal pain, heartburn, melena, nausea and vomiting. Negative for blood in stool and diarrhea.   Genitourinary:  Negative for dysuria.   Musculoskeletal:  Positive for back pain. Negative for neck pain.   Neurological:  Negative for dizziness and headaches.   Psychiatric/Behavioral:  Positive for substance abuse. The patient is not nervous/anxious.        Past Medical History   has a past medical history of Hearing loss.    Surgical History   has a past  surgical history that includes Intubation (2/19/2023); pr upper gi endoscopy,diagnosis (2/19/2023); and pr upper gi endoscopy,diagnosis (2/20/2023).     Family History  Family history of alcoholism  Family history reviewed with patient. There is no family history that is pertinent to the chief complaint.     Social History   reports that she has been smoking. She has never used smokeless tobacco. She reports current alcohol use of about 6.0 oz of alcohol per week. She reports that she does not use drugs.    Allergies  No Known Allergies    Medications  None       Physical Exam  Temp:  [36.4 °C (97.6 °F)-36.8 °C (98.3 °F)] 36.8 °C (98.2 °F)  Pulse:  [] 85  Resp:  [12-18] 17  BP: (112-160)/(57-95) 143/69  SpO2:  [89 %-97 %] 94 %  Blood Pressure: (!) 160/91   Temperature: 36.8 °C (98.3 °F)   Pulse: 92   Respiration: 14   Pulse Oximetry: 93 %       Physical Exam  Vitals reviewed.   Constitutional:       Appearance: Normal appearance. She is not diaphoretic.   HENT:      Head: Normocephalic and atraumatic.      Nose: Nose normal.      Mouth/Throat:      Mouth: Mucous membranes are moist.      Pharynx: No oropharyngeal exudate.   Eyes:      General: No scleral icterus.        Right eye: No discharge.         Left eye: No discharge.      Extraocular Movements: Extraocular movements intact.      Conjunctiva/sclera: Conjunctivae normal.   Cardiovascular:      Rate and Rhythm: Normal rate and regular rhythm.      Pulses:           Radial pulses are 2+ on the right side and 2+ on the left side.        Dorsalis pedis pulses are 2+ on the right side and 2+ on the left side.      Heart sounds: No murmur heard.  Pulmonary:      Effort: Pulmonary effort is normal. No respiratory distress.      Breath sounds: Normal breath sounds. No wheezing or rales.   Abdominal:      General: Bowel sounds are normal. There is no distension.      Palpations: Abdomen is soft.      Tenderness: There is no abdominal tenderness.  "  Musculoskeletal:         General: No swelling or tenderness.      Cervical back: Neck supple. No muscular tenderness.      Right lower leg: No edema.      Left lower leg: No edema.   Lymphadenopathy:      Cervical: No cervical adenopathy.   Skin:     Coloration: Skin is pale. Skin is not jaundiced.   Neurological:      General: No focal deficit present.      Mental Status: She is alert and oriented to person, place, and time. Mental status is at baseline.      Cranial Nerves: No cranial nerve deficit.   Psychiatric:         Mood and Affect: Mood normal.         Behavior: Behavior normal.         Laboratory:  Recent Labs     09/23/23 0416 09/23/23 0745   WBC 5.6  --    RBC 4.38  --    HEMOGLOBIN 15.1 13.3   HEMATOCRIT 44.5  --    .6*  --    MCH 34.5*  --    MCHC 33.9  --    RDW 56.3*  --    PLATELETCT 106*  --    MPV 10.8  --      Recent Labs     09/23/23 0416   SODIUM 145   POTASSIUM 4.4   CHLORIDE 106   CO2 24   GLUCOSE 132*   BUN 15   CREATININE 0.41*   CALCIUM 8.9     Recent Labs     09/23/23 0416   ALTSGPT 73*   ASTSGOT 165*   ALKPHOSPHAT 160*   TBILIRUBIN 2.8*   GLUCOSE 132*     Recent Labs     09/23/23 0416   INR 1.37*     No results for input(s): \"NTPROBNP\" in the last 72 hours.      No results for input(s): \"TROPONINT\" in the last 72 hours.    Imaging:  No orders to display         Assessment/Plan:  Justification for Admission Status  I anticipate this patient is appropriate for observation status at this time because need of monitor hgb, upper endoscopy    Patient will need a Telemetry bed on MEDICAL service .  The need is secondary to .    * Hematemesis- (present on admission)  Assessment & Plan  History of alcohol use and NSAID use as of recent  GI has consulted and will take patient to endoscopy  Monitor every 6 hours hemoglobins  Transfuse if hemoglobin less than 7 or if hemodynamically stable and hemoglobin less than 8 with active signs of bleeding  PPI twice daily  N.p.o. for " endoscopy  Encouraged alcohol cessation    Elevated liver enzymes  Assessment & Plan  Monitor CMP  History of alcohol abuse  Overweight    Alcohol abuse  Assessment & Plan  Watch for impending withdrawal  [facility] protocol  Thiamine and folate  Seizure precautions    Bleeding gastric varices- (present on admission)  Assessment & Plan  History of on prior endoscopies  Patient going to endoscopy with GI    Hepatitis C- (present on admission)  Assessment & Plan  Unknown if treated  Recommend follow-up with outpatient GI or infectious disease for consideration of treatment        VTE prophylaxis: SCDs/TEDs

## 2023-09-23 NOTE — ANESTHESIA PROCEDURE NOTES
Airway    Date/Time: 9/23/2023 8:46 AM    Performed by: Sony Teixeira M.D.  Authorized by: Sony Teixeira M.D.    Location:  OR  Urgency:  Elective  Indications for Airway Management:  Anesthesia      Spontaneous Ventilation: absent    Sedation Level:  Deep  Preoxygenated: Yes    Patient Position:  Sniffing  Mask Difficulty Assessment:  0 - not attempted  Final Airway Type:  Endotracheal airway  Final Endotracheal Airway:  ETT  Cuffed: Yes    Technique Used for Successful ETT Placement:  Direct laryngoscopy  Devices/Methods Used in Placement:  Intubating stylet    Insertion Site:  Oral  Blade Type:  Roberta  Laryngoscope Blade/Videolaryngoscope Blade Size:  3  ETT Size (mm):  7.0  Measured from:  Teeth  ETT to Teeth (cm):  20  Placement Verified by: capnometry    Cormack-Lehane Classification:  Grade IIb - view of arytenoids or posterior of glottis only  Number of Attempts at Approach:  1

## 2023-09-23 NOTE — ED NOTES
Bedside report done with DENISE Walker. All lines traced. Patient included in report and updated on plan of care at this time.    Patient on vitals monitor. Vital signs stable and in no acute distress at this time. Patient on 2L of O2 attached to wall oxygen.    Patient resting at this time. Care relinquished from patient at this time. Call light within reach of patient.    Fall risk interventions in place include bed rails up, bed locked and in low position, and patient oriented to call light use and verbalizes understanding of using call light for assistance. Fall risk sign in place and visible to staff.

## 2023-09-23 NOTE — ASSESSMENT & PLAN NOTE
History of on prior and 9/23 endoscopies with esophageal varices also noted  Continue octreotide drip for 72 hours

## 2023-09-23 NOTE — ASSESSMENT & PLAN NOTE
Unknown if treated  Recommend follow-up with outpatient GI or infectious disease for consideration of treatment

## 2023-09-23 NOTE — ANESTHESIA TIME REPORT
Anesthesia Start and Stop Event Times     Date Time Event    9/23/2023 0835 Ready for Procedure     0839 Anesthesia Start     0922 Anesthesia Stop        Responsible Staff  09/23/23    Name Role Begin End    Min PARADISE Teixeira M.D. Anesth 0839 0922        Overtime Reason:  no overtime (within assigned shift)    Comments:

## 2023-09-23 NOTE — ANESTHESIA POSTPROCEDURE EVALUATION
Patient: Chiara Pacheco    Procedure Summary     Date: 09/23/23 Room / Location: [facility] OR 06 / SURGERY [facility]    Anesthesia Start: 0839 Anesthesia Stop: 0922    Procedures:       GASTROSCOPY (Esophagus)      GASTROSCOPY, WITH BANDING (Esophagus) Diagnosis: (esophageal varices)    Surgeons: Nova Al M.D. Responsible Provider: Sony Teixeira M.D.    Anesthesia Type: general ASA Status: 3 - Emergent          Final Anesthesia Type: general  Last vitals  BP   Blood Pressure: 139/74    Temp   36.7 °C (98 °F)    Pulse   87   Resp   16    SpO2   93 %      Anesthesia Post Evaluation    Patient location during evaluation: PACU  Patient participation: complete - patient participated  Level of consciousness: awake and alert    Airway patency: patent  Anesthetic complications: no  Cardiovascular status: hemodynamically stable  Respiratory status: acceptable  Hydration status: euvolemic    PONV: none          No notable events documented.     Nurse Pain Score: 0 (NPRS)

## 2023-09-23 NOTE — PROGRESS NOTES
4 Eyes Skin Assessment Completed by DENISE Bradford and DENISE Moreno.    Head WDL  Ears WDL  Nose Redness  Mouth WDL  Neck WDL  Breast/Chest WDL  Shoulder Blades WDL  Spine WDL  (R) Arm/Elbow/Hand WDL  (L) Arm/Elbow/Hand WDL  Abdomen Scar  Groin WDL  Scrotum/Coccyx/Buttocks WDL  (R) Leg WDL  (L) Leg WDL  (R) Heel/Foot/Toe WDL  (L) Heel/Foot/Toe WDL          Devices In Places Tele Box, Blood Pressure Cuff, Pulse Ox, SCD's, and Nasal Cannula      Interventions In Place Gray Ear Foams, Sacral Mepilex, Pillows, Q2 Turns, Low Air Loss Mattress, and Heels Loaded W/Pillows    Possible Skin Injury No    Pictures Uploaded Into Epic N/A  Wound Consult Placed N/A  RN Wound Prevention Protocol Ordered Yes

## 2023-09-23 NOTE — PROCEDURES
OPERATIVE REPORT    PATIENT:   Chiara Pacheco   1965       PREOPERATIVE DIAGNOSES/INDICATIONS: Hematemesis, known cirrhosis with esophageal varices, intoxicated, recent Advil use, elevated INR    POSTOPERATIVE DIAGNOSIS: small esophageal varices, varix with erosion and stigmata of recent bleeding, banded x 1, gastric varix without stigmata, large amount of blood in stomach precluding complete exam    PROCEDURE:  ESOPHAGOGASTRODUODENOSCOPY with banding    PHYSICIAN:  Nova Al MD    ANESTHESIA:  Per anesthesiologist.    LOCATION: [facility]    CONSENT:  OBTAINED. The risks, benefits and alternatives of the procedure were discussed in details. The risks include and are not limited to bleeding, infection, perforation, missed lesions, and sedations risks (cardiopulmonary compromise and allergic reaction to medications).    DESCRIPTION: The patient presented to the procedure room.  After routine checkup was performed, patient was brought into the endoscopy suite.  Patient was placed on his left lateral decubitus position. A bite block was placed in patient's mouth. Patient was sedated by anesthesia.  Vital signs were monitored throughout the procedure.  Oxygenation support was provided throughout the procedure. Upper endoscope was inserted into patient's mouth and advanced to the second portion of the duodenum under direct visualization.      Once the site was reached and examined, the upper endoscope was withdrawn.  Retroflexion was made within the stomach.  The stomach was decompressed, scope was withdrawn and the procedure was terminated.     The patient tolerated the procedure well.  There were no immediate complications.    OPERATIVE FINDINGS:    1. Esophagus: Blood in lumen, lavaged away.  Small esophageal varices distal esophagus appreciated.  One varix with large erosion with stigmata of recent bleeding.  This was banded x 1 at end of procedure (34cm).    2. Stomach: Large amount of blood and  removed multiple large clots.  Lavaged aggressively but not ideal examination.  Large gastric varix in proximal fundus without stigmata of recent bleeding but posterior aspect not well visualized due to retained blood.  Cautious aspiration in this area so as not to precipitate bleeding from varix.     3. Duodenum: Blood from bulb to 3rd portion, lavaged.  Did not appreciate mucosal abnormality.    RECOMMENDATIONS:    NPO today  Pantoprazole IV BID  Octreotide drip  Serial H&H  If actively bleeding, please notify GI on call.  May need to be elevated to TIPS/BRTO  Patient should be in stepdown ICU minimum.  Voalte message to  hospitalist with above

## 2023-09-23 NOTE — ASSESSMENT & PLAN NOTE
History of alcohol use and NSAID use, EGD showed gastric and esophageal varices one of which was banded  GI following, will appreciate recs  Trend H/H  Transfuse if hemoglobin less than 7 or if hemodynamically stable and hemoglobin less than 8 with active signs of bleeding  PPI twice daily  Octreotide drip to complete 72 hours tomorrow morning

## 2023-09-23 NOTE — ANESTHESIA PREPROCEDURE EVALUATION
Case: [id number] Date/Time: 09/23/23 0805    Procedure: GASTROSCOPY (Esophagus)    Anesthesia type: MAC    Pre-op diagnosis: hematemesis    Location: [facility] OR 06 / SURGERY [facility]    Surgeons: Nova Al M.D.      56yo F w/ Hep C, EtOH use, hard of hearing, presented today w/ hematemesis. Vitals stable. Has 2 peripheral IV. On octreotide drip. Hb 13.3. On CIWA protocol.    Relevant Problems      (positive) Hepatitis C       Physical Exam    Airway   Mallampati: II  TM distance: >3 FB  Neck ROM: full       Cardiovascular - normal exam  Rhythm: regular  Rate: normal  (-) murmur     Dental - normal exam           Pulmonary - normal exam  Breath sounds clear to auscultation     Abdominal    Neurological - normal exam                 Anesthesia Plan    ASA 3- EMERGENT   ASA physical status 3 criteria: alcohol and/or substance dependence or abuseASA physical status emergent criteria: acute hemorrhage    Plan - general       Airway plan will be ETT          Induction: intravenous    Postoperative Plan: Postoperative administration of opioids is intended.    Pertinent diagnostic labs and testing reviewed    Informed Consent:    Anesthetic plan and risks discussed with patient.    Use of blood products discussed with: patient whom consented to blood products.

## 2023-09-23 NOTE — ED NOTES
Bedside report received from prior RN. Pt resting. Resp normal/unlabored. Bed side rails up/in low position. IV pumps running.

## 2023-09-23 NOTE — CONSULTS
..Date of Consultation:  9/23/2023    Patient: : Chiara Pacheco  MRN: [id number]    Referring Physician:  Dr. Aly     GI:TED Granger     Reason for Consultation: Hematemesis    History of Present Illness: Myrna Gar is a 57-year-old female who presents to the ER this morning after vomiting up blood 4 times this morning.  She also endorses dark stools for 2 days.  She describes associated lightheadedness, dizziness, and fatigue.  Denies abdominal pain.  She reports that she was drinking vodka last night and also took some Advil yesterday.  She says that she drinks one shot of vodka every day and takes Advil as needed for pain. Reports that she has been taking the medicine that the doctors gave her after she discharged in February (PPI).     In the ED, vital signs are stable but she is tachycardic.  Labs reveal hemoglobin 15.1, .6, platelets 106, , ALT 73, alkaline phosphatase 160, T. bili 2.8, and INR 1.37.    GI history:  2/19/23: EGD:  Grade 1 varices, flattened, cant treat  Oozing portal hypertension gastropathy  Oozing small gastric ulcers  Esmer enrique tear  Esophagitis with ulceration     2/20/23: EGD:  Grade 1 esophageal varices, flattened, cant treat  Oozing portal hypertension gastropathy  Oozing small gastric ulcers  Esmer enrique tear, clip intact      Past Medical History:   Diagnosis Date    Hearing loss          Past Surgical History:   Procedure Laterality Date    MT UPPER GI ENDOSCOPY,DIAGNOSIS  2/20/2023    Procedure: GASTROSCOPY;  Surgeon: Shanice Craft M.D.;  Location: SURGERY SAME DAY [facility];  Service: Gastroenterology    INTUBATION  2/19/2023    MT UPPER GI ENDOSCOPY,DIAGNOSIS  2/19/2023    Procedure: GASTROSCOPY;  Surgeon: Shanice Craft M.D.;  Location: SURGERY SAME DAY [facility];  Service: Gastroenterology       No family history on file.    Social History     Socioeconomic History    Marital status: Single   Tobacco Use    Smoking status:  Every Day    Smokeless tobacco: Never   Substance and Sexual Activity    Alcohol use: Yes     Alcohol/week: 6.0 oz     Types: 10 Shots of liquor per week    Drug use: No       Review of systems:  Review of Systems   Constitutional:  Positive for malaise/fatigue. Negative for chills and fever.   HENT:  Negative for hearing loss.    Eyes:  Negative for blurred vision.   Respiratory:  Negative for cough and shortness of breath.    Cardiovascular:  Negative for chest pain and leg swelling.   Gastrointestinal:  Positive for melena and vomiting. Negative for abdominal pain, blood in stool, constipation, diarrhea, heartburn and nausea.   Genitourinary:  Negative for dysuria.   Musculoskeletal:  Negative for back pain.   Skin:  Negative for rash.   Neurological:  Positive for dizziness and weakness.   Psychiatric/Behavioral:  Positive for substance abuse. Negative for depression.    All other systems reviewed and are negative.        Physical Exam:  Vitals:    09/23/23 0453 09/23/23 0500 09/23/23 0600 09/23/23 0700   BP: 131/85 (!) 141/88 (!) 141/88 (!) 160/91   Pulse: 99 93 96 92   Resp: 14 13 16 14   Temp: 36.8 °C (98.3 °F)      TempSrc: Temporal      SpO2: 89% 95% 94% 93%   Weight:       Height:           Physical Exam  Vitals and nursing note reviewed.   Constitutional:       General: She is not in acute distress.     Appearance: She is obese. She is not ill-appearing.   HENT:      Head: Normocephalic and atraumatic.      Right Ear: External ear normal.      Left Ear: External ear normal.      Ears:      Comments: Hearing impaired     Nose: Nose normal.      Mouth/Throat:      Mouth: Mucous membranes are dry.      Pharynx: Oropharynx is clear.   Eyes:      General: No scleral icterus.  Cardiovascular:      Rate and Rhythm: Regular rhythm. Tachycardia present.      Pulses: Normal pulses.      Heart sounds: Normal heart sounds.   Pulmonary:      Effort: Pulmonary effort is normal. No respiratory distress.      Breath  sounds: Normal breath sounds.   Abdominal:      General: Bowel sounds are normal. There is distension.      Palpations: Abdomen is soft.      Tenderness: There is no abdominal tenderness. There is no guarding.   Musculoskeletal:         General: Normal range of motion.      Cervical back: Normal range of motion.      Right lower leg: Edema present.      Left lower leg: Edema present.   Skin:     General: Skin is warm and dry.      Capillary Refill: Capillary refill takes less than 2 seconds.      Coloration: Skin is pale.   Neurological:      Mental Status: She is alert and oriented to person, place, and time.   Psychiatric:         Mood and Affect: Mood normal.         Behavior: Behavior normal.           Labs:  Recent Labs     09/23/23 0416   WBC 5.6   RBC 4.38   HEMOGLOBIN 15.1   HEMATOCRIT 44.5   .6*   MCH 34.5*   MCHC 33.9   RDW 56.3*   PLATELETCT 106*   MPV 10.8     Recent Labs     09/23/23 0416   SODIUM 145   POTASSIUM 4.4   CHLORIDE 106   CO2 24   GLUCOSE 132*   BUN 15     Recent Labs     09/23/23 0416   INR 1.37*       Recent Labs     09/23/23 0416   ASTSGOT 165*   ALTSGPT 73*   TBILIRUBIN 2.8*   ALKPHOSPHAT 160*   GLOBULIN 3.5   INR 1.37*         Imaging:  DX-FOOT-2- RIGHT  Narrative: 2/24/2023 11:12 AM    HISTORY/REASON FOR EXAM:  Pain/Deformity Following Trauma; assess for fracture.    TECHNIQUE/EXAM DESCRIPTION AND NUMBER OF VIEWS:  2 views of the  RIGHT foot.    COMPARISON: Ankle radiographs performed the same time    FINDINGS:    MINERALIZATION: Decreased.    INJURY: There are fractures of the distal fibula and medial malleolus.    JOINTS: No erosive arthropathy is evident.  Impression: Fractures of the distal fibula and medial malleolus  Osteopenia  DX-ANKLE 3+ VIEWS RIGHT  Narrative: 2/24/2023 11:12 AM    HISTORY/REASON FOR EXAM:  Pain/Deformity Following Trauma; assess for fracture.    TECHNIQUE/EXAM DESCRIPTION AND NUMBER OF VIEWS:  3 views of the  RIGHT ankle.    COMPARISON:  None    FINDINGS:    MINERALIZATION: Decreased.    INJURY: There is fracture of the distal fibula. There is fracture of the medial malleolus..    JOINTS: No erosive arthropathy is evident.  Ankle mortise is symmetric.  Impression: 1.  Fracture of the distal fibula  2.  Fracture of the medial malleolus  3.  Osteopenia      Impressions:  Acute GI bleeding  Hematemesis  Melena  Alcohol misuse disorder  Hepatic steatosis on ultrasound in February  Transaminitis with ALT to AST ratio 2:1  Thrombocytopenia  Hyperbilirubinemia  Elevated INR  Hearing impaired    MDM:  Patient is a 57-year-old female who is a daily alcohol drinker who presents with hematemesis x4 this morning and 2 days of melena.  Previous EGD has revealed portal hypertensive gastropathy, small gastric ulcers, esophagitis with ulceration, Esmer-Aparicio tear, and grade 1 esophageal varices.  Patient requires EGD for further exploration and is agreeable to proceed.    Recommendations:  IV PPI twice daily  Banana bag, daily thiamine, folic acid, and multivitamin  Octreotide until after EGD  [facility] protocol  Keep n.p.o.  EGD today with Dr. Al  Further plan of care to be determined postprocedure    Plan discussed with patient, Dr. Marshall, and Dr. Al    This note was generated using voice recognition software which has a small chance of producing errors of grammar and possibly content. I have made every reasonable attempt to find and correct any obvious errors, but expect that some may not be found prior to finalization of this note.

## 2023-09-24 LAB
ALBUMIN SERPL BCP-MCNC: 3.3 G/DL (ref 3.2–4.9)
ALBUMIN/GLOB SERPL: 1.1 G/DL
ALP SERPL-CCNC: 101 U/L (ref 30–99)
ALT SERPL-CCNC: 51 U/L (ref 2–50)
ANION GAP SERPL CALC-SCNC: 9 MMOL/L (ref 7–16)
AST SERPL-CCNC: 92 U/L (ref 12–45)
BILIRUB SERPL-MCNC: 3 MG/DL (ref 0.1–1.5)
BUN SERPL-MCNC: 15 MG/DL (ref 8–22)
CALCIUM ALBUM COR SERPL-MCNC: 8.4 MG/DL (ref 8.5–10.5)
CALCIUM SERPL-MCNC: 7.8 MG/DL (ref 8.5–10.5)
CHLORIDE SERPL-SCNC: 106 MMOL/L (ref 96–112)
CO2 SERPL-SCNC: 25 MMOL/L (ref 20–33)
CREAT SERPL-MCNC: 0.46 MG/DL (ref 0.5–1.4)
ERYTHROCYTE [DISTWIDTH] IN BLOOD BY AUTOMATED COUNT: 54.2 FL (ref 35.9–50)
GFR SERPLBLD CREATININE-BSD FMLA CKD-EPI: 112 ML/MIN/1.73 M 2
GLOBULIN SER CALC-MCNC: 3 G/DL (ref 1.9–3.5)
GLUCOSE SERPL-MCNC: 136 MG/DL (ref 65–99)
HCT VFR BLD AUTO: 36.3 % (ref 37–47)
HGB BLD-MCNC: 11.9 G/DL (ref 12–16)
HGB BLD-MCNC: 11.9 G/DL (ref 12–16)
MAGNESIUM SERPL-MCNC: 1.7 MG/DL (ref 1.5–2.5)
MCH RBC QN AUTO: 34.2 PG (ref 27–33)
MCHC RBC AUTO-ENTMCNC: 32.8 G/DL (ref 32.2–35.5)
MCV RBC AUTO: 104.3 FL (ref 81.4–97.8)
PHOSPHATE SERPL-MCNC: 2.6 MG/DL (ref 2.5–4.5)
PLATELET # BLD AUTO: 78 K/UL (ref 164–446)
PLATELETS.RETICULATED NFR BLD AUTO: 6.3 % (ref 0.6–13.1)
PMV BLD AUTO: 10.5 FL (ref 9–12.9)
POTASSIUM SERPL-SCNC: 4.2 MMOL/L (ref 3.6–5.5)
PROT SERPL-MCNC: 6.3 G/DL (ref 6–8.2)
RBC # BLD AUTO: 3.48 M/UL (ref 4.2–5.4)
SODIUM SERPL-SCNC: 140 MMOL/L (ref 135–145)
WBC # BLD AUTO: 5.4 K/UL (ref 4.8–10.8)

## 2023-09-24 PROCEDURE — 99232 SBSQ HOSP IP/OBS MODERATE 35: CPT | Performed by: NURSE PRACTITIONER

## 2023-09-24 PROCEDURE — 99231 SBSQ HOSP IP/OBS SF/LOW 25: CPT | Performed by: STUDENT IN AN ORGANIZED HEALTH CARE EDUCATION/TRAINING PROGRAM

## 2023-09-24 PROCEDURE — 700102 HCHG RX REV CODE 250 W/ 637 OVERRIDE(OP): Mod: UD | Performed by: HOSPITALIST

## 2023-09-24 PROCEDURE — 770020 HCHG ROOM/CARE - TELE (206)

## 2023-09-24 PROCEDURE — 700105 HCHG RX REV CODE 258: Mod: UD | Performed by: HOSPITALIST

## 2023-09-24 PROCEDURE — 83735 ASSAY OF MAGNESIUM: CPT

## 2023-09-24 PROCEDURE — 80053 COMPREHEN METABOLIC PANEL: CPT

## 2023-09-24 PROCEDURE — A9270 NON-COVERED ITEM OR SERVICE: HCPCS | Mod: UD | Performed by: HOSPITALIST

## 2023-09-24 PROCEDURE — 84100 ASSAY OF PHOSPHORUS: CPT

## 2023-09-24 PROCEDURE — 85018 HEMOGLOBIN: CPT

## 2023-09-24 PROCEDURE — C9113 INJ PANTOPRAZOLE SODIUM, VIA: HCPCS | Mod: UD | Performed by: HOSPITALIST

## 2023-09-24 PROCEDURE — 700111 HCHG RX REV CODE 636 W/ 250 OVERRIDE (IP): Mod: UD | Performed by: HOSPITALIST

## 2023-09-24 RX ADMIN — THERA TABS 1 TABLET: TAB at 07:37

## 2023-09-24 RX ADMIN — PANTOPRAZOLE SODIUM 40 MG: 40 INJECTION, POWDER, FOR SOLUTION INTRAVENOUS at 17:55

## 2023-09-24 RX ADMIN — SODIUM CHLORIDE, POTASSIUM CHLORIDE, SODIUM LACTATE AND CALCIUM CHLORIDE: 600; 310; 30; 20 INJECTION, SOLUTION INTRAVENOUS at 20:16

## 2023-09-24 RX ADMIN — FOLIC ACID 1 MG: 1 TABLET ORAL at 07:37

## 2023-09-24 RX ADMIN — OCTREOTIDE ACETATE 50 MCG/HR: 200 INJECTION, SOLUTION INTRAVENOUS; SUBCUTANEOUS at 07:34

## 2023-09-24 RX ADMIN — PANTOPRAZOLE SODIUM 40 MG: 40 INJECTION, POWDER, FOR SOLUTION INTRAVENOUS at 06:00

## 2023-09-24 RX ADMIN — Medication 100 MG: at 07:37

## 2023-09-24 RX ADMIN — SODIUM CHLORIDE, POTASSIUM CHLORIDE, SODIUM LACTATE AND CALCIUM CHLORIDE: 600; 310; 30; 20 INJECTION, SOLUTION INTRAVENOUS at 07:40

## 2023-09-24 ASSESSMENT — LIFESTYLE VARIABLES
DOES PATIENT WANT TO TALK TO SOMEONE ABOUT QUITTING: NO
EVER FELT BAD OR GUILTY ABOUT YOUR DRINKING: YES
ANXIETY: *
VISUAL DISTURBANCES: VERY MILD SENSITIVITY
AUDITORY DISTURBANCES: NOT PRESENT
ANXIETY: *
AGITATION: NORMAL ACTIVITY
EVER HAD A DRINK FIRST THING IN THE MORNING TO STEADY YOUR NERVES TO GET RID OF A HANGOVER: YES
ORIENTATION AND CLOUDING OF SENSORIUM: ORIENTED AND CAN DO SERIAL ADDITIONS
CONSUMPTION TOTAL: POSITIVE
PAROXYSMAL SWEATS: NO SWEAT VISIBLE
HAVE YOU EVER FELT YOU SHOULD CUT DOWN ON YOUR DRINKING: YES
HOW MANY TIMES IN THE PAST YEAR HAVE YOU HAD 5 OR MORE DRINKS IN A DAY: 365
HEADACHE, FULLNESS IN HEAD: NOT PRESENT
ANXIETY: *
ON A TYPICAL DAY WHEN YOU DRINK ALCOHOL HOW MANY DRINKS DO YOU HAVE: 5
NAUSEA AND VOMITING: NO NAUSEA AND NO VOMITING
AGITATION: NORMAL ACTIVITY
HEADACHE, FULLNESS IN HEAD: NOT PRESENT
TREMOR: NO TREMOR
VISUAL DISTURBANCES: NOT PRESENT
ALCOHOL_USE: YES
SUBSTANCE_ABUSE: 1
NAUSEA AND VOMITING: NO NAUSEA AND NO VOMITING
NAUSEA AND VOMITING: NO NAUSEA AND NO VOMITING
TOTAL SCORE: 4
ANXIETY: *
PAROXYSMAL SWEATS: NO SWEAT VISIBLE
HEADACHE, FULLNESS IN HEAD: NOT PRESENT
AVERAGE NUMBER OF DAYS PER WEEK YOU HAVE A DRINK CONTAINING ALCOHOL: 7
ORIENTATION AND CLOUDING OF SENSORIUM: ORIENTED AND CAN DO SERIAL ADDITIONS
VISUAL DISTURBANCES: NOT PRESENT
NAUSEA AND VOMITING: NO NAUSEA AND NO VOMITING
TOTAL SCORE: 3
AUDITORY DISTURBANCES: NOT PRESENT
TOTAL SCORE: 3
AUDITORY DISTURBANCES: NOT PRESENT
TREMOR: NO TREMOR
AUDITORY DISTURBANCES: NOT PRESENT
VISUAL DISTURBANCES: NOT PRESENT
DOES PATIENT WANT TO STOP DRINKING: YES
ORIENTATION AND CLOUDING OF SENSORIUM: ORIENTED AND CAN DO SERIAL ADDITIONS
TOTAL SCORE: 2
AGITATION: NORMAL ACTIVITY
HEADACHE, FULLNESS IN HEAD: NOT PRESENT
NAUSEA AND VOMITING: NO NAUSEA AND NO VOMITING
ANXIETY: *
VISUAL DISTURBANCES: VERY MILD SENSITIVITY
ORIENTATION AND CLOUDING OF SENSORIUM: ORIENTED AND CAN DO SERIAL ADDITIONS
ORIENTATION AND CLOUDING OF SENSORIUM: ORIENTED AND CAN DO SERIAL ADDITIONS
TOTAL SCORE: 2
HEADACHE, FULLNESS IN HEAD: NOT PRESENT
TOTAL SCORE: 4
AGITATION: NORMAL ACTIVITY
AUDITORY DISTURBANCES: NOT PRESENT
ORIENTATION AND CLOUDING OF SENSORIUM: ORIENTED AND CAN DO SERIAL ADDITIONS
TREMOR: NO TREMOR
PAROXYSMAL SWEATS: NO SWEAT VISIBLE
PAROXYSMAL SWEATS: NO SWEAT VISIBLE
TREMOR: NO TREMOR
TOTAL SCORE: 3
PAROXYSMAL SWEATS: NO SWEAT VISIBLE
TOTAL SCORE: 3
ANXIETY: *
TREMOR: NO TREMOR
VISUAL DISTURBANCES: VERY MILD SENSITIVITY
TREMOR: NO TREMOR
TOTAL SCORE: 4
AUDITORY DISTURBANCES: NOT PRESENT
NAUSEA AND VOMITING: NO NAUSEA AND NO VOMITING
PAROXYSMAL SWEATS: NO SWEAT VISIBLE
HEADACHE, FULLNESS IN HEAD: NOT PRESENT
AGITATION: NORMAL ACTIVITY
HAVE PEOPLE ANNOYED YOU BY CRITICIZING YOUR DRINKING: YES
AGITATION: NORMAL ACTIVITY

## 2023-09-24 ASSESSMENT — PAIN DESCRIPTION - PAIN TYPE
TYPE: ACUTE PAIN

## 2023-09-24 ASSESSMENT — COGNITIVE AND FUNCTIONAL STATUS - GENERAL
SUGGESTED CMS G CODE MODIFIER DAILY ACTIVITY: CJ
DRESSING REGULAR LOWER BODY CLOTHING: A LITTLE
WALKING IN HOSPITAL ROOM: A LOT
TURNING FROM BACK TO SIDE WHILE IN FLAT BAD: A LITTLE
DAILY ACTIVITIY SCORE: 20
MOBILITY SCORE: 16
TOILETING: A LITTLE
DRESSING REGULAR UPPER BODY CLOTHING: A LITTLE
SUGGESTED CMS G CODE MODIFIER MOBILITY: CK
MOVING FROM LYING ON BACK TO SITTING ON SIDE OF FLAT BED: A LITTLE
HELP NEEDED FOR BATHING: A LITTLE
STANDING UP FROM CHAIR USING ARMS: A LITTLE
CLIMB 3 TO 5 STEPS WITH RAILING: A LOT
MOVING TO AND FROM BED TO CHAIR: A LITTLE

## 2023-09-24 ASSESSMENT — ENCOUNTER SYMPTOMS
SHORTNESS OF BREATH: 0
FALLS: 0
NAUSEA: 0
CONSTIPATION: 0
CHILLS: 0
COUGH: 0
SENSORY CHANGE: 0
DIZZINESS: 0
NERVOUS/ANXIOUS: 0
NAUSEA: 1
FLANK PAIN: 0
VOMITING: 0
BLURRED VISION: 0
HEADACHES: 0
MYALGIAS: 0
BLOOD IN STOOL: 0
FEVER: 0
DIARRHEA: 0
SORE THROAT: 0
WEAKNESS: 1
ABDOMINAL PAIN: 0
MYALGIAS: 1
FOCAL WEAKNESS: 0

## 2023-09-24 ASSESSMENT — PATIENT HEALTH QUESTIONNAIRE - PHQ9
1. LITTLE INTEREST OR PLEASURE IN DOING THINGS: NOT AT ALL
2. FEELING DOWN, DEPRESSED, IRRITABLE, OR HOPELESS: NOT AT ALL
SUM OF ALL RESPONSES TO PHQ9 QUESTIONS 1 AND 2: 0

## 2023-09-24 NOTE — PROGRESS NOTES
Gastroenterology Progress Note               Author:  TED Beck Date & Time Created: 9/24/2023 1:25 PM       Patient ID:  Name:             Chiara Pacheco  YOB: 1965  Age:                 57 y.o.  female  MRN:               [id number]        Medical Decision Making, by Problem:  Active Hospital Problems    Diagnosis     Hematemesis [K92.0]     Bleeding gastric varices [I86.4]     Alcohol abuse [F10.10]     Elevated liver enzymes [R74.8]     Hepatitis C [B19.20]            Presenting Chief Complaint:  Hematemesis     History of Present Illness: Myrna Gar is a 57-year-old female who presents to the ER this morning after vomiting up blood 4 times this morning.  She also endorses dark stools for 2 days.  She describes associated lightheadedness, dizziness, and fatigue.  Denies abdominal pain.  She reports that she was drinking vodka last night and also took some Advil yesterday.  She says that she drinks one shot of vodka every day and takes Advil as needed for pain. Reports that she has been taking the medicine that the doctors gave her after she discharged in February (PPI).      In the ED, vital signs are stable but she is tachycardic.  Labs reveal hemoglobin 15.1, .6, platelets 106, , ALT 73, alkaline phosphatase 160, T. bili 2.8, and INR 1.37.     GI history:  2/19/23: EGD:  Grade 1 varices, flattened, cant treat  Oozing portal hypertension gastropathy  Oozing small gastric ulcers  Esmer enrique tear  Esophagitis with ulceration     2/20/23: EGD:  Grade 1 esophageal varices, flattened, cant treat  Oozing portal hypertension gastropathy  Oozing small gastric ulcers  Esmer enrique tear, clip intact         Interval History:  9/24/2023: Patient seen at bedside.  Patient reports nausea but no vomiting or abdominal pain.  Per nursing, patient was nauseous when attempted to take sips with 3 tablets of medication.  Hemoglobin stable.  CIWA scores overnight  2-3.        Hospital Medications:  Current Facility-Administered Medications   Medication Dose Frequency Provider Last Rate Last Admin    senna-docusate (Pericolace Or Senokot S) 8.6-50 MG per tablet 2 Tablet  2 Tablet BID Juan Marshall D.O.        And    polyethylene glycol/lytes (Miralax) PACKET 1 Packet  1 Packet QDAY PRN Juan Marshall D.O.        And    magnesium hydroxide (Milk Of Magnesia) suspension 30 mL  30 mL QDAY PRN Juan Marshall D.O.        And    bisacodyl (Dulcolax) suppository 10 mg  10 mg QDAY PRN Juan Marshall D.O.        lactated ringers infusion   Continuous Juan Marshall D.O. 83 mL/hr at 09/24/23 0740 New Bag at 09/24/23 0740    acetaminophen (Tylenol) tablet 650 mg  650 mg Q6HRS PRN Juan Marshall D.O.        labetalol (Normodyne/Trandate) injection 10 mg  10 mg Q4HRS PRN Juan Marshall D.O.        ondansetron (Zofran) syringe/vial injection 4 mg  4 mg Q4HRS PRN Juan Marshall D.O.        ondansetron (Zofran ODT) dispertab 4 mg  4 mg Q4HRS PRN Juan Marshall D.O.        promethazine (Phenergan) tablet 12.5-25 mg  12.5-25 mg Q4HRS PRN Juan Marshall D.O.        promethazine (Phenergan) suppository 12.5-25 mg  12.5-25 mg Q4HRS PRN Juan Marshall D.O.        prochlorperazine (Compazine) injection 5-10 mg  5-10 mg Q4HRS PRN Juan Marshall D.O.        octreotide (SandoSTATIN) 1,250 mcg in  mL Infusion  50 mcg/hr Continuous Juan Marshall D.O. 10 mL/hr at 09/24/23 0734 50 mcg/hr at 09/24/23 0734    pantoprazole (Protonix) injection 40 mg  40 mg BID ROSALBA GuerrierO.   40 mg at 09/24/23 0600    thiamine (Vitamin B-1) tablet 100 mg  100 mg DAILY ROSALBA GuerrierORoshan   100 mg at 09/24/23 0737    And    folic acid (Folvite) tablet 1 mg  1 mg DAILY ROSALBA GuerrierO.   1 mg at 09/24/23 0737    And    multivitamin tablet 1 Tablet  1 Tablet DAILY ROSALBA GuerrierORoshan   1 Tablet at 09/24/23 0737    LORazepam (Ativan) tablet 0.5 mg  0.5 mg Q4HRS PRN Juan Marshall D.O.         "LORazepam (Ativan) tablet 1 mg  1 mg Q4HRS PRN Juan Marshall D.O.        Or    LORazepam (Ativan) injection 0.5 mg  0.5 mg Q4HRS PRN Juan Marshall D.O.        LORazepam (Ativan) tablet 2 mg  2 mg Q2HRS PRN Juan Marshall D.O.        Or    LORazepam (Ativan) injection 1 mg  1 mg Q2HRS PRN ROSALBA GuerrierO.        LORazepam (Ativan) tablet 3 mg  3 mg Q HOUR PRN ROSALBA GuerrierO.        Or    LORazepam (Ativan) injection 1.5 mg  1.5 mg Q HOUR PRN Juan Marshall D.O.        LORazepam (Ativan) tablet 4 mg  4 mg Q15 MIN PRN Juan Marshall D.O.        Or    LORazepam (Ativan) injection 2 mg  2 mg Q15 MIN PRN Juan Marshall D.O.       Last reviewed on 9/23/2023  7:33 AM by [facility]       Review of Systems:  Review of Systems   Constitutional:  Positive for malaise/fatigue. Negative for chills and fever.   HENT:  Negative for congestion and sore throat.    Respiratory:  Negative for cough and shortness of breath.    Cardiovascular:  Negative for chest pain and leg swelling.   Gastrointestinal:  Positive for nausea. Negative for abdominal pain, blood in stool, constipation, diarrhea, melena and vomiting.   Genitourinary:  Negative for dysuria and flank pain.   Musculoskeletal:  Positive for myalgias. Negative for falls.   Neurological:  Positive for weakness. Negative for dizziness and headaches.   Psychiatric/Behavioral:  Positive for substance abuse. The patient is not nervous/anxious.    All other systems reviewed and are negative.        Vital signs:  Weight/BMI: Body mass index is 28.32 kg/m².  /63   Pulse 82   Temp 36.2 °C (97.2 °F) (Oral)   Resp 18   Ht 1.753 m (5' 9\")   Wt 87 kg (191 lb 12.8 oz)   SpO2 94%   Vitals:    09/24/23 0700 09/24/23 0800 09/24/23 0900 09/24/23 1000   BP: 136/62 (!) 155/96 121/58 133/63   Pulse: (!) 59 (!) 58 69 82   Resp:       Temp:  36.2 °C (97.2 °F)     TempSrc:  Oral     SpO2: 97% 97% 95% 94%   Weight:       Height:         Oxygen Therapy:  Pulse " Oximetry: 94 %, O2 (LPM): 1, O2 Delivery Device: Silicone Nasal Cannula    Intake/Output Summary (Last 24 hours) at 9/24/2023 1325  Last data filed at 9/24/2023 0800  Gross per 24 hour   Intake 1763.54 ml   Output 500 ml   Net 1263.54 ml         Physical Exam:  Physical Exam  Vitals and nursing note reviewed.   Constitutional:       General: She is awake. She is not in acute distress.     Appearance: She is overweight. She is not toxic-appearing.   HENT:      Head: Normocephalic.      Nose: Nose normal. No congestion.      Mouth/Throat:      Mouth: Mucous membranes are dry.      Pharynx: Oropharynx is clear. No oropharyngeal exudate.   Eyes:      General: No scleral icterus.     Extraocular Movements: Extraocular movements intact.      Conjunctiva/sclera: Conjunctivae normal.   Cardiovascular:      Rate and Rhythm: Normal rate and regular rhythm.      Pulses: Normal pulses.      Heart sounds: Normal heart sounds. No murmur heard.  Pulmonary:      Effort: Pulmonary effort is normal. No respiratory distress.      Breath sounds: Normal breath sounds. No wheezing.   Abdominal:      General: Abdomen is flat. Bowel sounds are normal. There is no distension.      Palpations: Abdomen is soft.      Tenderness: There is no abdominal tenderness. There is no guarding.   Musculoskeletal:      Right lower leg: No edema.      Left lower leg: No edema.   Skin:     General: Skin is warm and dry.      Capillary Refill: Capillary refill takes less than 2 seconds.      Coloration: Skin is not jaundiced.   Neurological:      General: No focal deficit present.      Mental Status: She is alert, oriented to person, place, and time and easily aroused. Mental status is at baseline.      Motor: No weakness.   Psychiatric:         Mood and Affect: Mood normal.         Behavior: Behavior normal. Behavior is cooperative.         Thought Content: Thought content normal.         Judgment: Judgment normal.             Labs:  Recent Labs      09/23/23 0416 09/24/23 0323   SODIUM 145 140   POTASSIUM 4.4 4.2   CHLORIDE 106 106   CO2 24 25   BUN 15 15   CREATININE 0.41* 0.46*   MAGNESIUM  --  1.7   PHOSPHORUS  --  2.6   CALCIUM 8.9 7.8*     Recent Labs     09/23/23 0416 09/24/23  0323   ALTSGPT 73* 51*   ASTSGOT 165* 92*   ALKPHOSPHAT 160* 101*   TBILIRUBIN 2.8* 3.0*   GLUCOSE 132* 136*     Recent Labs     09/23/23 0416 09/23/23 2330 09/24/23 0323   WBC 5.6 5.4  --    NEUTSPOLYS 51.50  --   --    LYMPHOCYTES 33.60  --   --    MONOCYTES 9.50  --   --    EOSINOPHILS 3.80  --   --    BASOPHILS 1.40  --   --    ASTSGOT 165*  --  92*   ALTSGPT 73*  --  51*   ALKPHOSPHAT 160*  --  101*   TBILIRUBIN 2.8*  --  3.0*     Recent Labs     09/23/23 0416 09/23/23 0745 09/23/23 1430 09/23/23 2330 09/24/23  0745   RBC 4.38  --   --  3.48*  --    HEMOGLOBIN 15.1   < > 12.8 11.9* 11.9*   HEMATOCRIT 44.5  --   --  36.3*  --    PLATELETCT 106*  --   --  78*  --    PROTHROMBTM 17.0*  --   --   --   --    INR 1.37*  --   --   --   --     < > = values in this interval not displayed.     Recent Results (from the past 24 hour(s))   HGB (Hemoglobin) for 48 hours    Collection Time: 09/23/23  2:30 PM   Result Value Ref Range    Hemoglobin 12.8 12.0 - 16.0 g/dL   CBC without Differential    Collection Time: 09/23/23 11:30 PM   Result Value Ref Range    WBC 5.4 4.8 - 10.8 K/uL    RBC 3.48 (L) 4.20 - 5.40 M/uL    Hemoglobin 11.9 (L) 12.0 - 16.0 g/dL    Hematocrit 36.3 (L) 37.0 - 47.0 %    .3 (H) 81.4 - 97.8 fL    MCH 34.2 (H) 27.0 - 33.0 pg    MCHC 32.8 32.2 - 35.5 g/dL    RDW 54.2 (H) 35.9 - 50.0 fL    Platelet Count 78 (L) 164 - 446 K/uL    MPV 10.5 9.0 - 12.9 fL   IMMATURE PLT FRACTION    Collection Time: 09/23/23 11:30 PM   Result Value Ref Range    Imm. Plt Fraction 6.3 0.6 - 13.1 %   Comp Metabolic Panel (CMP)    Collection Time: 09/24/23  3:23 AM   Result Value Ref Range    Sodium 140 135 - 145 mmol/L    Potassium 4.2 3.6 - 5.5 mmol/L    Chloride 106 96 - 112  mmol/L    Co2 25 20 - 33 mmol/L    Anion Gap 9.0 7.0 - 16.0    Glucose 136 (H) 65 - 99 mg/dL    Bun 15 8 - 22 mg/dL    Creatinine 0.46 (L) 0.50 - 1.40 mg/dL    Calcium 7.8 (L) 8.5 - 10.5 mg/dL    Correct Calcium 8.4 (L) 8.5 - 10.5 mg/dL    AST(SGOT) 92 (H) 12 - 45 U/L    ALT(SGPT) 51 (H) 2 - 50 U/L    Alkaline Phosphatase 101 (H) 30 - 99 U/L    Total Bilirubin 3.0 (H) 0.1 - 1.5 mg/dL    Albumin 3.3 3.2 - 4.9 g/dL    Total Protein 6.3 6.0 - 8.2 g/dL    Globulin 3.0 1.9 - 3.5 g/dL    A-G Ratio 1.1 g/dL   Magnesium    Collection Time: 09/24/23  3:23 AM   Result Value Ref Range    Magnesium 1.7 1.5 - 2.5 mg/dL   Phosphorus    Collection Time: 09/24/23  3:23 AM   Result Value Ref Range    Phosphorus 2.6 2.5 - 4.5 mg/dL   ESTIMATED GFR    Collection Time: 09/24/23  3:23 AM   Result Value Ref Range    GFR (CKD-EPI) 112 >60 mL/min/1.73 m 2   HGB (Hemoglobin) for 48 hours    Collection Time: 09/24/23  7:45 AM   Result Value Ref Range    Hemoglobin 11.9 (L) 12.0 - 16.0 g/dL       Radiology Review:  No orders to display         MDM (Data Review):   -Records reviewed and summarized in current documentation  -I personally reviewed and interpreted the laboratory results  -I personally reviewed the radiology images    Assessment/Recommendations:  Impressions:  Esophageal varices-s/p band ligation x1  Gastric varix  Acute GI bleeding  Hematemesis  Melena  Alcohol misuse disorder  Hepatic steatosis on ultrasound in February  Transaminitis with ALT to AST ratio 2:1  Thrombocytopenia  Hyperbilirubinemia  Elevated INR  Hearing impaired     MDM:  Patient is a 57-year-old female who is a daily alcohol drinker who presents with hematemesis x4 this morning and 2 days of melena.  Previous EGD has revealed portal hypertensive gastropathy, small gastric ulcers, esophagitis with ulceration, Esmer-Aparicio tear, and grade 1 esophageal varices.  Patient underwent EGD 9/23/2023 during which time small esophageal varices were seen in the distal  esophagus, 1 varix with large erosion with stigmata of recent bleeding with band x1.  There is a large amount of blood within the stomach as well as a large gastric varix in the proximal fundus without stigmata of recent bleeding.  Posterior aspect of stomach was not able to be well-visualized due to retained blood.  No mucosal abnormality in the duodenum.  Post procedurally, hemoglobin stable and BUN normal.  Hemodynamically stable.  LFTs improving.  Hyperbilirubinemia at 3.  Patient reports nausea but denies vomiting, abdominal pain, melena, hematochezia.      Recommendations:  Monitor H/H and for signs of overt bleeding-transfuse if necessary.  Continue thiamine, folic acid, and multivitamin  Continue octreotide for total of 72 hours  Continue PPI twice daily  CIWA protocol per primary team  May advance to clear liquid diet as tolerated-discussed with nursing.  If actively bleeding, please notify GI on call.  May need to be elevated to TIPS/BRTO       Plan discussed with patient, nursing, Dr. Lopez\      Core Quality Measures   Reviewed items::  Labs, Medications and Radiology reports reviewed

## 2023-09-24 NOTE — PROGRESS NOTES
Hospital Medicine Daily Progress Note    Date of Service  9/24/2023    Chief Complaint  Chiara Pacheco is a 57 y.o. female admitted 9/23/2023 with hematemesis    Hospital Course  Chiara Pacheco is a 57 y.o. female with hx of alcohol abuse with hx of withdrawal, tobacco use, and prior GI bleed for which she was admitted on 2/23, who presented 9/23/2023 with hematemesis. Due to this pt called EMS and presented to the ED. Pt also admits to daily ibuprofen use and states she has had melena like stools over past few days. GI was consulted and pt underwent endoscopy on 9/23 which showed gastric and esophageal varices with the stigmata of bleeding one of which was banded.      Interval Problem Update  Hgb stable overnight and normotensive, no further N/V, continuing octreotide drip, pt denies abd pain, sleeping comfortably prior to my interview, CIWA scores have been low    I have discussed this patient's plan of care and discharge plan at IDT rounds today with Case Management, Nursing, Nursing leadership, and other members of the IDT team.    Consultants/Specialty  GI    Code Status  Full Code    Disposition  The patient is not medically cleared for discharge to home or a post-acute facility.  Anticipate discharge to: home with close outpatient follow-up    I have placed the appropriate orders for post-discharge needs.    Review of Systems  Review of Systems   Constitutional:  Negative for chills and fever.   HENT:  Negative for congestion.    Eyes:  Negative for blurred vision.   Respiratory:  Negative for shortness of breath.    Cardiovascular:  Negative for chest pain and leg swelling.   Gastrointestinal:  Negative for abdominal pain, constipation, diarrhea, nausea and vomiting.   Genitourinary:  Negative for dysuria.   Musculoskeletal:  Negative for myalgias.   Skin:  Negative for rash.   Neurological:  Negative for sensory change and focal weakness.        Physical Exam  Temp:  [36.4 °C (97.6 °F)-36.8  °C (98.2 °F)] 36.8 °C (98.2 °F)  Pulse:  [] 59  Resp:  [10-26] 18  BP: (135-163)/(62-95) 136/62  SpO2:  [91 %-99 %] 97 %    Physical Exam  Constitutional:       General: She is not in acute distress.     Appearance: Normal appearance.   HENT:      Head: Normocephalic and atraumatic.      Nose: Nose normal.      Mouth/Throat:      Mouth: Mucous membranes are moist.      Pharynx: Oropharynx is clear.   Eyes:      Conjunctiva/sclera: Conjunctivae normal.      Pupils: Pupils are equal, round, and reactive to light.   Cardiovascular:      Rate and Rhythm: Normal rate and regular rhythm.      Heart sounds: No murmur heard.  Pulmonary:      Effort: Pulmonary effort is normal.      Breath sounds: No wheezing, rhonchi or rales.   Abdominal:      General: There is no distension.      Palpations: Abdomen is soft.      Tenderness: There is no abdominal tenderness. There is no guarding or rebound.   Musculoskeletal:         General: No swelling or tenderness.      Cervical back: Normal range of motion and neck supple.   Skin:     General: Skin is warm and dry.      Comments: Venous stasis dermatitis in b/l LE   Neurological:      Mental Status: She is alert.      GCS: GCS eye subscore is 4. GCS verbal subscore is 5. GCS motor subscore is 6.      Cranial Nerves: No facial asymmetry.   Psychiatric:         Mood and Affect: Mood normal.         Behavior: Behavior normal.         Fluids    Intake/Output Summary (Last 24 hours) at 9/24/2023 0837  Last data filed at 9/24/2023 0653  Gross per 24 hour   Intake 2319.81 ml   Output 500 ml   Net 1819.81 ml       Laboratory  Recent Labs     09/23/23  0416 09/23/23  0745 09/23/23  1430 09/23/23  2330 09/24/23  0745   WBC 5.6  --   --  5.4  --    RBC 4.38  --   --  3.48*  --    HEMOGLOBIN 15.1   < > 12.8 11.9* 11.9*   HEMATOCRIT 44.5  --   --  36.3*  --    .6*  --   --  104.3*  --    MCH 34.5*  --   --  34.2*  --    MCHC 33.9  --   --  32.8  --    RDW 56.3*  --   --  54.2*  --     PLATELETCT 106*  --   --  78*  --    MPV 10.8  --   --  10.5  --     < > = values in this interval not displayed.     Recent Labs     09/23/23  0416 09/24/23  0323   SODIUM 145 140   POTASSIUM 4.4 4.2   CHLORIDE 106 106   CO2 24 25   GLUCOSE 132* 136*   BUN 15 15   CREATININE 0.41* 0.46*   CALCIUM 8.9 7.8*     Recent Labs     09/23/23  0416   INR 1.37*               Imaging  No orders to display        Assessment/Plan  * Hematemesis- (present on admission)  Assessment & Plan  History of alcohol use and NSAID use, EGD showed gastric and esophageal varices one of which was banded  GI following, will appreciate recs  Trend H/H  Transfuse if hemoglobin less than 7 or if hemodynamically stable and hemoglobin less than 8 with active signs of bleeding  PPI twice daily  Octreotide drip      Elevated liver enzymes  Assessment & Plan  History of alcohol abuse and HCV, also overweight making LAUGHLIN potential component   -Monitor CMP    Alcohol abuse  Assessment & Plan  Watch for impending withdrawal  CIWA protocol  Thiamine, MTV, and folate  Seizure precautions  Encouraged alcohol cessation    Bleeding gastric varices- (present on admission)  Assessment & Plan  History of on prior and 9/23 endoscopies with esophageal varices also noted      Hepatitis C- (present on admission)  Assessment & Plan  Unknown if treated  Recommend follow-up with outpatient GI or infectious disease for consideration of treatment         VTE prophylaxis:   SCDs/TEDs      I have performed a physical exam and reviewed and updated ROS and Plan today (9/24/2023). In review of yesterday's note (9/23/2023), there are no changes except as documented above.

## 2023-09-24 NOTE — PROGRESS NOTES
4 Eyes Skin Assessment Completed by DENISE Zaldivar and DENISE Bunch.    Head WDL  Ears Redness and Blanching  Nose WDL  Mouth WDL  Neck WDL  Breast/Chest WDL  Shoulder Blades WDL  Spine WDL  (R) Arm/Elbow/Hand WDL  (L) Arm/Elbow/Hand WDL  Abdomen Scar  Groin WDL  Scrotum/Coccyx/Buttocks WDL  (R) Leg WDL  (L) Leg WDL  (R) Heel/Foot/Toe WDL  (L) Heel/Foot/Toe WDL          Devices In Places ECG, Tele Box, Blood Pressure Cuff, Pulse Ox, Hernandez, and Nasal Cannula      Interventions In Place NC W/Ear Foams and Low Air Loss Mattress    Possible Skin Injury No    Pictures Uploaded Into Epic N/A  Wound Consult Placed N/A  RN Wound Prevention Protocol Ordered No

## 2023-09-24 NOTE — CARE PLAN
The patient is Watcher - Medium risk of patient condition declining or worsening    Shift Goals  Clinical Goals: stable hgb, no hematemesis, hemodynamic stability  Patient Goals: rest  Family Goals: MARTINE    Progress made toward(s) clinical / shift goals:       Problem: Fall Risk  Goal: Patient will remain free from falls  Outcome: Progressing     Problem: Knowledge Deficit - Standard  Goal: Patient and family/care givers will demonstrate understanding of plan of care, disease process/condition, diagnostic tests and medications  Outcome: Progressing     Problem: Optimal Care for Alcohol Withdrawal  Goal: Optimal Care for the alcohol withdrawal patient  Outcome: Progressing     Problem: Risk for Aspiration  Goal: Patient's risk for aspiration will be absent or decrease  Outcome: Progressing     Problem: Skin Integrity  Goal: Skin integrity is maintained or improved  Outcome: Progressing     Problem: Pain - Standard  Goal: Alleviation of pain or a reduction in pain to the patient’s comfort goal  Outcome: Progressing     Problem: Hemodynamics  Goal: Patient's hemodynamics, fluid balance and neurologic status will be stable or improve  Outcome: Progressing       Patient is not progressing towards the following goals:

## 2023-09-25 LAB
ALBUMIN SERPL BCP-MCNC: 3.1 G/DL (ref 3.2–4.9)
ALBUMIN/GLOB SERPL: 1.1 G/DL
ALP SERPL-CCNC: 95 U/L (ref 30–99)
ALT SERPL-CCNC: 58 U/L (ref 2–50)
ANION GAP SERPL CALC-SCNC: 11 MMOL/L (ref 7–16)
AST SERPL-CCNC: 138 U/L (ref 12–45)
BILIRUB SERPL-MCNC: 2.8 MG/DL (ref 0.1–1.5)
BUN SERPL-MCNC: 17 MG/DL (ref 8–22)
CALCIUM ALBUM COR SERPL-MCNC: 8.6 MG/DL (ref 8.5–10.5)
CALCIUM SERPL-MCNC: 7.9 MG/DL (ref 8.5–10.5)
CHLORIDE SERPL-SCNC: 105 MMOL/L (ref 96–112)
CO2 SERPL-SCNC: 22 MMOL/L (ref 20–33)
CREAT SERPL-MCNC: 0.5 MG/DL (ref 0.5–1.4)
ERYTHROCYTE [DISTWIDTH] IN BLOOD BY AUTOMATED COUNT: 52.9 FL (ref 35.9–50)
GFR SERPLBLD CREATININE-BSD FMLA CKD-EPI: 109 ML/MIN/1.73 M 2
GLOBULIN SER CALC-MCNC: 2.9 G/DL (ref 1.9–3.5)
GLUCOSE SERPL-MCNC: 100 MG/DL (ref 65–99)
HCT VFR BLD AUTO: 33.2 % (ref 37–47)
HGB BLD-MCNC: 11.1 G/DL (ref 12–16)
MCH RBC QN AUTO: 34.5 PG (ref 27–33)
MCHC RBC AUTO-ENTMCNC: 33.4 G/DL (ref 32.2–35.5)
MCV RBC AUTO: 103.1 FL (ref 81.4–97.8)
PLATELET # BLD AUTO: 67 K/UL (ref 164–446)
PLATELETS.RETICULATED NFR BLD AUTO: 4.6 % (ref 0.6–13.1)
PMV BLD AUTO: 10.4 FL (ref 9–12.9)
POTASSIUM SERPL-SCNC: 3.6 MMOL/L (ref 3.6–5.5)
PROT SERPL-MCNC: 6 G/DL (ref 6–8.2)
RBC # BLD AUTO: 3.22 M/UL (ref 4.2–5.4)
SODIUM SERPL-SCNC: 138 MMOL/L (ref 135–145)
WBC # BLD AUTO: 4.5 K/UL (ref 4.8–10.8)

## 2023-09-25 PROCEDURE — 80053 COMPREHEN METABOLIC PANEL: CPT

## 2023-09-25 PROCEDURE — 85027 COMPLETE CBC AUTOMATED: CPT

## 2023-09-25 PROCEDURE — 700111 HCHG RX REV CODE 636 W/ 250 OVERRIDE (IP): Performed by: HOSPITALIST

## 2023-09-25 PROCEDURE — 700102 HCHG RX REV CODE 250 W/ 637 OVERRIDE(OP)

## 2023-09-25 PROCEDURE — A9270 NON-COVERED ITEM OR SERVICE: HCPCS | Performed by: HOSPITALIST

## 2023-09-25 PROCEDURE — 99233 SBSQ HOSP IP/OBS HIGH 50: CPT | Performed by: HOSPITALIST

## 2023-09-25 PROCEDURE — 99232 SBSQ HOSP IP/OBS MODERATE 35: CPT | Performed by: NURSE PRACTITIONER

## 2023-09-25 PROCEDURE — 700102 HCHG RX REV CODE 250 W/ 637 OVERRIDE(OP): Performed by: HOSPITALIST

## 2023-09-25 PROCEDURE — C9113 INJ PANTOPRAZOLE SODIUM, VIA: HCPCS | Performed by: HOSPITALIST

## 2023-09-25 PROCEDURE — 85055 RETICULATED PLATELET ASSAY: CPT

## 2023-09-25 PROCEDURE — A9270 NON-COVERED ITEM OR SERVICE: HCPCS

## 2023-09-25 PROCEDURE — 700105 HCHG RX REV CODE 258: Performed by: HOSPITALIST

## 2023-09-25 PROCEDURE — 770020 HCHG ROOM/CARE - TELE (206)

## 2023-09-25 RX ORDER — CHOLECALCIFEROL (VITAMIN D3) 125 MCG
5 CAPSULE ORAL ONCE
Status: COMPLETED | OUTPATIENT
Start: 2023-09-25 | End: 2023-09-25

## 2023-09-25 RX ADMIN — SENNOSIDES AND DOCUSATE SODIUM 2 TABLET: 50; 8.6 TABLET ORAL at 05:08

## 2023-09-25 RX ADMIN — Medication 5 MG: at 21:13

## 2023-09-25 RX ADMIN — SENNOSIDES AND DOCUSATE SODIUM 2 TABLET: 50; 8.6 TABLET ORAL at 18:44

## 2023-09-25 RX ADMIN — Medication 100 MG: at 05:08

## 2023-09-25 RX ADMIN — FOLIC ACID 1 MG: 1 TABLET ORAL at 05:08

## 2023-09-25 RX ADMIN — PANTOPRAZOLE SODIUM 40 MG: 40 INJECTION, POWDER, FOR SOLUTION INTRAVENOUS at 18:44

## 2023-09-25 RX ADMIN — OCTREOTIDE ACETATE 50 MCG/HR: 200 INJECTION, SOLUTION INTRAVENOUS; SUBCUTANEOUS at 09:54

## 2023-09-25 RX ADMIN — LORAZEPAM 0.5 MG: 0.5 TABLET ORAL at 09:52

## 2023-09-25 RX ADMIN — SODIUM CHLORIDE, POTASSIUM CHLORIDE, SODIUM LACTATE AND CALCIUM CHLORIDE: 600; 310; 30; 20 INJECTION, SOLUTION INTRAVENOUS at 09:45

## 2023-09-25 RX ADMIN — ACETAMINOPHEN 650 MG: 325 TABLET, FILM COATED ORAL at 09:51

## 2023-09-25 RX ADMIN — PANTOPRAZOLE SODIUM 40 MG: 40 INJECTION, POWDER, FOR SOLUTION INTRAVENOUS at 05:08

## 2023-09-25 RX ADMIN — THERA TABS 1 TABLET: TAB at 05:08

## 2023-09-25 ASSESSMENT — ENCOUNTER SYMPTOMS
CHILLS: 0
SHORTNESS OF BREATH: 0
COUGH: 0
SENSORY CHANGE: 0
ABDOMINAL PAIN: 0
NAUSEA: 1
NERVOUS/ANXIOUS: 0
SORE THROAT: 0
CONSTIPATION: 0
MYALGIAS: 0
FOCAL WEAKNESS: 0
NAUSEA: 0
WEAKNESS: 1
BLOOD IN STOOL: 0
FEVER: 0
HEADACHES: 0
MYALGIAS: 1
DIARRHEA: 0
DIZZINESS: 0
VOMITING: 0
BLURRED VISION: 0
FALLS: 0
FLANK PAIN: 0

## 2023-09-25 ASSESSMENT — LIFESTYLE VARIABLES
ANXIETY: *
VISUAL DISTURBANCES: VERY MILD SENSITIVITY
ORIENTATION AND CLOUDING OF SENSORIUM: ORIENTED AND CAN DO SERIAL ADDITIONS
AGITATION: NORMAL ACTIVITY
VISUAL DISTURBANCES: NOT PRESENT
ORIENTATION AND CLOUDING OF SENSORIUM: ORIENTED AND CAN DO SERIAL ADDITIONS
HEADACHE, FULLNESS IN HEAD: NOT PRESENT
AGITATION: NORMAL ACTIVITY
AUDITORY DISTURBANCES: NOT PRESENT
HEADACHE, FULLNESS IN HEAD: MILD
NAUSEA AND VOMITING: NO NAUSEA AND NO VOMITING
PAROXYSMAL SWEATS: NO SWEAT VISIBLE
NAUSEA AND VOMITING: NO NAUSEA AND NO VOMITING
TREMOR: NO TREMOR
TREMOR: NO TREMOR
AUDITORY DISTURBANCES: NOT PRESENT
HEADACHE, FULLNESS IN HEAD: VERY MILD
VISUAL DISTURBANCES: NOT PRESENT
VISUAL DISTURBANCES: NOT PRESENT
PAROXYSMAL SWEATS: NO SWEAT VISIBLE
SUBSTANCE_ABUSE: 1
VISUAL DISTURBANCES: VERY MILD SENSITIVITY
ORIENTATION AND CLOUDING OF SENSORIUM: ORIENTED AND CAN DO SERIAL ADDITIONS
ANXIETY: *
AUDITORY DISTURBANCES: NOT PRESENT
ORIENTATION AND CLOUDING OF SENSORIUM: ORIENTED AND CAN DO SERIAL ADDITIONS
PAROXYSMAL SWEATS: NO SWEAT VISIBLE
PAROXYSMAL SWEATS: NO SWEAT VISIBLE
NAUSEA AND VOMITING: NO NAUSEA AND NO VOMITING
AGITATION: NORMAL ACTIVITY
AGITATION: NORMAL ACTIVITY
ORIENTATION AND CLOUDING OF SENSORIUM: ORIENTED AND CAN DO SERIAL ADDITIONS
AUDITORY DISTURBANCES: NOT PRESENT
TOTAL SCORE: 3
ANXIETY: *
VISUAL DISTURBANCES: VERY MILD SENSITIVITY
NAUSEA AND VOMITING: NO NAUSEA AND NO VOMITING
ANXIETY: *
ORIENTATION AND CLOUDING OF SENSORIUM: ORIENTED AND CAN DO SERIAL ADDITIONS
HEADACHE, FULLNESS IN HEAD: VERY MILD
TREMOR: TREMOR NOT VISIBLE BUT CAN BE FELT, FINGERTIP TO FINGERTIP
ANXIETY: *
TOTAL SCORE: 3
TOTAL SCORE: 4
AGITATION: NORMAL ACTIVITY
PAROXYSMAL SWEATS: NO SWEAT VISIBLE
PAROXYSMAL SWEATS: NO SWEAT VISIBLE
TOTAL SCORE: 4
HEADACHE, FULLNESS IN HEAD: MILD
TOTAL SCORE: 2
ANXIETY: *
TOTAL SCORE: 7
TREMOR: NO TREMOR
AGITATION: NORMAL ACTIVITY
HEADACHE, FULLNESS IN HEAD: NOT PRESENT
AUDITORY DISTURBANCES: NOT PRESENT
NAUSEA AND VOMITING: NO NAUSEA AND NO VOMITING
AUDITORY DISTURBANCES: NOT PRESENT
NAUSEA AND VOMITING: MILD NAUSEA WITH NO VOMITING

## 2023-09-25 ASSESSMENT — PAIN DESCRIPTION - PAIN TYPE
TYPE: ACUTE PAIN

## 2023-09-25 ASSESSMENT — FIBROSIS 4 INDEX: FIB4 SCORE: 15.15

## 2023-09-25 NOTE — PROGRESS NOTES
Gastroenterology Progress Note               Author:  TED Beck Date & Time Created: 9/25/2023 12:27 PM       Patient ID:  Name:             Chiara Pacheco  YOB: 1965  Age:                 57 y.o.  female  MRN:               [id number]        Medical Decision Making, by Problem:  Active Hospital Problems    Diagnosis     Hematemesis [K92.0]     Bleeding gastric varices [I86.4]     Alcohol abuse [F10.10]     Elevated liver enzymes [R74.8]     Hepatitis C [B19.20]            Presenting Chief Complaint:  Hematemesis     History of Present Illness: Myrna Gar is a 57-year-old female who presents to the ER this morning after vomiting up blood 4 times this morning.  She also endorses dark stools for 2 days.  She describes associated lightheadedness, dizziness, and fatigue.  Denies abdominal pain.  She reports that she was drinking vodka last night and also took some Advil yesterday.  She says that she drinks one shot of vodka every day and takes Advil as needed for pain. Reports that she has been taking the medicine that the doctors gave her after she discharged in February (PPI).      In the ED, vital signs are stable but she is tachycardic.  Labs reveal hemoglobin 15.1, .6, platelets 106, , ALT 73, alkaline phosphatase 160, T. bili 2.8, and INR 1.37.     GI history:  2/19/23: EGD:  Grade 1 varices, flattened, cant treat  Oozing portal hypertension gastropathy  Oozing small gastric ulcers  Esmer enrique tear  Esophagitis with ulceration     2/20/23: EGD:  Grade 1 esophageal varices, flattened, cant treat  Oozing portal hypertension gastropathy  Oozing small gastric ulcers  Esmer enrique tear, clip intact         Interval History:  9/24/2023: Patient seen at bedside.  Patient reports nausea but no vomiting or abdominal pain.  Per nursing, patient was nauseous when attempted to take sips with 3 tablets of medication.  Hemoglobin stable.  CIWA scores overnight  2-3.    9/25/2023: Patient seen at bedside.  She reports ongoing nausea but able to tolerate small amounts of clear liquids.  Hemoglobin stable.  CIWA overnight 3-7.    Hospital Medications:  Current Facility-Administered Medications   Medication Dose Frequency Provider Last Rate Last Admin    senna-docusate (Pericolace Or Senokot S) 8.6-50 MG per tablet 2 Tablet  2 Tablet BID Juan Marshall D.O.   2 Tablet at 09/25/23 0508    And    polyethylene glycol/lytes (Miralax) PACKET 1 Packet  1 Packet QDAY PRN Juan Marshall D.O.        And    magnesium hydroxide (Milk Of Magnesia) suspension 30 mL  30 mL QDAY PRN Juan Marshall D.O.        And    bisacodyl (Dulcolax) suppository 10 mg  10 mg QDAY PRN Juan Marshall D.O.        lactated ringers infusion   Continuous Juan Marshall D.O. 83 mL/hr at 09/25/23 0945 New Bag at 09/25/23 0945    acetaminophen (Tylenol) tablet 650 mg  650 mg Q6HRS PRN ROSALBA GuerrierO.   650 mg at 09/25/23 0951    labetalol (Normodyne/Trandate) injection 10 mg  10 mg Q4HRS PRN Juan Marshall D.O.        ondansetron (Zofran) syringe/vial injection 4 mg  4 mg Q4HRS PRN Juan Marshall D.O.        ondansetron (Zofran ODT) dispertab 4 mg  4 mg Q4HRS PRN Juan Marshall D.O.        promethazine (Phenergan) tablet 12.5-25 mg  12.5-25 mg Q4HRS PRN Juan Marshall D.O.        promethazine (Phenergan) suppository 12.5-25 mg  12.5-25 mg Q4HRS PRN Juan Marshall D.O.        prochlorperazine (Compazine) injection 5-10 mg  5-10 mg Q4HRS PRN Juan Marshall D.O.        octreotide (SandoSTATIN) 1,250 mcg in  mL Infusion  50 mcg/hr Continuous Juan Marshall D.O. 10 mL/hr at 09/25/23 0954 50 mcg/hr at 09/25/23 0954    pantoprazole (Protonix) injection 40 mg  40 mg BID Juan Marshall D.O.   40 mg at 09/25/23 0508    thiamine (Vitamin B-1) tablet 100 mg  100 mg DAILY Juan Marshall D.O.   100 mg at 09/25/23 0508    And    folic acid (Folvite) tablet 1 mg  1 mg DAILY ROSALBA GuerrierORoshan   1 mg at  "09/25/23 0508    And    multivitamin tablet 1 Tablet  1 Tablet DAILY Juan Marshall D.O.   1 Tablet at 09/25/23 0508    LORazepam (Ativan) tablet 0.5 mg  0.5 mg Q4HRS PRN ROSALBA GuerrierO.   0.5 mg at 09/25/23 0952    LORazepam (Ativan) tablet 1 mg  1 mg Q4HRS PRN Juan Marshall D.O.        Or    LORazepam (Ativan) injection 0.5 mg  0.5 mg Q4HRS PRN ROSALBA GuerrierO.        LORazepam (Ativan) tablet 2 mg  2 mg Q2HRS PRN Juan Marshall D.O.        Or    LORazepam (Ativan) injection 1 mg  1 mg Q2HRS PRN Juan Marshall D.O.        LORazepam (Ativan) tablet 3 mg  3 mg Q HOUR PRN Juan Marshall D.O.        Or    LORazepam (Ativan) injection 1.5 mg  1.5 mg Q HOUR PRN Juan Marshall D.O.        LORazepam (Ativan) tablet 4 mg  4 mg Q15 MIN PRN Juan Marshall D.O.        Or    LORazepam (Ativan) injection 2 mg  2 mg Q15 MIN PRN Juan Marshall D.O.       Last reviewed on 9/23/2023  7:33 AM by [facility]       Review of Systems:  Review of Systems   Constitutional:  Positive for malaise/fatigue. Negative for chills and fever.   HENT:  Negative for congestion and sore throat.    Respiratory:  Negative for cough and shortness of breath.    Cardiovascular:  Negative for chest pain and leg swelling.        + Retrosternal chest pain s/p EGD with variceal banding.   Gastrointestinal:  Positive for nausea. Negative for abdominal pain, blood in stool, constipation, diarrhea, melena and vomiting.   Genitourinary:  Negative for dysuria and flank pain.   Musculoskeletal:  Positive for myalgias. Negative for falls.   Neurological:  Positive for weakness. Negative for dizziness and headaches.   Psychiatric/Behavioral:  Positive for substance abuse. The patient is not nervous/anxious.    All other systems reviewed and are negative.        Vital signs:  Weight/BMI: Body mass index is 28.71 kg/m².  /77   Pulse 63   Temp 36.6 °C (97.8 °F) (Temporal)   Resp 17   Ht 1.753 m (5' 9\")   Wt 88.2 kg (194 lb 7.1 oz)   " SpO2 93%   Vitals:    09/24/23 1944 09/24/23 2300 09/25/23 0401 09/25/23 0837   BP: 129/69 105/67 129/61 131/77   Pulse: 66 87 60 63   Resp: 18 18 18 17   Temp: 36.1 °C (97 °F) 36.5 °C (97.7 °F) 36.6 °C (97.9 °F) 36.6 °C (97.8 °F)   TempSrc: Temporal Temporal Temporal Temporal   SpO2: 91% 95% 91% 93%   Weight:   88.2 kg (194 lb 7.1 oz)    Height:         Oxygen Therapy:  Pulse Oximetry: 93 %, O2 (LPM): 0, O2 Delivery Device: None - Room Air    Intake/Output Summary (Last 24 hours) at 9/25/2023 1227  Last data filed at 9/25/2023 0951  Gross per 24 hour   Intake 545.68 ml   Output 450 ml   Net 95.68 ml           Physical Exam:  Physical Exam  Vitals and nursing note reviewed.   Constitutional:       General: She is awake. She is not in acute distress.     Appearance: She is overweight. She is not toxic-appearing.   HENT:      Head: Normocephalic.      Nose: Nose normal. No congestion.      Mouth/Throat:      Mouth: Mucous membranes are moist.      Pharynx: Oropharynx is clear. No oropharyngeal exudate.   Eyes:      General: No scleral icterus.     Extraocular Movements: Extraocular movements intact.      Conjunctiva/sclera: Conjunctivae normal.   Cardiovascular:      Rate and Rhythm: Normal rate and regular rhythm.      Pulses: Normal pulses.      Heart sounds: Normal heart sounds. No murmur heard.  Pulmonary:      Effort: Pulmonary effort is normal. No respiratory distress.      Breath sounds: Normal breath sounds. No wheezing.   Abdominal:      General: Abdomen is flat. Bowel sounds are normal. There is no distension.      Palpations: Abdomen is soft.      Tenderness: There is no abdominal tenderness. There is no guarding.   Musculoskeletal:      Right lower leg: No edema.      Left lower leg: No edema.   Skin:     General: Skin is warm and dry.      Capillary Refill: Capillary refill takes less than 2 seconds.      Coloration: Skin is not jaundiced.   Neurological:      General: No focal deficit present.       Mental Status: She is alert, oriented to person, place, and time and easily aroused. Mental status is at baseline.      Motor: No weakness.   Psychiatric:         Mood and Affect: Mood normal.         Behavior: Behavior normal. Behavior is cooperative.         Thought Content: Thought content normal.         Judgment: Judgment normal.             Labs:  Recent Labs     09/23/23 0416 09/24/23 0323 09/25/23 0121   SODIUM 145 140 138   POTASSIUM 4.4 4.2 3.6   CHLORIDE 106 106 105   CO2 24 25 22   BUN 15 15 17   CREATININE 0.41* 0.46* 0.50   MAGNESIUM  --  1.7  --    PHOSPHORUS  --  2.6  --    CALCIUM 8.9 7.8* 7.9*       Recent Labs     09/23/23 0416 09/24/23 0323 09/25/23 0121   ALTSGPT 73* 51* 58*   ASTSGOT 165* 92* 138*   ALKPHOSPHAT 160* 101* 95   TBILIRUBIN 2.8* 3.0* 2.8*   GLUCOSE 132* 136* 100*       Recent Labs     09/23/23 0416 09/23/23 2330 09/24/23 0323 09/25/23 0121   WBC 5.6 5.4  --  4.5*   NEUTSPOLYS 51.50  --   --   --    LYMPHOCYTES 33.60  --   --   --    MONOCYTES 9.50  --   --   --    EOSINOPHILS 3.80  --   --   --    BASOPHILS 1.40  --   --   --    ASTSGOT 165*  --  92* 138*   ALTSGPT 73*  --  51* 58*   ALKPHOSPHAT 160*  --  101* 95   TBILIRUBIN 2.8*  --  3.0* 2.8*       Recent Labs     09/23/23 0416 09/23/23 0745 09/23/23 2330 09/24/23 0745 09/25/23 0121   RBC 4.38  --  3.48*  --  3.22*   HEMOGLOBIN 15.1   < > 11.9* 11.9* 11.1*   HEMATOCRIT 44.5  --  36.3*  --  33.2*   PLATELETCT 106*  --  78*  --  67*   PROTHROMBTM 17.0*  --   --   --   --    INR 1.37*  --   --   --   --     < > = values in this interval not displayed.       Recent Results (from the past 24 hour(s))   CBC WITHOUT DIFFERENTIAL    Collection Time: 09/25/23  1:21 AM   Result Value Ref Range    WBC 4.5 (L) 4.8 - 10.8 K/uL    RBC 3.22 (L) 4.20 - 5.40 M/uL    Hemoglobin 11.1 (L) 12.0 - 16.0 g/dL    Hematocrit 33.2 (L) 37.0 - 47.0 %    .1 (H) 81.4 - 97.8 fL    MCH 34.5 (H) 27.0 - 33.0 pg    MCHC 33.4 32.2 - 35.5 g/dL     RDW 52.9 (H) 35.9 - 50.0 fL    Platelet Count 67 (L) 164 - 446 K/uL    MPV 10.4 9.0 - 12.9 fL   Comp Metabolic Panel    Collection Time: 09/25/23  1:21 AM   Result Value Ref Range    Sodium 138 135 - 145 mmol/L    Potassium 3.6 3.6 - 5.5 mmol/L    Chloride 105 96 - 112 mmol/L    Co2 22 20 - 33 mmol/L    Anion Gap 11.0 7.0 - 16.0    Glucose 100 (H) 65 - 99 mg/dL    Bun 17 8 - 22 mg/dL    Creatinine 0.50 0.50 - 1.40 mg/dL    Calcium 7.9 (L) 8.5 - 10.5 mg/dL    Correct Calcium 8.6 8.5 - 10.5 mg/dL    AST(SGOT) 138 (H) 12 - 45 U/L    ALT(SGPT) 58 (H) 2 - 50 U/L    Alkaline Phosphatase 95 30 - 99 U/L    Total Bilirubin 2.8 (H) 0.1 - 1.5 mg/dL    Albumin 3.1 (L) 3.2 - 4.9 g/dL    Total Protein 6.0 6.0 - 8.2 g/dL    Globulin 2.9 1.9 - 3.5 g/dL    A-G Ratio 1.1 g/dL   IMMATURE PLT FRACTION    Collection Time: 09/25/23  1:21 AM   Result Value Ref Range    Imm. Plt Fraction 4.6 0.6 - 13.1 %   ESTIMATED GFR    Collection Time: 09/25/23  1:21 AM   Result Value Ref Range    GFR (CKD-EPI) 109 >60 mL/min/1.73 m 2       Radiology Review:  No orders to display         MDM (Data Review):   -Records reviewed and summarized in current documentation  -I personally reviewed and interpreted the laboratory results  -I personally reviewed the radiology images    Assessment/Recommendations:  Impressions:  Esophageal varices-s/p band ligation x1  Gastric varix  Acute GI bleeding  Hematemesis  Melena  Alcohol misuse disorder  Hepatic steatosis on ultrasound in February  Transaminitis with ALT to AST ratio 2:1  Thrombocytopenia  Hyperbilirubinemia  Elevated INR  Hearing impaired     MDM:  Patient is a 57-year-old female who is a daily alcohol drinker who presents with hematemesis x4 this morning and 2 days of melena.  Previous EGD has revealed portal hypertensive gastropathy, small gastric ulcers, esophagitis with ulceration, Esmer-Aparicio tear, and grade 1 esophageal varices.  Patient underwent EGD 9/23/2023 during which time small  esophageal varices were seen in the distal esophagus, 1 varix with large erosion with stigmata of recent bleeding with band x1.  There is a large amount of blood within the stomach as well as a large gastric varix in the proximal fundus without stigmata of recent bleeding.  Posterior aspect of stomach was not able to be well-visualized due to retained blood.  No mucosal abnormality in the duodenum.  Post procedurally, hemoglobin stable and BUN normal.  Hemodynamically stable.  LFTs mildly increased from yesterday, consistent with alcohol pattern. Will discuss with nursing to watch for visitors bringing outside alcohol to patient.  Hyperbilirubinemia at 3 decreased to 2.8.  patient reports nausea but denies vomiting, abdominal pain, melena, hematochezia.      Recommendations:  Monitor H/H and for signs of overt bleeding-transfuse if necessary.  Continue thiamine, folic acid, and multivitamin  Ok to dc octreotide  Continue PPI twice daily  CIWA protocol per primary team  Clear liquid diet, advance to GI soft as tolerated.   Monitor visitors for bringing outside alcohol.   Alcohol cessation recommended.   Referral to OP GI placed- anticipate low compliance with NSBB, will defer to OP GI if they would like to start NSBB for bleeding prevention.   If actively bleeding, please notify GI on call.  May need to be elevated to TIPS/BRTO     No further interventions from acute GI. GI to sign off. Please reconsult for any further questions or concerns.       Plan discussed with patient, nursing, Dr. Ma, Dr. Wilkes      Core Quality Measures   Reviewed items::  Labs, Medications and Radiology reports reviewed

## 2023-09-25 NOTE — PROGRESS NOTES
4 Eyes Skin Assessment Completed by DENISE Maher and DENISE Mae.    Head WDL  Ears WDL  Nose WDL  Mouth WDL  Neck WDL  Breast/Chest WDL  Shoulder Blades WDL  Spine WDL  (R) Arm/Elbow/Hand WDL  (L) Arm/Elbow/Hand WDL  Abdomen Scar from appendectomy,   Groin WDL  Scrotum/Coccyx/Buttocks WDL  (R) Leg WDL  (L) Leg WDL  (R) Heel/Foot/Toe WDL , dryness   (L) Heel/Foot/Toe WDL, dryness            Devices In Places Tele Box and Pulse Ox      Interventions In Place Gray Ear Foams and Sacral Mepilex    Possible Skin Injury No    Pictures Uploaded Into Epic N/A  Wound Consult Placed N/A  RN Wound Prevention Protocol Ordered No

## 2023-09-25 NOTE — CARE PLAN
The patient is Watcher - Medium risk of patient condition declining or worsening    Shift Goals Monitor for hematemesis   Clinical Goals: Hemodynamic stability, No signs of blood loss  Patient Goals: Comfort and rest  Family Goals: MARTINE    Progress made toward(s) clinical / shift goals:    Problem: Seizure Precautions  Goal: Implementation of seizure precautions  Outcome: Progressing   Seizure precautions in place.       Patient is not progressing towards the following goals:

## 2023-09-25 NOTE — DISCHARGE PLANNING
"      Case Management Discharge Planning    Admission Date: 9/23/2023  GMLOS: 3  ALOS: 1    6-Clicks ADL Score: 20  6-Clicks Mobility Score: 16  PT and/or OT Eval ordered: No  Post-acute Referrals Ordered: NA  Post-acute Choice Obtained: NA  Has referral(s) been sent to post-acute provider:  SAWYER      Anticipated Discharge Dispo: Discharge Disposition: Discharged to home/self care (01)    DME Needed: Yes    DME Ordered: No    Action(s) Taken: DC Assessment Complete (See below)    Escalations Completed: None    Medically Clear: No    Next Steps: very [place]. Reads lips. States she has no needs. Refused resources for alcohol cessation. States she has been through rehab for alcohol and depression in [place]. 2 nd round of GI bleed this year. Lives with SO, also in the home of a 84 y/o male. They help him if needed.  Was independent prior to admission. No needs identified at this time. AD information packet given.     Barriers to Discharge: No Social Support    Is the patient up for discharge tomorrow: No  Care Transition Team Assessment  Case management role explained to patient. Very [place] states \"I read lips.\" Demographics verified. 56 y/o female, PMH: alcohol abuse with prior withdrawal, tobacco use, hard of hearing and prior GI bleed (2/23)   present with blood in vomit. Dx: hematemesis, elevated liver enzymes. Alcohol abuse, Bleeding gastric varices, Hep C.   Information Source  Orientation Level: Oriented X4  Information Given By: Patient  Informant's Name: Myrna  Who is responsible for making decisions for patient? : Patient    Readmission Evaluation  Is this a readmission?: No    Elopement Risk  Legal Hold: No  Ambulatory or Self Mobile in Wheelchair: Yes  Disoriented: No  Psychiatric Symptoms: None  History of Wandering: No  Elopement this Admit: No  Vocalizing Wanting to Leave: No  Displays Behaviors, Body Language Wanting to Leave: No-Not at Risk for Elopement  Elopement Risk: Not at Risk for " Elopement    Interdisciplinary Discharge Planning  Lives with - Patient's Self Care Capacity: Significant Other (and 84 y/o unrelated male.)  Patient or legal guardian wants to designate a caregiver: No  Support Systems: Spouse / Significant Other  Housing / Facility: Mobile Home  Do You Take your Prescribed Medications Regularly: No  Reasons Why Not Taking Medications :  (prn meds)  Able to Return to Previous ADL's: Yes  Mobility Issues: No  Patient Prefers to be Discharged to:: home  Assistance Needed: No  Durable Medical Equipment: Not Applicable    Discharge Preparedness  What is your plan after discharge?: Home with help  What are your discharge supports?: Other (comment) (significant other)  Prior Functional Level: Independent with Activities of Daily Living    Functional Assesment  Prior Functional Level: Independent with Activities of Daily Living    Finances  Financial Barriers to Discharge: No  Prescription Coverage: No    Vision / Hearing Impairment  Vision Impairment : Yes  Right Eye Vision: Impaired, Wears Glasses  Left Eye Vision: Impaired, Wears Glasses  Hearing Impairment : Yes  Hearing Impairment: Both Ears  Does Pt Need Special Equipment for the Hearing Impaired?: No         Advance Directive  Advance Directive?: None  Advance Directive offered?: AD Booklet given    Domestic Abuse  Have you ever been the victim of abuse or violence?: No  Physical Abuse or Sexual Abuse: No  Verbal Abuse or Emotional Abuse: No  Possible Abuse/Neglect Reported to:: Not Applicable    Psychological Assessment  History of Substance Abuse: Alcohol  Date Last Used - Alcohol: 9/23/23  Substance Abuse Comments: i have had lots of counseling I don't need resources.  History of Psychiatric Problems: Yes (Depression)  Non-compliant with Treatment: No  Newly Diagnosed Illness: No    Discharge Risks or Barriers  Discharge risks or barriers?: Substance abuse  Patient risk factors: Lack of outside supports    Anticipated Discharge  Information  Discharge Disposition: Discharged to home/self care (01)

## 2023-09-25 NOTE — CARE PLAN
The patient is Watcher - Medium risk of patient condition declining or worsening    Shift Goals  Clinical Goals: Hemodynamic stability, No signs of blood loss  Patient Goals: Comfort and rest  Family Goals: MARTINE    Progress made toward(s) clinical / shift goals:    Patient is a CIWA patient. Monitoring CIWAs as indicated by protocol. Patient states she has a ankle fracture. Encouraging pt to call for assistance, bed alarm is on and providing assistance OOB to bathroom. Seizure precautions initiated.     Patient is not progressing towards the following goals:      Problem: Fall Risk  Goal: Patient will remain free from falls  Outcome: Progressing     Problem: Seizure Precautions  Goal: Implementation of seizure precautions  Outcome: Progressing

## 2023-09-26 ENCOUNTER — PHARMACY VISIT (OUTPATIENT)
Dept: PHARMACY | Facility: MEDICAL CENTER | Age: 58
End: 2023-09-26
Payer: MEDICARE

## 2023-09-26 VITALS
WEIGHT: 191.8 LBS | RESPIRATION RATE: 16 BRPM | HEIGHT: 69 IN | HEART RATE: 68 BPM | BODY MASS INDEX: 28.41 KG/M2 | SYSTOLIC BLOOD PRESSURE: 156 MMHG | OXYGEN SATURATION: 90 % | TEMPERATURE: 98.6 F | DIASTOLIC BLOOD PRESSURE: 81 MMHG

## 2023-09-26 PROBLEM — K92.0 HEMATEMESIS: Status: RESOLVED | Noted: 2023-09-23 | Resolved: 2023-09-26

## 2023-09-26 LAB
ANION GAP SERPL CALC-SCNC: 11 MMOL/L (ref 7–16)
BUN SERPL-MCNC: 15 MG/DL (ref 8–22)
CALCIUM SERPL-MCNC: 8 MG/DL (ref 8.5–10.5)
CHLORIDE SERPL-SCNC: 107 MMOL/L (ref 96–112)
CO2 SERPL-SCNC: 23 MMOL/L (ref 20–33)
CREAT SERPL-MCNC: 0.55 MG/DL (ref 0.5–1.4)
ERYTHROCYTE [DISTWIDTH] IN BLOOD BY AUTOMATED COUNT: 51.8 FL (ref 35.9–50)
GFR SERPLBLD CREATININE-BSD FMLA CKD-EPI: 107 ML/MIN/1.73 M 2
GLUCOSE SERPL-MCNC: 101 MG/DL (ref 65–99)
HCT VFR BLD AUTO: 35.3 % (ref 37–47)
HGB BLD-MCNC: 11.7 G/DL (ref 12–16)
MAGNESIUM SERPL-MCNC: 1.7 MG/DL (ref 1.5–2.5)
MCH RBC QN AUTO: 33.4 PG (ref 27–33)
MCHC RBC AUTO-ENTMCNC: 33.1 G/DL (ref 32.2–35.5)
MCV RBC AUTO: 100.9 FL (ref 81.4–97.8)
PLATELET # BLD AUTO: 67 K/UL (ref 164–446)
PLATELETS.RETICULATED NFR BLD AUTO: 5.4 % (ref 0.6–13.1)
PMV BLD AUTO: 10.9 FL (ref 9–12.9)
POTASSIUM SERPL-SCNC: 3.9 MMOL/L (ref 3.6–5.5)
RBC # BLD AUTO: 3.5 M/UL (ref 4.2–5.4)
SODIUM SERPL-SCNC: 141 MMOL/L (ref 135–145)
WBC # BLD AUTO: 4.2 K/UL (ref 4.8–10.8)

## 2023-09-26 PROCEDURE — RXMED WILLOW AMBULATORY MEDICATION CHARGE: Performed by: INTERNAL MEDICINE

## 2023-09-26 PROCEDURE — 99239 HOSP IP/OBS DSCHRG MGMT >30: CPT | Performed by: INTERNAL MEDICINE

## 2023-09-26 PROCEDURE — A9270 NON-COVERED ITEM OR SERVICE: HCPCS | Performed by: HOSPITALIST

## 2023-09-26 PROCEDURE — 85027 COMPLETE CBC AUTOMATED: CPT

## 2023-09-26 PROCEDURE — 700111 HCHG RX REV CODE 636 W/ 250 OVERRIDE (IP): Performed by: HOSPITALIST

## 2023-09-26 PROCEDURE — 83735 ASSAY OF MAGNESIUM: CPT

## 2023-09-26 PROCEDURE — 80048 BASIC METABOLIC PNL TOTAL CA: CPT

## 2023-09-26 PROCEDURE — C9113 INJ PANTOPRAZOLE SODIUM, VIA: HCPCS | Performed by: HOSPITALIST

## 2023-09-26 PROCEDURE — 700102 HCHG RX REV CODE 250 W/ 637 OVERRIDE(OP): Performed by: HOSPITALIST

## 2023-09-26 PROCEDURE — 85055 RETICULATED PLATELET ASSAY: CPT

## 2023-09-26 RX ORDER — OMEPRAZOLE 20 MG/1
20 CAPSULE, DELAYED RELEASE ORAL 2 TIMES DAILY
Qty: 60 CAPSULE | Refills: 0 | Status: SHIPPED | OUTPATIENT
Start: 2023-09-26

## 2023-09-26 RX ORDER — LANOLIN ALCOHOL/MO/W.PET/CERES
100 CREAM (GRAM) TOPICAL DAILY
Qty: 30 TABLET | Refills: 0 | Status: SHIPPED | OUTPATIENT
Start: 2023-09-27

## 2023-09-26 RX ORDER — FOLIC ACID 1 MG/1
1 TABLET ORAL DAILY
Qty: 15 TABLET | Refills: 0 | Status: SHIPPED | OUTPATIENT
Start: 2023-09-27

## 2023-09-26 RX ADMIN — Medication 100 MG: at 04:45

## 2023-09-26 RX ADMIN — THERA TABS 1 TABLET: TAB at 04:45

## 2023-09-26 RX ADMIN — FOLIC ACID 1 MG: 1 TABLET ORAL at 04:45

## 2023-09-26 RX ADMIN — PANTOPRAZOLE SODIUM 40 MG: 40 INJECTION, POWDER, FOR SOLUTION INTRAVENOUS at 04:45

## 2023-09-26 ASSESSMENT — COGNITIVE AND FUNCTIONAL STATUS - GENERAL
HELP NEEDED FOR BATHING: A LITTLE
MOVING FROM LYING ON BACK TO SITTING ON SIDE OF FLAT BED: A LITTLE
MOBILITY SCORE: 19
MOVING TO AND FROM BED TO CHAIR: A LITTLE
DAILY ACTIVITIY SCORE: 21
SUGGESTED CMS G CODE MODIFIER MOBILITY: CK
CLIMB 3 TO 5 STEPS WITH RAILING: A LITTLE
DRESSING REGULAR LOWER BODY CLOTHING: A LITTLE
STANDING UP FROM CHAIR USING ARMS: A LITTLE
SUGGESTED CMS G CODE MODIFIER DAILY ACTIVITY: CJ
WALKING IN HOSPITAL ROOM: A LITTLE
TOILETING: A LITTLE

## 2023-09-26 ASSESSMENT — LIFESTYLE VARIABLES
PAROXYSMAL SWEATS: NO SWEAT VISIBLE
TREMOR: NO TREMOR
ANXIETY: *
NAUSEA AND VOMITING: NO NAUSEA AND NO VOMITING
AUDITORY DISTURBANCES: NOT PRESENT
TOTAL SCORE: 2
ORIENTATION AND CLOUDING OF SENSORIUM: ORIENTED AND CAN DO SERIAL ADDITIONS
NAUSEA AND VOMITING: NO NAUSEA AND NO VOMITING
ORIENTATION AND CLOUDING OF SENSORIUM: ORIENTED AND CAN DO SERIAL ADDITIONS
AGITATION: NORMAL ACTIVITY
PAROXYSMAL SWEATS: NO SWEAT VISIBLE
VISUAL DISTURBANCES: NOT PRESENT
AUDITORY DISTURBANCES: NOT PRESENT
HEADACHE, FULLNESS IN HEAD: NOT PRESENT
TREMOR: NO TREMOR
TOTAL SCORE: 3
ANXIETY: *
HEADACHE, FULLNESS IN HEAD: NOT PRESENT
VISUAL DISTURBANCES: NOT PRESENT
AGITATION: NORMAL ACTIVITY

## 2023-09-26 ASSESSMENT — PATIENT HEALTH QUESTIONNAIRE - PHQ9
SUM OF ALL RESPONSES TO PHQ9 QUESTIONS 1 AND 2: 0
1. LITTLE INTEREST OR PLEASURE IN DOING THINGS: NOT AT ALL
2. FEELING DOWN, DEPRESSED, IRRITABLE, OR HOPELESS: NOT AT ALL

## 2023-09-26 ASSESSMENT — FIBROSIS 4 INDEX: FIB4 SCORE: 15.15

## 2023-09-26 ASSESSMENT — PAIN DESCRIPTION - PAIN TYPE: TYPE: ACUTE PAIN

## 2023-09-26 NOTE — PROGRESS NOTES
Telemetry Shift Summary    Rhythm SR/SB  HR Range 63-95   Ectopy -  Measurements 0.13 / 0.074 / 0.44  *measured by monitor tech      Normal Values  Rhythm SR  HR Range:   Measurements: 0.12-0.20/0.06-0.10/0.30-0.52

## 2023-09-26 NOTE — CARE PLAN
Problem: Fall Risk  Goal: Patient will remain free from falls  Outcome: Progressing     Problem: Knowledge Deficit - Standard  Goal: Patient and family/care givers will demonstrate understanding of plan of care, disease process/condition, diagnostic tests and medications  Outcome: Progressing     Problem: Optimal Care for Alcohol Withdrawal  Goal: Optimal Care for the alcohol withdrawal patient  Outcome: Progressing     Problem: Pain - Standard  Goal: Alleviation of pain or a reduction in pain to the patient’s comfort goal  Outcome: Progressing   The patient is Stable - Low risk of patient condition declining or worsening    Shift Goals  Clinical Goals: monitor H/H, CIWA  Patient Goals: rest, headache relief  Family Goals: not present    Progress made toward(s) clinical / shift goals:  Progressing    Patient is not progressing towards the following goals:

## 2023-09-26 NOTE — PROGRESS NOTES
Patient arrived to [facility]. OK paper work, meds, and follow up appointments reviewed with patient. Questions addressed. Ride home with family.

## 2023-09-26 NOTE — CARE PLAN
The patient is Stable - Low risk of patient condition declining or worsening    Shift Goals  Clinical Goals: IV octreotide, IV fluids, safety  Patient Goals: rest, headache relief  Family Goals: not present    Progress made toward(s) clinical / shift goals:    Problem: Fall Risk  Goal: Patient will remain free from falls  Outcome: Progressing  Note: Fall precautions in place, bed low and locked, alarm on.  Call light and belongings within reach. Hourly rounding in place.      Problem: Knowledge Deficit - Standard  Goal: Patient and family/care givers will demonstrate understanding of plan of care, disease process/condition, diagnostic tests and medications  Outcome: Progressing  Note: Patient involved in and agreeable to plan of care.  Patient aware of and understands her options for support post-hospital.      Problem: Optimal Care for Alcohol Withdrawal  Goal: Optimal Care for the alcohol withdrawal patient  Outcome: Progressing  Note: Patient with CIWA scores 4-8 today.  Feels slightly anxious with bad headache.  Seizure/aspiration/fall precautions all in place.       Problem: Seizure Precautions  Goal: Implementation of seizure precautions  Outcome: Met  Note: Seizure precautions in place.      Problem: Psychosocial  Goal: Patient's level of anxiety will decrease  Outcome: Progressing  Goal: Spiritual and cultural needs incorporated into hospitalization  Outcome: Progressing     Problem: Risk for Aspiration  Goal: Patient's risk for aspiration will be absent or decrease  Outcome: Progressing       Patient is not progressing towards the following goals: n/a

## 2023-09-26 NOTE — PROGRESS NOTES
Bedside report received from night RN, pt care assumed, assessment completed. Pt is A&O4, pain 0, nsr on the monitor. Updated on POC, questions answered. Bed in lowest, locked position, treaded socks on, call light and belongings within reach.

## 2023-09-26 NOTE — PROGRESS NOTES
Patient being discharged. Pt educated on discharge instructions and new prescriptions, verbalized understanding. Follow up appointment made with PCP . PIV removed, monitor checked in. Patient going discharge Lounge via Wheelchair

## 2023-09-26 NOTE — PROGRESS NOTES
Hospital Medicine Daily Progress Note    Date of Service  9/25/2023    Chief Complaint  Chiara Pacheco is a 57 y.o. female admitted 9/23/2023 with hematemesis    Hospital Course  Chiara Pacheco is a 57 y.o. female with hx of alcohol abuse with hx of withdrawal, tobacco use, and prior GI bleed for which she was admitted on 2/23, who presented 9/23/2023 with hematemesis. Due to this pt called EMS and presented to the ED. Pt also admits to daily ibuprofen use and states she has had melena like stools over past few days. GI was consulted and pt underwent endoscopy on 9/23 which showed gastric and esophageal varices with the stigmata of bleeding one of which was banded.      Interval Problem Update  Hgb stable overnight and normotensive, no further N/V, continuing octreotide drip, pt denies abd pain, sleeping comfortably prior to my interview, CIWA scores have been low    9/25 patient is new to me today, patient is on IV octreotide, we are monitoring for side effects include but not limited to cardiac arrhythmias, continue monitoring for acute bleeding, if patient rebleeds will require probably TIPS procedure, discussed with GI, discussed with bedside nurse, continued telemetry.    I have discussed this patient's plan of care and discharge plan at IDT rounds today with Case Management, Nursing, Nursing leadership, and other members of the IDT team.    Consultants/Specialty  GI    Code Status  Full Code    Disposition  The patient is not medically cleared for discharge to home or a post-acute facility.      I have placed the appropriate orders for post-discharge needs.    Review of Systems  Review of Systems   Constitutional:  Negative for chills and fever.   HENT:  Negative for congestion.    Eyes:  Negative for blurred vision.   Respiratory:  Negative for shortness of breath.    Cardiovascular:  Negative for chest pain and leg swelling.   Gastrointestinal:  Negative for abdominal pain, constipation,  diarrhea, nausea and vomiting.   Genitourinary:  Negative for dysuria.   Musculoskeletal:  Negative for myalgias.   Skin:  Negative for rash.   Neurological:  Negative for sensory change and focal weakness.        Physical Exam  Temp:  [36.1 °C (97 °F)-37 °C (98.6 °F)] 37 °C (98.6 °F)  Pulse:  [60-87] 70  Resp:  [16-18] 18  BP: (105-136)/(54-82) 132/82  SpO2:  [91 %-97 %] 97 %    Physical Exam  Constitutional:       General: She is not in acute distress.     Appearance: Normal appearance.   HENT:      Head: Normocephalic and atraumatic.      Nose: Nose normal.      Mouth/Throat:      Mouth: Mucous membranes are moist.      Pharynx: Oropharynx is clear.   Eyes:      Conjunctiva/sclera: Conjunctivae normal.      Pupils: Pupils are equal, round, and reactive to light.   Cardiovascular:      Rate and Rhythm: Normal rate and regular rhythm.      Heart sounds: No murmur heard.  Pulmonary:      Effort: Pulmonary effort is normal.      Breath sounds: No wheezing, rhonchi or rales.   Abdominal:      General: There is no distension.      Palpations: Abdomen is soft.      Tenderness: There is no abdominal tenderness. There is no guarding or rebound.   Musculoskeletal:         General: No swelling or tenderness.      Cervical back: Normal range of motion and neck supple.   Skin:     General: Skin is warm and dry.      Comments: Venous stasis dermatitis in b/l LE   Neurological:      Mental Status: She is alert.      GCS: GCS eye subscore is 4. GCS verbal subscore is 5. GCS motor subscore is 6.      Cranial Nerves: No facial asymmetry.   Psychiatric:         Mood and Affect: Mood normal.         Behavior: Behavior normal.         Fluids    Intake/Output Summary (Last 24 hours) at 9/25/2023 1755  Last data filed at 9/25/2023 0951  Gross per 24 hour   Intake --   Output 450 ml   Net -450 ml         Laboratory  Recent Labs     09/23/23  0416 09/23/23  0745 09/23/23  2330 09/24/23  0745 09/25/23  0121   WBC 5.6  --  5.4  --  4.5*    RBC 4.38  --  3.48*  --  3.22*   HEMOGLOBIN 15.1   < > 11.9* 11.9* 11.1*   HEMATOCRIT 44.5  --  36.3*  --  33.2*   .6*  --  104.3*  --  103.1*   MCH 34.5*  --  34.2*  --  34.5*   MCHC 33.9  --  32.8  --  33.4   RDW 56.3*  --  54.2*  --  52.9*   PLATELETCT 106*  --  78*  --  67*   MPV 10.8  --  10.5  --  10.4    < > = values in this interval not displayed.       Recent Labs     09/23/23  0416 09/24/23  0323 09/25/23  0121   SODIUM 145 140 138   POTASSIUM 4.4 4.2 3.6   CHLORIDE 106 106 105   CO2 24 25 22   GLUCOSE 132* 136* 100*   BUN 15 15 17   CREATININE 0.41* 0.46* 0.50   CALCIUM 8.9 7.8* 7.9*       Recent Labs     09/23/23 0416   INR 1.37*                 Imaging  No orders to display          Assessment/Plan  * Hematemesis- (present on admission)  Assessment & Plan  History of alcohol use and NSAID use, EGD showed gastric and esophageal varices one of which was banded  GI following, will appreciate recs  Trend H/H  Transfuse if hemoglobin less than 7 or if hemodynamically stable and hemoglobin less than 8 with active signs of bleeding  PPI twice daily  Octreotide drip      Elevated liver enzymes  Assessment & Plan  History of alcohol abuse and HCV, also overweight making LAUGHLIN potential component   -Monitor CMP  Daily CMP    Alcohol abuse  Assessment & Plan  Watch for impending withdrawal  WA protocol  Thiamine, MTV, and folate  Seizure precautions  Encouraged alcohol cessation  Close monitoring, have discussed with patient today she stated that she is willing to stop drinking alcohol    Bleeding gastric varices- (present on admission)  Assessment & Plan  History of on prior and 9/23 endoscopies with esophageal varices also noted  Continue octreotide drip for 72 hours      Hepatitis C- (present on admission)  Assessment & Plan  Unknown if treated  Recommend follow-up with outpatient GI or infectious disease for consideration of treatment         VTE prophylaxis: SCDs    I have performed a physical exam  and reviewed and updated ROS and Plan today (9/25/2023). In review of yesterday's note (9/24/2023), there are no changes except as documented above.      Greater than 51 minutes spent prepping to see patient (e.g. review of tests) obtaining and/or reviewing separately obtained history. Performing a medically appropriate examination and/ evaluation.  Counseling and educating the patient.  Ordering medications, tests, or procedures.  Referring and communicating with other health care professionals.  Documenting clinical information in EPIC.  Independently interpreting results and communicating results to patient.  Care coordination.

## 2023-09-27 NOTE — DISCHARGE SUMMARY
Discharge Summary    CHIEF COMPLAINT ON ADMISSION  Chief Complaint   Patient presents with    Blood in Vomit     BIB [facility] from home residence for dark red blood in vomit for approx 45min. States hx of same, peptic ulcers, alcohol use daily, and gastric reflux.        Reason for Admission  EMS     Admission Date  9/23/2023    CODE STATUS  Prior    HPI & HOSPITAL COURSE  Chiara Pacheco is a 57 y.o. female with hx of alcohol abuse with hx of withdrawal, tobacco use, and prior GI bleed for which she was admitted on 2/23, who presented 9/23/2023 with hematemesis. Due to this pt called EMS and presented to the ED. Pt also admits to daily ibuprofen use and states she has had melena like stools over past few days. GI was consulted and pt underwent endoscopy on 9/23 which showed gastric and esophageal varices with the stigmata of bleeding one of which was banded.  Patient was monitored on telemetry and was started on IV octreotide with serial monitoring of her hemoglobin which remained stable.  She was counseled on alcohol cessation.  Patient was instructed to follow-up with GI and to avoid using NSAIDs in the future.  She will be continued on PPI twice daily.    Therefore, she is discharged in fair and stable condition to home with close outpatient follow-up.    The patient met 2-midnight criteria for an inpatient stay at the time of discharge.    Discharge Date  9/26/2023    FOLLOW UP ITEMS POST DISCHARGE  PCP and GI    DISCHARGE DIAGNOSES  Principal Problem (Resolved):    Hematemesis (POA: Yes)  Active Problems:    Hepatitis C (POA: Yes)    Bleeding gastric varices (POA: Yes)    Alcohol abuse (POA: Yes)    Elevated liver enzymes (POA: Yes)      FOLLOW UP  No future appointments.  Nova Al M.D.  [address] [place] [address] [address] [place] [id number]  [phone]    Follow up        MEDICATIONS ON DISCHARGE     Medication List        START taking these medications        Instructions   folic acid 1 MG Tabs  Start  taking on: September 27, 2023  Commonly known as: Folvite   Take 1 Tablet by mouth every day.  Dose: 1 mg     One-Daily Multi-Vitamin Tabs  Start taking on: September 27, 2023   Take 1 Tablet by mouth every day.  Dose: 1 Tablet     thiamine 100 MG tablet  Start taking on: September 27, 2023  Commonly known as: Thiamine   Take 1 Tablet by mouth every day.  Dose: 100 mg            CHANGE how you take these medications        Instructions   omeprazole 20 MG delayed-release capsule  What changed:   when to take this  reasons to take this  Commonly known as: PriLOSEC   Take 1 Capsule by mouth 2 times a day. Indications: Heartburn  Dose: 20 mg            STOP taking these medications      ibuprofen 200 MG Tabs  Commonly known as: Motrin              Allergies  No Known Allergies    DIET  No orders of the defined types were placed in this encounter.      ACTIVITY  As tolerated.  Weight bearing as tolerated    CONSULTATIONS  GI Dr Al    PROCEDURES  EGD  OPERATIVE FINDINGS:     1. Esophagus: Blood in lumen, lavaged away.  Small esophageal varices distal esophagus appreciated.  One varix with large erosion with stigmata of recent bleeding.  This was banded x 1 at end of procedure (34cm).     2. Stomach: Large amount of blood and removed multiple large clots.  Lavaged aggressively but not ideal examination.  Large gastric varix in proximal fundus without stigmata of recent bleeding but posterior aspect not well visualized due to retained blood.  Cautious aspiration in this area so as not to precipitate bleeding from varix.     3. Duodenum: Blood from bulb to 3rd portion, lavaged.  Did not appreciate mucosal abnormality.     RECOMMENDATIONS:     NPO today  Pantoprazole IV BID  Octreotide drip  Serial H&H  If actively bleeding, please notify GI on call.  May need to be elevated to TIPS/BRTO  Patient should be in stepdown ICU minimum.  Voalte message to  hospitalist with above    LABORATORY  Lab Results   Component Value  Date    SODIUM 141 09/26/2023    POTASSIUM 3.9 09/26/2023    CHLORIDE 107 09/26/2023    CO2 23 09/26/2023    GLUCOSE 101 (H) 09/26/2023    BUN 15 09/26/2023    CREATININE 0.55 09/26/2023        Lab Results   Component Value Date    WBC 4.2 (L) 09/26/2023    HEMOGLOBIN 11.7 (L) 09/26/2023    HEMATOCRIT 35.3 (L) 09/26/2023    PLATELETCT 67 (L) 09/26/2023        Total time of the discharge process exceeds 40 minutes.

## 2023-09-29 NOTE — DOCUMENTATION QUERY
[facility]                                                                       Query Response Note      PATIENT:               FELIPE COOK  ACCT #:                  [id number]  MRN:                     [id number]  :                      1965  ADMIT DATE:       2023 4:47 AM  DISCH DATE:        2023 1:34 PM  RESPONDING  PROVIDER #:        [id number]           QUERY TEXT:    Patient admitted with esophageal varices with bleeding. Thrombocytopenia is documented in the GI PN from . Based on the above findings and clinical indicators, can a diagnosis be provided?    The patient's clinical indicators include:  Findings:   --Patient admitted for esophageal varices with bleeding, thrombocytopenia and elevated INR  --Per GI PN , ''thrombocytopenia'' is documented  --Labs on admission :  wbc 5.6, rbc 4.38, platelets 106  --Labs on :  wbc 4.5, rbc 3.50, platelets 67  --Labs on :  wbc 4.2, rbc 3.50, platelets 67    Treatment:  --Daily labs including CBC    Risk Factors:  --Esophageal varices with bleeding  --ETOH abuse  --Hep C  --Thrombocytopenia    Thank you,  Mary Ward RN, BSN  Clinical   Connect via [employer]  Options provided:   -- Patient has pancytopenia   -- Patient does not have pancytopenia   -- Other explanation, please specify   -- Unable to determine      Query created by: Mary Ward on 2023 10:51 AM    RESPONSE TEXT:    Patient has pancytopenia          Electronically signed by:  GO LAKHANI MD 2023 3:01 PM

## 2024-04-30 ENCOUNTER — APPOINTMENT (OUTPATIENT)
Dept: RADIOLOGY | Facility: MEDICAL CENTER | Age: 59
DRG: 432 | End: 2024-04-30
Attending: EMERGENCY MEDICINE
Payer: COMMERCIAL

## 2024-04-30 ENCOUNTER — HOSPITAL ENCOUNTER (INPATIENT)
Facility: MEDICAL CENTER | Age: 59
DRG: 432 | End: 2024-04-30
Attending: EMERGENCY MEDICINE | Admitting: HOSPITALIST
Payer: COMMERCIAL

## 2024-04-30 VITALS
WEIGHT: 190 LBS | HEART RATE: 96 BPM | TEMPERATURE: 97.3 F | OXYGEN SATURATION: 93 % | SYSTOLIC BLOOD PRESSURE: 141 MMHG | HEIGHT: 69 IN | DIASTOLIC BLOOD PRESSURE: 62 MMHG | BODY MASS INDEX: 28.14 KG/M2 | RESPIRATION RATE: 15 BRPM

## 2024-04-30 DIAGNOSIS — R11.2 NAUSEA AND VOMITING, UNSPECIFIED VOMITING TYPE: ICD-10-CM

## 2024-04-30 DIAGNOSIS — K92.2 UPPER GI BLEED: ICD-10-CM

## 2024-04-30 PROBLEM — K76.82 HEPATIC ENCEPHALOPATHY (HCC): Status: ACTIVE | Noted: 2024-04-30

## 2024-04-30 PROBLEM — K70.10 ALCOHOLIC HEPATITIS: Status: RESOLVED | Noted: 2024-04-30 | Resolved: 2024-04-30

## 2024-04-30 PROBLEM — K70.10 ALCOHOLIC HEPATITIS: Status: ACTIVE | Noted: 2024-04-30

## 2024-04-30 PROBLEM — K74.60 CIRRHOSIS (HCC): Status: ACTIVE | Noted: 2024-04-30

## 2024-04-30 LAB
ALBUMIN SERPL BCP-MCNC: 3.8 G/DL (ref 3.2–4.9)
ALBUMIN/GLOB SERPL: 1.7 G/DL
ALP SERPL-CCNC: 116 U/L (ref 30–99)
ALT SERPL-CCNC: 98 U/L (ref 2–50)
AMMONIA PLAS-SCNC: 74 UMOL/L (ref 11–45)
ANION GAP SERPL CALC-SCNC: 14 MMOL/L (ref 7–16)
APTT PPP: 30.6 SEC (ref 24.7–36)
AST SERPL-CCNC: 139 U/L (ref 12–45)
BASOPHILS # BLD AUTO: 0.8 % (ref 0–1.8)
BASOPHILS # BLD: 0.06 K/UL (ref 0–0.12)
BILIRUB SERPL-MCNC: 6.1 MG/DL (ref 0.1–1.5)
BUN SERPL-MCNC: 19 MG/DL (ref 8–22)
CALCIUM ALBUM COR SERPL-MCNC: 9.3 MG/DL (ref 8.5–10.5)
CALCIUM SERPL-MCNC: 9.1 MG/DL (ref 8.5–10.5)
CHLORIDE SERPL-SCNC: 106 MMOL/L (ref 96–112)
CO2 SERPL-SCNC: 24 MMOL/L (ref 20–33)
CREAT SERPL-MCNC: 0.44 MG/DL (ref 0.5–1.4)
EOSINOPHIL # BLD AUTO: 0.15 K/UL (ref 0–0.51)
EOSINOPHIL NFR BLD: 1.9 % (ref 0–6.9)
ERYTHROCYTE [DISTWIDTH] IN BLOOD BY AUTOMATED COUNT: 52.3 FL (ref 35.9–50)
ETHANOL BLD-MCNC: <10.1 MG/DL
GFR SERPLBLD CREATININE-BSD FMLA CKD-EPI: 112 ML/MIN/1.73 M 2
GLOBULIN SER CALC-MCNC: 2.3 G/DL (ref 1.9–3.5)
GLUCOSE SERPL-MCNC: 153 MG/DL (ref 65–99)
HCT VFR BLD AUTO: 41.6 % (ref 37–47)
HGB BLD-MCNC: 13.7 G/DL (ref 12–16)
HGB BLD-MCNC: 14.5 G/DL (ref 12–16)
IMM GRANULOCYTES # BLD AUTO: 0.04 K/UL (ref 0–0.11)
IMM GRANULOCYTES NFR BLD AUTO: 0.5 % (ref 0–0.9)
INR PPP: 1.42 (ref 0.87–1.13)
LACTATE SERPL-SCNC: 2.6 MMOL/L (ref 0.5–2)
LYMPHOCYTES # BLD AUTO: 1.71 K/UL (ref 1–4.8)
LYMPHOCYTES NFR BLD: 21.9 % (ref 22–41)
MAGNESIUM SERPL-MCNC: 1.6 MG/DL (ref 1.5–2.5)
MCH RBC QN AUTO: 35.6 PG (ref 27–33)
MCHC RBC AUTO-ENTMCNC: 34.9 G/DL (ref 32.2–35.5)
MCV RBC AUTO: 102.2 FL (ref 81.4–97.8)
MONOCYTES # BLD AUTO: 0.81 K/UL (ref 0–0.85)
MONOCYTES NFR BLD AUTO: 10.4 % (ref 0–13.4)
NEUTROPHILS # BLD AUTO: 5.04 K/UL (ref 1.82–7.42)
NEUTROPHILS NFR BLD: 64.5 % (ref 44–72)
NRBC # BLD AUTO: 0 K/UL
NRBC BLD-RTO: 0 /100 WBC (ref 0–0.2)
PLATELET # BLD AUTO: 87 K/UL (ref 164–446)
PLATELET BLD QL SMEAR: NORMAL
PLATELETS.RETICULATED NFR BLD AUTO: 6 % (ref 0.6–13.1)
PMV BLD AUTO: 10.5 FL (ref 9–12.9)
POTASSIUM SERPL-SCNC: 3.5 MMOL/L (ref 3.6–5.5)
PROT SERPL-MCNC: 6.1 G/DL (ref 6–8.2)
PROTHROMBIN TIME: 17.5 SEC (ref 12–14.6)
RBC # BLD AUTO: 4.07 M/UL (ref 4.2–5.4)
SODIUM SERPL-SCNC: 144 MMOL/L (ref 135–145)
WBC # BLD AUTO: 7.8 K/UL (ref 4.8–10.8)

## 2024-04-30 PROCEDURE — C9113 INJ PANTOPRAZOLE SODIUM, VIA: HCPCS | Mod: UD | Performed by: EMERGENCY MEDICINE

## 2024-04-30 PROCEDURE — 700111 HCHG RX REV CODE 636 W/ 250 OVERRIDE (IP): Mod: JZ,UD | Performed by: EMERGENCY MEDICINE

## 2024-04-30 PROCEDURE — 700105 HCHG RX REV CODE 258: Performed by: HOSPITALIST

## 2024-04-30 RX ORDER — LABETALOL HYDROCHLORIDE 5 MG/ML
10 INJECTION, SOLUTION INTRAVENOUS EVERY 4 HOURS PRN
Status: DISCONTINUED | OUTPATIENT
Start: 2024-04-30 | End: 2024-05-03 | Stop reason: HOSPADM

## 2024-04-30 RX ORDER — LORAZEPAM 2 MG/ML
1.5 INJECTION INTRAMUSCULAR
Status: DISCONTINUED | OUTPATIENT
Start: 2024-04-30 | End: 2024-05-02

## 2024-04-30 RX ORDER — PROCHLORPERAZINE EDISYLATE 5 MG/ML
5-10 INJECTION INTRAMUSCULAR; INTRAVENOUS EVERY 4 HOURS PRN
Status: DISCONTINUED | OUTPATIENT
Start: 2024-04-30 | End: 2024-05-03 | Stop reason: HOSPADM

## 2024-04-30 RX ORDER — OCTREOTIDE ACETATE 100 UG/ML
50 INJECTION, SOLUTION INTRAVENOUS; SUBCUTANEOUS ONCE
Status: COMPLETED | OUTPATIENT
Start: 2024-04-30 | End: 2024-04-30

## 2024-04-30 RX ORDER — ONDANSETRON 2 MG/ML
8 INJECTION INTRAMUSCULAR; INTRAVENOUS ONCE
Status: COMPLETED | OUTPATIENT
Start: 2024-04-30 | End: 2024-04-30

## 2024-04-30 RX ORDER — LORAZEPAM 1 MG/1
1 TABLET ORAL EVERY 4 HOURS PRN
Status: DISCONTINUED | OUTPATIENT
Start: 2024-04-30 | End: 2024-05-02

## 2024-04-30 RX ORDER — LORAZEPAM 2 MG/1
4 TABLET ORAL
Status: DISCONTINUED | OUTPATIENT
Start: 2024-04-30 | End: 2024-05-02

## 2024-04-30 RX ORDER — LORAZEPAM 2 MG/ML
0.5 INJECTION INTRAMUSCULAR EVERY 4 HOURS PRN
Status: DISCONTINUED | OUTPATIENT
Start: 2024-04-30 | End: 2024-05-02

## 2024-04-30 RX ORDER — LORAZEPAM 2 MG/ML
2 INJECTION INTRAMUSCULAR
Status: DISCONTINUED | OUTPATIENT
Start: 2024-04-30 | End: 2024-05-02

## 2024-04-30 RX ORDER — PANTOPRAZOLE SODIUM 40 MG/10ML
40 INJECTION, POWDER, LYOPHILIZED, FOR SOLUTION INTRAVENOUS ONCE
Status: COMPLETED | OUTPATIENT
Start: 2024-04-30 | End: 2024-04-30

## 2024-04-30 RX ORDER — ONDANSETRON 2 MG/ML
4 INJECTION INTRAMUSCULAR; INTRAVENOUS EVERY 4 HOURS PRN
Status: DISCONTINUED | OUTPATIENT
Start: 2024-04-30 | End: 2024-05-03 | Stop reason: HOSPADM

## 2024-04-30 RX ORDER — CEFTRIAXONE 2 G/1
2000 INJECTION, POWDER, FOR SOLUTION INTRAMUSCULAR; INTRAVENOUS ONCE
Status: COMPLETED | OUTPATIENT
Start: 2024-04-30 | End: 2024-04-30

## 2024-04-30 RX ORDER — LORAZEPAM 0.5 MG/1
0.5 TABLET ORAL EVERY 4 HOURS PRN
Status: DISCONTINUED | OUTPATIENT
Start: 2024-04-30 | End: 2024-05-02

## 2024-04-30 RX ORDER — SODIUM CHLORIDE 9 MG/ML
INJECTION, SOLUTION INTRAVENOUS CONTINUOUS
Status: DISCONTINUED | OUTPATIENT
Start: 2024-04-30 | End: 2024-05-02

## 2024-04-30 RX ORDER — POTASSIUM CHLORIDE 7.45 MG/ML
10 INJECTION INTRAVENOUS
Status: DISPENSED | OUTPATIENT
Start: 2024-05-01 | End: 2024-05-01

## 2024-04-30 RX ORDER — LORAZEPAM 2 MG/ML
1 INJECTION INTRAMUSCULAR
Status: DISCONTINUED | OUTPATIENT
Start: 2024-04-30 | End: 2024-05-02

## 2024-04-30 RX ORDER — PROMETHAZINE HYDROCHLORIDE 25 MG/1
12.5-25 TABLET ORAL EVERY 4 HOURS PRN
Status: DISCONTINUED | OUTPATIENT
Start: 2024-04-30 | End: 2024-05-03 | Stop reason: HOSPADM

## 2024-04-30 RX ORDER — PANTOPRAZOLE SODIUM 40 MG/10ML
40 INJECTION, POWDER, LYOPHILIZED, FOR SOLUTION INTRAVENOUS 2 TIMES DAILY
Status: DISCONTINUED | OUTPATIENT
Start: 2024-05-01 | End: 2024-05-02

## 2024-04-30 RX ORDER — ONDANSETRON 4 MG/1
4 TABLET, ORALLY DISINTEGRATING ORAL EVERY 4 HOURS PRN
Status: DISCONTINUED | OUTPATIENT
Start: 2024-04-30 | End: 2024-05-03 | Stop reason: HOSPADM

## 2024-04-30 RX ORDER — LORAZEPAM 2 MG/1
2 TABLET ORAL
Status: DISCONTINUED | OUTPATIENT
Start: 2024-04-30 | End: 2024-05-02

## 2024-04-30 RX ORDER — PROMETHAZINE HYDROCHLORIDE 25 MG/1
12.5-25 SUPPOSITORY RECTAL EVERY 4 HOURS PRN
Status: DISCONTINUED | OUTPATIENT
Start: 2024-04-30 | End: 2024-05-03 | Stop reason: HOSPADM

## 2024-04-30 RX ORDER — LACTULOSE 20 G/30ML
30 SOLUTION ORAL 3 TIMES DAILY
Status: DISCONTINUED | OUTPATIENT
Start: 2024-05-01 | End: 2024-05-03 | Stop reason: HOSPADM

## 2024-04-30 RX ADMIN — SODIUM CHLORIDE: 9 INJECTION, SOLUTION INTRAVENOUS at 23:26

## 2024-04-30 RX ADMIN — CEFTRIAXONE SODIUM 2000 MG: 2 INJECTION, POWDER, FOR SOLUTION INTRAMUSCULAR; INTRAVENOUS at 21:39

## 2024-04-30 RX ADMIN — ONDANSETRON 8 MG: 2 INJECTION INTRAMUSCULAR; INTRAVENOUS at 20:55

## 2024-04-30 RX ADMIN — OCTREOTIDE ACETATE 50 MCG: 100 INJECTION, SOLUTION INTRAVENOUS; SUBCUTANEOUS at 22:05

## 2024-04-30 RX ADMIN — PANTOPRAZOLE SODIUM 40 MG: 40 INJECTION, POWDER, FOR SOLUTION INTRAVENOUS at 21:12

## 2024-04-30 ASSESSMENT — ENCOUNTER SYMPTOMS
DEPRESSION: 0
PHOTOPHOBIA: 0
DIZZINESS: 0
VOMITING: 1
FALLS: 0
TREMORS: 0
NAUSEA: 1
WEAKNESS: 0
SORE THROAT: 0
HEADACHES: 0
SPUTUM PRODUCTION: 0
DIAPHORESIS: 0
CHILLS: 0
BLOOD IN STOOL: 0
FLANK PAIN: 0
EYE PAIN: 0
TINGLING: 0
COUGH: 0
BRUISES/BLEEDS EASILY: 0
ORTHOPNEA: 0
PALPITATIONS: 0
SHORTNESS OF BREATH: 0
NECK PAIN: 0
ABDOMINAL PAIN: 0
WHEEZING: 0
HEMOPTYSIS: 0
FEVER: 0
BLURRED VISION: 0
CONSTIPATION: 0
DOUBLE VISION: 0
BACK PAIN: 0
HALLUCINATIONS: 0
POLYDIPSIA: 0
PND: 0
STRIDOR: 0
SINUS PAIN: 0
MYALGIAS: 0
CLAUDICATION: 0
HEARTBURN: 1
DIARRHEA: 0

## 2024-04-30 ASSESSMENT — LIFESTYLE VARIABLES
NAUSEA AND VOMITING: NO NAUSEA AND NO VOMITING
SUBSTANCE_ABUSE: 0
TOTAL SCORE: 1
ANXIETY: NO ANXIETY (AT EASE)
HEADACHE, FULLNESS IN HEAD: NOT PRESENT
AGITATION: NORMAL ACTIVITY
TREMOR: NO TREMOR
TACTILE DISTURBANCES: VERY MILD ITCHING, PINS AND NEEDLES SENSATION, BURNING OR NUMBNESS
ORIENTATION AND CLOUDING OF SENSORIUM: ORIENTED AND CAN DO SERIAL ADDITIONS
AUDITORY DISTURBANCES: NOT PRESENT
PAROXYSMAL SWEATS: NO SWEAT VISIBLE
VISUAL DISTURBANCES: NOT PRESENT

## 2024-04-30 ASSESSMENT — FIBROSIS 4 INDEX: FIB4 SCORE: 15.42

## 2024-05-01 ENCOUNTER — ANESTHESIA EVENT (OUTPATIENT)
Dept: SURGERY | Facility: MEDICAL CENTER | Age: 59
DRG: 432 | End: 2024-05-01
Payer: COMMERCIAL

## 2024-05-01 ENCOUNTER — APPOINTMENT (OUTPATIENT)
Dept: RADIOLOGY | Facility: MEDICAL CENTER | Age: 59
DRG: 432 | End: 2024-05-01
Attending: INTERNAL MEDICINE
Payer: COMMERCIAL

## 2024-05-01 LAB
ABO GROUP BLD: NORMAL
ALBUMIN SERPL BCP-MCNC: 3.6 G/DL (ref 3.2–4.9)
ALBUMIN/GLOB SERPL: 1.5 G/DL
ALP SERPL-CCNC: 107 U/L (ref 30–99)
ALT SERPL-CCNC: 87 U/L (ref 2–50)
ANION GAP SERPL CALC-SCNC: 12 MMOL/L (ref 7–16)
AST SERPL-CCNC: 119 U/L (ref 12–45)
BASOPHILS # BLD AUTO: 0.7 % (ref 0–1.8)
BASOPHILS # BLD: 0.07 K/UL (ref 0–0.12)
BILIRUB SERPL-MCNC: 4.5 MG/DL (ref 0.1–1.5)
BLD GP AB SCN SERPL QL: NORMAL
BUN SERPL-MCNC: 20 MG/DL (ref 8–22)
CALCIUM ALBUM COR SERPL-MCNC: 8.9 MG/DL (ref 8.5–10.5)
CALCIUM SERPL-MCNC: 8.6 MG/DL (ref 8.5–10.5)
CFT BLD TEG: 6.4 MIN (ref 4.6–9.1)
CFT P HPASE BLD TEG: 5.2 MIN (ref 4.3–8.3)
CHLORIDE SERPL-SCNC: 109 MMOL/L (ref 96–112)
CLOT ANGLE BLD TEG: 66.1 DEGREES (ref 63–78)
CLOT LYSIS 30M P MA LENFR BLD TEG: 0.2 % (ref 0–2.6)
CO2 SERPL-SCNC: 24 MMOL/L (ref 20–33)
CREAT SERPL-MCNC: 0.55 MG/DL (ref 0.5–1.4)
CT.EXTRINSIC BLD ROTEM: 2.8 MIN (ref 0.8–2.1)
EKG IMPRESSION: NORMAL
EOSINOPHIL # BLD AUTO: 0.17 K/UL (ref 0–0.51)
EOSINOPHIL NFR BLD: 1.8 % (ref 0–6.9)
ERYTHROCYTE [DISTWIDTH] IN BLOOD BY AUTOMATED COUNT: 53.2 FL (ref 35.9–50)
GFR SERPLBLD CREATININE-BSD FMLA CKD-EPI: 106 ML/MIN/1.73 M 2
GLOBULIN SER CALC-MCNC: 2.4 G/DL (ref 1.9–3.5)
GLUCOSE SERPL-MCNC: 131 MG/DL (ref 65–99)
HCT VFR BLD AUTO: 39.5 % (ref 37–47)
HGB BLD-MCNC: 11.8 G/DL (ref 12–16)
HGB BLD-MCNC: 11.8 G/DL (ref 12–16)
HGB BLD-MCNC: 13.7 G/DL (ref 12–16)
IMM GRANULOCYTES # BLD AUTO: 0.04 K/UL (ref 0–0.11)
IMM GRANULOCYTES NFR BLD AUTO: 0.4 % (ref 0–0.9)
LYMPHOCYTES # BLD AUTO: 2.72 K/UL (ref 1–4.8)
LYMPHOCYTES NFR BLD: 28.4 % (ref 22–41)
MAGNESIUM SERPL-MCNC: 1.7 MG/DL (ref 1.5–2.5)
MCF BLD TEG: <40 MM (ref 52–69)
MCF.PLATELET INHIB BLD ROTEM: 14.7 MM (ref 15–32)
MCH RBC QN AUTO: 36.1 PG (ref 27–33)
MCHC RBC AUTO-ENTMCNC: 34.7 G/DL (ref 32.2–35.5)
MCV RBC AUTO: 103.9 FL (ref 81.4–97.8)
MONOCYTES # BLD AUTO: 0.9 K/UL (ref 0–0.85)
MONOCYTES NFR BLD AUTO: 9.4 % (ref 0–13.4)
NEUTROPHILS # BLD AUTO: 5.68 K/UL (ref 1.82–7.42)
NEUTROPHILS NFR BLD: 59.3 % (ref 44–72)
NRBC # BLD AUTO: 0.02 K/UL
NRBC BLD-RTO: 0.2 /100 WBC (ref 0–0.2)
PA AA BLD-ACNC: 17.5 % (ref 0–11)
PA ADP BLD-ACNC: 4.7 % (ref 0–17)
PHOSPHATE SERPL-MCNC: 3.7 MG/DL (ref 2.5–4.5)
PLATELET # BLD AUTO: 93 K/UL (ref 164–446)
PLATELETS.RETICULATED NFR BLD AUTO: 5.3 % (ref 0.6–13.1)
PMV BLD AUTO: 10.5 FL (ref 9–12.9)
POTASSIUM SERPL-SCNC: 4.2 MMOL/L (ref 3.6–5.5)
PROT SERPL-MCNC: 6 G/DL (ref 6–8.2)
RBC # BLD AUTO: 3.8 M/UL (ref 4.2–5.4)
RH BLD: NORMAL
SODIUM SERPL-SCNC: 145 MMOL/L (ref 135–145)
TEG ALGORITHM TGALG: ABNORMAL
WBC # BLD AUTO: 9.6 K/UL (ref 4.8–10.8)

## 2024-05-01 PROCEDURE — 99233 SBSQ HOSP IP/OBS HIGH 50: CPT | Performed by: INTERNAL MEDICINE

## 2024-05-01 PROCEDURE — 99254 IP/OBS CNSLTJ NEW/EST MOD 60: CPT | Performed by: INTERNAL MEDICINE

## 2024-05-01 RX ADMIN — RIFAXIMIN 550 MG: 550 TABLET ORAL at 00:12

## 2024-05-01 RX ADMIN — SODIUM CHLORIDE: 9 INJECTION, SOLUTION INTRAVENOUS at 14:14

## 2024-05-01 RX ADMIN — LACTULOSE 30 ML: 20 SOLUTION ORAL at 17:13

## 2024-05-01 RX ADMIN — RIFAXIMIN 550 MG: 550 TABLET ORAL at 17:13

## 2024-05-01 RX ADMIN — OCTREOTIDE ACETATE 50 MCG/HR: 200 INJECTION, SOLUTION INTRAVENOUS; SUBCUTANEOUS at 00:06

## 2024-05-01 RX ADMIN — IOHEXOL 25 ML: 240 INJECTION, SOLUTION INTRATHECAL; INTRAVASCULAR; INTRAVENOUS; ORAL at 18:00

## 2024-05-01 RX ADMIN — CEFTRIAXONE SODIUM 2000 MG: 10 INJECTION, POWDER, FOR SOLUTION INTRAVENOUS at 20:17

## 2024-05-01 RX ADMIN — RIFAXIMIN 550 MG: 550 TABLET ORAL at 05:26

## 2024-05-01 RX ADMIN — POTASSIUM CHLORIDE 10 MEQ: 7.46 INJECTION, SOLUTION INTRAVENOUS at 00:04

## 2024-05-01 RX ADMIN — IOHEXOL 100 ML: 350 INJECTION, SOLUTION INTRAVENOUS at 20:04

## 2024-05-01 RX ADMIN — PANTOPRAZOLE SODIUM 40 MG: 40 INJECTION, POWDER, LYOPHILIZED, FOR SOLUTION INTRAVENOUS at 05:21

## 2024-05-01 RX ADMIN — LACTULOSE 30 ML: 20 SOLUTION ORAL at 05:28

## 2024-05-01 RX ADMIN — LACTULOSE 30 ML: 20 SOLUTION ORAL at 11:40

## 2024-05-01 RX ADMIN — PANTOPRAZOLE SODIUM 40 MG: 40 INJECTION, POWDER, LYOPHILIZED, FOR SOLUTION INTRAVENOUS at 17:13

## 2024-05-01 ASSESSMENT — COGNITIVE AND FUNCTIONAL STATUS - GENERAL
MOVING TO AND FROM BED TO CHAIR: A LITTLE
SUGGESTED CMS G CODE MODIFIER DAILY ACTIVITY: CI
SUGGESTED CMS G CODE MODIFIER MOBILITY: CJ
DAILY ACTIVITIY SCORE: 23
HELP NEEDED FOR BATHING: A LITTLE
MOBILITY SCORE: 22
STANDING UP FROM CHAIR USING ARMS: A LITTLE

## 2024-05-01 ASSESSMENT — LIFESTYLE VARIABLES
TOTAL SCORE: 0
ANXIETY: NO ANXIETY (AT EASE)
HEADACHE, FULLNESS IN HEAD: NOT PRESENT
ANXIETY: NO ANXIETY (AT EASE)
NAUSEA AND VOMITING: NO NAUSEA AND NO VOMITING
TOTAL SCORE: 2
HEADACHE, FULLNESS IN HEAD: NOT PRESENT
AGITATION: NORMAL ACTIVITY
ORIENTATION AND CLOUDING OF SENSORIUM: ORIENTED AND CAN DO SERIAL ADDITIONS
AGITATION: NORMAL ACTIVITY
ANXIETY: NO ANXIETY (AT EASE)
NAUSEA AND VOMITING: NO NAUSEA AND NO VOMITING
ANXIETY: NO ANXIETY (AT EASE)
VISUAL DISTURBANCES: NOT PRESENT
TOTAL SCORE: 0
HEADACHE, FULLNESS IN HEAD: NOT PRESENT
VISUAL DISTURBANCES: NOT PRESENT
NAUSEA AND VOMITING: NO NAUSEA AND NO VOMITING
TREMOR: NO TREMOR
PAROXYSMAL SWEATS: BARELY PERCEPTIBLE SWEATING. PALMS MOIST
VISUAL DISTURBANCES: NOT PRESENT
EVER FELT BAD OR GUILTY ABOUT YOUR DRINKING: YES
VISUAL DISTURBANCES: NOT PRESENT
TOTAL SCORE: 0
VISUAL DISTURBANCES: NOT PRESENT
TREMOR: NO TREMOR
AGITATION: NORMAL ACTIVITY
TOTAL SCORE: 0
PAROXYSMAL SWEATS: NO SWEAT VISIBLE
TREMOR: NO TREMOR
ORIENTATION AND CLOUDING OF SENSORIUM: ORIENTED AND CAN DO SERIAL ADDITIONS
ORIENTATION AND CLOUDING OF SENSORIUM: ORIENTED AND CAN DO SERIAL ADDITIONS
AGITATION: NORMAL ACTIVITY
HEADACHE, FULLNESS IN HEAD: NOT PRESENT
DOES PATIENT WANT TO TALK TO SOMEONE ABOUT QUITTING: NO
AGITATION: NORMAL ACTIVITY
PAROXYSMAL SWEATS: NO SWEAT VISIBLE
AUDITORY DISTURBANCES: NOT PRESENT
AUDITORY DISTURBANCES: NOT PRESENT
HOW MANY TIMES IN THE PAST YEAR HAVE YOU HAD 5 OR MORE DRINKS IN A DAY: 365
ON A TYPICAL DAY WHEN YOU DRINK ALCOHOL HOW MANY DRINKS DO YOU HAVE: 3
ORIENTATION AND CLOUDING OF SENSORIUM: ORIENTED AND CAN DO SERIAL ADDITIONS
TOTAL SCORE: 1
NAUSEA AND VOMITING: NO NAUSEA AND NO VOMITING
ORIENTATION AND CLOUDING OF SENSORIUM: ORIENTED AND CAN DO SERIAL ADDITIONS
ORIENTATION AND CLOUDING OF SENSORIUM: ORIENTED AND CAN DO SERIAL ADDITIONS
DOES PATIENT WANT TO STOP DRINKING: YES
AUDITORY DISTURBANCES: NOT PRESENT
EVER HAD A DRINK FIRST THING IN THE MORNING TO STEADY YOUR NERVES TO GET RID OF A HANGOVER: NO
AUDITORY DISTURBANCES: NOT PRESENT
CONSUMPTION TOTAL: POSITIVE
AUDITORY DISTURBANCES: NOT PRESENT
HEADACHE, FULLNESS IN HEAD: NOT PRESENT
AUDITORY DISTURBANCES: NOT PRESENT
HAVE YOU EVER FELT YOU SHOULD CUT DOWN ON YOUR DRINKING: YES
TOTAL SCORE: 2
ALCOHOL_USE: YES
AUDITORY DISTURBANCES: NOT PRESENT
HEADACHE, FULLNESS IN HEAD: NOT PRESENT
NAUSEA AND VOMITING: NO NAUSEA AND NO VOMITING
TREMOR: NO TREMOR
HAVE PEOPLE ANNOYED YOU BY CRITICIZING YOUR DRINKING: NO
TREMOR: NO TREMOR
PAROXYSMAL SWEATS: NO SWEAT VISIBLE
ANXIETY: NO ANXIETY (AT EASE)
AVERAGE NUMBER OF DAYS PER WEEK YOU HAVE A DRINK CONTAINING ALCOHOL: 7
AGITATION: NORMAL ACTIVITY
NAUSEA AND VOMITING: NO NAUSEA AND NO VOMITING
PAROXYSMAL SWEATS: NO SWEAT VISIBLE
PAROXYSMAL SWEATS: NO SWEAT VISIBLE
TREMOR: NO TREMOR
NAUSEA AND VOMITING: NO NAUSEA AND NO VOMITING
TACTILE DISTURBANCES: VERY MILD ITCHING, PINS AND NEEDLES SENSATION, BURNING OR NUMBNESS
TOTAL SCORE: 2
AGITATION: SOMEWHAT MORE THAN NORMAL ACTIVITY
TREMOR: NO TREMOR
PAROXYSMAL SWEATS: NO SWEAT VISIBLE
ORIENTATION AND CLOUDING OF SENSORIUM: ORIENTED AND CAN DO SERIAL ADDITIONS
VISUAL DISTURBANCES: NOT PRESENT
TOTAL SCORE: 2
ANXIETY: NO ANXIETY (AT EASE)
HEADACHE, FULLNESS IN HEAD: NOT PRESENT
VISUAL DISTURBANCES: NOT PRESENT
ANXIETY: NO ANXIETY (AT EASE)
TOTAL SCORE: 0

## 2024-05-01 ASSESSMENT — ENCOUNTER SYMPTOMS
NEUROLOGICAL NEGATIVE: 1
MYALGIAS: 0
SHORTNESS OF BREATH: 0
BLOOD IN STOOL: 0
CONSTITUTIONAL NEGATIVE: 1
VOMITING: 0
WEAKNESS: 1
MUSCULOSKELETAL NEGATIVE: 1
VOMITING: 1
RESPIRATORY NEGATIVE: 1
EYES NEGATIVE: 1
ABDOMINAL PAIN: 0
INSOMNIA: 1

## 2024-05-01 ASSESSMENT — PATIENT HEALTH QUESTIONNAIRE - PHQ9
2. FEELING DOWN, DEPRESSED, IRRITABLE, OR HOPELESS: NOT AT ALL
1. LITTLE INTEREST OR PLEASURE IN DOING THINGS: NOT AT ALL
SUM OF ALL RESPONSES TO PHQ9 QUESTIONS 1 AND 2: 0

## 2024-05-01 ASSESSMENT — FIBROSIS 4 INDEX: FIB4 SCORE: 7.82

## 2024-05-01 NOTE — ED NOTES
CIWA score 2. Discussed protocol with patient and signs/symptoms of ETOH withdrawal of which to notify RN. Patient given fresh water and apple juice.

## 2024-05-01 NOTE — CARE PLAN
Problem: Knowledge Deficit - Standard  Goal: Patient and family/care givers will demonstrate understanding of plan of care, disease process/condition, diagnostic tests and medications  Outcome: Progressing     Problem: Optimal Care for Alcohol Withdrawal  Goal: Optimal Care for the alcohol withdrawal patient  Outcome: Progressing   The patient is Stable - Low risk of patient condition declining or worsening    Shift Goals  Clinical Goals: admission, CT  Patient Goals: rest  Family Goals: kely    Progress made toward(s) clinical / shift goals:  yes    Patient is not progressing towards the following goals:

## 2024-05-01 NOTE — ED PROVIDER NOTES
"ED Provider Note    CHIEF COMPLAINT  Chief Complaint   Patient presents with    N/V     Patient reports she started vomiting coffee-ground emesis around 0300 this morning. 5-6 incidences throughout day.    Denies pain, states bowel movement this morning was normal.       EXTERNAL RECORDS REVIEWED  {Select:14958}    HPI/ROS  LIMITATION TO HISTORY   {Select:46197}  OUTSIDE HISTORIAN(S):  {Select:71940}    Chiara Pacheco is a 58 y.o. female who presents ***    PAST MEDICAL HISTORY   has a past medical history of Hearing loss.    SURGICAL HISTORY   has a past surgical history that includes Intubation (2/19/2023); upper gi endoscopy,diagnosis (2/19/2023); upper gi endoscopy,diagnosis (2/20/2023); upper gi endoscopy,diagnosis (N/A, 9/23/2023); and upper gi endoscopy,ligat varix (N/A, 9/23/2023).    FAMILY HISTORY  History reviewed. No pertinent family history.    SOCIAL HISTORY  Social History     Tobacco Use    Smoking status: Every Day    Smokeless tobacco: Never   Vaping Use    Vaping Use: Some days   Substance and Sexual Activity    Alcohol use: Yes     Alcohol/week: 6.0 oz     Types: 10 Shots of liquor per week     Comment: \"a shot everyday\"    Drug use: Yes     Types: Inhaled     Comment: marajuana    Sexual activity: Not on file       CURRENT MEDICATIONS  Home Medications       Reviewed by Juli Granados R.N. (Registered Nurse) on 04/30/24 at 1930  Med List Status: Partial     Medication Last Dose Status   folic acid (FOLVITE) 1 MG Tab  Active   multivitamin Tab  Active   omeprazole (PRILOSEC) 20 MG delayed-release capsule  Active   thiamine (THIAMINE) 100 MG tablet  Active                    ALLERGIES  No Known Allergies    PHYSICAL EXAM  VITAL SIGNS: /66   Pulse (!) 113   Temp 36.3 °C (97.3 °F) (Temporal)   Resp 16   Ht 1.753 m (5' 9\")   Wt 86.2 kg (190 lb)   LMP  (LMP Unknown)   SpO2 92%   BMI 28.06 kg/m²    ***    EKG/LABS  ***  I have independently interpreted this " EKG    RADIOLOGY/PROCEDURES   I have independently interpreted the diagnostic imaging associated with this visit and am waiting the final reading from the radiologist.   My preliminary interpretation is as follows: ***    Radiologist interpretation:  No orders to display       COURSE & MEDICAL DECISION MAKING    ASSESSMENT, COURSE AND PLAN  Care Narrative: ***    8:32 PM  Actively vomiting blood   {MEASURES:200515}          ADDITIONAL PROBLEMS MANAGED  ***    DISPOSITION AND DISCUSSIONS  I have discussed management of the patient with the following physicians and POLA's:  ***    Discussion of management with other [facility] or appropriate source(s): {Select:01161}     Escalation of care considered, and ultimately not performed:{Select:80672}    Barriers to care at this time, including but not limited to: {Select:22421}.     Decision tools and prescription drugs considered including, but not limited to: {Select:85294}.    FINAL DIAGNOSIS  No diagnosis found.       Electronically signed by: Shanice Hill M.D., 4/30/2024 7:35 PM       following components:    PT 17.5 (*)     INR 1.42 (*)     All other components within normal limits   AMMONIA - Abnormal; Notable for the following components:    Ammonia 74 (*)     All other components within normal limits   APTT   COD (ADULT)   ESTIMATED GFR   PLATELET ESTIMATE   IMMATURE PLT FRACTION   DIAGNOSTIC ALCOHOL   HGB   MAGNESIUM   BLOOD CULTURE   BLOOD CULTURE   CBC WITH DIFFERENTIAL   COMP METABOLIC PANEL   MAGNESIUM   PHOSPHORUS       I have independently interpreted this EKG    RADIOLOGY/PROCEDURES   I have independently interpreted the diagnostic imaging associated with this visit and am waiting the final reading from the radiologist.   My preliminary interpretation is as follows:     Chest x-ray with atelectasis but no signs of lobar pneumonia    Radiologist interpretation:  DX-CHEST-PORTABLE (1 VIEW)   Final Result         1.  Left basilar atelectasis, no focal infiltrate   2.  Atherosclerosis          COURSE & MEDICAL DECISION MAKING    ASSESSMENT, COURSE AND PLAN  Care Narrative:     Patient is a 58-year-old alcoholic female with a history of esophageal varices and here with complaints of hematemesis.  She is not actively vomiting at this time we do not have any blood in emesis bags at the bedside we discussed plan for guaiac test.  In the meantime she will be resuscitated with antiemetics IV fluids laboratory analysis and if she does show signs of GI bleed then will likely need octreotide Protonix and Rocephin.    DISPOSITION AND DISCUSSIONS  8:32 PM  I went to the bedside to perform guaiac and the patient has some blood-streaked sputum and mild amount of fluid in an emesis bag she states she vomited up she is a little diaphoretic and appears uncomfortable.  It is not a significant amount is less than 2 cc but is concerning for an upper GI bleed.  At this time I have ordered more Zofran for antiemesis IV fluids octreotide Rocephin and Protonix.        Patient was observed in the emerged  department for an extended period of time she did not have any further episodes of hematemesis.  Her vital signs of improved with IV fluids.  She does have evidence of chronic kidney disease and elevated lactic acidosis ammonia is only 74 actively vomiting blood and fortunately her hemoglobin is within normal limits at this time.  I do not believe the patient needs ICU or IMCU admission and she has not had any recurrent episodes and is hemodynamically stable.  I spoke with Dr. Amador with the medicine service and he has accepted the patient to his service    Hydration: Based on the patient's presentation of Acute Vomiting the patient was given IV fluids. IV Hydration was used because oral hydration failed due to vomiting. Upon recheck following hydration, the patient was improved.          I have discussed management of the patient with the following physicians and POLA's:  ePrry    Discussion of management with other [facility] or appropriate source(s): Pharmacy for meds      FINAL DIAGNOSIS  1. Upper GI bleed    2. Nausea and vomiting, unspecified vomiting type           Electronically signed by: Shanice Hill M.D., 4/30/2024 7:35 PM

## 2024-05-01 NOTE — ED NOTES
Pt oxygen declining to 88%, pt placed on 2L nc maintaining oxygen at 96% currently. Pt resting with even chest rise and fall, reports no needs at this time, call light available and in reach.

## 2024-05-01 NOTE — ASSESSMENT & PLAN NOTE
With hematemesis , likely upper GI source  Continuous cardiac monitoring  Patient has been started on IV fluid hydration  NPO  Patient is started on IV Protonix  Start octreotide and ceftriaxone  Monitor H&H every 8 hours, transfuse for hemoglobin less than 7  We will consult GI in the morning for endoscopic evaluation

## 2024-05-01 NOTE — ED NOTES
Med rec updated and complete. Allergies reviewed.   Confirmed name and date of birth. Pt denies taking medications.  Pt stated she hasn't taken any medications > 6 months.    No anticoagulant or antiplatelet medications.  No outpatient antibiotic use in last 30 days.      [facility]  [facility] = [phone]

## 2024-05-01 NOTE — ED NOTES
ASSIST RN  PIV established via [facility], pt tolerated well, labs collected and sent.   Pt medicated per MAR, education provided, pt verbalized understanding.

## 2024-05-01 NOTE — ED NOTES
Bedside report to DENISE Madrid. Pt resting, eyes closed, with even chest rise and fall, call light available and in reach. Pt has had appoximately 10ml of coffee ground emesis over the past 11 hours and 45 mins.

## 2024-05-01 NOTE — ED NOTES
Report from Juli WALKER, patient sleeping at this time, monitor in place, respirations noted, gtt continued.

## 2024-05-01 NOTE — H&P
Hospital Medicine History & Physical Note    Date of Service  4/30/2024    Primary Care Physician  Pcp Pt States None    Consultants  Consult GI in a.m.    Specialist Names:     Code Status  Full Code    Chief Complaint  Chief Complaint   Patient presents with    N/V     Patient reports she started vomiting coffee-ground emesis around 0300 this morning. 5-6 incidences throughout day.    Denies pain, states bowel movement this morning was normal.       History of Presenting Illness  Chiara Pacheco is a 58 y.o. female who presented 4/30/2024 with past medical history of alcohol abuse, hepatitis C, history of cirrhosis, history of varices who presents to the hospital for hematemesis that started today.  Patient states that she vomited multiple times.  She does complain of lightheaded and dizziness.  She denies any bloody stools.  She denies any abdominal pain, fever, chills, shortness of breath.  She last drank alcohol 2 days ago where she was drinking vodka.  Yesterday she took 800 mg of ibuprofen.  Patient was admitted to hospital multiple times for hematemesis and her last endoscopy was on 2/23 where she was found to have gastric and esophageal varices that was treated with banding.  She was asked to stop taking ibuprofen but the patient states that she was having right tooth ache and right shoulder pain.    Chest x-ray interpreted by me found no acute pulmonary process  EKG is still pending-monitor QTc on octreotide    I discussed the plan of care with patient.    Review of Systems  Review of Systems   Constitutional:  Negative for chills, diaphoresis, fever and malaise/fatigue.   HENT:  Negative for congestion, ear discharge, ear pain, hearing loss, nosebleeds, sinus pain, sore throat and tinnitus.    Eyes:  Negative for blurred vision, double vision, photophobia and pain.   Respiratory:  Negative for cough, hemoptysis, sputum production, shortness of breath, wheezing and stridor.    Cardiovascular:   Negative for chest pain, palpitations, orthopnea, claudication, leg swelling and PND.   Gastrointestinal:  Positive for heartburn, nausea and vomiting. Negative for abdominal pain, blood in stool, constipation, diarrhea and melena.   Genitourinary:  Negative for dysuria, flank pain, frequency, hematuria and urgency.   Musculoskeletal:  Negative for back pain, falls, joint pain, myalgias and neck pain.   Skin:  Negative for itching and rash.   Neurological:  Negative for dizziness, tingling, tremors, weakness and headaches.   Endo/Heme/Allergies:  Negative for environmental allergies and polydipsia. Does not bruise/bleed easily.   Psychiatric/Behavioral:  Negative for depression, hallucinations, substance abuse and suicidal ideas.        Past Medical History   has a past medical history of Hearing loss.    Surgical History   has a past surgical history that includes Intubation (2/19/2023); pr upper gi endoscopy,diagnosis (2/19/2023); pr upper gi endoscopy,diagnosis (2/20/2023); pr upper gi endoscopy,diagnosis (N/A, 9/23/2023); and pr upper gi endoscopy,ligat varix (N/A, 9/23/2023).     Family History  Family history reviewed with patient. There is no family history that is pertinent to the chief complaint.     Social History   reports that she has been smoking. She has never used smokeless tobacco. She reports current alcohol use of about 6.0 oz of alcohol per week. She reports current drug use. Drug: Inhaled.    Allergies  No Known Allergies    Medications  None       Physical Exam  Temp:  [36.3 °C (97.3 °F)] 36.3 °C (97.3 °F)  Pulse:  [] 97  Resp:  [14-20] 14  BP: (118-136)/(57-66) 136/63  SpO2:  [92 %-96 %] 94 %  Blood Pressure: 136/63   Temperature: 36.3 °C (97.3 °F)   Pulse: 97   Respiration: 14   Pulse Oximetry: 94 %       Physical Exam  Vitals and nursing note reviewed.   Constitutional:       General: She is not in acute distress.     Appearance: Normal appearance. She is not ill-appearing,  "toxic-appearing or diaphoretic.   HENT:      Head: Normocephalic and atraumatic.      Nose: No congestion or rhinorrhea.      Mouth/Throat:      Pharynx: No oropharyngeal exudate or posterior oropharyngeal erythema.   Eyes:      General: No scleral icterus.  Neck:      Vascular: No carotid bruit or JVD.   Cardiovascular:      Rate and Rhythm: Normal rate and regular rhythm.      Pulses: Normal pulses.      Heart sounds: Normal heart sounds. No murmur heard.     No friction rub. No gallop.   Pulmonary:      Effort: Pulmonary effort is normal. No respiratory distress.      Breath sounds: No stridor. No wheezing, rhonchi or rales.   Abdominal:      General: Abdomen is flat. There is no distension.      Palpations: There is no mass.      Tenderness: There is no abdominal tenderness. There is no left CVA tenderness, guarding or rebound.      Hernia: No hernia is present.   Musculoskeletal:         General: No swelling. Normal range of motion.      Cervical back: No rigidity. No muscular tenderness.      Right lower leg: No edema.      Left lower leg: No edema.   Lymphadenopathy:      Cervical: No cervical adenopathy.   Skin:     General: Skin is warm and dry.      Capillary Refill: Capillary refill takes more than 3 seconds.      Coloration: Skin is not jaundiced or pale.      Findings: No bruising or erythema.   Neurological:      Mental Status: She is alert.         Laboratory:  Recent Labs     04/30/24 1949   WBC 7.8   RBC 4.07*   HEMOGLOBIN 14.5   HEMATOCRIT 41.6   .2*   MCH 35.6*   MCHC 34.9   RDW 52.3*   PLATELETCT 87*   MPV 10.5     Recent Labs     04/30/24 1949   SODIUM 144   POTASSIUM 3.5*   CHLORIDE 106   CO2 24   GLUCOSE 153*   BUN 19   CREATININE 0.44*   CALCIUM 9.1     Recent Labs     04/30/24 1949   ALTSGPT 98*   ASTSGOT 139*   ALKPHOSPHAT 116*   TBILIRUBIN 6.1*   GLUCOSE 153*     Recent Labs     04/30/24 1949   APTT 30.6   INR 1.42*     No results for input(s): \"NTPROBNP\" in the last 72 hours.   " "   No results for input(s): \"TROPONINT\" in the last 72 hours.    Imaging:  DX-CHEST-PORTABLE (1 VIEW)   Final Result         1.  Left basilar atelectasis, no focal infiltrate   2.  Atherosclerosis          X-Ray:  I have personally reviewed the images and compared with prior images.    Assessment/Plan:  Justification for Admission Status  I anticipate this patient will require at least two midnights for appropriate medical management, necessitating inpatient admission because GI bleed    Patient will need a Telemetry bed on MEDICAL service .  The need is secondary to GI bleed.    * GI bleed- (present on admission)  Assessment & Plan  With hematemesis , likely upper GI source  Continuous cardiac monitoring  Patient has been started on IV fluid hydration  NPO  Patient is started on IV Protonix  Start octreotide and ceftriaxone  Monitor H&H every 8 hours, transfuse for hemoglobin less than 7  We will consult GI in the morning for endoscopic evaluation      Hepatic encephalopathy (HCC)  Assessment & Plan  Start rifaximin and lactulose    Cirrhosis (HCC)  Assessment & Plan  Meld 17  Decompensated  Not on diuretics at home        Alcohol abuse- (present on admission)  Assessment & Plan  Patient will be admitted to the telemetry unit with close cardiac monitoring on CIWA protocol  Started on rally bag, multivitamin, thiamine and folate  Replete electrolytes  Patient has been counseled on alcohol cessation and will be provided with information for outpatient detox facilities      Bleeding gastric varices- (present on admission)  Assessment & Plan  Presents with hematemesis and I will start her on octreotide and ceftriaxone    Lactic acidosis- (present on admission)  Assessment & Plan  Continue IV hydration        VTE prophylaxis: SCDs/TEDs  "

## 2024-05-01 NOTE — CONSULTS
Gastroenterology Initial Consult Note               Author:  Nova Al M.D. Date & Time Created: 5/1/2024 8:41 AM       Patient ID:  Name:             Chiara Pacheco  YOB: 1965  Age:                 58 y.o.  female  MRN:               [id number]      Referring Provider: Phoebe Galaviz MD        Presenting Chief Complaint:  Coffee-ground emesis      History of Present Illness:    This is a 58 y.o. female with alcoholic cirrhosis, history of small esophageal varices, gastric varix, portal hypertensive gastropathy and small gastric ulcers.  She underwent EGD in 2/2023 and 9/2023.      She presented to the ER last night but had several episodes of coffee-ground emesis during the day yesterday.  She had been drinking alcohol on April 29 in the evening.  Has been in ER for 12+ hours and has only had 10 ml of coffee grounds emesis.  She typically drinks 5 shots of vodka daily.  She takes NSAIDs on occasion but did have some several days ago.      She complains of abdominal bloating for the last several months but does not feel that she is eating enough to explain this    Hgb on discharge 9/2023 was 11.7  Hgb 4/30 was 14.5 and today 13.7, plt 93, BUN 20               Review of Systems:  Review of Systems   Constitutional: Negative.    HENT:  Positive for hearing loss.    Eyes: Negative.    Respiratory: Negative.     Cardiovascular:  Positive for leg swelling.   Gastrointestinal:  Positive for vomiting.   Genitourinary: Negative.    Musculoskeletal: Negative.    Skin: Negative.    Neurological: Negative.    Endo/Heme/Allergies: Negative.    Psychiatric/Behavioral:  The patient has insomnia.              Past Medical History:  Past Medical History:   Diagnosis Date    Hearing loss      Active Hospital Problems    Diagnosis     GI bleed [K92.2]     Cirrhosis (HCC) [K74.60]     Hepatic encephalopathy (HCC) [K76.82]     Bleeding gastric varices [I86.4]     Alcohol abuse [F10.10]     Lactic  acidosis [E87.20]          Past Surgical History:  Past Surgical History:   Procedure Laterality Date    PA UPPER GI ENDOSCOPY,DIAGNOSIS N/A 9/23/2023    Procedure: GASTROSCOPY;  Surgeon: Nova Al M.D.;  Location: SURGERY [facility];  Service: Gastroenterology    PA UPPER GI ENDOSCOPY,LIGAT VARIX N/A 9/23/2023    Procedure: GASTROSCOPY, WITH BANDING;  Surgeon: Nova Al M.D.;  Location: SURGERY [facility];  Service: Gastroenterology    PA UPPER GI ENDOSCOPY,DIAGNOSIS  2/20/2023    Procedure: GASTROSCOPY;  Surgeon: Shanice Craft M.D.;  Location: SURGERY SAME DAY [facility];  Service: Gastroenterology    INTUBATION  2/19/2023    PA UPPER GI ENDOSCOPY,DIAGNOSIS  2/19/2023    Procedure: GASTROSCOPY;  Surgeon: Shanice Craft M.D.;  Location: SURGERY SAME DAY [facility];  Service: Gastroenterology           [facility] Medications:  Current Facility-Administered Medications   Medication Dose Frequency Provider Last Rate Last Admin    NS infusion   Continuous Doe Amador M.D. 83 mL/hr at 04/30/24 2326 New Bag at 04/30/24 2326    labetalol (Normodyne/Trandate) injection 10 mg  10 mg Q4HRS PRN Doe Amador M.D.        ondansetron (Zofran) syringe/vial injection 4 mg  4 mg Q4HRS PRN Doe Amador M.D.        ondansetron (Zofran ODT) dispertab 4 mg  4 mg Q4HRS PRN Doe Amador M.D.        promethazine (Phenergan) tablet 12.5-25 mg  12.5-25 mg Q4HRS PRMARLEEN Amador M.D.        promethazine (Phenergan) suppository 12.5-25 mg  12.5-25 mg Q4HRS PRMARLEEN Amador M.D.        prochlorperazine (Compazine) injection 5-10 mg  5-10 mg Q4HRS PRN Doe Amador M.D.        octreotide (SandoSTATIN) 1,250 mcg in  mL Infusion  50 mcg/hr Continuous Doe Amador M.D. 10 mL/hr at 05/01/24 0006 50 mcg/hr at 05/01/24 0006    pantoprazole (Protonix) injection 40 mg  40 mg BID Doe Amador M.D.   40 mg at 05/01/24 0521    cefTRIAXone (Rocephin) syringe 2,000 mg  2,000 mg Q24HRS Doe Amador M.D.        Pharmacy  "Consult Request - IV Antibiotics to PO conversion   PHARMACY TO DOSE Doe Amador M.D.        LORazepam (Ativan) tablet 0.5 mg  0.5 mg Q4HRS PRN Doe Amador M.D.        LORazepam (Ativan) tablet 1 mg  1 mg Q4HRS PRN Doe Amador M.D.        Or    LORazepam (Ativan) injection 0.5 mg  0.5 mg Q4HRS PRN Doe Amador M.D.        LORazepam (Ativan) tablet 2 mg  2 mg Q2HRS PRN Doe Amador M.D.        Or    LORazepam (Ativan) injection 1 mg  1 mg Q2HRS PRN Doe Amador M.D.        LORazepam (Ativan) tablet 3 mg  3 mg Q HOUR PRN Doe Amador M.D.        Or    LORazepam (Ativan) injection 1.5 mg  1.5 mg Q HOUR PRN Doe Amador M.D.        LORazepam (Ativan) tablet 4 mg  4 mg Q15 MIN PRN Doe Amador M.D.        Or    LORazepam (Ativan) injection 2 mg  2 mg Q15 MIN PRN Doe Amador M.D.        riFAXIMin (Xifaxan) tablet 550 mg  550 mg BID Doe Amador M.D.   550 mg at 05/01/24 0526    lactulose 20 GM/30ML solution 30 mL  30 mL TID Doe Amador M.D.   30 mL at 05/01/24 0528   Last reviewed on 4/30/2024  8:56 PM by Phil Pereira       Current Outpatient Medications:  (Not in a hospital admission)        Medication Allergies:  No Known Allergies      Family Medical History:  History reviewed. No pertinent family history.      Social History:  Social History     Socioeconomic History    Marital status: Single     Spouse name: Not on file    Number of children: Not on file    Years of education: Not on file    Highest education level: Not on file   Occupational History    Not on file   Tobacco Use    Smoking status: Every Day    Smokeless tobacco: Never   Vaping Use    Vaping Use: Some days   Substance and Sexual Activity    Alcohol use: Yes     Alcohol/week: 6.0 oz     Types: 10 Shots of liquor per week     Comment: \"a shot everyday\"    Drug use: Yes     Types: Inhaled     Comment: marajuana    Sexual activity: Not on file   Other Topics Concern    Not on file   Social History Narrative    Not on file     Social " "Determinants of Health     Financial Resource Strain: Not on file   Food Insecurity: Not on file   Transportation Needs: Not on file   Physical Activity: Not on file   Stress: Not on file   Social Connections: Not on file   Intimate Partner Violence: Not on file   Housing Stability: Not on file         Vital signs:  Weight/BMI: Body mass index is 28.06 kg/m².  /57   Pulse 84   Temp 36.3 °C (97.3 °F) (Temporal)   Resp 14   Ht 1.753 m (5' 9\")   Wt 86.2 kg (190 lb)   SpO2 93%   Vitals:    05/01/24 0458 05/01/24 0558 05/01/24 0658 05/01/24 0740   BP: 123/58 113/54 126/61 115/57   Pulse: 80 94 87 84   Resp:  16 17 14   Temp:       TempSrc:       SpO2: 96% 93% 96% 93%   Weight:       Height:         Oxygen Therapy:  Pulse Oximetry: 93 %, O2 (LPM): 2, O2 Delivery Device: Nasal Cannula    Intake/Output Summary (Last 24 hours) at 5/1/2024 0841  Last data filed at 5/1/2024 0059  Gross per 24 hour   Intake 100 ml   Output --   Net 100 ml         Physical Exam:  Physical Exam  Constitutional:       General: She is not in acute distress.     Appearance: She is obese.   HENT:      Head: Normocephalic and atraumatic.      Nose: Nose normal.      Mouth/Throat:      Mouth: Mucous membranes are moist.   Eyes:      General: Scleral icterus present.      Pupils: Pupils are equal, round, and reactive to light.   Cardiovascular:      Rate and Rhythm: Regular rhythm.      Heart sounds: Normal heart sounds.   Pulmonary:      Effort: Pulmonary effort is normal.      Breath sounds: Normal breath sounds.   Abdominal:      General: Bowel sounds are normal. There is distension.      Palpations: Abdomen is soft.      Tenderness: There is no abdominal tenderness.      Comments: Spleen tip palpable   Musculoskeletal:      Cervical back: Neck supple.      Comments: Trace bilateral LE edema with brawny skin changes   Skin:     General: Skin is warm and dry.      Coloration: Skin is jaundiced.   Neurological:      Mental Status: She is " alert and oriented to person, place, and time.                 Labs:  Recent Labs     04/30/24 1949 05/01/24  0330   SODIUM 144 145   POTASSIUM 3.5* 4.2   CHLORIDE 106 109   CO2 24 24   BUN 19 20   CREATININE 0.44* 0.55   MAGNESIUM 1.6 1.7   PHOSPHORUS  --  3.7   CALCIUM 9.1 8.6     Recent Labs     04/30/24 1949 05/01/24  0330   ALTSGPT 98* 87*   ASTSGOT 139* 119*   ALKPHOSPHAT 116* 107*   TBILIRUBIN 6.1* 4.5*   GLUCOSE 153* 131*     Recent Labs     04/30/24 1949 05/01/24  0330   WBC 7.8 9.6   NEUTSPOLYS 64.50 59.30   LYMPHOCYTES 21.90* 28.40   MONOCYTES 10.40 9.40   EOSINOPHILS 1.90 1.80   BASOPHILS 0.80 0.70   ASTSGOT 139* 119*   ALTSGPT 98* 87*   ALKPHOSPHAT 116* 107*   TBILIRUBIN 6.1* 4.5*     Recent Labs     04/30/24 1949 04/30/24 2325 05/01/24  0330   RBC 4.07*  --  3.80*   HEMOGLOBIN 14.5 13.7 13.7   HEMATOCRIT 41.6  --  39.5   PLATELETCT 87*  --  93*   PROTHROMBTM 17.5*  --   --    APTT 30.6  --   --    INR 1.42*  --   --      Recent Results (from the past 24 hour(s))   CBC WITH DIFFERENTIAL    Collection Time: 04/30/24  7:49 PM   Result Value Ref Range    WBC 7.8 4.8 - 10.8 K/uL    RBC 4.07 (L) 4.20 - 5.40 M/uL    Hemoglobin 14.5 12.0 - 16.0 g/dL    Hematocrit 41.6 37.0 - 47.0 %    .2 (H) 81.4 - 97.8 fL    MCH 35.6 (H) 27.0 - 33.0 pg    MCHC 34.9 32.2 - 35.5 g/dL    RDW 52.3 (H) 35.9 - 50.0 fL    Platelet Count 87 (L) 164 - 446 K/uL    MPV 10.5 9.0 - 12.9 fL    Neutrophils-Polys 64.50 44.00 - 72.00 %    Lymphocytes 21.90 (L) 22.00 - 41.00 %    Monocytes 10.40 0.00 - 13.40 %    Eosinophils 1.90 0.00 - 6.90 %    Basophils 0.80 0.00 - 1.80 %    Immature Granulocytes 0.50 0.00 - 0.90 %    Nucleated RBC 0.00 0.00 - 0.20 /100 WBC    Neutrophils (Absolute) 5.04 1.82 - 7.42 K/uL    Lymphs (Absolute) 1.71 1.00 - 4.80 K/uL    Monos (Absolute) 0.81 0.00 - 0.85 K/uL    Eos (Absolute) 0.15 0.00 - 0.51 K/uL    Baso (Absolute) 0.06 0.00 - 0.12 K/uL    Immature Granulocytes (abs) 0.04 0.00 - 0.11 K/uL    NRBC  (Absolute) 0.00 K/uL   COMP METABOLIC PANEL    Collection Time: 04/30/24  7:49 PM   Result Value Ref Range    Sodium 144 135 - 145 mmol/L    Potassium 3.5 (L) 3.6 - 5.5 mmol/L    Chloride 106 96 - 112 mmol/L    Co2 24 20 - 33 mmol/L    Anion Gap 14.0 7.0 - 16.0    Glucose 153 (H) 65 - 99 mg/dL    Bun 19 8 - 22 mg/dL    Creatinine 0.44 (L) 0.50 - 1.40 mg/dL    Calcium 9.1 8.5 - 10.5 mg/dL    Correct Calcium 9.3 8.5 - 10.5 mg/dL    AST(SGOT) 139 (H) 12 - 45 U/L    ALT(SGPT) 98 (H) 2 - 50 U/L    Alkaline Phosphatase 116 (H) 30 - 99 U/L    Total Bilirubin 6.1 (H) 0.1 - 1.5 mg/dL    Albumin 3.8 3.2 - 4.9 g/dL    Total Protein 6.1 6.0 - 8.2 g/dL    Globulin 2.3 1.9 - 3.5 g/dL    A-G Ratio 1.7 g/dL   APTT    Collection Time: 04/30/24  7:49 PM   Result Value Ref Range    APTT 30.6 24.7 - 36.0 sec   PROTHROMBIN TIME (INR)    Collection Time: 04/30/24  7:49 PM   Result Value Ref Range    PT 17.5 (H) 12.0 - 14.6 sec    INR 1.42 (H) 0.87 - 1.13   ESTIMATED GFR    Collection Time: 04/30/24  7:49 PM   Result Value Ref Range    GFR (CKD-EPI) 112 >60 mL/min/1.73 m 2   PLATELET ESTIMATE    Collection Time: 04/30/24  7:49 PM   Result Value Ref Range    Plt Estimation Decreased    IMMATURE PLT FRACTION    Collection Time: 04/30/24  7:49 PM   Result Value Ref Range    Imm. Plt Fraction 6.0 0.6 - 13.1 %   MAGNESIUM    Collection Time: 04/30/24  7:49 PM   Result Value Ref Range    Magnesium 1.6 1.5 - 2.5 mg/dL   BLOOD CULTURE    Collection Time: 04/30/24  8:33 PM    Specimen: Peripheral; Blood   Result Value Ref Range    Significant Indicator NEG     Source BLD     Site PERIPHERAL     Culture Result       No Growth  Note: Blood cultures are incubated for 5 days and  are monitored continuously.Positive blood cultures  are called to the RN and reported as soon as  they are identified.     LACTIC ACID    Collection Time: 04/30/24  9:35 PM   Result Value Ref Range    Lactic Acid 2.6 (H) 0.5 - 2.0 mmol/L   BLOOD CULTURE    Collection Time:  04/30/24  9:35 PM    Specimen: Peripheral; Blood   Result Value Ref Range    Significant Indicator NEG     Source BLD     Site PERIPHERAL     Culture Result       No Growth  Note: Blood cultures are incubated for 5 days and  are monitored continuously.Positive blood cultures  are called to the RN and reported as soon as  they are identified.     COD (ADULT)    Collection Time: 04/30/24  9:35 PM   Result Value Ref Range    ABO Grouping Only O     Rh Grouping Only POS     Antibody Screen-Cod NEG    AMMONIA    Collection Time: 04/30/24  9:35 PM   Result Value Ref Range    Ammonia 74 (H) 11 - 45 umol/L   DIAGNOSTIC ALCOHOL    Collection Time: 04/30/24  9:35 PM   Result Value Ref Range    Diagnostic Alcohol <10.1 <10.1 mg/dL   HGB (Hemoglobin) for 48 hours    Collection Time: 04/30/24 11:25 PM   Result Value Ref Range    Hemoglobin 13.7 12.0 - 16.0 g/dL   CBC with Differential    Collection Time: 05/01/24  3:30 AM   Result Value Ref Range    WBC 9.6 4.8 - 10.8 K/uL    RBC 3.80 (L) 4.20 - 5.40 M/uL    Hemoglobin 13.7 12.0 - 16.0 g/dL    Hematocrit 39.5 37.0 - 47.0 %    .9 (H) 81.4 - 97.8 fL    MCH 36.1 (H) 27.0 - 33.0 pg    MCHC 34.7 32.2 - 35.5 g/dL    RDW 53.2 (H) 35.9 - 50.0 fL    Platelet Count 93 (L) 164 - 446 K/uL    MPV 10.5 9.0 - 12.9 fL    Neutrophils-Polys 59.30 44.00 - 72.00 %    Lymphocytes 28.40 22.00 - 41.00 %    Monocytes 9.40 0.00 - 13.40 %    Eosinophils 1.80 0.00 - 6.90 %    Basophils 0.70 0.00 - 1.80 %    Immature Granulocytes 0.40 0.00 - 0.90 %    Nucleated RBC 0.20 0.00 - 0.20 /100 WBC    Neutrophils (Absolute) 5.68 1.82 - 7.42 K/uL    Lymphs (Absolute) 2.72 1.00 - 4.80 K/uL    Monos (Absolute) 0.90 (H) 0.00 - 0.85 K/uL    Eos (Absolute) 0.17 0.00 - 0.51 K/uL    Baso (Absolute) 0.07 0.00 - 0.12 K/uL    Immature Granulocytes (abs) 0.04 0.00 - 0.11 K/uL    NRBC (Absolute) 0.02 K/uL   Comp Metabolic Panel (CMP)    Collection Time: 05/01/24  3:30 AM   Result Value Ref Range    Sodium 145 135 - 145  mmol/L    Potassium 4.2 3.6 - 5.5 mmol/L    Chloride 109 96 - 112 mmol/L    Co2 24 20 - 33 mmol/L    Anion Gap 12.0 7.0 - 16.0    Glucose 131 (H) 65 - 99 mg/dL    Bun 20 8 - 22 mg/dL    Creatinine 0.55 0.50 - 1.40 mg/dL    Calcium 8.6 8.5 - 10.5 mg/dL    Correct Calcium 8.9 8.5 - 10.5 mg/dL    AST(SGOT) 119 (H) 12 - 45 U/L    ALT(SGPT) 87 (H) 2 - 50 U/L    Alkaline Phosphatase 107 (H) 30 - 99 U/L    Total Bilirubin 4.5 (H) 0.1 - 1.5 mg/dL    Albumin 3.6 3.2 - 4.9 g/dL    Total Protein 6.0 6.0 - 8.2 g/dL    Globulin 2.4 1.9 - 3.5 g/dL    A-G Ratio 1.5 g/dL   Magnesium    Collection Time: 05/01/24  3:30 AM   Result Value Ref Range    Magnesium 1.7 1.5 - 2.5 mg/dL   Phosphorus    Collection Time: 05/01/24  3:30 AM   Result Value Ref Range    Phosphorus 3.7 2.5 - 4.5 mg/dL   ESTIMATED GFR    Collection Time: 05/01/24  3:30 AM   Result Value Ref Range    GFR (CKD-EPI) 106 >60 mL/min/1.73 m 2   IMMATURE PLT FRACTION    Collection Time: 05/01/24  3:30 AM   Result Value Ref Range    Imm. Plt Fraction 5.3 0.6 - 13.1 %         Radiology Review:  DX-CHEST-PORTABLE (1 VIEW)   Final Result         1.  Left basilar atelectasis, no focal infiltrate   2.  Atherosclerosis            MDM (Data Review):   -Records reviewed and summarized in current documentation  -I personally reviewed and interpreted the laboratory results  -I personally reviewed the radiology images    Assessment/Recommendations:    IMpression:   Coffee grounds emesis without anemia   Esophageal varix with stigmata in Sept 2023   Large gastric varix without stigmata Sept 2023   Alcohol use disorder   Portal hypertensive gastropathy   Abnormal liver tests   Thrombocytopenia    Recs:   CT abd/pelvis to assess anatomy for possible IR procedure should she have varices that need decompression other than endoscopically  Octreotide drip  Was started on ceftriaxone but not aware she has ascites from previous imaging.  Will see if ascites and will need paracentesis if she  has new dx of ascites   On lactulose and Xifaxan.  Ammonia minimally elevated.  No hx of HE   Cl liq diet   May not be able to scope today due to full schedule but will place on schedule tomorrow  7.    Check TEG with platelet mapping (unsure about amount of NSAIDs recently)        Nova Al M.D.          Core Quality Measures   Reviewed items:  Labs, Medications and Radiology reports reviewed

## 2024-05-01 NOTE — PROGRESS NOTES
4 Eyes Skin Assessment Completed by DENISE zazueta and ALYSON Mae.    Head WDL  Ears WDL  Nose WDL  Mouth WDL  Neck WDL  Breast/Chest WDL  Shoulder Blades WDL  Spine WDL  (R) Arm/Elbow/Hand WDL  (L) Arm/Elbow/Hand WDL  Abdomen WDL  Groin WDL  Scrotum/Coccyx/Buttocks WDL  (R) Leg WDL  (L) Leg WDL  (R) Heel/Foot/Toe WDL  (L) Heel/Foot/Toe WDL          Devices In Places Tele Box and Blood Pressure Cuff      Interventions In Place Pillows    Possible Skin Injury No    Pictures Uploaded Into Epic N/A  Wound Consult Placed N/A  RN Wound Prevention Protocol Ordered No

## 2024-05-01 NOTE — ED NOTES
2 attempts for PIV by this RN, assist RN at bedside for ultrasound PIV and blood draw, phlebotomy able to draw 1 set of cultures.

## 2024-05-01 NOTE — PROGRESS NOTES
Hospital Medicine Daily Progress Note    Date of Service  5/1/2024    Chief Complaint  Chiara Pacheco is a 58 y.o. female admitted 4/30/2024 with hematemesis    Hospital Course  59 yo woman with alcoholic cirrhosis, esophageal varices, hepatitis C presented with hematemesis with recent alcohol and ibuprofen use.  She did have EGD 9/2023 and found with esophageal varices that were banded.    Interval Problem Update  Hemoglobin stable 13.7.  She has not had hematemesis today.  She denies abdominal pain or nausea currently.  She feels very thirsty.    T. bili 4.5  I consulted GI    I have discussed this patient's plan of care and discharge plan at IDT rounds today with Case Management, Nursing, Nursing leadership, and other members of the IDT team.    Consultants/Specialty  GI    Code Status  Full Code    Disposition  The patient is not medically cleared for discharge to home or a post-acute facility.  Anticipate discharge to: home with close outpatient follow-up    I have placed the appropriate orders for post-discharge needs.    Review of Systems  Review of Systems   Constitutional:  Positive for malaise/fatigue.   Respiratory:  Negative for shortness of breath.    Cardiovascular:  Negative for chest pain.   Gastrointestinal:  Negative for abdominal pain, blood in stool and vomiting.   Musculoskeletal:  Negative for myalgias.   Neurological:  Positive for weakness.        Physical Exam  Temp:  [36.3 °C (97.3 °F)-36.5 °C (97.7 °F)] 36.5 °C (97.7 °F)  Pulse:  [] 90  Resp:  [13-20] 14  BP: (111-142)/(52-66) 128/61  SpO2:  [91 %-96 %] 94 %    Physical Exam  Vitals and nursing note reviewed.   Constitutional:       Appearance: She is ill-appearing. She is not diaphoretic.   HENT:      Head: Normocephalic.      Mouth/Throat:      Mouth: Mucous membranes are dry.   Eyes:      General: Scleral icterus present.         Right eye: No discharge.         Left eye: No discharge.   Cardiovascular:      Rate and  Rhythm: Normal rate and regular rhythm.   Pulmonary:      Effort: Pulmonary effort is normal. No respiratory distress.      Breath sounds: No wheezing or rales.   Abdominal:      Palpations: Abdomen is soft.      Tenderness: There is no abdominal tenderness. There is no guarding or rebound.   Musculoskeletal:      Cervical back: Neck supple.      Right lower leg: No edema.      Left lower leg: No edema.   Skin:     General: Skin is warm and dry.   Neurological:      Mental Status: She is alert and oriented to person, place, and time.         Fluids    Intake/Output Summary (Last 24 hours) at 5/1/2024 1323  Last data filed at 5/1/2024 1100  Gross per 24 hour   Intake 900 ml   Output --   Net 900 ml       Laboratory  Recent Labs     04/30/24 1949 04/30/24 2325 05/01/24  0330   WBC 7.8  --  9.6   RBC 4.07*  --  3.80*   HEMOGLOBIN 14.5 13.7 13.7   HEMATOCRIT 41.6  --  39.5   .2*  --  103.9*   MCH 35.6*  --  36.1*   MCHC 34.9  --  34.7   RDW 52.3*  --  53.2*   PLATELETCT 87*  --  93*   MPV 10.5  --  10.5     Recent Labs     04/30/24 1949 05/01/24  0330   SODIUM 144 145   POTASSIUM 3.5* 4.2   CHLORIDE 106 109   CO2 24 24   GLUCOSE 153* 131*   BUN 19 20   CREATININE 0.44* 0.55   CALCIUM 9.1 8.6     Recent Labs     04/30/24 1949   APTT 30.6   INR 1.42*               Imaging  DX-CHEST-PORTABLE (1 VIEW)   Final Result         1.  Left basilar atelectasis, no focal infiltrate   2.  Atherosclerosis      CT-ABDOMEN-PELVIS WITH    (Results Pending)        Assessment/Plan  * GI bleed- (present on admission)  Assessment & Plan  With hematemesis , likely upper GI source concerns for rebleeding of varices  Hemoglobin so far stable, she has not had recurrence of hematemesis  Continue on IV PPI, octreotide, ceftriaxone.  CTAP pending to assess for ascites  GI has been consulted.  Clear liquid diet today and possible EGD tomorrow      Hepatic encephalopathy (HCC)  Assessment & Plan  She is AO ox 3 and appears compensated.   Continue lactulose and rifaximin and monitor clinically    Cirrhosis (HCC)  Assessment & Plan  Meld 17 trend LFTs and INR  Decompensated  CTAP pending, assess for ascites  Ammonia is elevated, but she is AO X3.  Monitor for clinical changes.  Continue on lactulose and rifaximin        Alcohol abuse- (present on admission)  Assessment & Plan  monitoring on CIWA protocol  Not in withdrawal currently      Bleeding gastric varices- (present on admission)  Assessment & Plan  Recurring in the past.  Hematemesis again concerning for rebleeding  Continue on IV PPI, octreotide, ceftriaxone.  CTAP pending to assess for ascites  GI has been consulted.  Clear liquid diet today and possible EGD tomorrow    Lactic acidosis- (present on admission)  Assessment & Plan  Continue IV hydration         VTE prophylaxis:   SCDs/TEDs      I have performed a physical exam and reviewed and updated ROS and Plan today (5/1/2024). In review of yesterday's note (4/30/2024), there are no changes except as documented above.

## 2024-05-01 NOTE — ED NOTES
Pt ambulatory to restroom with 1 person standby assist. Pt urinated, but is unable to have bowel movement at this time. 1 person assist back to bed. Shanna switched out with hospital bed, pt provided additional blankets, hooked back up to monitor, and is resting with even chest rise and fall. Blood drawn and sent to lab. Reports no additional needs at this time, call light available and in reach.

## 2024-05-01 NOTE — ED TRIAGE NOTES
PT BIB EMS for   Chief Complaint   Patient presents with    N/V     Patient reports she started vomiting coffee-ground emesis around 0300 this morning. 5-6 incidences throughout day.    Denies pain, states bowel movement this morning was normal.      Hx GI bleed

## 2024-05-02 ENCOUNTER — ANESTHESIA (OUTPATIENT)
Dept: SURGERY | Facility: MEDICAL CENTER | Age: 59
DRG: 432 | End: 2024-05-02
Payer: COMMERCIAL

## 2024-05-02 LAB
ALBUMIN SERPL BCP-MCNC: 3.3 G/DL (ref 3.2–4.9)
ALBUMIN/GLOB SERPL: 1.8 G/DL
ALP SERPL-CCNC: 84 U/L (ref 30–99)
ALT SERPL-CCNC: 85 U/L (ref 2–50)
ANION GAP SERPL CALC-SCNC: 11 MMOL/L (ref 7–16)
AST SERPL-CCNC: 151 U/L (ref 12–45)
BASOPHILS # BLD AUTO: 0.9 % (ref 0–1.8)
BASOPHILS # BLD: 0.05 K/UL (ref 0–0.12)
BILIRUB SERPL-MCNC: 3.2 MG/DL (ref 0.1–1.5)
BUN SERPL-MCNC: 12 MG/DL (ref 8–22)
CALCIUM ALBUM COR SERPL-MCNC: 8.3 MG/DL (ref 8.5–10.5)
CALCIUM SERPL-MCNC: 7.7 MG/DL (ref 8.5–10.5)
CHLORIDE SERPL-SCNC: 106 MMOL/L (ref 96–112)
CO2 SERPL-SCNC: 23 MMOL/L (ref 20–33)
CREAT SERPL-MCNC: 0.41 MG/DL (ref 0.5–1.4)
EOSINOPHIL # BLD AUTO: 0.18 K/UL (ref 0–0.51)
EOSINOPHIL NFR BLD: 3.3 % (ref 0–6.9)
ERYTHROCYTE [DISTWIDTH] IN BLOOD BY AUTOMATED COUNT: 55.6 FL (ref 35.9–50)
GFR SERPLBLD CREATININE-BSD FMLA CKD-EPI: 114 ML/MIN/1.73 M 2
GLOBULIN SER CALC-MCNC: 1.8 G/DL (ref 1.9–3.5)
GLUCOSE SERPL-MCNC: 95 MG/DL (ref 65–99)
HCT VFR BLD AUTO: 32.4 % (ref 37–47)
HGB BLD-MCNC: 10.9 G/DL (ref 12–16)
HGB BLD-MCNC: 11.1 G/DL (ref 12–16)
IMM GRANULOCYTES # BLD AUTO: 0.02 K/UL (ref 0–0.11)
IMM GRANULOCYTES NFR BLD AUTO: 0.4 % (ref 0–0.9)
INR PPP: 1.56 (ref 0.87–1.13)
LYMPHOCYTES # BLD AUTO: 1.53 K/UL (ref 1–4.8)
LYMPHOCYTES NFR BLD: 28.4 % (ref 22–41)
MCH RBC QN AUTO: 36.2 PG (ref 27–33)
MCHC RBC AUTO-ENTMCNC: 33.6 G/DL (ref 32.2–35.5)
MCV RBC AUTO: 107.6 FL (ref 81.4–97.8)
MONOCYTES # BLD AUTO: 0.47 K/UL (ref 0–0.85)
MONOCYTES NFR BLD AUTO: 8.7 % (ref 0–13.4)
NEUTROPHILS # BLD AUTO: 3.13 K/UL (ref 1.82–7.42)
NEUTROPHILS NFR BLD: 58.3 % (ref 44–72)
NRBC # BLD AUTO: 0 K/UL
NRBC BLD-RTO: 0 /100 WBC (ref 0–0.2)
PLATELET # BLD AUTO: 64 K/UL (ref 164–446)
PLATELETS.RETICULATED NFR BLD AUTO: 4.5 % (ref 0.6–13.1)
PMV BLD AUTO: 10.2 FL (ref 9–12.9)
POTASSIUM SERPL-SCNC: 3.8 MMOL/L (ref 3.6–5.5)
PROT SERPL-MCNC: 5.1 G/DL (ref 6–8.2)
PROTHROMBIN TIME: 18.9 SEC (ref 12–14.6)
RBC # BLD AUTO: 3.01 M/UL (ref 4.2–5.4)
SODIUM SERPL-SCNC: 140 MMOL/L (ref 135–145)
WBC # BLD AUTO: 5.4 K/UL (ref 4.8–10.8)

## 2024-05-02 PROCEDURE — 43235 EGD DIAGNOSTIC BRUSH WASH: CPT | Performed by: INTERNAL MEDICINE

## 2024-05-02 PROCEDURE — 99233 SBSQ HOSP IP/OBS HIGH 50: CPT | Performed by: INTERNAL MEDICINE

## 2024-05-02 PROCEDURE — 0DJ68ZZ INSPECTION OF STOMACH, VIA NATURAL OR ARTIFICIAL OPENING ENDOSCOPIC: ICD-10-PCS | Performed by: INTERNAL MEDICINE

## 2024-05-02 RX ORDER — OMEPRAZOLE 20 MG/1
20 CAPSULE, DELAYED RELEASE ORAL 2 TIMES DAILY
Status: DISCONTINUED | OUTPATIENT
Start: 2024-05-02 | End: 2024-05-03 | Stop reason: HOSPADM

## 2024-05-02 RX ORDER — UREA 10 %
5 LOTION (ML) TOPICAL NIGHTLY PRN
Status: DISCONTINUED | OUTPATIENT
Start: 2024-05-02 | End: 2024-05-03 | Stop reason: HOSPADM

## 2024-05-02 RX ORDER — ONDANSETRON 2 MG/ML
INJECTION INTRAMUSCULAR; INTRAVENOUS PRN
Status: DISCONTINUED | OUTPATIENT
Start: 2024-05-02 | End: 2024-05-02 | Stop reason: SURG

## 2024-05-02 RX ORDER — LIDOCAINE HYDROCHLORIDE 40 MG/ML
SOLUTION TOPICAL PRN
Status: DISCONTINUED | OUTPATIENT
Start: 2024-05-02 | End: 2024-05-02 | Stop reason: SURG

## 2024-05-02 RX ORDER — SUCCINYLCHOLINE CHLORIDE 20 MG/ML
INJECTION INTRAMUSCULAR; INTRAVENOUS PRN
Status: DISCONTINUED | OUTPATIENT
Start: 2024-05-02 | End: 2024-05-02 | Stop reason: SURG

## 2024-05-02 RX ORDER — ONDANSETRON 2 MG/ML
4 INJECTION INTRAMUSCULAR; INTRAVENOUS
Status: DISCONTINUED | OUTPATIENT
Start: 2024-05-02 | End: 2024-05-02 | Stop reason: HOSPADM

## 2024-05-02 RX ORDER — OXYCODONE HCL 5 MG/5 ML
5 SOLUTION, ORAL ORAL
Status: DISCONTINUED | OUTPATIENT
Start: 2024-05-02 | End: 2024-05-02 | Stop reason: HOSPADM

## 2024-05-02 RX ORDER — EPHEDRINE SULFATE 50 MG/ML
5 INJECTION, SOLUTION INTRAVENOUS
Status: DISCONTINUED | OUTPATIENT
Start: 2024-05-02 | End: 2024-05-02 | Stop reason: HOSPADM

## 2024-05-02 RX ORDER — SODIUM CHLORIDE, SODIUM LACTATE, POTASSIUM CHLORIDE, CALCIUM CHLORIDE 600; 310; 30; 20 MG/100ML; MG/100ML; MG/100ML; MG/100ML
INJECTION, SOLUTION INTRAVENOUS
Status: DISCONTINUED | OUTPATIENT
Start: 2024-05-02 | End: 2024-05-02 | Stop reason: HOSPADM

## 2024-05-02 RX ORDER — DEXAMETHASONE SODIUM PHOSPHATE 4 MG/ML
INJECTION, SOLUTION INTRA-ARTICULAR; INTRALESIONAL; INTRAMUSCULAR; INTRAVENOUS; SOFT TISSUE PRN
Status: DISCONTINUED | OUTPATIENT
Start: 2024-05-02 | End: 2024-05-02 | Stop reason: HOSPADM

## 2024-05-02 RX ORDER — PHENYLEPHRINE HCL IN 0.9% NACL 1 MG/10 ML
SYRINGE (ML) INTRAVENOUS PRN
Status: DISCONTINUED | OUTPATIENT
Start: 2024-05-02 | End: 2024-05-02 | Stop reason: SURG

## 2024-05-02 RX ORDER — SODIUM CHLORIDE, SODIUM LACTATE, POTASSIUM CHLORIDE, CALCIUM CHLORIDE 600; 310; 30; 20 MG/100ML; MG/100ML; MG/100ML; MG/100ML
INJECTION, SOLUTION INTRAVENOUS CONTINUOUS
Status: DISCONTINUED | OUTPATIENT
Start: 2024-05-02 | End: 2024-05-02 | Stop reason: HOSPADM

## 2024-05-02 RX ORDER — LIDOCAINE HYDROCHLORIDE 20 MG/ML
INJECTION, SOLUTION EPIDURAL; INFILTRATION; INTRACAUDAL; PERINEURAL PRN
Status: DISCONTINUED | OUTPATIENT
Start: 2024-05-02 | End: 2024-05-02 | Stop reason: SURG

## 2024-05-02 RX ORDER — OXYCODONE HCL 5 MG/5 ML
10 SOLUTION, ORAL ORAL
Status: DISCONTINUED | OUTPATIENT
Start: 2024-05-02 | End: 2024-05-02 | Stop reason: HOSPADM

## 2024-05-02 RX ORDER — MIDAZOLAM HYDROCHLORIDE 1 MG/ML
1 INJECTION INTRAMUSCULAR; INTRAVENOUS
Status: DISCONTINUED | OUTPATIENT
Start: 2024-05-02 | End: 2024-05-02 | Stop reason: HOSPADM

## 2024-05-02 RX ORDER — MEPERIDINE HYDROCHLORIDE 25 MG/ML
6.25 INJECTION INTRAMUSCULAR; INTRAVENOUS; SUBCUTANEOUS
Status: DISCONTINUED | OUTPATIENT
Start: 2024-05-02 | End: 2024-05-02 | Stop reason: HOSPADM

## 2024-05-02 RX ORDER — HALOPERIDOL 5 MG/ML
1 INJECTION INTRAMUSCULAR
Status: DISCONTINUED | OUTPATIENT
Start: 2024-05-02 | End: 2024-05-02 | Stop reason: HOSPADM

## 2024-05-02 RX ORDER — DIPHENHYDRAMINE HYDROCHLORIDE 50 MG/ML
12.5 INJECTION INTRAMUSCULAR; INTRAVENOUS
Status: DISCONTINUED | OUTPATIENT
Start: 2024-05-02 | End: 2024-05-02 | Stop reason: HOSPADM

## 2024-05-02 RX ADMIN — RIFAXIMIN 550 MG: 550 TABLET ORAL at 05:40

## 2024-05-02 RX ADMIN — SODIUM CHLORIDE: 9 INJECTION, SOLUTION INTRAVENOUS at 03:18

## 2024-05-02 RX ADMIN — SODIUM CHLORIDE, POTASSIUM CHLORIDE, SODIUM LACTATE AND CALCIUM CHLORIDE: 600; 310; 30; 20 INJECTION, SOLUTION INTRAVENOUS at 07:24

## 2024-05-02 RX ADMIN — OMEPRAZOLE 20 MG: 20 CAPSULE, DELAYED RELEASE ORAL at 17:04

## 2024-05-02 RX ADMIN — Medication 100 MCG: at 07:32

## 2024-05-02 RX ADMIN — Medication 5 MG: at 23:07

## 2024-05-02 RX ADMIN — ONDANSETRON 4 MG: 2 INJECTION INTRAMUSCULAR; INTRAVENOUS at 07:37

## 2024-05-02 RX ADMIN — LIDOCAINE HYDROCHLORIDE 4 ML: 40 SOLUTION TOPICAL at 07:28

## 2024-05-02 RX ADMIN — DEXAMETHASONE SODIUM PHOSPHATE 8 MG: 4 INJECTION INTRA-ARTICULAR; INTRALESIONAL; INTRAMUSCULAR; INTRAVENOUS; SOFT TISSUE at 07:30

## 2024-05-02 RX ADMIN — LIDOCAINE HYDROCHLORIDE 75 MG: 20 INJECTION, SOLUTION EPIDURAL; INFILTRATION; INTRACAUDAL at 07:27

## 2024-05-02 RX ADMIN — RIFAXIMIN 550 MG: 550 TABLET ORAL at 17:04

## 2024-05-02 RX ADMIN — OCTREOTIDE ACETATE 50 MCG/HR: 200 INJECTION, SOLUTION INTRAVENOUS; SUBCUTANEOUS at 03:18

## 2024-05-02 RX ADMIN — SUCCINYLCHOLINE CHLORIDE 100 MG: 20 INJECTION, SOLUTION INTRAMUSCULAR; INTRAVENOUS at 07:27

## 2024-05-02 RX ADMIN — PANTOPRAZOLE SODIUM 40 MG: 40 INJECTION, POWDER, LYOPHILIZED, FOR SOLUTION INTRAVENOUS at 05:40

## 2024-05-02 RX ADMIN — LACTULOSE 30 ML: 20 SOLUTION ORAL at 17:04

## 2024-05-02 RX ADMIN — FENTANYL CITRATE 50 MCG: 50 INJECTION, SOLUTION INTRAMUSCULAR; INTRAVENOUS at 07:27

## 2024-05-02 RX ADMIN — LACTULOSE 30 ML: 20 SOLUTION ORAL at 05:40

## 2024-05-02 RX ADMIN — LACTULOSE 30 ML: 20 SOLUTION ORAL at 12:05

## 2024-05-02 RX ADMIN — PROPOFOL 150 MG: 10 INJECTION, EMULSION INTRAVENOUS at 07:27

## 2024-05-02 ASSESSMENT — COGNITIVE AND FUNCTIONAL STATUS - GENERAL
SUGGESTED CMS G CODE MODIFIER DAILY ACTIVITY: CH
MOVING TO AND FROM BED TO CHAIR: A LITTLE
DAILY ACTIVITIY SCORE: 24
STANDING UP FROM CHAIR USING ARMS: A LITTLE
MOBILITY SCORE: 22
SUGGESTED CMS G CODE MODIFIER MOBILITY: CJ

## 2024-05-02 ASSESSMENT — ENCOUNTER SYMPTOMS
SHORTNESS OF BREATH: 0
WEAKNESS: 0
ABDOMINAL PAIN: 0
VOMITING: 0

## 2024-05-02 ASSESSMENT — LIFESTYLE VARIABLES
AGITATION: NORMAL ACTIVITY
TREMOR: NO TREMOR
PAROXYSMAL SWEATS: NO SWEAT VISIBLE
ORIENTATION AND CLOUDING OF SENSORIUM: ORIENTED AND CAN DO SERIAL ADDITIONS
TOTAL SCORE: 0
HEADACHE, FULLNESS IN HEAD: NOT PRESENT
AGITATION: NORMAL ACTIVITY
TREMOR: NO TREMOR
TOTAL SCORE: 0
NAUSEA AND VOMITING: NO NAUSEA AND NO VOMITING
ANXIETY: NO ANXIETY (AT EASE)
AUDITORY DISTURBANCES: NOT PRESENT
ANXIETY: NO ANXIETY (AT EASE)
AUDITORY DISTURBANCES: NOT PRESENT
ANXIETY: NO ANXIETY (AT EASE)
NAUSEA AND VOMITING: NO NAUSEA AND NO VOMITING
VISUAL DISTURBANCES: NOT PRESENT
HEADACHE, FULLNESS IN HEAD: NOT PRESENT
ORIENTATION AND CLOUDING OF SENSORIUM: ORIENTED AND CAN DO SERIAL ADDITIONS
NAUSEA AND VOMITING: NO NAUSEA AND NO VOMITING
TREMOR: NO TREMOR
PAROXYSMAL SWEATS: NO SWEAT VISIBLE
VISUAL DISTURBANCES: NOT PRESENT
ORIENTATION AND CLOUDING OF SENSORIUM: ORIENTED AND CAN DO SERIAL ADDITIONS
AGITATION: NORMAL ACTIVITY
HEADACHE, FULLNESS IN HEAD: NOT PRESENT
TOTAL SCORE: 0
VISUAL DISTURBANCES: NOT PRESENT
AUDITORY DISTURBANCES: NOT PRESENT
PAROXYSMAL SWEATS: NO SWEAT VISIBLE

## 2024-05-02 ASSESSMENT — PAIN DESCRIPTION - PAIN TYPE
TYPE: SURGICAL PAIN
TYPE: SURGICAL PAIN

## 2024-05-02 ASSESSMENT — FIBROSIS 4 INDEX: FIB4 SCORE: 14.59

## 2024-05-02 NOTE — CARE PLAN
Problem: Knowledge Deficit - Standard  Goal: Patient and family/care givers will demonstrate understanding of plan of care, disease process/condition, diagnostic tests and medications  Outcome: Progressing     Problem: Mobility  Goal: Patient's capacity to carry out activities will improve  Outcome: Progressing     Problem: Self Care  Goal: Patient will have the ability to perform ADLs independently or with assistance (bathe, groom, dress, toilet and feed)  Outcome: Progressing   The patient is Stable - Low risk of patient condition declining or worsening    Shift Goals  Clinical Goals: post op, octreotide drip, VSS  Patient Goals: sleep  Family Goals: kely    Progress made toward(s) clinical / shift goals:  yes    Patient is not progressing towards the following goals:

## 2024-05-02 NOTE — PROGRESS NOTES
Bedside report received from day shift RN. Patient sitting up in bed. A&Ox4. No distress noted. Receiving 2L o2 via nasal cannula. Patient denies pain at this time. Patient drinking oral contrast for CT scan of abdomen. NS running at 83 ml/hr with Octreotide at 10 ml/hr. Patient updated with POC. Bed locked in lowest position. Bed alarm on. Call light within reach. Hourly rounding initiated.

## 2024-05-02 NOTE — CARE PLAN
The patient is Stable - Low risk of patient condition declining or worsening    Shift Goals  Clinical Goals: admission, CT  Patient Goals: rest  Family Goals: kely    Progress made toward(s) clinical / shift goals:      Problem: Knowledge Deficit - Standard  Goal: Patient and family/care givers will demonstrate understanding of plan of care, disease process/condition, diagnostic tests and medications  Outcome: Progressing     Problem: Optimal Care for Alcohol Withdrawal  Goal: Optimal Care for the alcohol withdrawal patient  Outcome: Progressing     Problem: Hemodynamics  Goal: Patient's hemodynamics, fluid balance and neurologic status will be stable or improve  Outcome: Progressing     Problem: Gastrointestinal Irritability  Goal: Nausea and vomiting will be absent or improve  Outcome: Progressing       Patient is not progressing towards the following goals:

## 2024-05-02 NOTE — OR NURSING
0744 - Pt to PACU from OR. Report from anesthesia and OR RN. On 4L O2 via mask. Respirations even and unlabored. VSS. No signs of bleeding. Hearing aids given back to patient. Per anesthesiologist not able to drink anything for an hour (until 0845).    0753 - Dr. Lopez at bedside speaking with patient.     0809 - Report given to Sylwia WALKER. Patient reports minor discomfort to throat.    0817 - Patient transferred back to [facility] via RN on telemetry monitor with belongings on 3L NC in stable condition.

## 2024-05-02 NOTE — OP REPORT
PreOp Diagnosis: Cirrhosis, hematemesis      PostOp Diagnosis:   #1.  Grade 1 esophageal varices  #2.  Mild Ulceration at the GE junction.  No high risk stigmata.  #3.  Large isolated gastric varix, approximately 5 cm.  #4.  Portal hypertensive gastropathy, moderate  #5.  Chronic gastritis  #6.  Mild peptic duodenitis      Procedure(s):  GASTROSCOPY - Wound Class: Clean Contaminated    Surgeon(s):  Jm Lopez M.D.    Anesthesiologist/Type of Anesthesia:  Anesthesiologist: Virginia Mckeon M.D./General    Surgical Staff:  Circulator: Ellen Ratliff R.N.  Endoscopy Technician: Lili Gloria  Endoscopy Nurse: Anabela Villanueva R.N.    Specimens removed if any:  * No specimens in log *      CONSENT: The risks, benefits and alternatives of the procedure were discussed in detail. The risks include and are not limited to bleeding, infection, perforation, missed lesions, and sedations risks (cardiopulmonary compromise and allergic reaction to medications).    DESCRIPTION:   The patient presented to the operating room.  A time out was performed prior to beginning the procedure.   The patient was placed in the left lateral recumbent position.   Patient was sedated by anesthesia: General endotracheal anesthesia.    OPERATIVE FINDINGS:    Esophagus: Grade 1 esophageal varices.  At the GE junction was mild erosive esophagitis.  No high risk stigmata for recent bleeding.  Inlet patch in the proximal esophagus.  Stomach: Large isolated gastric varix in the cardia.  Approximately 5 cm.  Moderate portal hypertensive gastropathy.  Chronic gastritis.  No active bleeding.  Duodenum: Peptic duodenitis in the bulb, normal D2.      Blood loss: None    The patient tolerated the procedure well.      There were no immediate complications.    IMPRESSION:  #1.  Grade 1 esophageal varices  #2.  Mild Ulceration at the GE junction.  No high risk stigmata.  #3.  Large isolated gastric varix, approximately 5 cm.  #4.  Portal  hypertensive gastropathy, moderate  #5.  Chronic gastritis  #6.  Mild peptic duodenitis      RECOMMENDATIONS:  Consider IR evaluation for BRTO/CARTO.  Advance diet as tolerated, 2 g sodium restriction.  [facility] for [facility] care.

## 2024-05-02 NOTE — PROGRESS NOTES
Monitor summary:        Rhythm: SR / ST  Rate:   Ectopy: (r)PAC  Measurements: .14/.09/.37        12hr chart check

## 2024-05-02 NOTE — ANESTHESIA POSTPROCEDURE EVALUATION
Patient: Chiara Pacheco    Procedure Summary       Date: 05/02/24 Room / Location: [facility] ROOM 26 / SURGERY SAME DAY [facility]    Anesthesia Start: 0724 Anesthesia Stop:     Procedure: GASTROSCOPY (Esophagus) Diagnosis: (Coffee-ground emesis)    Surgeons: Jm Lopez M.D. Responsible Provider: Virginia Mckeon M.D.    Anesthesia Type: general ASA Status: 3            Final Anesthesia Type: general  Last vitals  BP   Blood Pressure: 117/59    Temp   36.5 °C (97.7 °F)    Pulse   92   Resp   16    SpO2   94 %      Anesthesia Post Evaluation    Patient location during evaluation: PACU  Patient participation: complete - patient participated  Level of consciousness: awake and alert    Airway patency: patent  Anesthetic complications: no  Cardiovascular status: hemodynamically stable  Respiratory status: acceptable  Hydration status: euvolemic    PONV: none          No notable events documented.     Nurse Pain Score: 0 (NPRS)

## 2024-05-02 NOTE — ANESTHESIA PREPROCEDURE EVALUATION
Case: [id number] Date/Time: 05/02/24 0715    Procedure: GASTROSCOPY (Esophagus)    Anesthesia type: MAC    Pre-op diagnosis: Coffee-ground emesis    Location: [facility] ROOM 26 / SURGERY SAME DAY [facility]    Surgeons: Jm Lopez M.D.          57 yo F with alcoholic cirrhosis, bleeding gastric varices, portal hypertension and tobacco use here for EGD after coffee-ground emesis.  Hgb 10.9. currently on 2L O2 NC.     NPO  Relevant Problems   ANESTHESIA   (negative) History of anesthesia complications      CARDIAC   (positive) Bleeding gastric varices         (positive) Cirrhosis (HCC)   (positive) Hepatitis C      Other   (positive) Alcohol abuse   (positive) GI bleed   (positive) Hepatic encephalopathy (HCC)   (positive) Lactic acidosis       Physical Exam    Airway   Mallampati: III  TM distance: >3 FB  Neck ROM: full       Cardiovascular - normal exam  Rhythm: regular  Rate: normal  (-) murmur     Dental - normal exam           Pulmonary - normal exam  Breath sounds clear to auscultation     Abdominal    Neurological - normal exam                   Anesthesia Plan    ASA 3   ASA physical status 3 criteria: alcohol and/or substance dependence or abuse    Plan - general       Airway plan will be ETT          Induction: intravenous      Pertinent diagnostic labs and testing reviewed    Informed Consent:    Anesthetic plan and risks discussed with patient.    Use of blood products discussed with: patient whom consented to blood products.

## 2024-05-02 NOTE — ANESTHESIA PROCEDURE NOTES
Airway    Date/Time: 5/2/2024 7:28 AM    Performed by: Virginia Mckeon M.D.  Authorized by: Virginia Mckeon M.D.    Location:  OR  Urgency:  Elective  Difficult Airway: No    Indications for Airway Management:  Anesthesia      Spontaneous Ventilation: absent    Sedation Level:  Deep  Preoxygenated: Yes    Patient Position:  Sniffing  Mask Difficulty Assessment:  0 - not attempted  Final Airway Type:  Endotracheal airway  Final Endotracheal Airway:  ETT  Cuffed: Yes    Technique Used for Successful ETT Placement:  Direct laryngoscopy  Devices/Methods Used in Placement:  Intubating stylet and anterior pressure/BURP    Insertion Site:  Oral  Blade Type:  Roberta  Laryngoscope Blade/Videolaryngoscope Blade Size:  3  ETT Size (mm):  7.0  Measured from:  Teeth  ETT to Teeth (cm):  22  Placement Verified by: auscultation and capnometry    Cormack-Lehane Classification:  Grade IIb - view of arytenoids or posterior of glottis only  Number of Attempts at Approach:  1

## 2024-05-02 NOTE — ANESTHESIA TIME REPORT
Anesthesia Start and Stop Event Times       Date Time Event    5/2/2024 0716 Ready for Procedure     0724 Anesthesia Start     0746 Anesthesia Stop          Responsible Staff  05/02/24      Name Role Begin End    Virginia Mckeon M.D. Anesth 0724 0746          Overtime Reason:  no overtime (within assigned shift)    Comments:

## 2024-05-02 NOTE — PROGRESS NOTES
Hospital Medicine Daily Progress Note    Date of Service  5/2/2024    Chief Complaint  Chiara Pacheco is a 58 y.o. female admitted 4/30/2024 with hematemesis    Hospital Course  57 yo woman with alcoholic cirrhosis, esophageal varices, hepatitis C presented with hematemesis with recent alcohol and ibuprofen use.  She did have EGD 9/2023 and found with esophageal varices that were banded.  GI has been consulted.  She underwent EGD that showed grade 1 esophageal varices, mild ulceration at GE junction, large isolated gastric varix, portal hypertensive gastropathy, chronic gastritis, mild peptic duodenitis.    Interval Problem Update  Hemoglobin has down trended to 10.9, platelets 64.  T. bili 3.2  I saw her after her EGD and discussed IR evaluation for BRTO/CARTO, she is thinking she wants to hold off for now and may be discharged home tomorrow.    I have discussed this patient's plan of care and discharge plan at IDT rounds today with Case Management, Nursing, Nursing leadership, and other members of the IDT team.    Consultants/Specialty  GI    Code Status  Full Code    Disposition  The patient is not medically cleared for discharge to home or a post-acute facility.  Anticipate discharge to: home with close outpatient follow-up    I have placed the appropriate orders for post-discharge needs.    Review of Systems  Review of Systems   Constitutional:  Positive for malaise/fatigue.   Respiratory:  Negative for shortness of breath.    Cardiovascular:  Negative for chest pain.   Gastrointestinal:  Negative for abdominal pain and vomiting.   Neurological:  Negative for weakness.        Physical Exam  Temp:  [36 °C (96.8 °F)-36.5 °C (97.7 °F)] 36.3 °C (97.3 °F)  Pulse:  [63-94] 84  Resp:  [14-18] 16  BP: ()/(43-90) 113/90  SpO2:  [91 %-100 %] 94 %    Physical Exam  Vitals and nursing note reviewed.   Constitutional:       Appearance: She is ill-appearing. She is not diaphoretic.   HENT:      Head:  Normocephalic.      Mouth/Throat:      Mouth: Mucous membranes are dry.   Eyes:      General: Scleral icterus present.         Right eye: No discharge.         Left eye: No discharge.   Cardiovascular:      Rate and Rhythm: Normal rate and regular rhythm.   Pulmonary:      Effort: Pulmonary effort is normal. No respiratory distress.      Breath sounds: No wheezing or rales.   Abdominal:      Palpations: Abdomen is soft.      Tenderness: There is no abdominal tenderness. There is no guarding or rebound.   Musculoskeletal:      Cervical back: Neck supple.      Right lower leg: No edema.      Left lower leg: No edema.   Skin:     General: Skin is warm and dry.   Neurological:      Mental Status: She is alert and oriented to person, place, and time.         Fluids    Intake/Output Summary (Last 24 hours) at 5/2/2024 1254  Last data filed at 5/2/2024 0746  Gross per 24 hour   Intake 880 ml   Output --   Net 880 ml       Laboratory  Recent Labs     04/30/24 1949 04/30/24 2325 05/01/24 0330 05/01/24 1443 05/01/24 2123 05/02/24 0317 05/02/24  0939   WBC 7.8  --  9.6  --   --  5.4  --    RBC 4.07*  --  3.80*  --   --  3.01*  --    HEMOGLOBIN 14.5   < > 13.7   < > 11.8* 10.9* 11.1*   HEMATOCRIT 41.6  --  39.5  --   --  32.4*  --    .2*  --  103.9*  --   --  107.6*  --    MCH 35.6*  --  36.1*  --   --  36.2*  --    MCHC 34.9  --  34.7  --   --  33.6  --    RDW 52.3*  --  53.2*  --   --  55.6*  --    PLATELETCT 87*  --  93*  --   --  64*  --    MPV 10.5  --  10.5  --   --  10.2  --     < > = values in this interval not displayed.     Recent Labs     04/30/24 1949 05/01/24 0330 05/02/24 0317   SODIUM 144 145 140   POTASSIUM 3.5* 4.2 3.8   CHLORIDE 106 109 106   CO2 24 24 23   GLUCOSE 153* 131* 95   BUN 19 20 12   CREATININE 0.44* 0.55 0.41*   CALCIUM 9.1 8.6 7.7*     Recent Labs     04/30/24 1949 05/02/24 0317   APTT 30.6  --    INR 1.42* 1.56*               Imaging  CT-ABDOMEN-PELVIS WITH   Final Result          1.  Esophageal and perigastric varices   2.  Nodular cirrhosis   3.  Cholelithiasis   4.  Fibroid uterine changes   5.  Atherosclerosis      DX-CHEST-PORTABLE (1 VIEW)   Final Result         1.  Left basilar atelectasis, no focal infiltrate   2.  Atherosclerosis           Assessment/Plan  * GI bleed- (present on admission)  Assessment & Plan  EGD that showed grade 1 esophageal varices, mild ulceration at GE junction, large isolated gastric varix, portal hypertensive gastropathy, chronic gastritis, mild peptic duodenitis.  Continue PPI twice daily.  Stop ceftriaxone and octreotide  Advancing diet  Patient wants to hold off on IR consultation for BRTO/CARTO at this time  Monitor for recurring bleeding      Hepatic encephalopathy (HCC)  Assessment & Plan  She is AO ox 3 and appears compensated.  Continue lactulose and rifaximin and monitor clinically    Cirrhosis (HCC)  Assessment & Plan  Meld is 29  Ascites on CT  Ammonia is elevated, but she is AO X3.  Monitor for clinical changes.  Continue on lactulose and rifaximin        Alcohol abuse- (present on admission)  Assessment & Plan  monitoring on CIWA protocol  Not in withdrawal currently      Bleeding gastric varices- (present on admission)  Assessment & Plan  Recurring in the past.  Hematemesis again concerning for rebleeding  GI has been consulted.    EGD that showed grade 1 esophageal varices, mild ulceration at GE junction, large isolated gastric varix, portal hypertensive gastropathy, chronic gastritis, mild peptic duodenitis.  Continue PPI twice daily.  Stop ceftriaxone and octreotide  Advancing diet  Patient wants to hold off on IR consultation for BRTO/CARTO at this time    Lactic acidosis- (present on admission)  Assessment & Plan  Improved with IV hydration         VTE prophylaxis:   SCDs/TEDs      I have performed a physical exam and reviewed and updated ROS and Plan today (5/2/2024). In review of yesterday's note (5/1/2024), there are no changes except  as documented above.

## 2024-05-03 ENCOUNTER — PHARMACY VISIT (OUTPATIENT)
Dept: PHARMACY | Facility: MEDICAL CENTER | Age: 59
End: 2024-05-03
Payer: COMMERCIAL

## 2024-05-03 VITALS
WEIGHT: 202.16 LBS | BODY MASS INDEX: 29.94 KG/M2 | RESPIRATION RATE: 18 BRPM | OXYGEN SATURATION: 93 % | HEART RATE: 63 BPM | SYSTOLIC BLOOD PRESSURE: 97 MMHG | TEMPERATURE: 97.2 F | DIASTOLIC BLOOD PRESSURE: 74 MMHG | HEIGHT: 69 IN

## 2024-05-03 LAB
ALBUMIN SERPL BCP-MCNC: 3.2 G/DL (ref 3.2–4.9)
ALBUMIN/GLOB SERPL: 1.4 G/DL
ALP SERPL-CCNC: 104 U/L (ref 30–99)
ALT SERPL-CCNC: 79 U/L (ref 2–50)
ANION GAP SERPL CALC-SCNC: 12 MMOL/L (ref 7–16)
AST SERPL-CCNC: 111 U/L (ref 12–45)
BASOPHILS # BLD AUTO: 0.1 % (ref 0–1.8)
BASOPHILS # BLD: 0.01 K/UL (ref 0–0.12)
BILIRUB SERPL-MCNC: 3.6 MG/DL (ref 0.1–1.5)
BUN SERPL-MCNC: 12 MG/DL (ref 8–22)
CALCIUM ALBUM COR SERPL-MCNC: 9.1 MG/DL (ref 8.5–10.5)
CALCIUM SERPL-MCNC: 8.5 MG/DL (ref 8.5–10.5)
CHLORIDE SERPL-SCNC: 107 MMOL/L (ref 96–112)
CO2 SERPL-SCNC: 22 MMOL/L (ref 20–33)
CREAT SERPL-MCNC: 0.38 MG/DL (ref 0.5–1.4)
EOSINOPHIL # BLD AUTO: 0 K/UL (ref 0–0.51)
EOSINOPHIL NFR BLD: 0 % (ref 0–6.9)
ERYTHROCYTE [DISTWIDTH] IN BLOOD BY AUTOMATED COUNT: 53.1 FL (ref 35.9–50)
GFR SERPLBLD CREATININE-BSD FMLA CKD-EPI: 116 ML/MIN/1.73 M 2
GLOBULIN SER CALC-MCNC: 2.3 G/DL (ref 1.9–3.5)
GLUCOSE SERPL-MCNC: 157 MG/DL (ref 65–99)
HCT VFR BLD AUTO: 30.9 % (ref 37–47)
HGB BLD-MCNC: 10.5 G/DL (ref 12–16)
HGB BLD-MCNC: 11.2 G/DL (ref 12–16)
IMM GRANULOCYTES # BLD AUTO: 0.04 K/UL (ref 0–0.11)
IMM GRANULOCYTES NFR BLD AUTO: 0.5 % (ref 0–0.9)
INR PPP: 1.54 (ref 0.87–1.13)
LYMPHOCYTES # BLD AUTO: 0.7 K/UL (ref 1–4.8)
LYMPHOCYTES NFR BLD: 8.7 % (ref 22–41)
MCH RBC QN AUTO: 36.1 PG (ref 27–33)
MCHC RBC AUTO-ENTMCNC: 34 G/DL (ref 32.2–35.5)
MCV RBC AUTO: 106.2 FL (ref 81.4–97.8)
MONOCYTES # BLD AUTO: 0.44 K/UL (ref 0–0.85)
MONOCYTES NFR BLD AUTO: 5.5 % (ref 0–13.4)
NEUTROPHILS # BLD AUTO: 6.82 K/UL (ref 1.82–7.42)
NEUTROPHILS NFR BLD: 85.2 % (ref 44–72)
NRBC # BLD AUTO: 0.02 K/UL
NRBC BLD-RTO: 0.2 /100 WBC (ref 0–0.2)
PLATELET # BLD AUTO: 67 K/UL (ref 164–446)
PLATELETS.RETICULATED NFR BLD AUTO: 6.3 % (ref 0.6–13.1)
PMV BLD AUTO: 11.2 FL (ref 9–12.9)
POTASSIUM SERPL-SCNC: 4.1 MMOL/L (ref 3.6–5.5)
PROT SERPL-MCNC: 5.5 G/DL (ref 6–8.2)
PROTHROMBIN TIME: 18.7 SEC (ref 12–14.6)
RBC # BLD AUTO: 2.91 M/UL (ref 4.2–5.4)
SODIUM SERPL-SCNC: 141 MMOL/L (ref 135–145)
WBC # BLD AUTO: 8 K/UL (ref 4.8–10.8)

## 2024-05-03 PROCEDURE — RXMED WILLOW AMBULATORY MEDICATION CHARGE: Performed by: INTERNAL MEDICINE

## 2024-05-03 PROCEDURE — 99239 HOSP IP/OBS DSCHRG MGMT >30: CPT | Performed by: INTERNAL MEDICINE

## 2024-05-03 RX ORDER — OMEPRAZOLE 20 MG/1
20 CAPSULE, DELAYED RELEASE ORAL 2 TIMES DAILY
Qty: 30 CAPSULE | Refills: 1 | Status: SHIPPED | OUTPATIENT
Start: 2024-05-03

## 2024-05-03 RX ORDER — LACTULOSE 20 G/30ML
30 SOLUTION ORAL 2 TIMES DAILY
Qty: 473 ML | Refills: 3 | Status: SHIPPED | OUTPATIENT
Start: 2024-05-03

## 2024-05-03 RX ADMIN — LACTULOSE 30 ML: 20 SOLUTION ORAL at 05:25

## 2024-05-03 RX ADMIN — RIFAXIMIN 550 MG: 550 TABLET ORAL at 05:26

## 2024-05-03 RX ADMIN — OMEPRAZOLE 20 MG: 20 CAPSULE, DELAYED RELEASE ORAL at 05:25

## 2024-05-03 ASSESSMENT — FIBROSIS 4 INDEX: FIB4 SCORE: 10.62

## 2024-05-03 NOTE — PROGRESS NOTES
Bedside report received from Sylwia WALKER. Pt assessment complete. Pt AO x 4. Reviewed plan of care with pt. Pt tele monitored. Chart and labs reviewed. Bed in lowest position, and 3 side rails up. Pt educated on call lights, call light within reach. Hourly rounding in place

## 2024-05-03 NOTE — DISCHARGE PLANNING
Case Management Discharge Planning    Admission Date: 4/30/2024  GMLOS: 4.9  ALOS: 3    6-Clicks ADL Score: 24  6-Clicks Mobility Score: 22      Anticipated Discharge Dispo: Discharge Disposition: Discharged to home/self care (01)    DME Needed: No    Action(s) Taken: Updated Provider/Nurse on Discharge Plan    0915, pt was visited at room. Pt verbalized wanting to cut back on her alcohol. Pt was provided Drug and Alcohol Treatment program resources. DENISE MAYEN said that Pt will not need Home O2 and Pt is in room air.    No further CM needs identified.      Escalations Completed: None    Medically Clear: Yes    Next Steps: CM will continue to assist Pt with discharge needs.      Barriers to Discharge: None      Care Transition Team Assessment  RN CM met with Pt at bedside to obtain assessment informations. Demographics verified. RN CM introduced self and purpose of visit.   Pt lives with significant other in a mobile home with 5 steps to main entrance.  Pt has  significant other  for support.  Pt has no PCP. Pt agreed to be established with a PCP. RN CM called and spoke to [facility]  and she will facilitate PCP appointment and give Pt a call.  Pt was independent with ADLs prior to hospitalization.  Pt's significant other will provide transport.      Information Source  Orientation Level: Oriented X4  Information Given By: Patient  Who is responsible for making decisions for patient? : Patient      Elopement Risk  Legal Hold: No  Ambulatory or Self Mobile in Wheelchair: Yes  Disoriented: No  Psychiatric Symptoms: None  History of Wandering: No  Elopement this Admit: No  Vocalizing Wanting to Leave: No  Displays Behaviors, Body Language Wanting to Leave: No-Not at Risk for Elopement  Elopement Risk: Not at Risk for Elopement    Interdisciplinary Discharge Planning  Lives with - Patient's Self Care Capacity: Significant Other  Patient or legal guardian wants to designate a caregiver: No  Support  Systems: Spouse / Significant Other  Housing / Facility: Mobile Home (with 5 steps to main entrance)  Durable Medical Equipment: Not Applicable    Discharge Preparedness  What is your plan after discharge?: Home with help  What are your discharge supports?: Other (comment) (significant other)  Prior Functional Level: Independent with Activities of Daily Living, Independent with Medication Management, Ambulatory    Functional Assesment  Prior Functional Level: Independent with Activities of Daily Living, Independent with Medication Management, Ambulatory    Finances  Financial Barriers to Discharge: No  Prescription Coverage: Yes    Vision / Hearing Impairment  Vision Impairment : Yes  Right Eye Vision: Impaired, Wears Glasses  Left Eye Vision: Impaired, Wears Glasses  Hearing Impairment : Yes  Hearing Impairment: Both Ears, Hearing Device(s) Available      Domestic Abuse  Have you ever been the victim of abuse or violence?: No  Physical Abuse or Sexual Abuse: No  Verbal Abuse or Emotional Abuse: No  Possible Abuse/Neglect Reported to:: Not Applicable    Psychological Assessment  History of Substance Abuse: Alcohol, Marijuana  Date Last Used - Alcohol:  (5 shots of Vodka per day)  Date Last Used - Marijuana:  (occassionaly smokes)  History of Psychiatric Problems: No         Anticipated Discharge Information  Discharge Disposition: Discharged to home/self care (01)

## 2024-05-03 NOTE — DISCHARGE INSTRUCTIONS
Discharge Instructions per Miguel Galaviz M.D.    DIAGNOSIS: You had bleeding related to alcohol and ibuprofen use, it is important that you stop these to prevent recurring bleeding. If you have pain, you can take Tylenol. Since you are at risk for recurring bleeding, I placed a referral for you to see our Interventional Radiologist about doing a procedure on the blood vessels to your stomach to decreased your risk of bleeding, you will be getting a call to make an appointment.    Return to ER if you are vomiting blood or have blood in your stool.

## 2024-05-03 NOTE — DISCHARGE SUMMARY
Discharge Summary    CHIEF COMPLAINT ON ADMISSION  Chief Complaint   Patient presents with    N/V     Patient reports she started vomiting coffee-ground emesis around 0300 this morning. 5-6 incidences throughout day.    Denies pain, states bowel movement this morning was normal.       Reason for Admission  ems     Admission Date  4/30/2024    CODE STATUS  Full    HPI & HOSPITAL COURSE  57 yo woman with alcoholic cirrhosis, esophageal varices, hepatitis C presented with hematemesis with recent alcohol and ibuprofen use.  She did have EGD 9/2023 and found with esophageal varices that were banded.  GI has been consulted.  She underwent EGD that showed grade 1 esophageal varices, mild ulceration at GE junction, large isolated gastric varix, portal hypertensive gastropathy, chronic gastritis, mild peptic duodenitis.  She was started on a diet and advanced and tolerated.  Had no other signs of recurring bleeding after EGD.  I discussed with her IR consultation for BRTO/CARTO recommended by GI several times, however she did not want any further procedures or consultations done and wished to discharge.  I counseled her on return precautions and no longer using alcohol or ibuprofen which she understands.  I did place an outpatient referral to IR consultation for her.    Therefore, she is discharged in good and stable condition to home with close outpatient follow-up.    The patient met 2-midnight criteria for an inpatient stay at the time of discharge.    Discharge Date  5/3/2024    FOLLOW UP ITEMS POST DISCHARGE  F/u GI and IR regarding recurring bleeding for BRTO/CARTO or TIPS    DISCHARGE DIAGNOSES  Principal Problem:    GI bleed (POA: Yes)  Active Problems:    Lactic acidosis (POA: Yes)    Bleeding gastric varices (POA: Yes)    Alcohol abuse (POA: Yes)    Cirrhosis (HCC) (POA: Unknown)    Hepatic encephalopathy (HCC) (POA: Unknown)  Resolved Problems:    Alcoholic hepatitis (POA: Unknown)      FOLLOW UP  Future  Appointments   Date Time Provider Department Center   5/10/2024  2:00 PM Vanita Pruett M.D. [facility], Emergency Dept  [address] [facility] [address] [phone]  Follow up  As needed      MEDICATIONS ON DISCHARGE     Medication List        START taking these medications        Instructions   lactulose 10 GM/15ML Soln   Take 30 mL by mouth 2 times a day.  Dose: 30 mL     omeprazole 20 MG delayed-release capsule  Commonly known as: PriLOSEC   Take 1 Capsule by mouth 2 times a day.  Dose: 20 mg              Allergies  No Known Allergies    DIET  No orders of the defined types were placed in this encounter.      ACTIVITY  As tolerated.    CONSULTATIONS  GI    PROCEDURES  Procedure(s):  GASTROSCOPY - Wound Class: Clean Contaminated     Surgeon(s):  Jm Lopez M.D.       LABORATORY  Lab Results   Component Value Date    SODIUM 141 05/03/2024    POTASSIUM 4.1 05/03/2024    CHLORIDE 107 05/03/2024    CO2 22 05/03/2024    GLUCOSE 157 (H) 05/03/2024    BUN 12 05/03/2024    CREATININE 0.38 (L) 05/03/2024        Lab Results   Component Value Date    WBC 8.0 05/03/2024    HEMOGLOBIN 11.2 (L) 05/03/2024    HEMATOCRIT 30.9 (L) 05/03/2024    PLATELETCT 67 (L) 05/03/2024        Total time of the discharge process exceeds 34 minutes.

## 2024-05-03 NOTE — PROGRESS NOTES
Discharge lounge order placed by akila RN. Pt ready and PIV removed. Pt being wheeled down to dc lounge.

## 2024-05-03 NOTE — CARE PLAN
The patient is Stable - Low risk of patient condition declining or worsening    Shift Goals  Clinical Goals: pain control, rest  Patient Goals: rest, updates, discharge  Family Goals: kely    Progress made toward(s) clinical / shift goals:  pt medicated for pain per MAR, provided comfort measures with repositioning and extra blankets.   Provided answers to questions about plan of care.       Problem: Knowledge Deficit - Standard  Goal: Patient and family/care givers will demonstrate understanding of plan of care, disease process/condition, diagnostic tests and medications  Outcome: Progressing  Note: Pt participating in plan of care with questions for care team. All questions addressed.      Problem: Communication  Goal: The ability to communicate needs accurately and effectively will improve  Outcome: Progressing  Note: Pt able to communicate her needs. All needs escalated or addressed.        Patient is not progressing towards the following goals:

## 2024-05-05 LAB
BACTERIA BLD CULT: NORMAL
BACTERIA BLD CULT: NORMAL
SIGNIFICANT IND 70042: NORMAL
SIGNIFICANT IND 70042: NORMAL
SITE SITE: NORMAL
SITE SITE: NORMAL
SOURCE SOURCE: NORMAL
SOURCE SOURCE: NORMAL

## 2024-05-07 SDOH — ECONOMIC STABILITY: INCOME INSECURITY: IN THE LAST 12 MONTHS, WAS THERE A TIME WHEN YOU WERE NOT ABLE TO PAY THE MORTGAGE OR RENT ON TIME?: NO

## 2024-05-07 SDOH — HEALTH STABILITY: PHYSICAL HEALTH: ON AVERAGE, HOW MANY DAYS PER WEEK DO YOU ENGAGE IN MODERATE TO STRENUOUS EXERCISE (LIKE A BRISK WALK)?: 2 DAYS

## 2024-05-07 SDOH — ECONOMIC STABILITY: TRANSPORTATION INSECURITY
IN THE PAST 12 MONTHS, HAS THE LACK OF TRANSPORTATION KEPT YOU FROM MEDICAL APPOINTMENTS OR FROM GETTING MEDICATIONS?: NO

## 2024-05-07 SDOH — ECONOMIC STABILITY: HOUSING INSECURITY
IN THE LAST 12 MONTHS, WAS THERE A TIME WHEN YOU DID NOT HAVE A STEADY PLACE TO SLEEP OR SLEPT IN A SHELTER (INCLUDING NOW)?: NO

## 2024-05-07 SDOH — ECONOMIC STABILITY: INCOME INSECURITY: HOW HARD IS IT FOR YOU TO PAY FOR THE VERY BASICS LIKE FOOD, HOUSING, MEDICAL CARE, AND HEATING?: SOMEWHAT HARD

## 2024-05-07 SDOH — ECONOMIC STABILITY: FOOD INSECURITY: WITHIN THE PAST 12 MONTHS, YOU WORRIED THAT YOUR FOOD WOULD RUN OUT BEFORE YOU GOT MONEY TO BUY MORE.: SOMETIMES TRUE

## 2024-05-07 SDOH — ECONOMIC STABILITY: FOOD INSECURITY: WITHIN THE PAST 12 MONTHS, THE FOOD YOU BOUGHT JUST DIDN'T LAST AND YOU DIDN'T HAVE MONEY TO GET MORE.: SOMETIMES TRUE

## 2024-05-07 SDOH — ECONOMIC STABILITY: HOUSING INSECURITY
IN THE LAST 12 MONTHS, WAS THERE A TIME WHEN YOU DID NOT HAVE A STEADY PLACE TO SLEEP OR SLEPT IN A SHELTER (INCLUDING NOW)?: PATIENT DECLINED

## 2024-05-07 SDOH — HEALTH STABILITY: PHYSICAL HEALTH: ON AVERAGE, HOW MANY MINUTES DO YOU ENGAGE IN EXERCISE AT THIS LEVEL?: 20 MIN

## 2024-05-07 SDOH — ECONOMIC STABILITY: HOUSING INSECURITY: IN THE LAST 12 MONTHS, HOW MANY PLACES HAVE YOU LIVED?: 0

## 2024-05-07 SDOH — ECONOMIC STABILITY: TRANSPORTATION INSECURITY
IN THE PAST 12 MONTHS, HAS LACK OF RELIABLE TRANSPORTATION KEPT YOU FROM MEDICAL APPOINTMENTS, MEETINGS, WORK OR FROM GETTING THINGS NEEDED FOR DAILY LIVING?: NO

## 2024-05-07 SDOH — HEALTH STABILITY: MENTAL HEALTH
STRESS IS WHEN SOMEONE FEELS TENSE, NERVOUS, ANXIOUS, OR CAN'T SLEEP AT NIGHT BECAUSE THEIR MIND IS TROUBLED. HOW STRESSED ARE YOU?: VERY MUCH

## 2024-05-07 SDOH — ECONOMIC STABILITY: TRANSPORTATION INSECURITY
IN THE PAST 12 MONTHS, HAS LACK OF TRANSPORTATION KEPT YOU FROM MEETINGS, WORK, OR FROM GETTING THINGS NEEDED FOR DAILY LIVING?: NO

## 2024-05-07 ASSESSMENT — LIFESTYLE VARIABLES
HOW MANY STANDARD DRINKS CONTAINING ALCOHOL DO YOU HAVE ON A TYPICAL DAY: 3 OR 4
HOW OFTEN DO YOU HAVE SIX OR MORE DRINKS ON ONE OCCASION: PATIENT DECLINED
HOW OFTEN DO YOU HAVE A DRINK CONTAINING ALCOHOL: 4 OR MORE TIMES A WEEK
SKIP TO QUESTIONS 9-10: 0
AUDIT-C TOTAL SCORE: -1

## 2024-05-07 ASSESSMENT — SOCIAL DETERMINANTS OF HEALTH (SDOH)
HOW OFTEN DO YOU GET TOGETHER WITH FRIENDS OR RELATIVES?: NEVER
HOW MANY DRINKS CONTAINING ALCOHOL DO YOU HAVE ON A TYPICAL DAY WHEN YOU ARE DRINKING: 3 OR 4
HOW OFTEN DO YOU GET TOGETHER WITH FRIENDS OR RELATIVES?: NEVER
HOW OFTEN DO YOU HAVE A DRINK CONTAINING ALCOHOL: 4 OR MORE TIMES A WEEK
HOW OFTEN DO YOU HAVE SIX OR MORE DRINKS ON ONE OCCASION: PATIENT DECLINED
ARE YOU MARRIED, WIDOWED, DIVORCED, SEPARATED, NEVER MARRIED, OR LIVING WITH A PARTNER?: LIVING WITH PARTNER
IN A TYPICAL WEEK, HOW MANY TIMES DO YOU TALK ON THE PHONE WITH FAMILY, FRIENDS, OR NEIGHBORS?: NEVER
ARE YOU MARRIED, WIDOWED, DIVORCED, SEPARATED, NEVER MARRIED, OR LIVING WITH A PARTNER?: LIVING WITH PARTNER
HOW OFTEN DO YOU ATTEND CHURCH OR RELIGIOUS SERVICES?: PATIENT DECLINED
DO YOU BELONG TO ANY CLUBS OR ORGANIZATIONS SUCH AS CHURCH GROUPS UNIONS, FRATERNAL OR ATHLETIC GROUPS, OR SCHOOL GROUPS?: NO
WITHIN THE PAST 12 MONTHS, YOU WORRIED THAT YOUR FOOD WOULD RUN OUT BEFORE YOU GOT THE MONEY TO BUY MORE: SOMETIMES TRUE
HOW OFTEN DO YOU ATTEND CHURCH OR RELIGIOUS SERVICES?: PATIENT DECLINED
HOW HARD IS IT FOR YOU TO PAY FOR THE VERY BASICS LIKE FOOD, HOUSING, MEDICAL CARE, AND HEATING?: SOMEWHAT HARD
DO YOU BELONG TO ANY CLUBS OR ORGANIZATIONS SUCH AS CHURCH GROUPS UNIONS, FRATERNAL OR ATHLETIC GROUPS, OR SCHOOL GROUPS?: NO
HOW OFTEN DO YOU ATTENT MEETINGS OF THE CLUB OR ORGANIZATION YOU BELONG TO?: PATIENT DECLINED
IN A TYPICAL WEEK, HOW MANY TIMES DO YOU TALK ON THE PHONE WITH FAMILY, FRIENDS, OR NEIGHBORS?: NEVER
HOW OFTEN DO YOU ATTENT MEETINGS OF THE CLUB OR ORGANIZATION YOU BELONG TO?: PATIENT DECLINED

## 2024-05-10 ENCOUNTER — OFFICE VISIT (OUTPATIENT)
Dept: MEDICAL GROUP | Facility: CLINIC | Age: 59
End: 2024-05-10
Payer: COMMERCIAL

## 2024-05-10 VITALS
BODY MASS INDEX: 30.81 KG/M2 | OXYGEN SATURATION: 93 % | SYSTOLIC BLOOD PRESSURE: 116 MMHG | HEIGHT: 69 IN | TEMPERATURE: 97 F | HEART RATE: 93 BPM | WEIGHT: 208 LBS | DIASTOLIC BLOOD PRESSURE: 62 MMHG

## 2024-05-10 DIAGNOSIS — I85.10 ESOPHAGEAL VARICES IN ALCOHOLIC CIRRHOSIS (HCC): ICD-10-CM

## 2024-05-10 DIAGNOSIS — K70.30 ESOPHAGEAL VARICES IN ALCOHOLIC CIRRHOSIS (HCC): ICD-10-CM

## 2024-05-10 DIAGNOSIS — F41.9 ANXIETY: ICD-10-CM

## 2024-05-10 DIAGNOSIS — F10.90 ALCOHOL USE DISORDER: ICD-10-CM

## 2024-05-10 DIAGNOSIS — R74.8 ELEVATED LIVER ENZYMES: ICD-10-CM

## 2024-05-10 PROCEDURE — 99213 OFFICE O/P EST LOW 20 MIN: CPT | Mod: GE

## 2024-05-10 RX ORDER — NALTREXONE HYDROCHLORIDE 50 MG/1
50 TABLET, FILM COATED ORAL DAILY
Qty: 30 TABLET | Refills: 0 | Status: SHIPPED | OUTPATIENT
Start: 2024-05-10

## 2024-05-10 ASSESSMENT — FIBROSIS 4 INDEX: FIB4 SCORE: 10.62

## 2024-05-10 NOTE — PROGRESS NOTES
Subjective:     CC:   Chief Complaint   Patient presents with    [facility] Follow-up     Hospital f/u, throwing up blood, lining of the stomach ready to burst, referral to radiology        HPI:   Chiara Pacheco is a 58 y.o. female who presents to the clinic today for post hospital follow-up.  Past medical history significant for nicotine dependence, alcohol abuse currently drinking about a pint a day, partner at bedside who also drinks as much as her and they live in an environment where alcohol is always present.  She has been drinking alcohol since she was a teenager.  Presents to the emergency department on 4/30 for evaluation of vomiting coffee-ground emesis 5 x 6 times throughout the day.  She has been seen multiple times for the same problem in the past.  In the hospital patient underwent banding of esophageal varices, GI was consulted who recommended a BRTO/CART treatment however patient declined.  She was discharged in stable medical condition and advised and counseled on discontinuing both ibuprofen and alcohol.  Patient presents today for follow-up.  She is currently still drinking alcohol about a pint a day.  She has been to inpatient rehabilitation 3 times with minimal success.  She is interested in starting therapy and medication for anxiety which she feels this was driving her to drink alcohol.      Maddrey's: 29.8 and would not benefit from glucocorticoid therapy at this time  MELD score: 16 points with a 6.0% Estimated 3 month mortality     OB History   No obstetric history on file.      She  has no history on file for sexual activity.    She  has a past medical history of Hearing loss.  She  has a past surgical history that includes Intubation (2/19/2023); pr upper gi endoscopy,diagnosis (2/19/2023); pr upper gi endoscopy,diagnosis (2/20/2023); pr upper gi endoscopy,diagnosis (N/A, 9/23/2023); pr upper gi endoscopy,ligat varix (N/A, 9/23/2023); and pr upper gi  "endoscopy,diagnosis (N/A, 5/2/2024).    No family history on file.  Social History     Tobacco Use    Smoking status: Every Day     Current packs/day: 0.50     Types: Cigarettes    Smokeless tobacco: Never   Vaping Use    Vaping Use: Some days   Substance Use Topics    Alcohol use: Yes     Alcohol/week: 1.8 oz     Types: 3 Shots of liquor per week     Comment: \"a shot everyday\"    Drug use: Yes     Types: Inhaled     Comment: alysa       Patient Active Problem List    Diagnosis Date Noted    GI bleed 04/30/2024    Cirrhosis (HCC) 04/30/2024    Hepatic encephalopathy (HCC) 04/30/2024    Bleeding gastric varices 09/23/2023    Alcohol abuse 09/23/2023    Elevated liver enzymes 09/23/2023    Displaced fracture of right fibula 02/24/2023    Hepatitis C 02/19/2023    Lactic acidosis 02/18/2023     Current Outpatient Medications   Medication Sig Dispense Refill    lactulose 20 GM/30ML Solution Take 30 mL by mouth 2 times a day. 473 mL 3    omeprazole (PRILOSEC) 20 MG delayed-release capsule Take 1 Capsule by mouth 2 times a day. 30 Capsule 1     No current facility-administered medications for this visit.     No Known Allergies    Review of Systems   Constitutional: Positive for weight gain.  HENT: Negative for congestion.    Eyes: Negative for pain.   Respiratory: Negative for cough and shortness of breath.    Cardiovascular: Negative for chest pain and leg swelling.   Gastrointestinal: Positive for abdominal bloating.  Genitourinary: Negative for dysuria and hematuria.   Skin: Negative for rash.   Neurological: Negative for dizziness, focal weakness and headaches.   Endo/Heme/Allergies: Does not bruise/bleed easily.   Psychiatric/Behavioral: Pos for anxiety.     Objective:   LMP  (LMP Unknown)     Wt Readings from Last 4 Encounters:   05/03/24 91.7 kg (202 lb 2.6 oz)   09/26/23 87 kg (191 lb 12.8 oz)   02/23/23 94.1 kg (207 lb 7.3 oz)   12/21/19 68 kg (150 lb)       Physical Exam:  Constitutional: Well-developed and " well-nourished. Not diaphoretic. No distress.   Skin: Skin is warm and dry. No rash noted.  Head: Atraumatic without lesions.  Eyes: Conjunctivae and extraocular motions are normal. Pupils are equal, round, and reactive to light. No scleral icterus.   Ears:  External ears unremarkable.   Nose: Nares patent. Septum midline. Turbinates without erythema nor edema. No discharge.   Mouth/Throat: Tongue normal. Oropharynx is clear and moist. Posterior pharynx without erythema or exudates.  Neck: Supple, trachea midline. Normal range of motion.   Cardiovascular: Regular rate and rhythm, S1 and S2 without murmur, rubs, or gallops.    Respiratory: Effort normal. Clear to auscultation throughout. No adventitious sounds.   Abdomen: Significant distention present.  Extremities: No cyanosis, clubbing, erythema, nor edema. Distal pulses intact and symmetric.   Musculoskeletal: All major joints AROM full in all directions without pain.  Neurological: Alert and oriented x 3. Grossly non-focal.   Psychiatric:  Behavior, mood, and affect are appropriate.    Assessment and Plan:     58-year-old female with severe alcohol use disorder since being a teenager, currently still drinking presenting for posthospital visit found to have esophageal varices in the setting of alcoholic cirrhosis with elevated liver enzymes.    Presents with significant anxiety especially after discussing prognosis.  She is an excellent candidate for balloon occluded retrograde transvenous obliteration to treat gastric varices.  Referral placed for gastroenterology for further management.    She also presents with significant anxiety likely self treating with alcohol, placed referral for psychiatry for further evaluation treatment and management.    1. Alcohol use disorder  - naltrexone (DEPADE) 50 MG Tab; Take 1 Tablet by mouth every day.  Dispense: 30 Tablet; Refill: 0  - Referral to Gastroenterology  - HEPATITIS PANEL ACUTE(4 COMPONENTS); Future  - Referral to  Psychiatry    2. Esophageal varices in alcoholic cirrhosis (HCC)  - Referral to Gastroenterology    3. Elevated liver enzymes  - HEPATITIS PANEL ACUTE(4 COMPONENTS); Future    4. Anxiety  - Referral to Psychiatry      Health maintenance:     Labs per orders  Immunizations per orders  Patient counseled about skin care, diet, supplements, and exercise.  Discussed  HIV risk factors and prevention, adequate intake of calcium and vitamin D     Follow-up: 2 weeks

## 2024-05-22 PROCEDURE — RXMED WILLOW AMBULATORY MEDICATION CHARGE: Performed by: INTERNAL MEDICINE

## 2024-05-28 ENCOUNTER — APPOINTMENT (OUTPATIENT)
Dept: MEDICAL GROUP | Facility: CLINIC | Age: 59
End: 2024-05-28
Payer: COMMERCIAL

## 2024-05-30 ENCOUNTER — PHARMACY VISIT (OUTPATIENT)
Dept: PHARMACY | Facility: MEDICAL CENTER | Age: 59
End: 2024-05-30
Payer: COMMERCIAL

## 2024-06-04 ENCOUNTER — APPOINTMENT (OUTPATIENT)
Dept: MEDICAL GROUP | Facility: CLINIC | Age: 59
End: 2024-06-04
Payer: COMMERCIAL

## 2024-06-18 ENCOUNTER — APPOINTMENT (OUTPATIENT)
Dept: MEDICAL GROUP | Facility: CLINIC | Age: 59
End: 2024-06-18
Payer: COMMERCIAL

## 2024-11-30 ENCOUNTER — PHARMACY VISIT (OUTPATIENT)
Dept: PHARMACY | Facility: MEDICAL CENTER | Age: 59
End: 2024-11-30
Payer: COMMERCIAL

## 2024-11-30 ENCOUNTER — HOSPITAL ENCOUNTER (EMERGENCY)
Facility: MEDICAL CENTER | Age: 59
End: 2024-11-30
Attending: STUDENT IN AN ORGANIZED HEALTH CARE EDUCATION/TRAINING PROGRAM | Admitting: STUDENT IN AN ORGANIZED HEALTH CARE EDUCATION/TRAINING PROGRAM
Payer: COMMERCIAL

## 2024-11-30 VITALS
SYSTOLIC BLOOD PRESSURE: 118 MMHG | OXYGEN SATURATION: 90 % | DIASTOLIC BLOOD PRESSURE: 66 MMHG | BODY MASS INDEX: 28.14 KG/M2 | TEMPERATURE: 98.2 F | RESPIRATION RATE: 14 BRPM | WEIGHT: 190 LBS | HEART RATE: 98 BPM | HEIGHT: 69 IN

## 2024-11-30 DIAGNOSIS — K70.31 ALCOHOLIC CIRRHOSIS OF LIVER WITH ASCITES (HCC): ICD-10-CM

## 2024-11-30 DIAGNOSIS — F10.19 DISORDER DUE TO ALCOHOL ABUSE (HCC): ICD-10-CM

## 2024-11-30 DIAGNOSIS — R14.0 ABDOMINAL DISTENTION: ICD-10-CM

## 2024-11-30 LAB
ALBUMIN SERPL BCP-MCNC: 3.6 G/DL (ref 3.2–4.9)
ALBUMIN/GLOB SERPL: 1.4 G/DL
ALP SERPL-CCNC: 171 U/L (ref 30–99)
ALT SERPL-CCNC: 41 U/L (ref 2–50)
ANION GAP SERPL CALC-SCNC: 16 MMOL/L (ref 7–16)
AST SERPL-CCNC: 114 U/L (ref 12–45)
BASOPHILS # BLD AUTO: 1.2 % (ref 0–1.8)
BASOPHILS # BLD: 0.06 K/UL (ref 0–0.12)
BILIRUB SERPL-MCNC: 5.6 MG/DL (ref 0.1–1.5)
BUN SERPL-MCNC: 7 MG/DL (ref 8–22)
CALCIUM ALBUM COR SERPL-MCNC: 8.6 MG/DL (ref 8.5–10.5)
CALCIUM SERPL-MCNC: 8.3 MG/DL (ref 8.5–10.5)
CHLORIDE SERPL-SCNC: 107 MMOL/L (ref 96–112)
CO2 SERPL-SCNC: 23 MMOL/L (ref 20–33)
CREAT SERPL-MCNC: 0.36 MG/DL (ref 0.5–1.4)
EOSINOPHIL # BLD AUTO: 0.16 K/UL (ref 0–0.51)
EOSINOPHIL NFR BLD: 3.1 % (ref 0–6.9)
ERYTHROCYTE [DISTWIDTH] IN BLOOD BY AUTOMATED COUNT: 56.4 FL (ref 35.9–50)
GFR SERPLBLD CREATININE-BSD FMLA CKD-EPI: 118 ML/MIN/1.73 M 2
GLOBULIN SER CALC-MCNC: 2.5 G/DL (ref 1.9–3.5)
GLUCOSE SERPL-MCNC: 122 MG/DL (ref 65–99)
HCT VFR BLD AUTO: 44.9 % (ref 37–47)
HGB BLD-MCNC: 15.3 G/DL (ref 12–16)
IMM GRANULOCYTES # BLD AUTO: 0.02 K/UL (ref 0–0.11)
IMM GRANULOCYTES NFR BLD AUTO: 0.4 % (ref 0–0.9)
INR PPP: 1.49 (ref 0.87–1.13)
LIPASE SERPL-CCNC: 129 U/L (ref 11–82)
LYMPHOCYTES # BLD AUTO: 1.89 K/UL (ref 1–4.8)
LYMPHOCYTES NFR BLD: 36.6 % (ref 22–41)
MAGNESIUM SERPL-MCNC: 1.8 MG/DL (ref 1.5–2.5)
MCH RBC QN AUTO: 33 PG (ref 27–33)
MCHC RBC AUTO-ENTMCNC: 34.1 G/DL (ref 32.2–35.5)
MCV RBC AUTO: 97 FL (ref 81.4–97.8)
MONOCYTES # BLD AUTO: 0.58 K/UL (ref 0–0.85)
MONOCYTES NFR BLD AUTO: 11.2 % (ref 0–13.4)
NEUTROPHILS # BLD AUTO: 2.46 K/UL (ref 1.82–7.42)
NEUTROPHILS NFR BLD: 47.5 % (ref 44–72)
NRBC # BLD AUTO: 0 K/UL
NRBC BLD-RTO: 0 /100 WBC (ref 0–0.2)
PHOSPHATE SERPL-MCNC: 3.2 MG/DL (ref 2.5–4.5)
PLATELET # BLD AUTO: 67 K/UL (ref 164–446)
PLATELETS.RETICULATED NFR BLD AUTO: 3.6 % (ref 0.6–13.1)
PMV BLD AUTO: 11 FL (ref 9–12.9)
POTASSIUM SERPL-SCNC: 3.7 MMOL/L (ref 3.6–5.5)
PROT SERPL-MCNC: 6.1 G/DL (ref 6–8.2)
PROTHROMBIN TIME: 18 SEC (ref 12–14.6)
RBC # BLD AUTO: 4.63 M/UL (ref 4.2–5.4)
SODIUM SERPL-SCNC: 146 MMOL/L (ref 135–145)
WBC # BLD AUTO: 5.2 K/UL (ref 4.8–10.8)

## 2024-11-30 PROCEDURE — 85610 PROTHROMBIN TIME: CPT

## 2024-11-30 PROCEDURE — RXMED WILLOW AMBULATORY MEDICATION CHARGE: Performed by: STUDENT IN AN ORGANIZED HEALTH CARE EDUCATION/TRAINING PROGRAM

## 2024-11-30 PROCEDURE — 84100 ASSAY OF PHOSPHORUS: CPT

## 2024-11-30 PROCEDURE — 83735 ASSAY OF MAGNESIUM: CPT

## 2024-11-30 PROCEDURE — 83690 ASSAY OF LIPASE: CPT

## 2024-11-30 PROCEDURE — 700102 HCHG RX REV CODE 250 W/ 637 OVERRIDE(OP): Mod: UD | Performed by: STUDENT IN AN ORGANIZED HEALTH CARE EDUCATION/TRAINING PROGRAM

## 2024-11-30 PROCEDURE — 85025 COMPLETE CBC W/AUTO DIFF WBC: CPT

## 2024-11-30 PROCEDURE — A9270 NON-COVERED ITEM OR SERVICE: HCPCS | Mod: UD | Performed by: STUDENT IN AN ORGANIZED HEALTH CARE EDUCATION/TRAINING PROGRAM

## 2024-11-30 PROCEDURE — 80053 COMPREHEN METABOLIC PANEL: CPT

## 2024-11-30 PROCEDURE — 36415 COLL VENOUS BLD VENIPUNCTURE: CPT

## 2024-11-30 PROCEDURE — 99285 EMERGENCY DEPT VISIT HI MDM: CPT

## 2024-11-30 PROCEDURE — 85055 RETICULATED PLATELET ASSAY: CPT

## 2024-11-30 RX ORDER — OMEPRAZOLE 40 MG/1
40 CAPSULE, DELAYED RELEASE ORAL DAILY
Qty: 30 CAPSULE | Refills: 0 | Status: SHIPPED | OUTPATIENT
Start: 2024-11-30

## 2024-11-30 RX ORDER — FAMOTIDINE 20 MG/1
20 TABLET, FILM COATED ORAL ONCE
Status: COMPLETED | OUTPATIENT
Start: 2024-11-30 | End: 2024-11-30

## 2024-11-30 RX ORDER — HYDROMORPHONE HYDROCHLORIDE 1 MG/ML
0.5 INJECTION, SOLUTION INTRAMUSCULAR; INTRAVENOUS; SUBCUTANEOUS ONCE
Status: DISCONTINUED | OUTPATIENT
Start: 2024-11-30 | End: 2024-11-30

## 2024-11-30 RX ADMIN — OMEPRAZOLE 20 MG: 20 CAPSULE, DELAYED RELEASE ORAL at 16:36

## 2024-11-30 RX ADMIN — FAMOTIDINE 20 MG: 20 TABLET, FILM COATED ORAL at 16:36

## 2024-11-30 ASSESSMENT — FIBROSIS 4 INDEX: FIB4 SCORE: 10.81

## 2024-11-30 NOTE — ED TRIAGE NOTES
".  Chief Complaint   Patient presents with    Abdominal Swelling     Reports abdominal swelling and pain, off home medications.     Alcohol Intoxication     Reports drinking a shot every 2 hours, used methamphetamine prior to calling ambulance       58 yo female brought in by [facility] to triage for above complaint. Abdominal pain protocol placed.      Pt is alert and oriented, speaking in full sentences, follows commands and responds appropriately to questions.      Patient placed back for labs and educated on triage process. Asked to inform RN of any changes or concerns.     /62   Pulse (!) 115   Temp 36.8 °C (98.2 °F) (Temporal)   Resp 18   Ht 1.753 m (5' 9\")   Wt 86.2 kg (190 lb)   LMP  (LMP Unknown)   SpO2 90%   BMI 28.06 kg/m²     "

## 2024-11-30 NOTE — ED PROVIDER NOTES
ED Provider Note    CHIEF COMPLAINT  Chief Complaint   Patient presents with    Abdominal Swelling     Reports abdominal swelling and pain, off home medications.     Alcohol Intoxication     Reports drinking a shot every 2 hours, used methamphetamine prior to calling ambulance       EXTERNAL RECORDS REVIEWED  Inpatient Notes the patient was discharged from the hospital on 5/3/2024 after presenting with coffee-ground emesis.  Notably with a history of alcoholic cirrhosis, esophageal varices and hepatitis C.  EGD at this time demonstrates grade 1 esophageal varices with mild ulceration at the GE junction and a large isolated gastric varix, portal hypertensive gastropathy with chronic gastritis, duodenitis.  Patient was recommended for BRTO which she declined.    HPI/ROS  LIMITATION TO HISTORY   Select: : None      Chiara Ju Pacheco is a 59 y.o. female who presents to the emergency department for evaluation of abdominal distention, discomfort and difficulty eating.  She reports continued alcohol use with her last drink 2 hours ago.  She states that she drinks a shot every 2 hours.  She also reports daily methamphetamine use.  She reports that her abdomen has become so distended that she feels like she cannot move, feels generally weak and very tired and cannot eat because the food does not stay down.  She denies hematemesis, black or bloody stool, urinary symptoms.  She states that she has been feeling increasingly confused and forgetful.  She has not been taking any of her medications.    PAST MEDICAL HISTORY   has a past medical history of Hearing loss.    SURGICAL HISTORY   has a past surgical history that includes Intubation (2/19/2023); upper gi endoscopy,diagnosis (2/19/2023); upper gi endoscopy,diagnosis (2/20/2023); upper gi endoscopy,diagnosis (N/A, 9/23/2023); upper gi endoscopy,ligat varix (N/A, 9/23/2023); and upper gi endoscopy,diagnosis (N/A, 5/2/2024).    FAMILY HISTORY  History reviewed. No  "pertinent family history.    SOCIAL HISTORY  Social History     Tobacco Use    Smoking status: Every Day     Current packs/day: 0.50     Types: Cigarettes    Smokeless tobacco: Never   Vaping Use    Vaping status: Some Days   Substance and Sexual Activity    Alcohol use: Yes     Alcohol/week: 1.8 oz     Types: 3 Shots of liquor per week     Comment: \"a shot every 2-3 hours\"    Drug use: Yes     Types: Inhaled     Comment: marajuana, meth today    Sexual activity: Not on file       CURRENT MEDICATIONS  Home Medications       Reviewed by Mercy Sun R.N. (Registered Nurse) on 11/30/24 at 1430  Med List Status: Partial     Medication Last Dose Status   lactulose 20 GM/30ML Solution  Active   naltrexone (DEPADE) 50 MG Tab  Active   omeprazole (PRILOSEC) 20 MG delayed-release capsule  Active                    ALLERGIES  No Known Allergies    PHYSICAL EXAM  VITAL SIGNS: /62   Pulse 82   Temp 36.8 °C (98.2 °F) (Temporal)   Resp 16   Ht 1.753 m (5' 9\")   Wt 86.2 kg (190 lb)   LMP  (LMP Unknown)   SpO2 89%   BMI 28.06 kg/m²    Constitutional: No acute distress, sluggish, disoriented  HEENT: Atraumatic, normocephalic, pupils are equal round reactive to light, nose normal, mouth shows dry mucous membranes  Neck: Supple, no JVD, no tracheal deviation  Cardiovascular: Regular rate and rhythm, no murmur, rub or gallop, 2+ pulses peripherally x4  Thorax & Lungs: No respiratory distress, clear breath sounds  GI: Soft, minimally distended, nontender.  Skin: Warm, dry, no acute rash or lesion  Musculoskeletal: Moving all extremities, no acute deformity, no edema, no tenderness  Neurologic: A&Ox3, at baseline mentation, no focal deficit, no clonus, no asterixis  Psychiatric: Appropriate affect for situation at this time      EKG/LABS  Labs Reviewed   CBC WITH DIFFERENTIAL - Abnormal; Notable for the following components:       Result Value    RDW 56.4 (*)     Platelet Count 67 (*)     All other components within " normal limits   COMP METABOLIC PANEL - Abnormal; Notable for the following components:    Sodium 146 (*)     Glucose 122 (*)     Bun 7 (*)     Creatinine 0.36 (*)     Calcium 8.3 (*)     AST(SGOT) 114 (*)     Alkaline Phosphatase 171 (*)     Total Bilirubin 5.6 (*)     All other components within normal limits   LIPASE - Abnormal; Notable for the following components:    Lipase 129 (*)     All other components within normal limits   IMMATURE PLT FRACTION   ESTIMATED GFR   URINALYSIS   PROTHROMBIN TIME   DIAGNOSTIC ALCOHOL   MAGNESIUM   PHOSPHORUS   AMMONIA       COURSE & MEDICAL DECISION MAKING    ASSESSMENT, COURSE AND PLAN  Care Narrative:     Patient with a known history of chronic alcohol abuse disorder, alcoholic cirrhosis, ongoing alcohol use presents to the ER for evaluation of abdominal distention.  Patient's vital signs are stable and she is well-appearing at the time of my initial evaluation.  Endorsing alcohol use 2 hours ago.  No evidence of withdrawal.  She is not altered and has no asterixis or clonus on exam to suggest hepatic encephalopathy.  Bedside ultrasound was performed without evidence of significant intra-abdominal ascites.  She is nontender and afebrile.  I doubt SBP.  Laboratory workup was undertaken which demonstrates evidence of ongoing liver dysfunction and cirrhosis.  Her elevated lipase which is not twice the upper limit of normal but slightly elevated is likely reflective of pancreatic irritation from ongoing alcohol use.  She has no significant epigastric tenderness on exam however.  At this point she does not have any evidence of alcoholic encephalopathy, alcoholic hepatitis, SBP, sepsis and she is tolerating p.o. in the emergency department.  I discussed her case with the hospitalist Dr. Rosales and he does not believe that hospitalization would be necessitated for this patient as unfortunately her laboratory derangements are all reflective of her chronic alcohol abuse.   Unfortunately the patient does not want to engage in outpatient detoxification resources currently.  I strongly encouraged her to follow-up with her primary care doctor and provided information to her to allow her to do so.  I prescribed omeprazole for likely alcoholic gastritis.  strict return precautions were discussed all questions answered and ultimately she was discharged stable condition.    ADDITIONAL PROBLEMS MANAGED  None    DISPOSITION AND DISCUSSIONS  I have discussed management of the patient with the following physicians and POLA's: Hospitalist as above    Discussion of management with other QHP or appropriate source(s): None     Escalation of care considered, and ultimately not performed:acute inpatient care management, however at this time, the patient is most appropriate for outpatient management    Barriers to care at this time, including but not limited to: Patient does not have established PCP.     FINAL DIAGNOSIS  1. Alcoholic cirrhosis of liver with ascites (HCC)    2. Abdominal distention    3. 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

## 2024-12-01 NOTE — ED NOTES
Med rec updated and complete. Allergies reviewed.    Pt denies taking medications. No OTC , no supplements    Removed all medications from med rec    Preferred pharmacy  [facility] = [phone]

## 2024-12-01 NOTE — DISCHARGE INSTRUCTIONS
Please call the attached number to schedule a follow-up appointment with a primary care doctor.    Return to the emergency department with any confusion, inability to keep yourself hydrated, vomiting blood, blood in your stool or if you are otherwise feeling worse.

## 2025-07-05 ENCOUNTER — ANESTHESIA EVENT (OUTPATIENT)
Dept: SURGERY | Facility: MEDICAL CENTER | Age: 60
DRG: 263 | End: 2025-07-05
Payer: COMMERCIAL

## 2025-07-05 ENCOUNTER — APPOINTMENT (OUTPATIENT)
Dept: RADIOLOGY | Facility: MEDICAL CENTER | Age: 60
DRG: 263 | End: 2025-07-05
Attending: EMERGENCY MEDICINE
Payer: COMMERCIAL

## 2025-07-05 ENCOUNTER — HOSPITAL ENCOUNTER (INPATIENT)
Facility: MEDICAL CENTER | Age: 60
LOS: 8 days | DRG: 263 | End: 2025-07-13
Attending: EMERGENCY MEDICINE | Admitting: INTERNAL MEDICINE
Payer: COMMERCIAL

## 2025-07-05 ENCOUNTER — ANESTHESIA (OUTPATIENT)
Dept: SURGERY | Facility: MEDICAL CENTER | Age: 60
DRG: 263 | End: 2025-07-05
Payer: COMMERCIAL

## 2025-07-05 DIAGNOSIS — D64.9 SYMPTOMATIC ANEMIA: ICD-10-CM

## 2025-07-05 DIAGNOSIS — Z87.19 HISTORY OF ESOPHAGEAL VARICES WITH BLEEDING: ICD-10-CM

## 2025-07-05 DIAGNOSIS — J44.0 CHRONIC OBSTRUCTIVE PULMONARY DISEASE WITH ACUTE LOWER RESPIRATORY INFECTION (HCC): ICD-10-CM

## 2025-07-05 DIAGNOSIS — K70.31 ALCOHOLIC CIRRHOSIS OF LIVER WITH ASCITES (HCC): ICD-10-CM

## 2025-07-05 DIAGNOSIS — K92.2 GASTROINTESTINAL HEMORRHAGE, UNSPECIFIED GASTROINTESTINAL HEMORRHAGE TYPE: Primary | ICD-10-CM

## 2025-07-05 DIAGNOSIS — I86.4 GASTRIC VARICES: ICD-10-CM

## 2025-07-05 PROBLEM — D72.829 LEUKOCYTOSIS: Status: ACTIVE | Noted: 2025-07-05

## 2025-07-05 LAB
ABO GROUP BLD: NORMAL
ALBUMIN SERPL BCP-MCNC: 2.5 G/DL (ref 3.2–4.9)
ALBUMIN/GLOB SERPL: 1 G/DL
ALP SERPL-CCNC: 86 U/L (ref 30–99)
ALT SERPL-CCNC: 15 U/L (ref 2–50)
AMMONIA PLAS-SCNC: 71 UMOL/L (ref 11–45)
ANION GAP SERPL CALC-SCNC: 18 MMOL/L (ref 7–16)
ANISOCYTOSIS BLD QL SMEAR: ABNORMAL
AST SERPL-CCNC: 39 U/L (ref 12–45)
BARCODED ABORH UBTYP: 5100
BARCODED PRD CODE UBPRD: NORMAL
BARCODED UNIT NUM UBUNT: NORMAL
BASOPHILS # BLD AUTO: 0.7 % (ref 0–1.8)
BASOPHILS # BLD: 0.11 K/UL (ref 0–0.12)
BILIRUB SERPL-MCNC: 9.5 MG/DL (ref 0.1–1.5)
BLD GP AB SCN SERPL QL: NORMAL
BUN SERPL-MCNC: 18 MG/DL (ref 8–22)
CALCIUM ALBUM COR SERPL-MCNC: 9.3 MG/DL (ref 8.5–10.5)
CALCIUM SERPL-MCNC: 8.1 MG/DL (ref 8.5–10.5)
CFT BLD TEG: 4 MIN (ref 4.6–9.1)
CFT P HPASE BLD TEG: 4.2 MIN (ref 4.3–8.3)
CHLORIDE SERPL-SCNC: 102 MMOL/L (ref 96–112)
CLOT ANGLE BLD TEG: 72.7 DEGREES (ref 63–78)
CO2 SERPL-SCNC: 17 MMOL/L (ref 20–33)
COMMENT 1642: NORMAL
COMPONENT R 8504R: NORMAL
CREAT SERPL-MCNC: 0.77 MG/DL (ref 0.5–1.4)
CT.EXTRINSIC BLD ROTEM: 1.5 MIN (ref 0.8–2.1)
EKG IMPRESSION: NORMAL
EOSINOPHIL # BLD AUTO: 0.11 K/UL (ref 0–0.51)
EOSINOPHIL NFR BLD: 0.7 % (ref 0–6.9)
ERYTHROCYTE [DISTWIDTH] IN BLOOD BY AUTOMATED COUNT: 73.1 FL (ref 35.9–50)
ERYTHROCYTE [DISTWIDTH] IN BLOOD BY AUTOMATED COUNT: 74.5 FL (ref 35.9–50)
ETHANOL BLD-MCNC: <10.1 MG/DL
GFR SERPLBLD CREATININE-BSD FMLA CKD-EPI: 89 ML/MIN/1.73 M 2
GLOBULIN SER CALC-MCNC: 2.5 G/DL (ref 1.9–3.5)
GLUCOSE SERPL-MCNC: 127 MG/DL (ref 65–99)
HCT VFR BLD AUTO: 20.9 % (ref 37–47)
HCT VFR BLD AUTO: 23.4 % (ref 37–47)
HGB BLD-MCNC: 5.8 G/DL (ref 12–16)
HGB BLD-MCNC: 6.4 G/DL (ref 12–16)
HGB BLD-MCNC: 6.7 G/DL (ref 12–16)
HGB BLD-MCNC: 7.4 G/DL (ref 12–16)
HOLDING TUBE BB 8507: NORMAL
HYPOCHROMIA BLD QL SMEAR: ABNORMAL
IMM GRANULOCYTES # BLD AUTO: 0.15 K/UL (ref 0–0.11)
IMM GRANULOCYTES NFR BLD AUTO: 1 % (ref 0–0.9)
INR PPP: 2.66 (ref 0.87–1.13)
LIPASE SERPL-CCNC: 73 U/L (ref 11–82)
LYMPHOCYTES # BLD AUTO: 3.23 K/UL (ref 1–4.8)
LYMPHOCYTES NFR BLD: 20.9 % (ref 22–41)
MCF BLD TEG: 51.8 MM (ref 52–69)
MCF.PLATELET INHIB BLD ROTEM: 13.4 MM (ref 15–32)
MCH RBC QN AUTO: 23.1 PG (ref 27–33)
MCH RBC QN AUTO: 23.2 PG (ref 27–33)
MCHC RBC AUTO-ENTMCNC: 27.4 G/DL (ref 32.2–35.5)
MCHC RBC AUTO-ENTMCNC: 27.8 G/DL (ref 32.2–35.5)
MCV RBC AUTO: 83.6 FL (ref 81.4–97.8)
MCV RBC AUTO: 84.5 FL (ref 81.4–97.8)
MICROCYTES BLD QL SMEAR: ABNORMAL
MONOCYTES # BLD AUTO: 1.51 K/UL (ref 0–0.85)
MONOCYTES NFR BLD AUTO: 9.7 % (ref 0–13.4)
MORPHOLOGY BLD-IMP: NORMAL
NEUTROPHILS # BLD AUTO: 10.38 K/UL (ref 1.82–7.42)
NEUTROPHILS NFR BLD: 67 % (ref 44–72)
NRBC # BLD AUTO: 0.13 K/UL
NRBC BLD-RTO: 0.8 /100 WBC (ref 0–0.2)
OVALOCYTES BLD QL SMEAR: NORMAL
PA AA BLD-ACNC: 16.9 % (ref 0–11)
PA ADP BLD-ACNC: 7.6 % (ref 0–17)
PLATELET # BLD AUTO: 165 K/UL (ref 164–446)
PLATELET # BLD AUTO: 195 K/UL (ref 164–446)
PLATELET BLD QL SMEAR: NORMAL
PMV BLD AUTO: 10.5 FL (ref 9–12.9)
PMV BLD AUTO: 10.8 FL (ref 9–12.9)
POIKILOCYTOSIS BLD QL SMEAR: NORMAL
POLYCHROMASIA BLD QL SMEAR: NORMAL
POTASSIUM SERPL-SCNC: 4.5 MMOL/L (ref 3.6–5.5)
PRODUCT TYPE UPROD: NORMAL
PROT SERPL-MCNC: 5 G/DL (ref 6–8.2)
PROTHROMBIN TIME: 28.5 SEC (ref 12–14.6)
RBC # BLD AUTO: 2.5 M/UL (ref 4.2–5.4)
RBC # BLD AUTO: 2.77 M/UL (ref 4.2–5.4)
RBC BLD AUTO: PRESENT
RH BLD: NORMAL
SODIUM SERPL-SCNC: 137 MMOL/L (ref 135–145)
TEG ALGORITHM TGALG: ABNORMAL
TROPONIN T SERPL-MCNC: 18 NG/L (ref 6–19)
UNIT STATUS USTAT: NORMAL
WBC # BLD AUTO: 12.6 K/UL (ref 4.8–10.8)
WBC # BLD AUTO: 15.5 K/UL (ref 4.8–10.8)

## 2025-07-05 PROCEDURE — 85025 COMPLETE CBC W/AUTO DIFF WBC: CPT

## 2025-07-05 PROCEDURE — 160015 HCHG STAT PREOP MINUTES: Performed by: SPECIALIST

## 2025-07-05 PROCEDURE — 700105 HCHG RX REV CODE 258: Performed by: INTERNAL MEDICINE

## 2025-07-05 PROCEDURE — 160048 HCHG OR STATISTICAL LEVEL 1-5: Performed by: SPECIALIST

## 2025-07-05 PROCEDURE — 86923 COMPATIBILITY TEST ELECTRIC: CPT

## 2025-07-05 PROCEDURE — 770020 HCHG ROOM/CARE - TELE (206)

## 2025-07-05 PROCEDURE — 99284 EMERGENCY DEPT VISIT MOD MDM: CPT | Mod: 25 | Performed by: SPECIALIST

## 2025-07-05 PROCEDURE — A9270 NON-COVERED ITEM OR SERVICE: HCPCS | Performed by: INTERNAL MEDICINE

## 2025-07-05 PROCEDURE — 0DJ08ZZ INSPECTION OF UPPER INTESTINAL TRACT, VIA NATURAL OR ARTIFICIAL OPENING ENDOSCOPIC: ICD-10-PCS | Performed by: SPECIALIST

## 2025-07-05 PROCEDURE — 85027 COMPLETE CBC AUTOMATED: CPT

## 2025-07-05 PROCEDURE — P9016 RBC LEUKOCYTES REDUCED: HCPCS

## 2025-07-05 PROCEDURE — 82140 ASSAY OF AMMONIA: CPT

## 2025-07-05 PROCEDURE — 700111 HCHG RX REV CODE 636 W/ 250 OVERRIDE (IP): Mod: JZ | Performed by: INTERNAL MEDICINE

## 2025-07-05 PROCEDURE — 700101 HCHG RX REV CODE 250: Performed by: ANESTHESIOLOGY

## 2025-07-05 PROCEDURE — 160002 HCHG RECOVERY MINUTES (STAT): Performed by: SPECIALIST

## 2025-07-05 PROCEDURE — 85384 FIBRINOGEN ACTIVITY: CPT

## 2025-07-05 PROCEDURE — 36430 TRANSFUSION BLD/BLD COMPNT: CPT

## 2025-07-05 PROCEDURE — 82077 ASSAY SPEC XCP UR&BREATH IA: CPT

## 2025-07-05 PROCEDURE — 96375 TX/PRO/DX INJ NEW DRUG ADDON: CPT

## 2025-07-05 PROCEDURE — 30233N1 TRANSFUSION OF NONAUTOLOGOUS RED BLOOD CELLS INTO PERIPHERAL VEIN, PERCUTANEOUS APPROACH: ICD-10-PCS | Performed by: EMERGENCY MEDICINE

## 2025-07-05 PROCEDURE — 51798 US URINE CAPACITY MEASURE: CPT

## 2025-07-05 PROCEDURE — 160202 HCHG ENDO MINUTES - 1ST 30 MINS LEVEL 3: Performed by: SPECIALIST

## 2025-07-05 PROCEDURE — 86850 RBC ANTIBODY SCREEN: CPT

## 2025-07-05 PROCEDURE — 43235 EGD DIAGNOSTIC BRUSH WASH: CPT | Performed by: SPECIALIST

## 2025-07-05 PROCEDURE — 36415 COLL VENOUS BLD VENIPUNCTURE: CPT

## 2025-07-05 PROCEDURE — 96374 THER/PROPH/DIAG INJ IV PUSH: CPT

## 2025-07-05 PROCEDURE — 86901 BLOOD TYPING SEROLOGIC RH(D): CPT

## 2025-07-05 PROCEDURE — 83690 ASSAY OF LIPASE: CPT

## 2025-07-05 PROCEDURE — 71045 X-RAY EXAM CHEST 1 VIEW: CPT

## 2025-07-05 PROCEDURE — 160193 HCHG PACU STANDARD - 1ST 60 MINS: Performed by: SPECIALIST

## 2025-07-05 PROCEDURE — 85610 PROTHROMBIN TIME: CPT

## 2025-07-05 PROCEDURE — 160192 HCHG ANESTHESIA COMPLEX: Performed by: SPECIALIST

## 2025-07-05 PROCEDURE — 96361 HYDRATE IV INFUSION ADD-ON: CPT

## 2025-07-05 PROCEDURE — 85576 BLOOD PLATELET AGGREGATION: CPT | Mod: 91

## 2025-07-05 PROCEDURE — 85018 HEMOGLOBIN: CPT

## 2025-07-05 PROCEDURE — 80053 COMPREHEN METABOLIC PANEL: CPT

## 2025-07-05 PROCEDURE — 99291 CRITICAL CARE FIRST HOUR: CPT | Performed by: INTERNAL MEDICINE

## 2025-07-05 PROCEDURE — 700102 HCHG RX REV CODE 250 W/ 637 OVERRIDE(OP): Performed by: INTERNAL MEDICINE

## 2025-07-05 PROCEDURE — 99285 EMERGENCY DEPT VISIT HI MDM: CPT

## 2025-07-05 PROCEDURE — 84484 ASSAY OF TROPONIN QUANT: CPT

## 2025-07-05 PROCEDURE — 700111 HCHG RX REV CODE 636 W/ 250 OVERRIDE (IP): Performed by: ANESTHESIOLOGY

## 2025-07-05 PROCEDURE — 700105 HCHG RX REV CODE 258: Mod: UD | Performed by: EMERGENCY MEDICINE

## 2025-07-05 PROCEDURE — 86900 BLOOD TYPING SEROLOGIC ABO: CPT

## 2025-07-05 PROCEDURE — 700105 HCHG RX REV CODE 258: Performed by: ANESTHESIOLOGY

## 2025-07-05 PROCEDURE — 93005 ELECTROCARDIOGRAM TRACING: CPT | Mod: TC | Performed by: EMERGENCY MEDICINE

## 2025-07-05 PROCEDURE — 93005 ELECTROCARDIOGRAM TRACING: CPT | Mod: TC

## 2025-07-05 PROCEDURE — 85347 COAGULATION TIME ACTIVATED: CPT

## 2025-07-05 RX ORDER — MEPERIDINE HYDROCHLORIDE 50 MG/ML
12.5 INJECTION INTRAMUSCULAR; INTRAVENOUS; SUBCUTANEOUS
Status: DISCONTINUED | OUTPATIENT
Start: 2025-07-05 | End: 2025-07-05 | Stop reason: HOSPADM

## 2025-07-05 RX ORDER — ONDANSETRON 4 MG/1
4 TABLET, ORALLY DISINTEGRATING ORAL EVERY 4 HOURS PRN
Status: DISCONTINUED | OUTPATIENT
Start: 2025-07-05 | End: 2025-07-13 | Stop reason: HOSPADM

## 2025-07-05 RX ORDER — PROMETHAZINE HYDROCHLORIDE 25 MG/1
12.5-25 TABLET ORAL EVERY 4 HOURS PRN
Status: DISCONTINUED | OUTPATIENT
Start: 2025-07-05 | End: 2025-07-13 | Stop reason: HOSPADM

## 2025-07-05 RX ORDER — VASOPRESSIN 20 [USP'U]/ML
INJECTION, SOLUTION INTRAVENOUS PRN
Status: DISCONTINUED | OUTPATIENT
Start: 2025-07-05 | End: 2025-07-05 | Stop reason: SURG

## 2025-07-05 RX ORDER — SODIUM CHLORIDE 9 MG/ML
1000 INJECTION, SOLUTION INTRAVENOUS ONCE
Status: COMPLETED | OUTPATIENT
Start: 2025-07-05 | End: 2025-07-05

## 2025-07-05 RX ORDER — LACTULOSE 10 G/15ML
30 SOLUTION ORAL 3 TIMES DAILY
Status: DISCONTINUED | OUTPATIENT
Start: 2025-07-05 | End: 2025-07-09

## 2025-07-05 RX ORDER — SODIUM CHLORIDE, SODIUM LACTATE, POTASSIUM CHLORIDE, CALCIUM CHLORIDE 600; 310; 30; 20 MG/100ML; MG/100ML; MG/100ML; MG/100ML
INJECTION, SOLUTION INTRAVENOUS
Status: DISCONTINUED | OUTPATIENT
Start: 2025-07-05 | End: 2025-07-05 | Stop reason: SURG

## 2025-07-05 RX ORDER — SODIUM CHLORIDE, SODIUM LACTATE, POTASSIUM CHLORIDE, CALCIUM CHLORIDE 600; 310; 30; 20 MG/100ML; MG/100ML; MG/100ML; MG/100ML
INJECTION, SOLUTION INTRAVENOUS CONTINUOUS
Status: DISCONTINUED | OUTPATIENT
Start: 2025-07-05 | End: 2025-07-05 | Stop reason: HOSPADM

## 2025-07-05 RX ORDER — LIDOCAINE HYDROCHLORIDE 20 MG/ML
INJECTION, SOLUTION EPIDURAL; INFILTRATION; INTRACAUDAL; PERINEURAL PRN
Status: DISCONTINUED | OUTPATIENT
Start: 2025-07-05 | End: 2025-07-05 | Stop reason: SURG

## 2025-07-05 RX ORDER — HALOPERIDOL 5 MG/ML
1 INJECTION INTRAMUSCULAR
Status: DISCONTINUED | OUTPATIENT
Start: 2025-07-05 | End: 2025-07-05 | Stop reason: HOSPADM

## 2025-07-05 RX ORDER — EPHEDRINE SULFATE 50 MG/ML
5 INJECTION, SOLUTION INTRAVENOUS
Status: DISCONTINUED | OUTPATIENT
Start: 2025-07-05 | End: 2025-07-05 | Stop reason: HOSPADM

## 2025-07-05 RX ORDER — OCTREOTIDE ACETATE 100 UG/ML
50 INJECTION, SOLUTION INTRAVENOUS; SUBCUTANEOUS ONCE
Status: COMPLETED | OUTPATIENT
Start: 2025-07-05 | End: 2025-07-05

## 2025-07-05 RX ORDER — PROMETHAZINE HYDROCHLORIDE 25 MG/1
12.5-25 SUPPOSITORY RECTAL EVERY 4 HOURS PRN
Status: DISCONTINUED | OUTPATIENT
Start: 2025-07-05 | End: 2025-07-13 | Stop reason: HOSPADM

## 2025-07-05 RX ORDER — LIDOCAINE HYDROCHLORIDE 40 MG/ML
SOLUTION TOPICAL PRN
Status: DISCONTINUED | OUTPATIENT
Start: 2025-07-05 | End: 2025-07-05 | Stop reason: SURG

## 2025-07-05 RX ORDER — FOLIC ACID 1 MG/1
1 TABLET ORAL DAILY
Status: DISCONTINUED | OUTPATIENT
Start: 2025-07-05 | End: 2025-07-13 | Stop reason: HOSPADM

## 2025-07-05 RX ORDER — DIPHENHYDRAMINE HYDROCHLORIDE 50 MG/ML
12.5 INJECTION, SOLUTION INTRAMUSCULAR; INTRAVENOUS
Status: DISCONTINUED | OUTPATIENT
Start: 2025-07-05 | End: 2025-07-05 | Stop reason: HOSPADM

## 2025-07-05 RX ORDER — PANTOPRAZOLE SODIUM 40 MG/10ML
40 INJECTION, POWDER, LYOPHILIZED, FOR SOLUTION INTRAVENOUS 2 TIMES DAILY
Status: DISCONTINUED | OUTPATIENT
Start: 2025-07-05 | End: 2025-07-09

## 2025-07-05 RX ORDER — ONDANSETRON 2 MG/ML
4 INJECTION INTRAMUSCULAR; INTRAVENOUS
Status: DISCONTINUED | OUTPATIENT
Start: 2025-07-05 | End: 2025-07-05 | Stop reason: HOSPADM

## 2025-07-05 RX ORDER — ROCURONIUM BROMIDE 10 MG/ML
INJECTION, SOLUTION INTRAVENOUS PRN
Status: DISCONTINUED | OUTPATIENT
Start: 2025-07-05 | End: 2025-07-05 | Stop reason: SURG

## 2025-07-05 RX ORDER — OXYCODONE HCL 5 MG/5 ML
5 SOLUTION, ORAL ORAL
Status: DISCONTINUED | OUTPATIENT
Start: 2025-07-05 | End: 2025-07-05 | Stop reason: HOSPADM

## 2025-07-05 RX ORDER — ONDANSETRON 2 MG/ML
INJECTION INTRAMUSCULAR; INTRAVENOUS PRN
Status: DISCONTINUED | OUTPATIENT
Start: 2025-07-05 | End: 2025-07-05 | Stop reason: SURG

## 2025-07-05 RX ORDER — SODIUM CHLORIDE 9 MG/ML
INJECTION, SOLUTION INTRAVENOUS CONTINUOUS
Status: DISCONTINUED | OUTPATIENT
Start: 2025-07-05 | End: 2025-07-06

## 2025-07-05 RX ORDER — SODIUM CHLORIDE 9 MG/ML
INJECTION, SOLUTION INTRAVENOUS CONTINUOUS
Status: ACTIVE | OUTPATIENT
Start: 2025-07-05 | End: 2025-07-05

## 2025-07-05 RX ORDER — MIDAZOLAM HYDROCHLORIDE 1 MG/ML
1 INJECTION INTRAMUSCULAR; INTRAVENOUS
Status: DISCONTINUED | OUTPATIENT
Start: 2025-07-05 | End: 2025-07-05 | Stop reason: HOSPADM

## 2025-07-05 RX ORDER — OXYCODONE HCL 5 MG/5 ML
10 SOLUTION, ORAL ORAL
Status: DISCONTINUED | OUTPATIENT
Start: 2025-07-05 | End: 2025-07-05 | Stop reason: HOSPADM

## 2025-07-05 RX ORDER — ONDANSETRON 2 MG/ML
4 INJECTION INTRAMUSCULAR; INTRAVENOUS EVERY 4 HOURS PRN
Status: DISCONTINUED | OUTPATIENT
Start: 2025-07-05 | End: 2025-07-13 | Stop reason: HOSPADM

## 2025-07-05 RX ORDER — IPRATROPIUM BROMIDE AND ALBUTEROL SULFATE 2.5; .5 MG/3ML; MG/3ML
3 SOLUTION RESPIRATORY (INHALATION)
Status: DISCONTINUED | OUTPATIENT
Start: 2025-07-05 | End: 2025-07-05 | Stop reason: HOSPADM

## 2025-07-05 RX ORDER — LABETALOL HYDROCHLORIDE 5 MG/ML
10 INJECTION, SOLUTION INTRAVENOUS EVERY 4 HOURS PRN
Status: DISCONTINUED | OUTPATIENT
Start: 2025-07-05 | End: 2025-07-13 | Stop reason: HOSPADM

## 2025-07-05 RX ORDER — PROCHLORPERAZINE EDISYLATE 5 MG/ML
5-10 INJECTION INTRAMUSCULAR; INTRAVENOUS EVERY 4 HOURS PRN
Status: DISCONTINUED | OUTPATIENT
Start: 2025-07-05 | End: 2025-07-13 | Stop reason: HOSPADM

## 2025-07-05 RX ORDER — GAUZE BANDAGE 2" X 2"
100 BANDAGE TOPICAL DAILY
Status: DISCONTINUED | OUTPATIENT
Start: 2025-07-05 | End: 2025-07-13 | Stop reason: HOSPADM

## 2025-07-05 RX ADMIN — ONDANSETRON 4 MG: 2 INJECTION INTRAMUSCULAR; INTRAVENOUS at 08:48

## 2025-07-05 RX ADMIN — PROPOFOL 20 MG: 10 INJECTION, EMULSION INTRAVENOUS at 16:29

## 2025-07-05 RX ADMIN — ROCURONIUM BROMIDE 50 MG: 10 INJECTION INTRAVENOUS at 16:14

## 2025-07-05 RX ADMIN — PROPOFOL 120 MG: 10 INJECTION, EMULSION INTRAVENOUS at 16:14

## 2025-07-05 RX ADMIN — VASOPRESSIN 1 UNITS: 20 INJECTION INTRAVENOUS at 16:16

## 2025-07-05 RX ADMIN — OCTREOTIDE ACETATE 50 MCG: 100 INJECTION, SOLUTION INTRAVENOUS; SUBCUTANEOUS at 09:46

## 2025-07-05 RX ADMIN — PROCHLORPERAZINE EDISYLATE 10 MG: 5 INJECTION INTRAMUSCULAR; INTRAVENOUS at 10:41

## 2025-07-05 RX ADMIN — OCTREOTIDE ACETATE 50 MCG/HR: 200 INJECTION, SOLUTION INTRAVENOUS; SUBCUTANEOUS at 10:12

## 2025-07-05 RX ADMIN — PANTOPRAZOLE SODIUM 40 MG: 40 INJECTION, POWDER, FOR SOLUTION INTRAVENOUS at 08:40

## 2025-07-05 RX ADMIN — ONDANSETRON 4 MG: 2 INJECTION INTRAMUSCULAR; INTRAVENOUS at 16:29

## 2025-07-05 RX ADMIN — FOLIC ACID 1 MG: 1 TABLET ORAL at 08:37

## 2025-07-05 RX ADMIN — LIDOCAINE HYDROCHLORIDE 80 MG: 20 INJECTION, SOLUTION EPIDURAL; INFILTRATION; INTRACAUDAL; PERINEURAL at 16:14

## 2025-07-05 RX ADMIN — PANTOPRAZOLE SODIUM 40 MG: 40 INJECTION, POWDER, FOR SOLUTION INTRAVENOUS at 17:48

## 2025-07-05 RX ADMIN — THERA TABS 1 TABLET: TAB at 08:38

## 2025-07-05 RX ADMIN — SUGAMMADEX 400 MG: 100 INJECTION, SOLUTION INTRAVENOUS at 16:29

## 2025-07-05 RX ADMIN — SODIUM CHLORIDE: 9 INJECTION, SOLUTION INTRAVENOUS at 10:20

## 2025-07-05 RX ADMIN — SODIUM CHLORIDE, POTASSIUM CHLORIDE, SODIUM LACTATE AND CALCIUM CHLORIDE: 600; 310; 30; 20 INJECTION, SOLUTION INTRAVENOUS at 16:09

## 2025-07-05 RX ADMIN — SODIUM CHLORIDE: 9 INJECTION, SOLUTION INTRAVENOUS at 08:36

## 2025-07-05 RX ADMIN — SODIUM CHLORIDE 1000 ML: 9 INJECTION, SOLUTION INTRAVENOUS at 06:52

## 2025-07-05 RX ADMIN — VASOPRESSIN 1 UNITS: 20 INJECTION INTRAVENOUS at 16:18

## 2025-07-05 RX ADMIN — CEFTRIAXONE SODIUM 1000 MG: 10 INJECTION, POWDER, FOR SOLUTION INTRAVENOUS at 09:47

## 2025-07-05 RX ADMIN — LIDOCAINE HYDROCHLORIDE 4 ML: 40 SOLUTION TOPICAL at 16:15

## 2025-07-05 RX ADMIN — LACTULOSE 30 ML: 10 SOLUTION ORAL at 17:48

## 2025-07-05 RX ADMIN — IPRATROPIUM BROMIDE AND ALBUTEROL SULFATE 3 ML: .5; 2.5 SOLUTION RESPIRATORY (INHALATION) at 16:38

## 2025-07-05 RX ADMIN — Medication 100 MG: at 08:38

## 2025-07-05 ASSESSMENT — LIFESTYLE VARIABLES
HAVE PEOPLE ANNOYED YOU BY CRITICIZING YOUR DRINKING: NO
EVER FELT BAD OR GUILTY ABOUT YOUR DRINKING: YES
HAVE YOU EVER FELT YOU SHOULD CUT DOWN ON YOUR DRINKING: YES
ON A TYPICAL DAY WHEN YOU DRINK ALCOHOL HOW MANY DRINKS DO YOU HAVE: 3
HOW MANY TIMES IN THE PAST YEAR HAVE YOU HAD 5 OR MORE DRINKS IN A DAY: 5
TOTAL SCORE: 2
TOTAL SCORE: 2
DOES PATIENT WANT TO STOP DRINKING: CANNOT ASSESS
EVER HAD A DRINK FIRST THING IN THE MORNING TO STEADY YOUR NERVES TO GET RID OF A HANGOVER: NO
ALCOHOL_USE: YES
TOTAL SCORE: 2
CONSUMPTION TOTAL: POSITIVE
AVERAGE NUMBER OF DAYS PER WEEK YOU HAVE A DRINK CONTAINING ALCOHOL: 7

## 2025-07-05 ASSESSMENT — COGNITIVE AND FUNCTIONAL STATUS - GENERAL
DAILY ACTIVITIY SCORE: 20
WALKING IN HOSPITAL ROOM: A LITTLE
STANDING UP FROM CHAIR USING ARMS: A LITTLE
HELP NEEDED FOR BATHING: A LITTLE
SUGGESTED CMS G CODE MODIFIER DAILY ACTIVITY: CJ
DRESSING REGULAR UPPER BODY CLOTHING: A LITTLE
DRESSING REGULAR LOWER BODY CLOTHING: A LITTLE
MOBILITY SCORE: 20
MOVING TO AND FROM BED TO CHAIR: A LITTLE
CLIMB 3 TO 5 STEPS WITH RAILING: A LITTLE
TOILETING: A LITTLE
SUGGESTED CMS G CODE MODIFIER MOBILITY: CJ

## 2025-07-05 ASSESSMENT — PAIN SCALES - GENERAL: PAIN_LEVEL: 0

## 2025-07-05 ASSESSMENT — ENCOUNTER SYMPTOMS
ABDOMINAL PAIN: 1
LOSS OF CONSCIOUSNESS: 0
CHILLS: 0
VOMITING: 1
SHORTNESS OF BREATH: 0
COUGH: 0
SPUTUM PRODUCTION: 0
DIARRHEA: 0
STRIDOR: 0
FALLS: 0
PALPITATIONS: 0
WEAKNESS: 0
TINGLING: 0
FEVER: 0
DIZZINESS: 0
MYALGIAS: 0
CONSTIPATION: 0
HEADACHES: 0
NAUSEA: 1
DEPRESSION: 0

## 2025-07-05 ASSESSMENT — SOCIAL DETERMINANTS OF HEALTH (SDOH)
WITHIN THE PAST 12 MONTHS, THE FOOD YOU BOUGHT JUST DIDN'T LAST AND YOU DIDN'T HAVE MONEY TO GET MORE: SOMETIMES TRUE
WITHIN THE LAST YEAR, HAVE TO BEEN RAPED OR FORCED TO HAVE ANY KIND OF SEXUAL ACTIVITY BY YOUR PARTNER OR EX-PARTNER?: NO
WITHIN THE LAST YEAR, HAVE YOU BEEN KICKED, HIT, SLAPPED, OR OTHERWISE PHYSICALLY HURT BY YOUR PARTNER OR EX-PARTNER?: NO
WITHIN THE LAST YEAR, HAVE YOU BEEN HUMILIATED OR EMOTIONALLY ABUSED IN OTHER WAYS BY YOUR PARTNER OR EX-PARTNER?: NO
IN THE PAST 12 MONTHS, HAS THE ELECTRIC, GAS, OIL, OR WATER COMPANY THREATENED TO SHUT OFF SERVICE IN YOUR HOME?: NO
WITHIN THE LAST YEAR, HAVE YOU BEEN AFRAID OF YOUR PARTNER OR EX-PARTNER?: NO
WITHIN THE PAST 12 MONTHS, YOU WORRIED THAT YOUR FOOD WOULD RUN OUT BEFORE YOU GOT THE MONEY TO BUY MORE: SOMETIMES TRUE

## 2025-07-05 ASSESSMENT — FIBROSIS 4 INDEX
FIB4 SCORE: 15.41
FIB4 SCORE: 3.54

## 2025-07-05 ASSESSMENT — PAIN DESCRIPTION - PAIN TYPE
TYPE: SURGICAL PAIN
TYPE: ACUTE PAIN

## 2025-07-05 ASSESSMENT — PATIENT HEALTH QUESTIONNAIRE - PHQ9
1. LITTLE INTEREST OR PLEASURE IN DOING THINGS: NOT AT ALL
SUM OF ALL RESPONSES TO PHQ9 QUESTIONS 1 AND 2: 0
2. FEELING DOWN, DEPRESSED, IRRITABLE, OR HOPELESS: NOT AT ALL

## 2025-07-05 NOTE — ASSESSMENT & PLAN NOTE
With decompensation with GI bleeding, volume overloaded and hepatic encephalopathy   due to alcoholism MELD: 28 and elevated bilirubin  CT scan showed signs of cirrhosis  Optimize volume status with Aldactone, Lasix  Continue lactulose

## 2025-07-05 NOTE — H&P
Hospital Medicine History & Physical Note    Date of Service  7/5/2025    Primary Care Physician  Pcp Pt States None    Consultants  GI    Specialist Names: Dr Craft    Code Status  Full Code    Chief Complaint  Chief Complaint   Patient presents with    Abdominal Pain    Bloody Stools    N/V     Pt BIB Ems for dark, black blood in vomit today. Pt reports associated RUQ abdominal pain when she needs to have a BM and has had blood in stool for an unknown time, pt reports it has been a couple weeks.     Hx ETOH, 2-4 shots per day       History of Presenting Illness  Chiara Pacheco is a 59 y.o. female who presented 7/5/2025 with dark stools and coffee-ground emesis.  Patient does have a history of alcoholic cirrhosis, GI bleed due to varices.  Previously, it was recommended that she undergo BRTO/CAR-T O procedure however patient refused, requesting no additional interventions.  Patient continues to drink alcohol and around midnight she began having coffee-ground emesis.  States she has been noticing dark stools for a few weeks but did not seek assistance.  In addition, she also complains of generalized abdominal pain as well as fatigue.  ERP discussed the case with GI.  I did discuss the case including labs and imaging with the ER physician.    I discussed the plan of care with patient.    Review of Systems  Review of Systems   Constitutional:  Positive for malaise/fatigue. Negative for chills and fever.   HENT:  Negative for congestion.    Respiratory:  Negative for cough, sputum production, shortness of breath and stridor.    Cardiovascular:  Negative for chest pain, palpitations and leg swelling.   Gastrointestinal:  Positive for abdominal pain, melena, nausea and vomiting. Negative for constipation and diarrhea.   Genitourinary:  Negative for dysuria and urgency.   Musculoskeletal:  Negative for falls and myalgias.   Neurological:  Negative for dizziness, tingling, loss of consciousness, weakness and  headaches.   Psychiatric/Behavioral:  Negative for depression and suicidal ideas.    All other systems reviewed and are negative.      Past Medical History   has a past medical history of Hearing loss.    Surgical History   has a past surgical history that includes Intubation (2/19/2023); pr upper gi endoscopy,diagnosis (2/19/2023); pr upper gi endoscopy,diagnosis (2/20/2023); pr upper gi endoscopy,diagnosis (N/A, 9/23/2023); pr upper gi endoscopy,ligat varix (N/A, 9/23/2023); and pr upper gi endoscopy,diagnosis (N/A, 5/2/2024).     Family History  No family history on file.     Family history reviewed with patient. There is no family history that is pertinent to the chief complaint.     Social History   reports that she has been smoking cigarettes. She has never used smokeless tobacco. She reports current alcohol use of about 1.8 oz of alcohol per week. She reports current drug use. Drug: Inhaled.    Allergies  Allergies[1]    Medications  Prior to Admission Medications   Prescriptions Last Dose Informant Patient Reported? Taking?   omeprazole (PRILOSEC) 40 MG delayed-release capsule   No No   Sig: Take 1 Capsule by mouth every day.      Facility-Administered Medications: None       Physical Exam  Temp:  [37.1 °C (98.7 °F)] 37.1 °C (98.7 °F)  Pulse:  [110-116] 110  Resp:  [19-22] 21  BP: (112-152)/() 116/56  SpO2:  [86 %-94 %] 94 %  Blood Pressure: 116/56   Temperature: 37.1 °C (98.7 °F)   Pulse: (!) 110   Respiration: (!) 21   Pulse Oximetry: 94 %       Physical Exam  Vitals and nursing note reviewed.   Constitutional:       General: She is not in acute distress.     Appearance: She is well-developed. She is ill-appearing. She is not diaphoretic.      Comments: Somnolent but arousable   HENT:      Head: Normocephalic and atraumatic.      Right Ear: External ear normal.      Left Ear: External ear normal.      Nose: Nose normal. No congestion or rhinorrhea.      Mouth/Throat:      Mouth: Mucous membranes are  "dry.      Pharynx: No oropharyngeal exudate.   Eyes:      General: Scleral icterus present.         Right eye: No discharge.         Left eye: No discharge.   Neck:      Trachea: No tracheal deviation.   Cardiovascular:      Rate and Rhythm: Normal rate and regular rhythm.      Heart sounds: No murmur heard.     No friction rub. No gallop.   Pulmonary:      Effort: Pulmonary effort is normal. No respiratory distress.      Breath sounds: Normal breath sounds. No stridor. No wheezing or rales.   Chest:      Chest wall: No tenderness.   Abdominal:      General: Bowel sounds are normal. There is no distension.      Palpations: Abdomen is soft.      Tenderness: There is generalized abdominal tenderness.   Musculoskeletal:         General: No tenderness. Normal range of motion.      Cervical back: Neck supple.      Right lower leg: No edema.      Left lower leg: No edema.   Lymphadenopathy:      Cervical: No cervical adenopathy.   Skin:     General: Skin is warm and dry.      Coloration: Skin is jaundiced.      Findings: No erythema or rash.   Neurological:      Mental Status: She is oriented to person, place, and time.      Cranial Nerves: No cranial nerve deficit.   Psychiatric:         Mood and Affect: Mood normal.         Behavior: Behavior normal.         Thought Content: Thought content normal.         Judgment: Judgment normal.         Laboratory:  Recent Labs     07/05/25  0625   WBC 15.5*   RBC 2.77*   HEMOGLOBIN 6.4*   HEMATOCRIT 23.4*   MCV 84.5   MCH 23.1*   MCHC 27.4*   RDW 73.1*   PLATELETCT 195   MPV 10.8     Recent Labs     07/05/25  0625   SODIUM 137   POTASSIUM 4.5   CHLORIDE 102   CO2 17*   GLUCOSE 127*   BUN 18   CREATININE 0.77   CALCIUM 8.1*     Recent Labs     07/05/25  0625   ALTSGPT 15   ASTSGOT 39   ALKPHOSPHAT 86   TBILIRUBIN 9.5*   LIPASE 73   GLUCOSE 127*         No results for input(s): \"NTPROBNP\" in the last 72 hours.      Recent Labs     07/05/25  0625   TROPONINT 18 "       Imaging:  DX-CHEST-PORTABLE (1 VIEW)   Final Result         1.  No acute cardiopulmonary disease.          X-Ray:  I have personally reviewed the images and compared with prior images.    Assessment/Plan:  Justification for Admission Status  I anticipate this patient will require at least two midnights for appropriate medical management, necessitating inpatient admission because acute GI bleeding    Patient will need a Telemetry bed on CARDIOLOGY service .  The need is secondary to acute GI bleeding.    * Acute GI bleeding- (present on admission)  Assessment & Plan  Causing significant anemia  Patient will be transfused 1 unit of packed red blood cells  Continue to closely monitor hemoglobin  GI was consulted, keep patient n.p.o. and await recommendations  Start twice daily Protonix as well as octreotide drip  Patient does have a history of varices, in addition to octreotide will also start Rocephin  Patient is at high risk of worsening or does require close monitoring of her hemodynamics    Patient is critically ill.   The patient continues to have: Acute GI bleeding  The vital organ system that is affected is the: All are at risk  If untreated there is a high chance of deterioration into: Shock  And eventually death.   The critical care that I am providing today is: Octreotide drip  The critical that has been undertaken is medically complex.   There has been no overlap in critical care time.   Critical Care Time not including procedures: 35 minutes    Leukocytosis- (present on admission)  Assessment & Plan  Start Rocephin due to history of varices  Repeat CBC in the morning    Cirrhosis (HCC)- (present on admission)  Assessment & Plan  Due to alcohol abuse  Patient is more somnolent than I would anticipate her baseline is, obtain ammonia level  If ammonia level is elevated, will start lactulose  Associated with significant elevation of her total bilirubin  Repeat CMP in the morning    Alcohol abuse- (present  on admission)  Assessment & Plan  I am unsure at this time, she is drinking  She is currently not in alcohol withdrawal but continue to closely monitor    Metabolic acidosis- (present on admission)  Assessment & Plan  Secondary to alcohol abuse and dehydration and potentially hypoperfusion  Start IV fluids  Repeat BMP in the morning        VTE prophylaxis: SCDs/TEDs         [1] No Known Allergies

## 2025-07-05 NOTE — PROCEDURES
OPERATIVE REPORT    PATIENT:   Chiara Pacheco   1965       PREOPERATIVE DIAGNOSES/INDICATIONS: hematemesis, melena    POSTOPERATIVE DIAGNOSIS: large gastric varices     PROCEDURE:  ESOPHAGOGASTRODUODENOSCOPY    PHYSICIAN:  Shanice Craft MD    ANESTHESIA:  Per anesthesiologist.    ESTIMATED BLOOD LOSS:nil    LOCATION: [facility]    CONSENT:  OBTAINED. The risks, benefits and alternatives of the procedure were discussed in details. The risks include and are not limited to bleeding, infection, perforation, missed lesions, and sedations risks (cardiopulmonary compromise and allergic reaction to medications).    DESCRIPTION: The patient presented to the procedure room.  After routine checkup was performed, patient was brought into the endoscopy suite.  Patient was placed on his left lateral decubitus position. A bite block was placed in patient's mouth. Patient was sedated by anesthesia.  Vital signs were monitored throughout the procedure.  Oxygenation support was provided throughout the procedure. Upper endoscope was inserted into patient's mouth and advanced to the second portion of the duodenum under direct visualization.      Once the site was reached and examined, the upper endoscope was withdrawn.  Retroflexion was made within the stomach.  The stomach was decompressed, scope was withdrawn and the procedure was terminated.     The patient tolerated the procedure well.  There were no immediate complications.    OPERATIVE FINDINGS:    1. Esophagus: large esophageal ulcer from 36 to 40 cm. Not bleeding but prominent. Minimal to no esophageal varices  2. Stomach: stomach full of blood, large gastric varices, source of bleeding. Also portal hypertensive gastropathy that is oozing minimally.   3. Duodenum: normal bulb and second portion of duodenum    IMPRESSION/RECOMMENDATIONS:  Large gastric varices on greater curvature of stomach near cardia  Large esophageal ulcer  Portal hypertension gastropathy    PPI  bid  Octreotide times 72 hours  IR consult for Carto  Trend h/h      This note has been transcribed with digital voice recognition software and although it has been reviewed may contain grammatical or word errors

## 2025-07-05 NOTE — ANESTHESIA TIME REPORT
Anesthesia Start and Stop Event Times       Date Time Event    7/5/2025 1600 Ready for Procedure     1609 Anesthesia Start     1637 Anesthesia Stop          Responsible Staff  07/05/25      Name Role Begin End    Kash Morrow M.D. Anesth 1609 1637          Overtime Reason:  no overtime (within assigned shift)    Comments:

## 2025-07-05 NOTE — ANESTHESIA POSTPROCEDURE EVALUATION
Patient: Chiara Pacheco    Procedure Summary       Date: 07/05/25 Room / Location: [facility] OR 06 / SURGERY [facility]    Anesthesia Start: 1609 Anesthesia Stop: 1637    Procedure: GASTROSCOPY (Throat) Diagnosis: (esophageal ulcer, borderline hypertensive gastropathy)    Surgeons: Shanice Craft M.D. Responsible Provider: Kash Morrow M.D.    Anesthesia Type: general ASA Status: 4 - Emergent            Final Anesthesia Type: general  Last vitals  BP   Blood Pressure: 92/53    Temp   36.9 °C (98.4 °F)    Pulse   93   Resp   (!) 24    SpO2   97 %      Anesthesia Post Evaluation    Patient location during evaluation: PACU  Patient participation: complete - patient participated  Level of consciousness: sleepy but conscious  Pain score: 0    Airway patency: patent  Anesthetic complications: no  Cardiovascular status: hemodynamically stable  Respiratory status: acceptable  Hydration status: balanced    PONV: none          There were no known notable events for this encounter.     Nurse Pain Score: 0 (NPRS)

## 2025-07-05 NOTE — CONSULTS
GASTROENTEROLOGY CONSULTATION    PATIENT NAME: Chiara Pacheco  : 1965  CSN: [id number]  MRN:  [id number]     CONSULTATION DATE:  2025    PRIMARY CARE PROVIDER:  Pcp Pt States None      REASON FOR CONSULT:  hematemesis  Consult requested by Dr. Sosa Mullen    HISTORY OF PRESENT ILLNESS: history per chart as she isn't fully answering questions when interviewed  Chiara Pacheco is a 59 y.o. female   male who presents to the emergency department today for evaluation of hematemesis.  She has a past medical history significant for alcoholic cirrhosis, was hospitalized a little over a year ago for hematemesis.  EGD on 2023 showed Gastric varix, portal hypertension gastropathy, gastric ulcer and esophagitis, not bleeding, Esmer enrique tear, clip still in place and no bleeding   EGD on 2023 showed:small esophageal varices, varix with erosion and stigmata of recent bleeding, banded x 1, gastric varix without stigmata, large amount of blood in stomach precluding complete exam     Due to gastric varices, interventional radiology consultation was recommended by gastroenterology for BRTO/CARTO procedure, however patient declined, stated that she did not want any further intervention or procedures.  She was provided with outpatient follow-up information, on my discussion today she did not call IR, and never scheduled follow-up for these issues.     She continues to use alcohol, at midnight she began to have recurrent hematemesis.  She states that she has stopped all NSAID use.  She does have some mild epigastric pain as well.  She also reports dark stools for a few weeks, uncertain as to the exact duration of time.  She does not report any recent fevers, no other abnormal bleeding or bruising. She had dark vomitus times one while in hospital.   PAST MEDICAL HISTORY:  Past Medical History[1]    PAST SURGICAL HISTORY:  Past Surgical History[2]     CURRENT MEDS:  Current Medications[3]  "    ALLERGIES:  Allergies[4]    SOCIAL HISTORY:  Social History     Socioeconomic History    Marital status: Single     Spouse name: Not on file    Number of children: Not on file    Years of education: Not on file    Highest education level: 12th grade   Occupational History    Not on file   Tobacco Use    Smoking status: Every Day     Current packs/day: 0.50     Types: Cigarettes    Smokeless tobacco: Never   Vaping Use    Vaping status: Some Days   Substance and Sexual Activity    Alcohol use: Yes     Alcohol/week: 1.8 oz     Types: 3 Shots of liquor per week     Comment: \"a shot every 2-3 hours\"    Drug use: Yes     Types: Inhaled     Comment: marajuana, meth today    Sexual activity: Not on file   Other Topics Concern    Not on file   Social History Narrative    Not on file     Social Drivers of Health     Financial Resource Strain: Medium Risk (5/7/2024)    Overall Financial Resource Strain (CARDIA)     Difficulty of Paying Living Expenses: Somewhat hard   Food Insecurity: Food Insecurity Present (5/7/2024)    Hunger Vital Sign     Worried About Running Out of Food in the Last Year: Sometimes true     Ran Out of Food in the Last Year: Sometimes true   Transportation Needs: No Transportation Needs (5/7/2024)    PRAPARE - Transportation     Lack of Transportation (Medical): No     Lack of Transportation (Non-Medical): No   Physical Activity: Insufficiently Active (5/7/2024)    Exercise Vital Sign     Days of Exercise per Week: 2 days     Minutes of Exercise per Session: 20 min   Stress: Stress Concern Present (5/7/2024)    [place] [employer] - Occupational Stress Questionnaire     Feeling of Stress : Very much   Social Connections: Unknown (5/7/2024)    Social Connection and Isolation Panel [NHANES]     Frequency of Communication with Friends and Family: Never     Frequency of Social Gatherings with Friends and Family: Never     Attends Mosque Services: Patient declined     Active Member " "of Clubs or Organizations: No     Attends Club or Organization Meetings: Patient declined     Marital Status: Living with partner   Intimate Partner Violence: Not on file   Housing Stability: Unknown (5/7/2024)    Housing Stability Vital Sign     Unable to Pay for Housing in the Last Year: No     Number of Places Lived in the Last Year: 0     Unstable Housing in the Last Year: Patient declined       FAMILY HISTORY:  No family history on file.     REVIEW OF SYSTEMS:  General ROS: Negative for - chills, fever, night sweats or weight loss.  HEENT ROS: Negative  Respiratory ROS: Negative for - cough or shortness of breath.  Cardiovascular ROS:  Negative for - chest pain or palpitations.  Gastrointestinal ROS: As per the history of present illness.  Genito-Urinary ROS: Negative  Musculoskeletal ROS: Negative.  Neurological ROS: Negative  Skin ROS: negative  Hematology ROS: negative  Endocrinology ROS: Negative        PHYSICAL EXAM:  VITALS: BP 99/48   Pulse (!) 102   Temp 36.3 °C (97.3 °F) (Temporal)   Resp 18   Ht 1.753 m (5' 9\")   Wt 98.5 kg (217 lb 2.5 oz)   LMP  (LMP Unknown)   SpO2 92%   BMI 32.07 kg/m²   GEN:  Chiara Pacheco is a 59 y.o. female in no acute distress.  HEENT: Mucous membranes pink and moist.  Sclera anicteric.    NECK:    Neck supple without lymphadenopathy or thyromegaly.  LUNGS: Clear to auscultation posteriorly.  HEART: Regular rate and rhythm. S1 and S2 normal. No murmurs, gallops  ABD:  +BS distended, nontender   RECTAL: Not done at this time.  EXT:  Without cyanosis, deformity or pitting edema.  SKIN:  Pink, warm, dry.  NEURO: Grossly intact, A/OR.    LABS:  Recent Labs     07/05/25  0625 07/05/25  0837   WBC 15.5* 12.6*   MCV 84.5 83.6     Recent Labs     07/05/25  0625   GLUCOSE 127*   BUN 18   CO2 17*     Lab Results   Component Value Date    INR 1.49 (H) 11/30/2024    INR 1.54 (H) 05/03/2024    INR 1.56 (H) 05/02/2024     No components found for: \"ALT\", \"AST\", \"GGT\", " "\"ALKPHOS\"  No results found for: \"BILINEO\"      @LASTIMGCAT(XQ7542)@     @LASTIMGCAT(IB9644)@       IMPRESSION/PLAN:  Hematemesis  Melena  Alcohol abuse disorder  Cirrhosis secondary to alcohol  Jaundice  6. Anemia due to GI blood loss  7. Hepatic encephalopathy - lactulose with 2-3 bowels per day    EGD today after two units given and stabilized  Vit k   Octreotide  PPI  Trend H/h         Shanice Craft M.D.  Gastroenterology           [1]   Past Medical History:  Diagnosis Date    Hearing loss    [2]   Past Surgical History:  Procedure Laterality Date    NM UPPER GI ENDOSCOPY,DIAGNOSIS N/A 5/2/2024    Procedure: GASTROSCOPY;  Surgeon: Jm Lopez M.D.;  Location: SURGERY SAME DAY [facility];  Service: Gastroenterology    NM UPPER GI ENDOSCOPY,DIAGNOSIS N/A 9/23/2023    Procedure: GASTROSCOPY;  Surgeon: Nova Al M.D.;  Location: SURGERY [facility];  Service: Gastroenterology    NM UPPER GI ENDOSCOPY,LIGAT VARIX N/A 9/23/2023    Procedure: GASTROSCOPY, WITH BANDING;  Surgeon: Nova Al M.D.;  Location: SURGERY [facility];  Service: Gastroenterology    NM UPPER GI ENDOSCOPY,DIAGNOSIS  2/20/2023    Procedure: GASTROSCOPY;  Surgeon: Shanice Craft M.D.;  Location: SURGERY SAME DAY [facility];  Service: Gastroenterology    INTUBATION  2/19/2023    NM UPPER GI ENDOSCOPY,DIAGNOSIS  2/19/2023    Procedure: GASTROSCOPY;  Surgeon: Shanice Craft M.D.;  Location: SURGERY SAME DAY [facility];  Service: Gastroenterology   [3]   Current Facility-Administered Medications   Medication Dose Route Frequency Provider Last Rate Last Admin    NS infusion   Intravenous Continuous Sosa Aguilar M.D. 30 mL/hr at 07/05/25 1020 New Bag at 07/05/25 1020    NS infusion   Intravenous Continuous ROSALBA CruzORoshan 83 mL/hr at 07/05/25 0836 New Bag at 07/05/25 0836    cefTRIAXone (Rocephin) syringe 1,000 mg  1,000 mg Intravenous Q24HRS ROSALBA CruzORoshan   1,000 mg at 07/05/25 0947    Pharmacy Consult " Request - IV Antibiotics to PO conversion   Other PHARMACY TO DOSE Miguelito Chambers D.O.        labetalol (Normodyne/Trandate) injection 10 mg  10 mg Intravenous Q4HRS PRN Miguelito Chambers D.O.        ondansetron (Zofran) syringe/vial injection 4 mg  4 mg Intravenous Q4HRS PRN ROSALBA CruzO.   4 mg at 07/05/25 0848    ondansetron (Zofran ODT) dispertab 4 mg  4 mg Oral Q4HRS PRN Miguelito Chambers D.O.        promethazine (Phenergan) tablet 12.5-25 mg  12.5-25 mg Oral Q4HRS PRN Miguelito Chambers D.O.        promethazine (Phenergan) suppository 12.5-25 mg  12.5-25 mg Rectal Q4HRS PRN Miguelito Chambers D.O.        prochlorperazine (Compazine) injection 5-10 mg  5-10 mg Intravenous Q4HRS PRN ROSALBA CruzORoshan   10 mg at 07/05/25 1041    octreotide (SandoSTATIN) 1,250 mcg in  mL Infusion  50 mcg/hr Intravenous Continuous ROSALBA CruzO. 10 mL/hr at 07/05/25 1012 50 mcg/hr at 07/05/25 1012    pantoprazole (Protonix) injection 40 mg  40 mg Intravenous BID ROSALBA CruzO.   40 mg at 07/05/25 0840    multivitamin tablet 1 Tablet  1 Tablet Oral DAILY ROSALBA CruzO.   1 Tablet at 07/05/25 0838    folic acid (Folvite) tablet 1 mg  1 mg Oral DAILY ROSALBA CruzO.   1 mg at 07/05/25 0837    thiamine (Vitamin B-1) tablet 100 mg  100 mg Oral DAILY ROSALBA CruzO.   100 mg at 07/05/25 0838   [4] No Known Allergies

## 2025-07-05 NOTE — ED TRIAGE NOTES
"Chief Complaint   Patient presents with    Abdominal Pain    Bloody Stools    N/V     Pt BIB Ems for dark, black blood in vomit today. Pt reports associated RUQ abdominal pain when she needs to have a BM and has had blood in stool for an unknown time, pt reports it has been a couple weeks.     Hx ETOH, 2-4 shots per day       Pt is A&Ox4 with dry mouth noted upon arrival. Pt had jaundice to skin and sclera. Black emesis x 2 with ems, sublingual zofran provided in route. Pt denies pain at this time, +N/V/D    Further Hx: alcoholic cirrhosis, esophageal varices and hepatitis C.       Protocols ordered, EKG at bedside.         /69   Pulse (!) 116   Temp 37.1 °C (98.7 °F) (Oral)   Resp (!) 22   Ht 1.753 m (5' 9\")   Wt 90.7 kg (200 lb)   LMP  (LMP Unknown)   SpO2 93%   BMI 29.53 kg/m²     "

## 2025-07-05 NOTE — ANESTHESIA PROCEDURE NOTES
Airway    Date/Time: 7/5/2025 4:15 PM    Performed by: Kash Morrow M.D.  Authorized by: Kash Morrow M.D.    Location:  OR  Urgency:  Elective  Indications for Airway Management:  Anesthesia      Spontaneous Ventilation: absent    Sedation Level:  Deep  Preoxygenated: Yes    Patient Position:  Sniffing  Final Airway Type:  Endotracheal airway  Final Endotracheal Airway:  ETT  Cuffed: Yes    Technique Used for Successful ETT Placement:  Direct laryngoscopy    Insertion Site:  Oral  Blade Type:  Roberta  Laryngoscope Blade/Videolaryngoscope Blade Size:  3  ETT Size (mm):  6.5  Measured from:  Teeth  ETT to Teeth (cm):  20  Placement Verified by: auscultation and capnometry    Cormack-Lehane Classification:  Grade I - full view of glottis  Number of Attempts at Approach:  1

## 2025-07-05 NOTE — PROGRESS NOTES
4 Eyes Skin Assessment Completed by DENISE Kenney and DENISE Mae.    Skin assessment is primarily focused on high risk bony prominences. Pay special attention to skin beneath and around medical devices, high risk bony prominences, skin to skin areas and areas where the patient lacks sensation to feel pain and areas where the patient previously had breakdown.     Head (Occipital):  WDL   Ears (Under Medical Devices): Red, blanching (hearing aids)   Nose (Under Medical Devices): WDL   Mouth:  WDL   Neck: WDL   Breast/Chest:  WDL   Shoulder Blades:  WDL   Spine:   WDL   (R) Arm/Elbow/Hand: Red and Blanching   (L) Arm/Elbow/Hand: Red and Blanching   Abdomen: WDL   Pannus/Groin:  WDL   Sacrum/Coccyx:   WDL   (R) Ischial Tuberosity (Sit Bones):  WDL   (L) Ischial Tuberosity (Sit Bones):  WDL   (R) Leg:  Pitting edema   (L) Leg:  Pitting edema   (R) Heel:  WDL   (R) Foot/Toe: Callous, pitting edema   (L) Heel: WDL   (L) Foot/Toe:  callous, pitting edema       DEVICES IN USE:   Respiratory Devices:  Nasal cannula and Pulse ox  Feeding Devices:  N/A   Lines & BP Monitoring Devices:  Peripheral IV, BP cuff, and Pulse ox    Orthopedic Devices:  N/A  Miscellaneous Devices:  NA    PROTOCOL INTERVENTIONS:   Standard/Trauma Bed:  Already in place    WOUND PHOTOS:   N/A no wounds identified    WOUND CONSULT:   N/A, no advanced wound care needs identified

## 2025-07-05 NOTE — ASSESSMENT & PLAN NOTE
Due to portal hypertension and variceal bleeding and ulcer  EGD 7/5:  large gastric varices on the greater curvature of the stomach, large esophageal ulcer and portal hypertension.  S/p 6/7 CARTO   Finish course of octreotide 72 hours  PPI twice daily  Advance diet as tolerated  Received 5 days of ceftriaxone

## 2025-07-05 NOTE — ANESTHESIA PREPROCEDURE EVALUATION
" Case: [id number] Date/Time: 07/05/25 1545    Procedure: GASTROSCOPY    Location: [facility] OR 06 / SURGERY [facility]    Surgeons: Shanice Craft M.D.          60y/o F with EtOH history presents with acute UGIB. Given one red box already.  Hgb 7.4 now    No previous GA, negative family h/o MH  Used meth 2d ago    Past Medical History[1]    Past Surgical History[2]    Current Outpatient Medications   Medication Instructions    Ascorbic Acid (VITAMIN C PO) 1 Tablet, DAILY    CALCIUM PO 1 Tablet, DAILY    MAGNESIUM PO 1 Tablet, DAILY    Potassium 99 mg, DAILY       Social History[3]    /59   Pulse (!) 102   Temp 36.2 °C (97.2 °F) (Temporal)   Resp 18   Ht 1.753 m (5' 9\")   Wt 98.5 kg (217 lb 2.5 oz)   SpO2 95%     Allergies[4]    Lab Results   Component Value Date/Time    SODIUM 137 07/05/2025 06:25 AM    POTASSIUM 4.5 07/05/2025 06:25 AM    CHLORIDE 102 07/05/2025 06:25 AM    GLUCOSE 127 (H) 07/05/2025 06:25 AM    BUN 18 07/05/2025 06:25 AM    CREATININE 0.77 07/05/2025 06:25 AM      Lab Results   Component Value Date/Time    WBC 12.6 (H) 07/05/2025 08:37 AM    RBC 2.50 (L) 07/05/2025 08:37 AM    HEMOGLOBIN 7.4 (L) 07/05/2025 03:29 PM    HEMATOCRIT 20.9 (L) 07/05/2025 08:37 AM    MCV 83.6 07/05/2025 08:37 AM    MCH 23.2 (L) 07/05/2025 08:37 AM    MCHC 27.8 (L) 07/05/2025 08:37 AM    MPV 10.5 07/05/2025 08:37 AM    NEUTSPOLYS 67.00 07/05/2025 06:25 AM    LYMPHOCYTES 20.90 (L) 07/05/2025 06:25 AM    MONOCYTES 9.70 07/05/2025 06:25 AM    EOSINOPHILS 0.70 07/05/2025 06:25 AM    BASOPHILS 0.70 07/05/2025 06:25 AM    HYPOCHROMIA 2+ (A) 07/05/2025 06:25 AM    ANISOCYTOSIS 1+ 07/05/2025 06:25 AM           Relevant Problems   CARDIAC   (positive) Bleeding gastric varices         (positive) Cirrhosis (HCC)   (positive) Hepatitis C       Physical Exam    Airway   Mallampati: II  TM distance: >3 FB  Neck ROM: full       Cardiovascular - normal exam  Rhythm: regular  Rate: normal    (-) murmur     Dental - normal " exam           Pulmonary - normal examBreath sounds clear to auscultation     Abdominal    Neurological - normal exam                   Anesthesia Plan    ASA 4- EMERGENT   ASA physical status 4 criteria: acute alcohol or substance abuse withdrawal and shockASA physical status emergent criteria: acute hemorrhage    Plan - general       Airway plan will be ETT          Induction: intravenous    Postoperative Plan: Postoperative administration of opioids is intended.    Pertinent diagnostic labs and testing reviewed    Informed Consent:    Anesthetic plan and risks discussed with patient.    Use of blood products discussed with: patient whom consented to blood products.       Anesthetic procedure and risks discussed with patient in detail.  Risks include but are not limited to PONV, pain, sore throat, damage to teeth/lips/gums, aspiration, positioning injury, allergic reaction, vocal cord injury, prolonged intubation, stroke, and/or cardiopulmonary problems up to and including death.  Patient indicates complete understanding. All questions fully answered and they agree to proceed with anesthesia as planned above.           [1]   Past Medical History:  Diagnosis Date    Hearing loss    [2]   Past Surgical History:  Procedure Laterality Date    AL UPPER GI ENDOSCOPY,DIAGNOSIS N/A 5/2/2024    Procedure: GASTROSCOPY;  Surgeon: Jm Lopez M.D.;  Location: SURGERY SAME DAY [facility];  Service: Gastroenterology    AL UPPER GI ENDOSCOPY,DIAGNOSIS N/A 9/23/2023    Procedure: GASTROSCOPY;  Surgeon: Nova Al M.D.;  Location: SURGERY [facility];  Service: Gastroenterology    AL UPPER GI ENDOSCOPY,LIGAT VARIX N/A 9/23/2023    Procedure: GASTROSCOPY, WITH BANDING;  Surgeon: Nova Al M.D.;  Location: SURGERY [facility];  Service: Gastroenterology    AL UPPER GI ENDOSCOPY,DIAGNOSIS  2/20/2023    Procedure: GASTROSCOPY;  Surgeon: Shanice Craft M.D.;  Location: SURGERY SAME DAY [facility];  Service:  "Gastroenterology    INTUBATION  2/19/2023    RI UPPER GI ENDOSCOPY,DIAGNOSIS  2/19/2023    Procedure: GASTROSCOPY;  Surgeon: Shanice Craft M.D.;  Location: SURGERY SAME DAY [facility];  Service: Gastroenterology   [3]   Social History  Tobacco Use    Smoking status: Every Day     Current packs/day: 0.50     Types: Cigarettes    Smokeless tobacco: Never   Vaping Use    Vaping status: Some Days   Substance Use Topics    Alcohol use: Yes     Alcohol/week: 1.8 oz     Types: 3 Shots of liquor per week     Comment: \"a shot every 2-3 hours\"    Drug use: Yes     Types: Inhaled     Comment: marajuana, meth today   [4] No Known Allergies    "

## 2025-07-05 NOTE — ED NOTES
Medication history reviewed with PT at bedside    [facility] is complete per PT reporting    Allergies reviewed.     Patient denies any outpatient antibiotics in the last 30 days.     Patient has not taken any antimicrobials (antivirals, antifungals and antiparasitics) in the last 30 days.    Patient is not taking anticoagulants.    Preferred pharmacy for this encounter - [facility] on [address] ([phone])

## 2025-07-05 NOTE — ED PROVIDER NOTES
Emergency Physician Note    Chief Concern:  Chief Complaint   Patient presents with    Abdominal Pain    Bloody Stools    N/V     Pt BIB Ems for dark, black blood in vomit today. Pt reports associated RUQ abdominal pain when she needs to have a BM and has had blood in stool for an unknown time, pt reports it has been a couple weeks.     Hx ETOH, 2-4 shots per day         External Records Reviewed:  Inpatient records reviewed: Hospital medicine physician discharge summary reviewed from 5/3/2024.  Patient has a past medical history significant for alcoholic cirrhosis, esophageal varices, hepatitis C, who presented with hematemesis due to recent alcohol and ibuprofen use.  She underwent EGD 9/2023, was found to have esophageal varices that were banded.  GI was consulted, she underwent EGD that showed grade 1 esophageal varices, mild ulceration at GE junction, large isolated gastric varix, portal hypertensive gastropathy, chronic gastritis, mild peptic duodenitis.  IR consultation discussed for BRTO/CARTO, recommended by GI several times.  However she does not want any further procedures or consultations, and requested discharge.  Outpatient referral to interventional radiology was placed.    HPI/ROS     HPI:  Chiara Pacheco is a 59 y.o. female who presents to the emergency department today for evaluation of hematemesis.  She has a past medical history significant for alcoholic cirrhosis, was hospitalized a little over a year ago for hematemesis.  Due to varices, interventional radiology consultation was recommended by gastroenterology for BRTO/CARTO procedure, however patient declined, stated that she did not want any further intervention or procedures.  She was provided with outpatient follow-up information, on my discussion today she did not call IR, and never scheduled follow-up for these issues.    She continues to use alcohol, at midnight she began to have recurrent hematemesis.  She states that she has  "stopped all NSAID use.  She does have some mild epigastric pain as well.  She also reports dark stools for a few weeks, uncertain as to the exact duration of time.  She does not report any recent fevers, no other abnormal bleeding or bruising.    PAST MEDICAL HISTORY  Past Medical History[1]    SURGICAL HISTORY  Past Surgical History[2]    FAMILY HISTORY  No family history on file.    SOCIAL HISTORY   reports that she has been smoking cigarettes. She has never used smokeless tobacco. She reports current alcohol use of about 1.8 oz of alcohol per week. She reports current drug use. Drug: Inhaled.    CURRENT MEDICATIONS  Current Discharge Medication List        CONTINUE these medications which have NOT CHANGED    Details   Potassium 99 MG Tab Take 99 mg by mouth every day. OTC      MAGNESIUM PO Take 1 Tablet by mouth every day.      Ascorbic Acid (VITAMIN C PO) Take 1 Tablet by mouth every day.      CALCIUM PO Take 1 Tablet by mouth every day.             ALLERGIES  Patient has no known allergies.    PHYSICAL EXAM  Vital Signs: /59   Pulse (!) 102   Temp 36.2 °C (97.2 °F) (Temporal)   Resp 18   Ht 1.753 m (5' 9\")   Wt 98.5 kg (217 lb 2.5 oz)   LMP  (LMP Unknown)   SpO2 95%   BMI 32.07 kg/m²   Constitutional: Alert, no acute distress  HENT: Normocephalic, atraumatic.  Cardiovascular: Tachycardic rate, regular rhythm, no murmur appreciated  Pulmonary: No respiratory distress, normal work of breathing, breath sounds clear and equal bilaterally  Abdomen: Soft, non tender, no peritoneal signs, no palpable masses, no rebound or guarding  Skin: Warm, dry, no rashes or lesions, mildly jaundiced appearing  Musculoskeletal: Normal range of motion in all extremities, no swelling or deformity noted  Neurologic: Alert, oriented, normal motor function, no speech deficits    Diagnostic Studies & Procedures    Labs:  All labs reviewed by me as noted below.    EK Lead EKG interpreted by me as noted below  Results " for orders placed or performed during the hospital encounter of 25   EKG   Result Value Ref Range    Report       [facility] Emergency Dept.    Test Date:  2025  Pt Name:    FELIPE PACHECO                Department: ER  MRN:        [id number]                      Room:       RD 02  Gender:     Female                       Technician: [id number]  :        1965                   Requested By:ER TRIAGE PROTOCOL  Order #:    [id number]                    Reading MD: RUBY FRANKS MD    Measurements  Intervals                                Axis  Rate:       117                          P:          76  NM:         115                          QRS:        45  QRSD:       74                           T:          42  QT:         345  QTc:        482    Interpretive Statements  Rate 117, sinus tachycardia, no ST elevation or depression, artifact present  in V5  Electronically Signed On 2025 06:51:12 PDT by RUBY FRANKS MD           Radiology:  The attending Emergency Physician has independently interpreted the following imaging:  I independently reviewed the chest x-ray image, no free air under the diaphragm.    DX-CHEST-PORTABLE (1 VIEW)   Final Result         1.  No acute cardiopulmonary disease.          Course and Medical Decision Making    Initial Assessment and Plan:  Ms. Pacheco presents to the emergency department today for evaluation of hematemesis as documented above.  She has a past medical history significant for esophageal varices, has previously been evaluated by gastroenterology with referral placed to interventional radiology, however she declined inpatient IR procedures, declined to follow-up with outpatient interventional radiology.  I did detailed discussion with her regarding goals of care, and she remains full code at this time.    Differential diagnosis includes anemia, variceal bleed, worsening liver disease, gastritis, lower GI bleed.    On laboratory evaluation  diagnostic alcohol level is negative.  High-sensitivity troponin is negative.  CMP shows no significant liver enzyme abnormalities, total bilirubin is 9.5 consistent with her jaundiced presentation.  Sodium and potassium are within normal limits.  Lipase is normal without evidence of pancreatitis.  CBC is notable for white blood count of 15.5 with hemoglobin 6.4, likely due to ongoing GI bleeding.    Packed red blood cell transfusion ordered in the emergency department.    Plan at this time is for admission to hospitalist service with gastroenterology consulting, with likely plan for endoscopy for further evaluation of upper GI bleed.    Additional Problems and Disposition    Discussion of management with other Qualified Healthcare Professionals or appropriate source(s):   Nurse practitioner with gastroenterology service contacted, GI service will consult on the patient  2.  Dr. Chambers    Disposition:  Admit in guarded condition    FINAL IMPRESSION   1. Gastrointestinal hemorrhage, unspecified gastrointestinal hemorrhage type    2. History of esophageal varices with bleeding    3. Symptomatic anemia        Transfusion: I have explained to the patient the risks and benefits of transfusion of blood products.  This includes, as appropriate, the risk of mild allergic reaction, hemolytic reaction, transfusion-associated lung injury, febrile reactions, circulatory or iron overload, and infection.    We discussed possible alternatives and their risks, including directed donation, autologous transfusion, and no transfusion, including IV or oral iron supplementation, as appropriate.  I believe the patient understands the risks and benefits and was able to express understanding.       [1]   Past Medical History:  Diagnosis Date    Hearing loss    [2]   Past Surgical History:  Procedure Laterality Date    [facility] UPPER GI ENDOSCOPY,DIAGNOSIS N/A 5/2/2024    Procedure: GASTROSCOPY;  Surgeon: Jm Lopez M.D.;  Location: SURGERY  SAME DAY [facility];  Service: Gastroenterology    [place] UPPER GI ENDOSCOPY,DIAGNOSIS N/A 9/23/2023    Procedure: GASTROSCOPY;  Surgeon: Nova Al M.D.;  Location: SURGERY [facility];  Service: Gastroenterology    [place] UPPER GI ENDOSCOPY,LIGAT VARIX N/A 9/23/2023    Procedure: GASTROSCOPY, WITH BANDING;  Surgeon: Nova Al M.D.;  Location: SURGERY [facility];  Service: Gastroenterology    [place] UPPER GI ENDOSCOPY,DIAGNOSIS  2/20/2023    Procedure: GASTROSCOPY;  Surgeon: Shanice Craft M.D.;  Location: SURGERY SAME DAY [facility];  Service: Gastroenterology    INTUBATION  2/19/2023    [place] UPPER GI ENDOSCOPY,DIAGNOSIS  2/19/2023    Procedure: GASTROSCOPY;  Surgeon: Shanice Craft M.D.;  Location: SURGERY SAME DAY [facility];  Service: Gastroenterology

## 2025-07-06 ENCOUNTER — APPOINTMENT (OUTPATIENT)
Dept: RADIOLOGY | Facility: MEDICAL CENTER | Age: 60
DRG: 263 | End: 2025-07-06
Attending: INTERNAL MEDICINE
Payer: COMMERCIAL

## 2025-07-06 LAB
AMMONIA PLAS-SCNC: 73 UMOL/L (ref 11–45)
ANION GAP SERPL CALC-SCNC: 6 MMOL/L (ref 7–16)
APPEARANCE UR: CLEAR
BACTERIA #/AREA URNS HPF: ABNORMAL /HPF
BILIRUB UR QL STRIP.AUTO: ABNORMAL
BUN SERPL-MCNC: 23 MG/DL (ref 8–22)
CALCIUM SERPL-MCNC: 7.4 MG/DL (ref 8.5–10.5)
CASTS URNS QL MICRO: ABNORMAL /LPF (ref 0–2)
CHLORIDE SERPL-SCNC: 110 MMOL/L (ref 96–112)
CO2 SERPL-SCNC: 21 MMOL/L (ref 20–33)
COLOR UR: ABNORMAL
CREAT SERPL-MCNC: 0.65 MG/DL (ref 0.5–1.4)
EPITHELIAL CELLS 1715: ABNORMAL /HPF (ref 0–5)
ERYTHROCYTE [DISTWIDTH] IN BLOOD BY AUTOMATED COUNT: 68.6 FL (ref 35.9–50)
GFR SERPLBLD CREATININE-BSD FMLA CKD-EPI: 102 ML/MIN/1.73 M 2
GLUCOSE SERPL-MCNC: 167 MG/DL (ref 65–99)
GLUCOSE UR STRIP.AUTO-MCNC: NEGATIVE MG/DL
HCT VFR BLD AUTO: 23 % (ref 37–47)
HCT VFR BLD AUTO: 26 % (ref 37–47)
HCT VFR BLD AUTO: 28.6 % (ref 37–47)
HGB BLD-MCNC: 6.6 G/DL (ref 12–16)
HGB BLD-MCNC: 8.1 G/DL (ref 12–16)
HGB BLD-MCNC: 8.5 G/DL (ref 12–16)
HGB BLD-MCNC: 8.8 G/DL (ref 12–16)
KETONES UR STRIP.AUTO-MCNC: NEGATIVE MG/DL
LEUKOCYTE ESTERASE UR QL STRIP.AUTO: ABNORMAL
MCH RBC QN AUTO: 23.9 PG (ref 27–33)
MCHC RBC AUTO-ENTMCNC: 28.7 G/DL (ref 32.2–35.5)
MCV RBC AUTO: 83.3 FL (ref 81.4–97.8)
MICRO URNS: ABNORMAL
NITRITE UR QL STRIP.AUTO: POSITIVE
PH UR STRIP.AUTO: 5.5 [PH] (ref 5–8)
PLATELET # BLD AUTO: 107 K/UL (ref 164–446)
PMV BLD AUTO: 11.1 FL (ref 9–12.9)
POTASSIUM SERPL-SCNC: 4.5 MMOL/L (ref 3.6–5.5)
PROT UR QL STRIP: NEGATIVE MG/DL
RBC # BLD AUTO: 2.76 M/UL (ref 4.2–5.4)
RBC # URNS HPF: >100 /HPF (ref 0–2)
RBC UR QL AUTO: ABNORMAL
SODIUM SERPL-SCNC: 137 MMOL/L (ref 135–145)
SP GR UR STRIP.AUTO: 1.02
UROBILINOGEN UR STRIP.AUTO-MCNC: 1 EU/DL
WBC # BLD AUTO: 10.7 K/UL (ref 4.8–10.8)
WBC #/AREA URNS HPF: ABNORMAL /HPF

## 2025-07-06 PROCEDURE — 700105 HCHG RX REV CODE 258: Performed by: INTERNAL MEDICINE

## 2025-07-06 PROCEDURE — 82140 ASSAY OF AMMONIA: CPT

## 2025-07-06 PROCEDURE — 700102 HCHG RX REV CODE 250 W/ 637 OVERRIDE(OP): Performed by: INTERNAL MEDICINE

## 2025-07-06 PROCEDURE — 81001 URINALYSIS AUTO W/SCOPE: CPT

## 2025-07-06 PROCEDURE — 85014 HEMATOCRIT: CPT

## 2025-07-06 PROCEDURE — 99232 SBSQ HOSP IP/OBS MODERATE 35: CPT | Performed by: NURSE PRACTITIONER

## 2025-07-06 PROCEDURE — 36012 PLACE CATHETER IN VEIN: CPT

## 2025-07-06 PROCEDURE — A9270 NON-COVERED ITEM OR SERVICE: HCPCS | Performed by: INTERNAL MEDICINE

## 2025-07-06 PROCEDURE — 700117 HCHG RX CONTRAST REV CODE 255: Performed by: RADIOLOGY

## 2025-07-06 PROCEDURE — 36415 COLL VENOUS BLD VENIPUNCTURE: CPT

## 2025-07-06 PROCEDURE — 86923 COMPATIBILITY TEST ELECTRIC: CPT

## 2025-07-06 PROCEDURE — 700111 HCHG RX REV CODE 636 W/ 250 OVERRIDE (IP): Mod: JZ

## 2025-07-06 PROCEDURE — 770020 HCHG ROOM/CARE - TELE (206)

## 2025-07-06 PROCEDURE — 85018 HEMOGLOBIN: CPT | Mod: 91

## 2025-07-06 PROCEDURE — 700111 HCHG RX REV CODE 636 W/ 250 OVERRIDE (IP): Performed by: INTERNAL MEDICINE

## 2025-07-06 PROCEDURE — C1894 INTRO/SHEATH, NON-LASER: HCPCS

## 2025-07-06 PROCEDURE — 06LY3DZ OCCLUSION OF LOWER VEIN WITH INTRALUMINAL DEVICE, PERCUTANEOUS APPROACH: ICD-10-PCS | Performed by: RADIOLOGY

## 2025-07-06 PROCEDURE — 99291 CRITICAL CARE FIRST HOUR: CPT | Performed by: STUDENT IN AN ORGANIZED HEALTH CARE EDUCATION/TRAINING PROGRAM

## 2025-07-06 PROCEDURE — P9016 RBC LEUKOCYTES REDUCED: HCPCS

## 2025-07-06 PROCEDURE — 36430 TRANSFUSION BLD/BLD COMPNT: CPT

## 2025-07-06 PROCEDURE — 85027 COMPLETE CBC AUTOMATED: CPT

## 2025-07-06 PROCEDURE — 80048 BASIC METABOLIC PNL TOTAL CA: CPT

## 2025-07-06 RX ORDER — MIDAZOLAM HYDROCHLORIDE 1 MG/ML
.5-2 INJECTION INTRAMUSCULAR; INTRAVENOUS PRN
Status: DISCONTINUED | OUTPATIENT
Start: 2025-07-06 | End: 2025-07-06 | Stop reason: HOSPADM

## 2025-07-06 RX ORDER — ONDANSETRON 2 MG/ML
4 INJECTION INTRAMUSCULAR; INTRAVENOUS PRN
Status: DISCONTINUED | OUTPATIENT
Start: 2025-07-06 | End: 2025-07-06 | Stop reason: HOSPADM

## 2025-07-06 RX ORDER — MIDAZOLAM HYDROCHLORIDE 1 MG/ML
INJECTION INTRAMUSCULAR; INTRAVENOUS
Status: COMPLETED
Start: 2025-07-06 | End: 2025-07-06

## 2025-07-06 RX ORDER — SODIUM CHLORIDE 9 MG/ML
500 INJECTION, SOLUTION INTRAVENOUS
Status: DISCONTINUED | OUTPATIENT
Start: 2025-07-06 | End: 2025-07-06 | Stop reason: HOSPADM

## 2025-07-06 RX ADMIN — MIDAZOLAM HYDROCHLORIDE 0.5 MG: 1 INJECTION INTRAMUSCULAR; INTRAVENOUS at 10:40

## 2025-07-06 RX ADMIN — Medication 100 MG: at 05:04

## 2025-07-06 RX ADMIN — CEFTRIAXONE SODIUM 1000 MG: 10 INJECTION, POWDER, FOR SOLUTION INTRAVENOUS at 05:04

## 2025-07-06 RX ADMIN — LACTULOSE 30 ML: 10 SOLUTION ORAL at 05:04

## 2025-07-06 RX ADMIN — FENTANYL CITRATE 25 MCG: 50 INJECTION, SOLUTION INTRAMUSCULAR; INTRAVENOUS at 10:40

## 2025-07-06 RX ADMIN — IOHEXOL 70 ML: 300 INJECTION, SOLUTION INTRAVENOUS at 11:30

## 2025-07-06 RX ADMIN — MIDAZOLAM HYDROCHLORIDE 0.5 MG: 1 INJECTION, SOLUTION INTRAMUSCULAR; INTRAVENOUS at 10:40

## 2025-07-06 RX ADMIN — PANTOPRAZOLE SODIUM 40 MG: 40 INJECTION, POWDER, FOR SOLUTION INTRAVENOUS at 16:55

## 2025-07-06 RX ADMIN — PANTOPRAZOLE SODIUM 40 MG: 40 INJECTION, POWDER, FOR SOLUTION INTRAVENOUS at 05:04

## 2025-07-06 RX ADMIN — FOLIC ACID 1 MG: 1 TABLET ORAL at 05:04

## 2025-07-06 RX ADMIN — LACTULOSE 30 ML: 10 SOLUTION ORAL at 13:42

## 2025-07-06 ASSESSMENT — ENCOUNTER SYMPTOMS
SHORTNESS OF BREATH: 0
WEAKNESS: 1
FEVER: 0
VOMITING: 0
ABDOMINAL PAIN: 1
NAUSEA: 1

## 2025-07-06 ASSESSMENT — FIBROSIS 4 INDEX: FIB4 SCORE: 5.46

## 2025-07-06 NOTE — PROGRESS NOTES
..Gastroenterology Progress Note               Author:  Anabela Swift, VALERY,  APRN Date & Time Created: 7/6/2025 8:44 AM       Patient ID:  Name:             Chiara Pacheco  YOB: 1965  Age:                 59 y.o.  female  MRN:               [id number]    Medical Decision Making, by Problem:  Active Hospital Problems    Diagnosis     Acute GI bleeding [K92.2]     Leukocytosis [D72.829]     Cirrhosis (HCC) [K74.60]     Alcohol abuse [F10.10]     Metabolic acidosis [E87.20]      Presenting Chief Complaint:  hematemesis     HISTORY OF PRESENT ILLNESS: history per chart as she isn't fully answering questions when interviewed  Chiara Pacheco is a 59 y.o. female who presents to the emergency department today for evaluation of hematemesis.  She has a past medical history significant for alcoholic cirrhosis, was hospitalized a little over a year ago for hematemesis.  EGD on 2/20/2023 showed Gastric varix, portal hypertension gastropathy, gastric ulcer and esophagitis, not bleeding, Esmer enrique tear, clip still in place and no bleeding EGD on 9/23/2023 showed:small esophageal varices, varix with erosion and stigmata of recent bleeding, banded x 1, gastric varix without stigmata, large amount of blood in stomach precluding complete exam      Due to gastric varices, interventional radiology consultation was recommended by gastroenterology for BRTO/CARTO procedure, however patient declined, stated that she did not want any further intervention or procedures.  She was provided with outpatient follow-up information, on my discussion today she did not call IR, and never scheduled follow-up for these issues.     She continues to use alcohol, at midnight she began to have recurrent hematemesis.  She states that she has stopped all NSAID use.  She does have some mild epigastric pain as well.  She also reports dark stools for a few weeks, uncertain as to the exact duration of time.  She does  not report any recent fevers, no other abnormal bleeding or bruising. She had dark vomitus times one while in hospital.     Interval History:  7/5/2025: EGD:   Esophagus: large esophageal ulcer from 36 to 40 cm. Not bleeding but prominent. Minimal to no esophageal varices  Stomach: stomach full of blood, large gastric varices, source of bleeding. Also portal hypertensive gastropathy that is oozing minimally.   Duodenum: normal bulb and second portion of duodenum    7/6/2025: Patient resting comfortably.  N.p.o. pending Carto with IR today.  Hgb 8.5  Last bloody BM yesterday, also having blood in urine.     MELD 3.0-28 points  Child Class C    Hospital Medications:  Current Facility-Administered Medications   Medication Dose Frequency Provider Last Rate Last Admin    NS infusion   Continuous Miguelito Chambers D.O.   Stopped at 07/05/25 2322    cefTRIAXone (Rocephin) syringe 1,000 mg  1,000 mg Q24HRS ROSALBA CruzO.   1,000 mg at 07/06/25 0504    Pharmacy Consult Request - IV Antibiotics to PO conversion   PHARMACY TO DOSE Miguelito Chambers D.O.        labetalol (Normodyne/Trandate) injection 10 mg  10 mg Q4HRS PRN Miguelito Chambers D.O.        ondansetron (Zofran) syringe/vial injection 4 mg  4 mg Q4HRS PRN ROSALBA CruzO.   4 mg at 07/05/25 0848    ondansetron (Zofran ODT) dispertab 4 mg  4 mg Q4HRS PRN Miguelito Chambers D.O.        promethazine (Phenergan) tablet 12.5-25 mg  12.5-25 mg Q4HRS PRN Miguelito Chambers D.O.        promethazine (Phenergan) suppository 12.5-25 mg  12.5-25 mg Q4HRS PRN Miguelito Chambers D.O.        prochlorperazine (Compazine) injection 5-10 mg  5-10 mg Q4HRS PRN ROSALBA CruzO.   10 mg at 07/05/25 1041    octreotide (SandoSTATIN) 1,250 mcg in  mL Infusion  50 mcg/hr Continuous ROSALBA CruzO. 10 mL/hr at 07/05/25 1012 50 mcg/hr at 07/05/25 1012    pantoprazole (Protonix) injection 40 mg  40 mg BID ROSALBA CruzO.   40 mg at 07/06/25 0504    folic acid  "(Folvite) tablet 1 mg  1 mg DAILY ROSALBA CruzORoshan   1 mg at 07/06/25 0504    thiamine (Vitamin B-1) tablet 100 mg  100 mg DAILY Miguelito Chambers D.O.   100 mg at 07/06/25 0504    lactulose 20 GM/30ML solution 30 mL  30 mL TID Miguelito Chambers D.O.   30 mL at 07/06/25 0504   Last reviewed on 7/5/2025  3:58 PM by Ca Viera R.N.       Review of Systems:  Review of Systems   Unable to perform ROS: Medical condition     Vital signs:  Weight/BMI: Body mass index is 33.04 kg/m².  /55   Pulse 78   Temp 36.4 °C (97.6 °F) (Temporal)   Resp 18   Ht 1.753 m (5' 9\")   Wt 101 kg (223 lb 12.3 oz)   SpO2 93%   Vitals:    07/06/25 0129 07/06/25 0308 07/06/25 0411 07/06/25 0804   BP: 94/44 91/49 90/46 108/55   Pulse: 86 95 88 78   Resp: 18 18  18   Temp: 36.5 °C (97.7 °F) 36.2 °C (97.2 °F) 36.4 °C (97.5 °F) 36.4 °C (97.6 °F)   TempSrc: Temporal Temporal Temporal Temporal   SpO2: 97% 99% 99% 93%   Weight:   101 kg (223 lb 12.3 oz)    Height:         Oxygen Therapy:  Pulse Oximetry: 93 %, O2 (LPM): 3, O2 Delivery Device: Silicone Nasal Cannula    Intake/Output Summary (Last 24 hours) at 7/6/2025 0844  Last data filed at 7/6/2025 0804  Gross per 24 hour   Intake 2021 ml   Output 525 ml   Net 1496 ml       Physical Exam  Vitals and nursing note reviewed.   Constitutional:       General: She is not in acute distress.     Appearance: She is ill-appearing.   HENT:      Head: Normocephalic and atraumatic.      Right Ear: External ear normal.      Left Ear: External ear normal.      Nose: Nose normal.      Mouth/Throat:      Mouth: Mucous membranes are moist.      Pharynx: Oropharynx is clear.   Eyes:      General: Scleral icterus present.   Cardiovascular:      Rate and Rhythm: Normal rate and regular rhythm.      Pulses: Normal pulses.      Heart sounds: Normal heart sounds.   Pulmonary:      Effort: Pulmonary effort is normal. No respiratory distress.      Breath sounds: Normal breath sounds.   Abdominal:      General: " Bowel sounds are normal. There is distension.      Tenderness: There is no abdominal tenderness.   Musculoskeletal:         General: Normal range of motion.      Cervical back: Normal range of motion.   Skin:     General: Skin is warm and dry.      Capillary Refill: Capillary refill takes less than 2 seconds.      Coloration: Skin is jaundiced.   Neurological:      Mental Status: She is oriented to person, place, and time.      Motor: Weakness present.   Psychiatric:         Mood and Affect: Mood normal.         Behavior: Behavior normal.         Labs:  Recent Labs     07/05/25 0625 07/06/25 0007   SODIUM 137 137   POTASSIUM 4.5 4.5   CHLORIDE 102 110   CO2 17* 21   BUN 18 23*   CREATININE 0.77 0.65   CALCIUM 8.1* 7.4*     Recent Labs     07/05/25 0625 07/06/25 0007   ALTSGPT 15  --    ASTSGOT 39  --    ALKPHOSPHAT 86  --    TBILIRUBIN 9.5*  --    LIPASE 73  --    GLUCOSE 127* 167*     Recent Labs     07/05/25 0625 07/05/25 0837 07/06/25 0007   WBC 15.5* 12.6* 10.7   NEUTSPOLYS 67.00  --   --    LYMPHOCYTES 20.90*  --   --    MONOCYTES 9.70  --   --    EOSINOPHILS 0.70  --   --    BASOPHILS 0.70  --   --    ASTSGOT 39  --   --    ALTSGPT 15  --   --    ALKPHOSPHAT 86  --   --    TBILIRUBIN 9.5*  --   --      Recent Labs     07/05/25 0625 07/05/25 0837 07/05/25  1255 07/05/25  1305 07/05/25  1529 07/06/25  0007 07/06/25  0430 07/06/25  0753   RBC 2.77* 2.50*  --   --   --  2.76*  --   --    HEMOGLOBIN 6.4* 5.8*   < >  --    < > 6.6* 8.1* 8.5*   HEMATOCRIT 23.4* 20.9*  --   --   --  23.0* 26.0*  --    PLATELETCT 195 165  --   --   --  107*  --   --    PROTHROMBTM  --   --   --  28.5*  --   --   --   --    INR  --   --   --  2.66*  --   --   --   --     < > = values in this interval not displayed.     Recent Results (from the past 24 hours)   AMMONIA    Collection Time: 07/05/25 12:55 PM   Result Value Ref Range    Ammonia 71 (H) 11 - 45 umol/L   HGB (Hemoglobin) for 48 hours    Collection Time: 07/05/25 12:55  PM   Result Value Ref Range    Hemoglobin 6.7 (L) 12.0 - 16.0 g/dL   Prothrombin Time    Collection Time: 07/05/25  1:05 PM   Result Value Ref Range    PT 28.5 (H) 12.0 - 14.6 sec    INR 2.66 (H) 0.87 - 1.13   PLATELET MAPPING WITH BASIC TEG    Collection Time: 07/05/25  1:05 PM   Result Value Ref Range    Reaction Time Initial-R 4.0 (L) 4.6 - 9.1 min    React Time Initial Hep 4.2 (L) 4.3 - 8.3 min    Clot Kinetics-K 1.5 0.8 - 2.1 min    Clot Angle-Angle 72.7 63.0 - 78.0 degrees    Maximum Clot Strength-MA 51.8 (L) 52.0 - 69.0 mm    TEG Functional Fibrinogen(MA) 13.4 (L) 15.0 - 32.0 mm    % Inhibition ADP 7.6 0.0 - 17.0 %    % Inhibition AA 16.9 (H) 0.0 - 11.0 %    TEG Algorithm Link Algorithm    HGB    Collection Time: 07/05/25  3:29 PM   Result Value Ref Range    Hemoglobin 7.4 (L) 12.0 - 16.0 g/dL   Basic Metabolic Panel (BMP)    Collection Time: 07/06/25 12:07 AM   Result Value Ref Range    Sodium 137 135 - 145 mmol/L    Potassium 4.5 3.6 - 5.5 mmol/L    Chloride 110 96 - 112 mmol/L    Co2 21 20 - 33 mmol/L    Glucose 167 (H) 65 - 99 mg/dL    Bun 23 (H) 8 - 22 mg/dL    Creatinine 0.65 0.50 - 1.40 mg/dL    Calcium 7.4 (L) 8.5 - 10.5 mg/dL    Anion Gap 6.0 (L) 7.0 - 16.0   CBC without Differential    Collection Time: 07/06/25 12:07 AM   Result Value Ref Range    WBC 10.7 4.8 - 10.8 K/uL    RBC 2.76 (L) 4.20 - 5.40 M/uL    Hemoglobin 6.6 (L) 12.0 - 16.0 g/dL    Hematocrit 23.0 (L) 37.0 - 47.0 %    MCV 83.3 81.4 - 97.8 fL    MCH 23.9 (L) 27.0 - 33.0 pg    MCHC 28.7 (L) 32.2 - 35.5 g/dL    RDW 68.6 (H) 35.9 - 50.0 fL    Platelet Count 107 (L) 164 - 446 K/uL    MPV 11.1 9.0 - 12.9 fL   AMMONIA    Collection Time: 07/06/25 12:07 AM   Result Value Ref Range    Ammonia 73 (H) 11 - 45 umol/L   ESTIMATED GFR    Collection Time: 07/06/25 12:07 AM   Result Value Ref Range    GFR (CKD-EPI) 102 >60 mL/min/1.73 m 2   Urinalysis    Collection Time: 07/06/25  3:55 AM    Specimen: Urine, Clean Catch   Result Value Ref Range     Color Orange (A)     Character Clear     Specific Gravity 1.024 <1.035    Ph 5.5 5.0 - 8.0    Glucose Negative Negative mg/dL    Ketones Negative Negative mg/dL    Protein Negative Negative mg/dL    Bilirubin Moderate (A) Negative    Urobilinogen, Urine 1.0 <=1.0 EU/dL    Nitrite Positive (A) Negative    Leukocyte Esterase Small (A) Negative    Occult Blood Large (A) Negative    Micro Urine Req Microscopic    URINE MICROSCOPIC (W/UA)    Collection Time: 07/06/25  3:55 AM   Result Value Ref Range    WBC 11-20 (A) /hpf    RBC >100 (A) 0 - 2 /hpf    Bacteria Few (A) None /hpf    Epithelial Cells 11-20 (A) 0 - 5 /hpf    Urine Casts 0-2 0 - 2 /lpf   HEMOGLOBIN AND HEMATOCRIT    Collection Time: 07/06/25  4:30 AM   Result Value Ref Range    Hemoglobin 8.1 (L) 12.0 - 16.0 g/dL    Hematocrit 26.0 (L) 37.0 - 47.0 %   HGB (Hemoglobin) for 48 hours    Collection Time: 07/06/25  7:53 AM   Result Value Ref Range    Hemoglobin 8.5 (L) 12.0 - 16.0 g/dL       Radiology Review:  DX-CHEST-PORTABLE (1 VIEW)   Final Result         1.  No acute cardiopulmonary disease.      IR-CONSULT AND TREAT    (Results Pending)         MDM (Data Review):   -Records reviewed and summarized in current documentation  -I personally reviewed and interpreted the laboratory results  -I personally reviewed the radiology images    Assessment/Recommendations:  Hematemesis  Melena  Alcohol abuse disorder  Cirrhosis secondary to alcohol  Jaundice  Anemia due to GI blood loss  Hepatic encephalopathy   Gastric varices  Esophageal ulcer  Portal hypertensive gastropathy     Recommendations:  PPI BID  Lactulose titrated to 2-3 bowel movements a day  Trend H/H and transfuse for hemoglobin <7  Octreotide for 72 hours  Right upper quadrant ultrasound with Doppler, if ascites present will need paracentesis  If no ascites,   A.M. CMP and INR to recalculate MELD  Okay for low-sodium diet as tolerated    GI will follow in a.m.    Discussed with Dr. Enriquez and   Luana    ..Anabela Swift, VALERY,  APRN    Core Quality Measures   Reviewed items::  Labs, Medications and Radiology reports reviewed

## 2025-07-06 NOTE — PROGRESS NOTES
Hospital Medicine Daily Progress Note    Date of Service  7/6/2025    Chief Complaint  Chiara Ju Pacheco is a 59 y.o. female admitted 7/5/2025 with melena    Hospital Course  Myrna Pacheco is a 59-year-old female with PMHx alcoholic liver cirrhosis with history of variceal bleeding.  Admitted 7/5 for bloody stools.    Per history patient was admitted 4/30-5/3 for variceal bleeding.  At that time, recommendation for BRTO/CARTO recommended by GI several times, however she did not want any further procedures or consultations done and wished to discharge.     Patient is now again presenting with bloody stools and abdominal pain.    In the ED: Vitals notable for tachycardia.  Hemoglobin 6.4.  Patient was started on Protonix, octreotide and ceftriaxone.  2 units RBCs administered.  GI was consulted.     Patient underwent EGD 7/5.  This showed large gastric varices on the greater curvature of the stomach, large esophageal ulcer and portal hypertension.  Again recommending IR consult for CARTO.    Interval Problem Update  7/6: Vitals notable for SBP ranging .  Patient is on 4 L/min supplemental O2.  Hb improved to 8.1 from 6.6 after 1 unit RBCs overnight.  She is n.p.o. for IR consult for possible CARTO today.  Continue to trend H&H.    I have discussed this patient's plan of care and discharge plan at IDT rounds today with Case Management, Nursing, Nursing leadership, and other members of the IDT team.    Consultants/Specialty  GI and interventional radiology    Code Status  Full Code    Disposition  The patient is not medically cleared for discharge to home or a post-acute facility.  Anticipate discharge to: home with close outpatient follow-up    I have placed the appropriate orders for post-discharge needs.    Review of Systems  Review of Systems   Constitutional:  Positive for malaise/fatigue. Negative for fever.   Respiratory:  Negative for shortness of breath.    Cardiovascular:  Negative for chest pain and  leg swelling.   Gastrointestinal:  Positive for abdominal pain, melena and nausea. Negative for vomiting.   Neurological:  Positive for weakness.        Physical Exam  Temp:  [36.1 °C (97 °F)-37 °C (98.6 °F)] 36.1 °C (97 °F)  Pulse:  [] 87  Resp:  [10-24] 15  BP: ()/(44-66) 108/59  SpO2:  [88 %-99 %] 92 %    Physical Exam  Vitals and nursing note reviewed.   Constitutional:       General: She is sleeping. She is not in acute distress.     Appearance: Normal appearance. She is ill-appearing.   Cardiovascular:      Rate and Rhythm: Normal rate and regular rhythm.   Pulmonary:      Effort: Pulmonary effort is normal.      Breath sounds: Normal breath sounds.   Abdominal:      General: Bowel sounds are normal. There is distension.      Tenderness: There is abdominal tenderness.   Skin:     General: Skin is warm and dry.      Coloration: Skin is jaundiced.   Neurological:      Mental Status: She is oriented to person, place, and time. She is lethargic.      Motor: Weakness present.   Psychiatric:         Mood and Affect: Mood normal.         Behavior: Behavior normal.         Fluids    Intake/Output Summary (Last 24 hours) at 7/6/2025 1354  Last data filed at 7/6/2025 0804  Gross per 24 hour   Intake 1716 ml   Output 525 ml   Net 1191 ml        Laboratory  Recent Labs     07/05/25  0625 07/05/25  0837 07/05/25  1255 07/06/25  0007 07/06/25  0430 07/06/25  0753   WBC 15.5* 12.6*  --  10.7  --   --    RBC 2.77* 2.50*  --  2.76*  --   --    HEMOGLOBIN 6.4* 5.8*   < > 6.6* 8.1* 8.5*   HEMATOCRIT 23.4* 20.9*  --  23.0* 26.0*  --    MCV 84.5 83.6  --  83.3  --   --    MCH 23.1* 23.2*  --  23.9*  --   --    MCHC 27.4* 27.8*  --  28.7*  --   --    RDW 73.1* 74.5*  --  68.6*  --   --    PLATELETCT 195 165  --  107*  --   --    MPV 10.8 10.5  --  11.1  --   --     < > = values in this interval not displayed.     Recent Labs     07/05/25  0625 07/06/25  0007   SODIUM 137 137   POTASSIUM 4.5 4.5   CHLORIDE 102 110   CO2  17* 21   GLUCOSE 127* 167*   BUN 18 23*   CREATININE 0.77 0.65   CALCIUM 8.1* 7.4*     Recent Labs     07/05/25  1305   INR 2.66*               Imaging  IR-EMBOLIZATION   Final Result      1.  Left renal venogram demonstrating conventional anatomy.   2.  Gastrorenal shunt venogram demonstrating a markedly enlarged gastric variceal complex.   3.  Successful coil embolization of the gastrorenal shunt vessel.   4.  Successful Gelfoam embolization of gastric varices.      DX-CHEST-PORTABLE (1 VIEW)   Final Result         1.  No acute cardiopulmonary disease.           Assessment/Plan  * Acute GI bleeding- (present on admission)  Assessment & Plan  Hb on admission 6.4  Secondary to variceal bleeding  EGD 7/5:  large gastric varices on the greater curvature of the stomach, large esophageal ulcer and portal hypertension.  - GI recommending CARTO  - N.p.o. for IR guided CARTO today  - Continue to trend H&H  - Continuous telemetry monitoring; monitoring for arrhythmia in the setting of GI bleed  - Continue IV octreotide infusion  - Continue IV Protonix  - Continue IV ceftriaxone    Leukocytosis- (present on admission)  Assessment & Plan  WBC 10.7  - Continue  ceftriaxone bleeding varices  - Repeat CBC in a.m.    Cirrhosis (HCC)- (present on admission)  Assessment & Plan  MELD: 28  - Continue lactulose; titrate to bowel movements 2 to 3/day  - Continue thiamine  - Start spironolactone if blood pressures improve  - Repeat CMP in a.m.    Alcohol abuse- (present on admission)  Assessment & Plan  Continue to encourage cessation    Metabolic acidosis- (present on admission)  Assessment & Plan  HCO3 improved  - Discontinue IVF       Patient is critically ill.   The patient continues to have: Acute variceal bleeding requiring IV octreotide infusion  The vital organ system that is affected is the: Gastroenterological  If untreated there is a high chance of deterioration into: Uncontrolled bleeding, anemia, cardiac arrhythmia, cardiac  arrest and eventually death.   The critical care that I am providing today is: IV octreotide infusion; telemetry monitoring for arrhythmia  The critical that has been undertaken is medically complex.   There has been no overlap in critical care time.   Critical Care Time not including procedures: 39 minutes      VTE prophylaxis:   SCDs/TEDs   pharmacologic prophylaxis contraindicated due to Variceal bleeding      I have performed a physical exam and reviewed and updated ROS and Plan today (7/6/2025). In review of yesterday's note (7/5/2025), there are no changes except as documented above.

## 2025-07-06 NOTE — CARE PLAN
The patient is Watcher - Medium risk of patient condition declining or worsening    Shift Goals  Clinical Goals: Monitor H/H, Octreotide, IR procedure  Patient Goals: eat and drink    Progress made toward(s) clinical / shift goals:    Problem: Knowledge Deficit - Standard  Goal: Patient and family/care givers will demonstrate understanding of plan of care, disease process/condition, diagnostic tests and medications  Outcome: Progressing     Problem: Fall Risk  Goal: Patient will remain free from falls  Outcome: Progressing     Problem: Pain - Standard  Goal: Alleviation of pain or a reduction in pain to the patient’s comfort goal  Outcome: Progressing       Patient is not progressing towards the following goals:

## 2025-07-06 NOTE — OR SURGEON
Immediate Post- Operative Note        Findings: Gastric varices.       Procedure(s): CARTO.      Estimated Blood Loss: Less than 5 ml        Complications: None            7/6/2025     1121 AM     Benson Baeza M.D.

## 2025-07-06 NOTE — PROGRESS NOTES
IR RN note    Patient underwent a CARTO by Dr. Baeza.  Procedure site was verified by MD using imaging guidance. Consent was signed.  IR donato Loza and Rhiannon assisted. Patient was placed in a supine position.  Vitals were taken every 5 minutes and remained stable during procedure (see doc flow sheet for results).  CO2 waveform capnography was monitored throughout procedure, see chart.  Right femoral vein, access site.   A gauze and Tegaderm dressing was placed over surgical site. Report called to Meryl WALKER. Pt transported by charu with RN to room, with monitor .   Reviewed sedation orders with MD FRANKLIN procedure ended at 1119    Held pressure right femoral vein for 10 min    Coils   Splenorenal shunt   EmboCube  REF # [id number]  LOT # [id number]  Exp. 12-    Damari Coil Standard  14mm X  60cm  Ref [id number]  Lot [id number]  Exp 09-    Packing Coil   60cm  Ref [id number]  Lot [id number]  Exp 05-    Packing Coil   60cm  Ref [id number]  Lot [id number]  Exp 10-    Packing Coil   60cm  Ref [id number]  Lot [id number]  Exp 10-    Packing Coil   60cm  Ref [id number]  Lot [id number]  Exp 10-

## 2025-07-06 NOTE — OR NURSING
1633 - Patient arrived from OR via bed. Report received from Anesthesia and Nursing staff. Vitals stable, patient's breathing is tachypnic with expiratory wheezes. Orders reviewed and released.     1719 - Patient transported to room via bed accompanied by RN. Patient monitored on transport monitor during transfer. Vitals stable, pain denies pain or nausea, no distress noted, patient placed hearing aids in ears in PACU. Report given to DENISE Mae.

## 2025-07-06 NOTE — HOSPITAL COURSE
Myrna Pacheco is a 59-year-old female with PMHx alcoholic liver cirrhosis with history of variceal bleeding.  Admitted 7/5 for bloody stools. Recently admitted 4/30-5/3 for variceal bleeding.  GI was consulted. Patient underwent EGD 7/5.  This showed large gastric varices on the greater curvature of the stomach, large esophageal ulcer and portal hypertension.  IR consulted for CARTO on 07/06. Hb was trended and demonstrated stabilization. Underwent paracentesis 07/09 with 6.4L removed. I have ordered outpatient paracentesis in 1 week. Patients lactulose, lasix/aldactone were titrated for volume optimization. Case management assisted in organizing home oxygen. Patient improved clinically and was agreeable to discharge. Patient was determined satisfactory for discharge with appropriate follow up.

## 2025-07-06 NOTE — CARE PLAN
The patient is Watcher - Medium risk of patient condition declining or worsening    Shift Goals  Clinical Goals: Monitor H/H, Octreotide, NPO @ Midnight  Patient Goals: rest    Progress made toward(s) clinical / shift goals:       Problem: Knowledge Deficit - Standard  Goal: Patient and family/care givers will demonstrate understanding of plan of care, disease process/condition, diagnostic tests and medications  Description: Target End Date:  1-3 days or as soon as patient condition allows    Document in Patient Education    1.  Patient and family/caregiver oriented to unit, equipment, visitation policy and means for communicating concern  2.  Complete/review Learning Assessment  3.  Assess knowledge level of disease process/condition, treatment plan, diagnostic tests and medications  4.  Explain disease process/condition, treatment plan, diagnostic tests and medications  Outcome: Progressing     Problem: Fall Risk  Goal: Patient will remain free from falls  Description: Target End Date:  Prior to discharge or change in level of care    Document interventions on the Shreya Thapa Fall Risk Assessment    1.  Assess for fall risk factors  2.  Implement fall precautions  Outcome: Progressing

## 2025-07-07 ENCOUNTER — APPOINTMENT (OUTPATIENT)
Dept: RADIOLOGY | Facility: MEDICAL CENTER | Age: 60
DRG: 263 | End: 2025-07-07
Attending: STUDENT IN AN ORGANIZED HEALTH CARE EDUCATION/TRAINING PROGRAM
Payer: COMMERCIAL

## 2025-07-07 ENCOUNTER — APPOINTMENT (OUTPATIENT)
Dept: RADIOLOGY | Facility: MEDICAL CENTER | Age: 60
DRG: 263 | End: 2025-07-07
Attending: NURSE PRACTITIONER
Payer: COMMERCIAL

## 2025-07-07 PROBLEM — Z71.89 ACP (ADVANCE CARE PLANNING): Status: ACTIVE | Noted: 2025-07-07

## 2025-07-07 LAB
ALBUMIN SERPL BCP-MCNC: 2.4 G/DL (ref 3.2–4.9)
ALBUMIN/GLOB SERPL: 1.1 G/DL
ALP SERPL-CCNC: 77 U/L (ref 30–99)
ALT SERPL-CCNC: 15 U/L (ref 2–50)
ANION GAP SERPL CALC-SCNC: 9 MMOL/L (ref 7–16)
AST SERPL-CCNC: 48 U/L (ref 12–45)
BILIRUB CONJ SERPL-MCNC: 6 MG/DL (ref 0.1–0.5)
BILIRUB INDIRECT SERPL-MCNC: 4.4 MG/DL (ref 0–1)
BILIRUB SERPL-MCNC: 10.4 MG/DL (ref 0.1–1.5)
BUN SERPL-MCNC: 20 MG/DL (ref 8–22)
CALCIUM ALBUM COR SERPL-MCNC: 8.9 MG/DL (ref 8.5–10.5)
CALCIUM SERPL-MCNC: 7.6 MG/DL (ref 8.5–10.5)
CHLORIDE SERPL-SCNC: 105 MMOL/L (ref 96–112)
CO2 SERPL-SCNC: 22 MMOL/L (ref 20–33)
CREAT SERPL-MCNC: 0.69 MG/DL (ref 0.5–1.4)
ERYTHROCYTE [DISTWIDTH] IN BLOOD BY AUTOMATED COUNT: 63.7 FL (ref 35.9–50)
GFR SERPLBLD CREATININE-BSD FMLA CKD-EPI: 100 ML/MIN/1.73 M 2
GLOBULIN SER CALC-MCNC: 2.1 G/DL (ref 1.9–3.5)
GLUCOSE SERPL-MCNC: 116 MG/DL (ref 65–99)
HCT VFR BLD AUTO: 26.3 % (ref 37–47)
HCT VFR BLD AUTO: 28 % (ref 37–47)
HGB BLD-MCNC: 8.1 G/DL (ref 12–16)
HGB BLD-MCNC: 8.4 G/DL (ref 12–16)
INR PPP: 2.23 (ref 0.87–1.13)
MAGNESIUM SERPL-MCNC: 1.8 MG/DL (ref 1.5–2.5)
MCH RBC QN AUTO: 25.6 PG (ref 27–33)
MCHC RBC AUTO-ENTMCNC: 30.8 G/DL (ref 32.2–35.5)
MCV RBC AUTO: 83 FL (ref 81.4–97.8)
PHOSPHATE SERPL-MCNC: 2.3 MG/DL (ref 2.5–4.5)
PLATELET # BLD AUTO: 93 K/UL (ref 164–446)
PLATELETS.RETICULATED NFR BLD AUTO: 5.9 % (ref 0.6–13.1)
POTASSIUM SERPL-SCNC: 4.3 MMOL/L (ref 3.6–5.5)
PROT SERPL-MCNC: 4.5 G/DL (ref 6–8.2)
PROTHROMBIN TIME: 24.8 SEC (ref 12–14.6)
RBC # BLD AUTO: 3.17 M/UL (ref 4.2–5.4)
SODIUM SERPL-SCNC: 136 MMOL/L (ref 135–145)
WBC # BLD AUTO: 12.6 K/UL (ref 4.8–10.8)

## 2025-07-07 PROCEDURE — 85610 PROTHROMBIN TIME: CPT

## 2025-07-07 PROCEDURE — 85018 HEMOGLOBIN: CPT

## 2025-07-07 PROCEDURE — C1751 CATH, INF, PER/CENT/MIDLINE: HCPCS

## 2025-07-07 PROCEDURE — 700105 HCHG RX REV CODE 258: Performed by: INTERNAL MEDICINE

## 2025-07-07 PROCEDURE — A9270 NON-COVERED ITEM OR SERVICE: HCPCS | Performed by: INTERNAL MEDICINE

## 2025-07-07 PROCEDURE — 700111 HCHG RX REV CODE 636 W/ 250 OVERRIDE (IP): Performed by: STUDENT IN AN ORGANIZED HEALTH CARE EDUCATION/TRAINING PROGRAM

## 2025-07-07 PROCEDURE — 700102 HCHG RX REV CODE 250 W/ 637 OVERRIDE(OP)

## 2025-07-07 PROCEDURE — A9270 NON-COVERED ITEM OR SERVICE: HCPCS | Performed by: NURSE PRACTITIONER

## 2025-07-07 PROCEDURE — 99291 CRITICAL CARE FIRST HOUR: CPT | Performed by: STUDENT IN AN ORGANIZED HEALTH CARE EDUCATION/TRAINING PROGRAM

## 2025-07-07 PROCEDURE — 84100 ASSAY OF PHOSPHORUS: CPT

## 2025-07-07 PROCEDURE — 770020 HCHG ROOM/CARE - TELE (206)

## 2025-07-07 PROCEDURE — 700102 HCHG RX REV CODE 250 W/ 637 OVERRIDE(OP): Performed by: NURSE PRACTITIONER

## 2025-07-07 PROCEDURE — 85014 HEMATOCRIT: CPT

## 2025-07-07 PROCEDURE — 36415 COLL VENOUS BLD VENIPUNCTURE: CPT

## 2025-07-07 PROCEDURE — 76700 US EXAM ABDOM COMPLETE: CPT

## 2025-07-07 PROCEDURE — 700102 HCHG RX REV CODE 250 W/ 637 OVERRIDE(OP): Performed by: INTERNAL MEDICINE

## 2025-07-07 PROCEDURE — 83735 ASSAY OF MAGNESIUM: CPT

## 2025-07-07 PROCEDURE — 80053 COMPREHEN METABOLIC PANEL: CPT

## 2025-07-07 PROCEDURE — 05HB33Z INSERTION OF INFUSION DEVICE INTO RIGHT BASILIC VEIN, PERCUTANEOUS APPROACH: ICD-10-PCS

## 2025-07-07 PROCEDURE — 85055 RETICULATED PLATELET ASSAY: CPT

## 2025-07-07 PROCEDURE — 700105 HCHG RX REV CODE 258: Performed by: STUDENT IN AN ORGANIZED HEALTH CARE EDUCATION/TRAINING PROGRAM

## 2025-07-07 PROCEDURE — 85027 COMPLETE CBC AUTOMATED: CPT

## 2025-07-07 PROCEDURE — 700111 HCHG RX REV CODE 636 W/ 250 OVERRIDE (IP): Performed by: INTERNAL MEDICINE

## 2025-07-07 PROCEDURE — 82248 BILIRUBIN DIRECT: CPT

## 2025-07-07 PROCEDURE — 99232 SBSQ HOSP IP/OBS MODERATE 35: CPT | Performed by: NURSE PRACTITIONER

## 2025-07-07 PROCEDURE — A9270 NON-COVERED ITEM OR SERVICE: HCPCS

## 2025-07-07 RX ORDER — SULFAMETHOXAZOLE AND TRIMETHOPRIM 800; 160 MG/1; MG/1
1 TABLET ORAL EVERY 12 HOURS
Status: DISCONTINUED | OUTPATIENT
Start: 2025-07-08 | End: 2025-07-09

## 2025-07-07 RX ORDER — MAGNESIUM SULFATE HEPTAHYDRATE 40 MG/ML
2 INJECTION, SOLUTION INTRAVENOUS ONCE
Status: COMPLETED | OUTPATIENT
Start: 2025-07-07 | End: 2025-07-07

## 2025-07-07 RX ORDER — HYDROXYZINE HYDROCHLORIDE 25 MG/1
25 TABLET, FILM COATED ORAL ONCE
Status: COMPLETED | OUTPATIENT
Start: 2025-07-07 | End: 2025-07-07

## 2025-07-07 RX ORDER — FUROSEMIDE 10 MG/ML
60 INJECTION INTRAMUSCULAR; INTRAVENOUS 2 TIMES DAILY
Status: DISCONTINUED | OUTPATIENT
Start: 2025-07-07 | End: 2025-07-12

## 2025-07-07 RX ADMIN — FOLIC ACID 1 MG: 1 TABLET ORAL at 04:26

## 2025-07-07 RX ADMIN — LACTULOSE 30 ML: 10 SOLUTION ORAL at 12:51

## 2025-07-07 RX ADMIN — MAGNESIUM SULFATE HEPTAHYDRATE 2 G: 2 INJECTION, SOLUTION INTRAVENOUS at 17:46

## 2025-07-07 RX ADMIN — CEFTRIAXONE SODIUM 1000 MG: 10 INJECTION, POWDER, FOR SOLUTION INTRAVENOUS at 04:26

## 2025-07-07 RX ADMIN — PANTOPRAZOLE SODIUM 40 MG: 40 INJECTION, POWDER, FOR SOLUTION INTRAVENOUS at 04:26

## 2025-07-07 RX ADMIN — FUROSEMIDE 60 MG: 10 INJECTION, SOLUTION INTRAVENOUS at 12:51

## 2025-07-07 RX ADMIN — LACTULOSE 30 ML: 10 SOLUTION ORAL at 04:26

## 2025-07-07 RX ADMIN — FUROSEMIDE 60 MG: 10 INJECTION, SOLUTION INTRAVENOUS at 17:45

## 2025-07-07 RX ADMIN — PANTOPRAZOLE SODIUM 40 MG: 40 INJECTION, POWDER, FOR SOLUTION INTRAVENOUS at 17:45

## 2025-07-07 RX ADMIN — Medication 100 MG: at 04:26

## 2025-07-07 RX ADMIN — HYDROXYZINE HYDROCHLORIDE 25 MG: 25 TABLET ORAL at 22:46

## 2025-07-07 RX ADMIN — OCTREOTIDE ACETATE 50 MCG/HR: 200 INJECTION, SOLUTION INTRAVENOUS; SUBCUTANEOUS at 15:09

## 2025-07-07 ASSESSMENT — FIBROSIS 4 INDEX: FIB4 SCORE: 7.73

## 2025-07-07 ASSESSMENT — ENCOUNTER SYMPTOMS
FEVER: 0
BLOOD IN STOOL: 0
ABDOMINAL PAIN: 0
ABDOMINAL PAIN: 1
DEPRESSION: 0
VOMITING: 0
NAUSEA: 0
DIZZINESS: 0
NAUSEA: 1
DIARRHEA: 0
SHORTNESS OF BREATH: 0
CONSTIPATION: 0
WEAKNESS: 1
BACK PAIN: 0
CHILLS: 0
HEARTBURN: 0
BLURRED VISION: 0
COUGH: 0

## 2025-07-07 ASSESSMENT — PAIN DESCRIPTION - PAIN TYPE
TYPE: ACUTE PAIN
TYPE: ACUTE PAIN

## 2025-07-07 ASSESSMENT — LIFESTYLE VARIABLES: SUBSTANCE_ABUSE: 1

## 2025-07-07 NOTE — PROGRESS NOTES
Radiology Progress Note   Author: TED Falcon Date & Time created: 7/7/2025  11:03 AM   Date of admission  7/5/2025  Note to reader: this note follows the APSO format rather than the historical SOAP format. Assessment and plan located at the top of the note for ease of use.    Chief Complaint  59 y.o. female admitted 7/5/2025 with   Chief Complaint   Patient presents with    Abdominal Pain    Bloody Stools    N/V     Pt BIB [facility] for dark, black blood in vomit today. Pt reports associated RUQ abdominal pain when she needs to have a BM and has had blood in stool for an unknown time, pt reports it has been a couple weeks.     Hx ETOH, 2-4 shots per day         HPI  59 year old female with PMH significant for current alcohol use; alcoholic cirrhosis; past hospitalizations for hematemesis s/p EGD showing gastric varices, portal hypertension gastropathy, gastric ulcers, Esmer-Aparicio tear (clipped), and esophagitis 02/2023; and EGD with banding x 1 of gastric varices 09/2023 presented 07/05/2025 with hematemesis and dark stool.  EGD 7/5/25 showed large right gastric varices large esophageal ulcer, and portal hypertension.  IR was consulted and patient underwent coil assisted retrograde transvenous obliteration with IR Dr. Baeza 07/06/2025.    Interval History:   07/07/25 - no acute events overnight.  H&H stable.  Right femoral vein access site soft, nontender, without ecchymosis, dressing CDI. Pt denies pain. Reports feelling good but tired.  All recent labs reviewed by me including WBC 12.6, hemoglobin 8.4, AST 48 ALT 15 alk phos 77 T. bili 10.4, direct bili 6.0.  I reviewed all IDT notes and discussed patient with hospitalist Dr. Enriquez.    Assessment/Plan     Principal Problem:    Acute GI bleeding  Active Problems:    Metabolic acidosis    Alcohol abuse    Cirrhosis (HCC)    Leukocytosis      Plan IR  - Post IR groin access site instructions: no lifting greater than 5 lbs and no  baths/swimming/soaking in tub for 7-10 days. Shower OK. OK to change dressings/band aid as needed.   - No further interventions planned from an IR standpoint  - IR signing off.     -Thank you for allowing Interventional Radiology team to participate in the patients care, if any additional care or requests are needed in the future please do not hesitate to call or place IR order           Review of Systems  Physical Exam   Review of Systems   Constitutional:  Positive for malaise/fatigue. Negative for chills and fever.   HENT:  Positive for hearing loss.    Respiratory:  Negative for shortness of breath.    Cardiovascular:  Negative for chest pain.   Gastrointestinal:  Negative for abdominal pain, blood in stool, nausea and vomiting.   Neurological:  Positive for weakness. Negative for dizziness.      Vitals:    07/07/25 0739   BP: 136/73   Pulse: (!) 104   Resp: 17   Temp: 36.4 °C (97.5 °F)   SpO2: 91%        Physical Exam  Vitals and nursing note reviewed.   Constitutional:       General: She is not in acute distress.     Appearance: She is ill-appearing.   Eyes:      General: Scleral icterus present.   Cardiovascular:      Rate and Rhythm: Normal rate.   Pulmonary:      Effort: Pulmonary effort is normal. No respiratory distress.   Abdominal:      General: There is distension.   Musculoskeletal:      Right lower leg: Edema present.      Left lower leg: Edema present.   Skin:     General: Skin is warm and dry.      Coloration: Skin is jaundiced.   Neurological:      General: No focal deficit present.      Mental Status: She is lethargic.   Psychiatric:         Behavior: Behavior is cooperative.             Labs    Recent Labs     07/05/25  0625 07/05/25  0837 07/05/25  1255 07/06/25  0007 07/06/25  0430 07/06/25  1706 07/07/25  0115 07/07/25  0905   WBC 15.5* 12.6*  --  10.7  --   --  12.6*  --    RBC 2.77* 2.50*  --  2.76*  --   --  3.17*  --    HEMOGLOBIN 6.4* 5.8*   < > 6.6*   < > 8.8* 8.1* 8.4*   HEMATOCRIT  "23.4* 20.9*  --  23.0*   < > 28.6* 26.3* 28.0*   MCV 84.5 83.6  --  83.3  --   --  83.0  --    MCH 23.1* 23.2*  --  23.9*  --   --  25.6*  --    MCHC 27.4* 27.8*  --  28.7*  --   --  30.8*  --    RDW 73.1* 74.5*  --  68.6*  --   --  63.7*  --    PLATELETCT 195 165  --  107*  --   --  93*  --    MPV 10.8 10.5  --  11.1  --   --   --   --     < > = values in this interval not displayed.     Recent Labs     07/05/25 0625 07/06/25 0007 07/07/25  0115   SODIUM 137 137 136   POTASSIUM 4.5 4.5 4.3   CHLORIDE 102 110 105   CO2 17* 21 22   GLUCOSE 127* 167* 116*   BUN 18 23* 20   CREATININE 0.77 0.65 0.69   CALCIUM 8.1* 7.4* 7.6*     Recent Labs     07/05/25 0625 07/06/25  0007 07/07/25  0115   ALBUMIN 2.5*  --  2.4*   TBILIRUBIN 9.5*  --  10.4*   ALKPHOSPHAT 86  --  77   TOTPROTEIN 5.0*  --  4.5*   ALTSGPT 15  --  15   ASTSGOT 39  --  48*   CREATININE 0.77 0.65 0.69     US-ABDOMEN COMPLETE SURVEY   Final Result         1.  Common bile duct dilatation, consider causes of biliary obstruction with additional workup as clinically appropriate.   2.  Nodular hepatic contour compatible with changes of cirrhosis.   3.  Ascites   4.  Splenomegaly.      IR-EMBOLIZATION   Final Result      1.  Left renal venogram demonstrating conventional anatomy.   2.  Gastrorenal shunt venogram demonstrating a markedly enlarged gastric variceal complex.   3.  Successful coil embolization of the gastrorenal shunt vessel.   4.  Successful Gelfoam embolization of gastric varices.      DX-CHEST-PORTABLE (1 VIEW)   Final Result         1.  No acute cardiopulmonary disease.        INR   Date Value Ref Range Status   07/07/2025 2.23 (H) 0.87 - 1.13 Final     Comment:     INR - Non-therapeutic Reference Range: 0.87-1.13  INR - Therapeutic Reference Range: 2.0-4.0       No results found for: \"POCINR\"     Intake/Output Summary (Last 24 hours) at 7/7/2025 1103  Last data filed at 7/7/2025 0900  Gross per 24 hour   Intake 1250 ml   Output --   Net 1250 ml "      I have personally reviewed the above labs and imaging      I have performed a physical exam and reviewed and updated ROS and Plan today (7/7/2025).     52 minutes in directly providing and coordinating care and extensive data review.  No time overlap and excludes procedures.

## 2025-07-07 NOTE — PROGRESS NOTES
..Gastroenterology Progress Note               Author:  Anabela Swift, VALERY,  APRN Date & Time Created: 7/7/2025 9:23 AM       Patient ID:  Name:             Chiara Pacheco  YOB: 1965  Age:                 59 y.o.  female  MRN:               [id number]    Medical Decision Making, by Problem:  Active Hospital Problems    Diagnosis     Acute GI bleeding [K92.2]     Leukocytosis [D72.829]     Cirrhosis (HCC) [K74.60]     Alcohol abuse [F10.10]     Metabolic acidosis [E87.20]      Presenting Chief Complaint:  hematemesis     HISTORY OF PRESENT ILLNESS: history per chart as she isn't fully answering questions when interviewed  Chiara Pacheco is a 59 y.o. female who presents to the emergency department today for evaluation of hematemesis.  She has a past medical history significant for alcoholic cirrhosis, was hospitalized a little over a year ago for hematemesis.  EGD on 2/20/2023 showed Gastric varix, portal hypertension gastropathy, gastric ulcer and esophagitis, not bleeding, Esmer enrique tear, clip still in place and no bleeding EGD on 9/23/2023 showed:small esophageal varices, varix with erosion and stigmata of recent bleeding, banded x 1, gastric varix without stigmata, large amount of blood in stomach precluding complete exam      Due to gastric varices, interventional radiology consultation was recommended by gastroenterology for BRTO/CARTO procedure, however patient declined, stated that she did not want any further intervention or procedures.  She was provided with outpatient follow-up information, on my discussion today she did not call IR, and never scheduled follow-up for these issues.     She continues to use alcohol, at midnight she began to have recurrent hematemesis.  She states that she has stopped all NSAID use.  She does have some mild epigastric pain as well.  She also reports dark stools for a few weeks, uncertain as to the exact duration of time.  She does  not report any recent fevers, no other abnormal bleeding or bruising. She had dark vomitus times one while in hospital.     Interval History:  7/5/2025: EGD:   Esophagus: large esophageal ulcer from 36 to 40 cm. Not bleeding but prominent. Minimal to no esophageal varices  Stomach: stomach full of blood, large gastric varices, source of bleeding. Also portal hypertensive gastropathy that is oozing minimally.   Duodenum: normal bulb and second portion of duodenum    7/6/2025: Patient resting comfortably.  N.p.o. pending Carto with IR today.  Hgb 8.5  Last bloody BM yesterday, also having blood in urine.     MELD 3.0-28 points  Child Class C    7/7/2025: Patient seen. Feeling better, more alert. Had several brown bowel movements and still having hematuria. Underwent Carto yesterday. Hgb 8.1.     MELD 3.0 - 28 points    Hospital Medications:  Current Facility-Administered Medications   Medication Dose Frequency Provider Last Rate Last Admin    cefTRIAXone (Rocephin) syringe 1,000 mg  1,000 mg Q24HRS ROSALBA CruzO.   1,000 mg at 07/07/25 0426    Pharmacy Consult Request - IV Antibiotics to PO conversion   PHARMACY TO DOSE Miguelito Chambers D.O.        labetalol (Normodyne/Trandate) injection 10 mg  10 mg Q4HRS PRN ROSALBA CruzO.        ondansetron (Zofran) syringe/vial injection 4 mg  4 mg Q4HRS PRN ROSALBA CruzO.   4 mg at 07/05/25 0848    ondansetron (Zofran ODT) dispertab 4 mg  4 mg Q4HRS PRN ROSALBA CruzO.        promethazine (Phenergan) tablet 12.5-25 mg  12.5-25 mg Q4HRS PRN Miguelito Chambers D.O.        promethazine (Phenergan) suppository 12.5-25 mg  12.5-25 mg Q4HRS PRN ROSALBA CruzO.        prochlorperazine (Compazine) injection 5-10 mg  5-10 mg Q4HRS PRN ROSALBA CruzO.   10 mg at 07/05/25 1041    octreotide (SandoSTATIN) 1,250 mcg in  mL Infusion  50 mcg/hr Continuous Miguelito Chambers D.O. 10 mL/hr at 07/05/25 1012 50 mcg/hr at 07/05/25 1012    pantoprazole  "(Protonix) injection 40 mg  40 mg BID ROSALBA CruzO.   40 mg at 07/07/25 0426    folic acid (Folvite) tablet 1 mg  1 mg DAILY ROSALBA CruzO.   1 mg at 07/07/25 0426    thiamine (Vitamin B-1) tablet 100 mg  100 mg DAILY ROSALBA CruzORoshan   100 mg at 07/07/25 0426    lactulose 20 GM/30ML solution 30 mL  30 mL TID Anabela Swift, DNP,  APRN   30 mL at 07/07/25 0426   Last reviewed on 7/5/2025  3:58 PM by Ca Viera R.N.       Review of Systems:  Review of Systems   Constitutional:  Negative for chills, fever and malaise/fatigue.   HENT:  Positive for hearing loss.    Eyes:  Negative for blurred vision.   Respiratory:  Negative for cough and shortness of breath.    Cardiovascular:  Positive for leg swelling. Negative for chest pain.   Gastrointestinal:  Negative for abdominal pain, blood in stool, constipation, diarrhea, heartburn, melena, nausea and vomiting.   Genitourinary:  Negative for dysuria.   Musculoskeletal:  Negative for back pain.   Skin:  Negative for rash.   Neurological:  Positive for weakness. Negative for dizziness.   Psychiatric/Behavioral:  Positive for substance abuse. Negative for depression.    All other systems reviewed and are negative.    Vital signs:  Weight/BMI: Body mass index is 33.21 kg/m².  /73   Pulse (!) 104   Temp 36.4 °C (97.5 °F) (Temporal)   Resp 17   Ht 1.753 m (5' 9\")   Wt 102 kg (224 lb 13.9 oz)   SpO2 91%   Vitals:    07/06/25 1921 07/06/25 2344 07/07/25 0419 07/07/25 0739   BP: 92/45 107/50 129/74 136/73   Pulse: 95 88 (!) 107 (!) 104   Resp: 16 16 16 17   Temp: 36.6 °C (97.9 °F) 36.5 °C (97.7 °F) 36.5 °C (97.7 °F) 36.4 °C (97.5 °F)   TempSrc: Temporal Temporal Temporal Temporal   SpO2: 94% 96% 91% 91%   Weight:   102 kg (224 lb 13.9 oz)    Height:         Oxygen Therapy:  Pulse Oximetry: 91 %, O2 (LPM): 4, O2 Delivery Device: Oxymask    Intake/Output Summary (Last 24 hours) at 7/7/2025 0923  Last data filed at 7/7/2025 0900  Gross per 24 " hour   Intake 1250 ml   Output 100 ml   Net 1150 ml       Physical Exam  Vitals and nursing note reviewed.   Constitutional:       General: She is not in acute distress.     Appearance: She is ill-appearing.   HENT:      Head: Normocephalic and atraumatic.      Right Ear: External ear normal.      Left Ear: External ear normal.      Nose: Nose normal.      Mouth/Throat:      Mouth: Mucous membranes are moist.      Pharynx: Oropharynx is clear.   Eyes:      General: Scleral icterus present.   Cardiovascular:      Rate and Rhythm: Normal rate and regular rhythm.      Pulses: Normal pulses.      Heart sounds: Normal heart sounds.   Pulmonary:      Effort: Pulmonary effort is normal. No respiratory distress.      Breath sounds: Normal breath sounds.   Abdominal:      General: Bowel sounds are normal. There is distension.      Tenderness: There is no abdominal tenderness.   Musculoskeletal:         General: Normal range of motion.      Cervical back: Normal range of motion.   Skin:     General: Skin is warm and dry.      Capillary Refill: Capillary refill takes less than 2 seconds.      Coloration: Skin is jaundiced.   Neurological:      Mental Status: She is oriented to person, place, and time.      Motor: Weakness present.   Psychiatric:         Mood and Affect: Mood normal.         Behavior: Behavior normal.         Labs:  Recent Labs     07/05/25 0625 07/06/25 0007 07/07/25 0115   SODIUM 137 137 136   POTASSIUM 4.5 4.5 4.3   CHLORIDE 102 110 105   CO2 17* 21 22   BUN 18 23* 20   CREATININE 0.77 0.65 0.69   MAGNESIUM  --   --  1.8   CALCIUM 8.1* 7.4* 7.6*     Recent Labs     07/05/25 0625 07/06/25 0007 07/07/25 0115   ALTSGPT 15  --  15   ASTSGOT 39  --  48*   ALKPHOSPHAT 86  --  77   TBILIRUBIN 9.5*  --  10.4*   DBILIRUBIN  --   --  6.0*   LIPASE 73  --   --    GLUCOSE 127* 167* 116*     Recent Labs     07/05/25 0625 07/05/25  0837 07/06/25 0007 07/07/25 0115   WBC 15.5* 12.6* 10.7 12.6*   NEUTSPOLYS 67.00   --   --   --    LYMPHOCYTES 20.90*  --   --   --    MONOCYTES 9.70  --   --   --    EOSINOPHILS 0.70  --   --   --    BASOPHILS 0.70  --   --   --    ASTSGOT 39  --   --  48*   ALTSGPT 15  --   --  15   ALKPHOSPHAT 86  --   --  77   TBILIRUBIN 9.5*  --   --  10.4*     Recent Labs     07/05/25  0837 07/05/25  1255 07/05/25  1305 07/05/25  1529 07/06/25  0007 07/06/25  0430 07/06/25  0753 07/06/25  1706 07/07/25  0115   RBC 2.50*  --   --   --  2.76*  --   --   --  3.17*   HEMOGLOBIN 5.8*   < >  --    < > 6.6* 8.1* 8.5* 8.8* 8.1*   HEMATOCRIT 20.9*  --   --   --  23.0* 26.0*  --  28.6* 26.3*   PLATELETCT 165  --   --   --  107*  --   --   --  93*   PROTHROMBTM  --   --  28.5*  --   --   --   --   --  24.8*   INR  --   --  2.66*  --   --   --   --   --  2.23*    < > = values in this interval not displayed.     Recent Results (from the past 24 hours)   HEMOGLOBIN AND HEMATOCRIT    Collection Time: 07/06/25  5:06 PM   Result Value Ref Range    Hemoglobin 8.8 (L) 12.0 - 16.0 g/dL    Hematocrit 28.6 (L) 37.0 - 47.0 %   CBC WITHOUT DIFFERENTIAL    Collection Time: 07/07/25  1:15 AM   Result Value Ref Range    WBC 12.6 (H) 4.8 - 10.8 K/uL    RBC 3.17 (L) 4.20 - 5.40 M/uL    Hemoglobin 8.1 (L) 12.0 - 16.0 g/dL    Hematocrit 26.3 (L) 37.0 - 47.0 %    MCV 83.0 81.4 - 97.8 fL    MCH 25.6 (L) 27.0 - 33.0 pg    MCHC 30.8 (L) 32.2 - 35.5 g/dL    RDW 63.7 (H) 35.9 - 50.0 fL    Platelet Count 93 (L) 164 - 446 K/uL   MAGNESIUM    Collection Time: 07/07/25  1:15 AM   Result Value Ref Range    Magnesium 1.8 1.5 - 2.5 mg/dL   Comp Metabolic Panel    Collection Time: 07/07/25  1:15 AM   Result Value Ref Range    Sodium 136 135 - 145 mmol/L    Potassium 4.3 3.6 - 5.5 mmol/L    Chloride 105 96 - 112 mmol/L    Co2 22 20 - 33 mmol/L    Anion Gap 9.0 7.0 - 16.0    Glucose 116 (H) 65 - 99 mg/dL    Bun 20 8 - 22 mg/dL    Creatinine 0.69 0.50 - 1.40 mg/dL    Calcium 7.6 (L) 8.5 - 10.5 mg/dL    Correct Calcium 8.9 8.5 - 10.5 mg/dL    AST(SGOT) 48  (H) 12 - 45 U/L    ALT(SGPT) 15 2 - 50 U/L    Alkaline Phosphatase 77 30 - 99 U/L    Total Bilirubin 10.4 (H) 0.1 - 1.5 mg/dL    Albumin 2.4 (L) 3.2 - 4.9 g/dL    Total Protein 4.5 (L) 6.0 - 8.2 g/dL    Globulin 2.1 1.9 - 3.5 g/dL    A-G Ratio 1.1 g/dL   Prothrombin Time    Collection Time: 07/07/25  1:15 AM   Result Value Ref Range    PT 24.8 (H) 12.0 - 14.6 sec    INR 2.23 (H) 0.87 - 1.13   BILIRUBIN DIRECT    Collection Time: 07/07/25  1:15 AM   Result Value Ref Range    Direct Bilirubin 6.0 (H) 0.1 - 0.5 mg/dL   BILIRUBIN INDIRECT    Collection Time: 07/07/25  1:15 AM   Result Value Ref Range    Indirect Bilirubin 4.4 (H) 0.0 - 1.0 mg/dL   ESTIMATED GFR    Collection Time: 07/07/25  1:15 AM   Result Value Ref Range    GFR (CKD-EPI) 100 >60 mL/min/1.73 m 2   IMMATURE PLT FRACTION    Collection Time: 07/07/25  1:15 AM   Result Value Ref Range    Imm. Plt Fraction 5.9 0.6 - 13.1 %       Radiology Review:  US-ABDOMEN COMPLETE SURVEY   Final Result         1.  Common bile duct dilatation, consider causes of biliary obstruction with additional workup as clinically appropriate.   2.  Nodular hepatic contour compatible with changes of cirrhosis.   3.  Ascites   4.  Splenomegaly.      IR-EMBOLIZATION   Final Result      1.  Left renal venogram demonstrating conventional anatomy.   2.  Gastrorenal shunt venogram demonstrating a markedly enlarged gastric variceal complex.   3.  Successful coil embolization of the gastrorenal shunt vessel.   4.  Successful Gelfoam embolization of gastric varices.      DX-CHEST-PORTABLE (1 VIEW)   Final Result         1.  No acute cardiopulmonary disease.            MDM (Data Review):   -Records reviewed and summarized in current documentation  -I personally reviewed and interpreted the laboratory results  -I personally reviewed the radiology images    Assessment/Recommendations:  Hematemesis  Melena  Alcohol abuse disorder  Cirrhosis secondary to alcohol  Jaundice  Anemia due to GI blood  loss  Hepatic encephalopathy   Gastric varices  Esophageal ulcer  Portal hypertensive gastropathy  Hematuria      Recommendations:  PPI BID  Lactulose titrated to 2-3 bowel movements a day  Trend H/H and transfuse for hemoglobin <7  Octreotide for 72 hours  Consider evaluation of hematuria  Right upper quadrant ultrasound with Doppler, if ascites present will need paracentesis  Fluid analysis and cytology ordered  If no ascites, start diuretics at a ratio of 100 mg spironolactone to furosemide 40 mg  Okay for low-sodium diet as tolerated    GI will follow in a.m.    Discussed with patient, RN, Dr. Enriquez and Dr. Craft    ..Anabela Swift, DNP,  APRN    Core Quality Measures   Reviewed items::  Labs, Medications and Radiology reports reviewed

## 2025-07-07 NOTE — PROGRESS NOTES
Bedside report received from off going RN Meryl, assumed care of patient.     Fall Risk Score: HIGH RISK  Fall risk interventions in place: Provide patient/family education based on risk assessment, Educate patient/family to call staff for assistance when getting out of bed, Place fall precaution signage outside patient door, Place patient in room close to nursing station, and Utilize bed/chair fall alarm  Bed type: Regular (Jefferson Score less than 17 interventions in place)  Patient on cardiac monitor: Yes  IVF/IV medications: Infusion per MAR (List Med(s)) octreotide 10 mL/hr  Oxygen: How many liters 5L  Bedside sitter: Not Applicable   Isolation: Not applicable

## 2025-07-07 NOTE — PROCEDURES
Vascular Access Team    Date of Insertion: 7/7/2025  Arm Circumference: n/a  Line Length: 10  Line Size: 18  Vein Occupancy %: 42  Reason for Midline: access  Labs: WBC 12.6, PLT 93, BUN 20, Cr 0.69, , INR n/a    Orders confirmed, vessel patency confirmed with ultrasound. Risks and benefits of procedure explained to patient and education regarding line associated bloodstream infections provided. Questions answered.     PowerGlide Midline placed in RUE per licensed provider order with ultrasound guidance. 18g, 10 cm line placed in basilic vein after 1 attempt(s).  Catheter inserted with brisk blood return. Secured with 0cm external from insertion site.  Line flushed without resistance with 10 mL 0.9% normal saline.  Midline secured with Biopatch and Tegaderm.     Midline placement is confirmed by nurse using ultrasound and ability to flush and draw blood. Midline is appropriate for use at this time.  No X-ray is needed for placement confirmation. Pt tolerated procedure well.  Patient condition relayed to unit RN or ordering physician via this post procedure note in the EMR.    Ultrasound images uploaded to PACS and viewable in the EMR - yes  Ultrasound imaged printed and placed in paper chart - no      BARD PowerGlide Midline ref # [id number], Lot # [id number], Expiration Date 02/28/2026

## 2025-07-07 NOTE — CARE PLAN
The patient is Stable - Low risk of patient condition declining or worsening    Shift Goals  Clinical Goals: Monitor H&H, monitor O2  Patient Goals: Rest  Family Goals: MARTINE    Progress made toward(s) clinical / shift goals:    Problem: Knowledge Deficit - Standard  Goal: Patient and family/care givers will demonstrate understanding of plan of care, disease process/condition, diagnostic tests and medications  Outcome: Progressing  Note: Pt educated regarding plan of care for the day, activity, diet, and medications. Verbalized understanding.        Problem: Pain - Standard  Goal: Alleviation of pain or a reduction in pain to the patient’s comfort goal  Outcome: Progressing  Note: Pt assessed for pain Q4h and medicated PRN. Pt instructed to notify RN of any new or increasing pain to prevent it from becoming intolerable. Verbalized understanding.          Patient is not progressing towards the following goals:

## 2025-07-07 NOTE — CARE PLAN
The patient is Stable - Low risk of patient condition declining or worsening    Shift Goals  Clinical Goals: monitor H&H, safety  Patient Goals: sleep  Family Goals: MARTINE    Progress made toward(s) clinical / shift goals:      Patient is not progressing towards the following goals:      Problem: Knowledge Deficit - Standard  Goal: Patient and family/care givers will demonstrate understanding of plan of care, disease process/condition, diagnostic tests and medications  Outcome: Progressing  Note: Discuss and review POC with patient. Re-educate as needed.       Problem: Fall Risk  Goal: Patient will remain free from falls  Outcome: Progressing  Note: Fall risk assessment complete. Fall precautions implemented; bed alarm on, patient wearing non-slip socks, call light within reach, hourly rounding in progress, and tolieting needs assessed.

## 2025-07-07 NOTE — PROGRESS NOTES
Monitor Summary  Rhythm: Normal Sinus Rhythm and Sinus Tachycardia  Rate:   Ectopy: None Noted  .14 / .05 / .34

## 2025-07-07 NOTE — PROGRESS NOTES
Hospital Medicine Daily Progress Note    Date of Service  7/7/2025    Chief Complaint  Chiara Ju Pacheco is a 59 y.o. female admitted 7/5/2025 with melena    Hospital Course  Myrna Pacheco is a 59-year-old female with PMHx alcoholic liver cirrhosis with history of variceal bleeding.  Admitted 7/5 for bloody stools.    Per history patient was admitted 4/30-5/3 for variceal bleeding.  At that time, recommendation for BRTO/CARTO recommended by GI several times, however she did not want any further procedures or consultations done and wished to discharge.     Patient is now again presenting with bloody stools and abdominal pain.    In the ED: Vitals notable for tachycardia.  Hemoglobin 6.4.  Patient was started on Protonix, octreotide and ceftriaxone.  2 units RBCs administered.  GI was consulted.     Patient underwent EGD 7/5.  This showed large gastric varices on the greater curvature of the stomach, large esophageal ulcer and portal hypertension.  Again recommending IR consult for CARTO.  CARTO completed 7/6.    Interval Problem Update  7/6: Vitals notable for SBP ranging .  Patient is on 4 L/min supplemental O2.  Hb improved to 8.1 from 6.6 after 1 unit RBCs overnight.  She is n.p.o. for IR consult for possible CARTO today.  Continue to trend H&H.    7/7: Vitals largely stable overnight.  SBP ranging .  Patient is on 4 L/min supplemental O2.  Hb 8.1 from 8.8.  Bilirubin 6 from 9.  INR 2.23.  Abdominal ultrasound with ascites.  Paracentesis ordered.  Continue octreotide infusion.  Start IV Lasix today.  Strict I's and O's.      I have discussed this patient's plan of care and discharge plan at IDT rounds today with Case Management, Nursing, Nursing leadership, and other members of the IDT team.    Consultants/Specialty  GI and interventional radiology    Code Status  Full Code    Disposition  The patient is not medically cleared for discharge to home or a post-acute facility.      I have placed the  appropriate orders for post-discharge needs.    Review of Systems  Review of Systems   Constitutional:  Positive for malaise/fatigue. Negative for fever.   Respiratory:  Negative for shortness of breath.    Cardiovascular:  Negative for chest pain and leg swelling.   Gastrointestinal:  Positive for abdominal pain, melena and nausea. Negative for vomiting.   Neurological:  Positive for weakness.        Physical Exam  Temp:  [36.1 °C (97 °F)-36.6 °C (97.9 °F)] 36.3 °C (97.3 °F)  Pulse:  [] 80  Resp:  [16-18] 18  BP: ()/(45-74) 112/68  SpO2:  [91 %-96 %] 94 %    Physical Exam  Vitals and nursing note reviewed.   Constitutional:       General: She is sleeping. She is not in acute distress.     Appearance: Normal appearance. She is ill-appearing.   Cardiovascular:      Rate and Rhythm: Normal rate and regular rhythm.   Pulmonary:      Effort: Pulmonary effort is normal.      Breath sounds: Normal breath sounds.   Abdominal:      General: Bowel sounds are normal. There is distension.      Tenderness: There is abdominal tenderness.   Skin:     General: Skin is warm and dry.      Coloration: Skin is jaundiced.   Neurological:      Mental Status: She is oriented to person, place, and time. She is lethargic.      Motor: Weakness present.   Psychiatric:         Mood and Affect: Mood normal.         Behavior: Behavior normal.         Fluids    Intake/Output Summary (Last 24 hours) at 7/7/2025 1237  Last data filed at 7/7/2025 0900  Gross per 24 hour   Intake 1250 ml   Output --   Net 1250 ml        Laboratory  Recent Labs     07/05/25  0625 07/05/25  0837 07/05/25  1255 07/06/25  0007 07/06/25  0430 07/06/25  1706 07/07/25  0115 07/07/25  0905   WBC 15.5* 12.6*  --  10.7  --   --  12.6*  --    RBC 2.77* 2.50*  --  2.76*  --   --  3.17*  --    HEMOGLOBIN 6.4* 5.8*   < > 6.6*   < > 8.8* 8.1* 8.4*   HEMATOCRIT 23.4* 20.9*  --  23.0*   < > 28.6* 26.3* 28.0*   MCV 84.5 83.6  --  83.3  --   --  83.0  --    MCH 23.1* 23.2*   --  23.9*  --   --  25.6*  --    MCHC 27.4* 27.8*  --  28.7*  --   --  30.8*  --    RDW 73.1* 74.5*  --  68.6*  --   --  63.7*  --    PLATELETCT 195 165  --  107*  --   --  93*  --    MPV 10.8 10.5  --  11.1  --   --   --   --     < > = values in this interval not displayed.     Recent Labs     07/05/25  0625 07/06/25  0007 07/07/25  0115   SODIUM 137 137 136   POTASSIUM 4.5 4.5 4.3   CHLORIDE 102 110 105   CO2 17* 21 22   GLUCOSE 127* 167* 116*   BUN 18 23* 20   CREATININE 0.77 0.65 0.69   CALCIUM 8.1* 7.4* 7.6*     Recent Labs     07/05/25  1305 07/07/25  0115   INR 2.66* 2.23*               Imaging  US-ABDOMEN COMPLETE SURVEY   Final Result         1.  Common bile duct dilatation, consider causes of biliary obstruction with additional workup as clinically appropriate.   2.  Nodular hepatic contour compatible with changes of cirrhosis.   3.  Ascites   4.  Splenomegaly.      IR-EMBOLIZATION   Final Result      1.  Left renal venogram demonstrating conventional anatomy.   2.  Gastrorenal shunt venogram demonstrating a markedly enlarged gastric variceal complex.   3.  Successful coil embolization of the gastrorenal shunt vessel.   4.  Successful Gelfoam embolization of gastric varices.      DX-CHEST-PORTABLE (1 VIEW)   Final Result         1.  No acute cardiopulmonary disease.      US-PARACENTESIS, ABD WITH IMAGING    (Results Pending)        Assessment/Plan  * Acute GI bleeding- (present on admission)  Assessment & Plan  Hb on admission 6.4  Secondary to variceal bleeding  EGD 7/5:  large gastric varices on the greater curvature of the stomach, large esophageal ulcer and portal hypertension.  S/p 6/7 CARTO   - GI recommending CARTO  - Continue to trend H&H  - Continuous telemetry monitoring; monitoring for arrhythmia in the setting of GI bleed  - Continue IV octreotide infusion; end date 7/8  - Continue IV Protonix  - Continue IV ceftriaxone    ACP (advance care planning)  Assessment & Plan  I had a long  conversation with patient today regarding her alcohol consumption.  She states that she is living with roommates that are alcoholics as well.  It is a triggering and stressful situation.  I explained to patient that if she continues to consume alcohol this will lead to fulminant liver failure, cardiovascular and renal failure and ultimately patient's untimely death.  Patient states understanding.  She is looking for ways to discontinue alcohol consumption.  She is going to reach out to her dad to see if she is able to go live with him to help maintain her sobriety.  ACP time spent 20 minutes.    Leukocytosis- (present on admission)  Assessment & Plan  WBC 10.7 --> 12.6  - Continue  ceftriaxone bleeding varices  - Repeat CBC in a.m.    Cirrhosis (HCC)- (present on admission)  Assessment & Plan  MELD: 28  - Continue lactulose; titrate to bowel movements 2 to 3/day  - Start IV Lasix for volume overload; starting with 60 mg twice daily.  Titrate up as blood pressures allow  - Strict I's and O's  - Continue thiamine  - Start spironolactone if blood pressures improve  - Repeat CMP in a.m.    Alcohol abuse- (present on admission)  Assessment & Plan  Continue to encourage cessation    Hypoxia- (present on admission)  Assessment & Plan  Secondary to volume overload  Currently requiring 5 to 6 L/min supplemental O2; no baseline supplemental O2 requirement  - CXR pending  - Start IV diuresis  - Wean oxygen as able  - Encourage ambulation and incentive spirometry as able    Metabolic acidosis- (present on admission)  Assessment & Plan  HCO3 improved  - Discontinue IVF       Patient is critically ill.   The patient continues to have: Acute variceal bleeding requiring IV octreotide infusion  The vital organ system that is affected is the: Gastroenterological  If untreated there is a high chance of deterioration into: Uncontrolled bleeding, anemia, cardiac arrhythmia, cardiac arrest and eventually death.   The critical care that I  am providing today is: IV octreotide infusion; telemetry monitoring for arrhythmia  The critical that has been undertaken is medically complex.   There has been no overlap in critical care time.   Critical Care Time not including procedures: 42 minutes      VTE prophylaxis:   SCDs/TEDs   pharmacologic prophylaxis contraindicated due to High bleeding risk      I have performed a physical exam and reviewed and updated ROS and Plan today (7/7/2025). In review of yesterday's note (7/6/2025), there are no changes except as documented above.

## 2025-07-07 NOTE — PROGRESS NOTES
Bedside report received from off going RN/tech: Megan, assumed care of patient.     Fall Risk Score: HIGH RISK  Fall risk interventions in place: Place yellow fall risk ID band on patient, Provide patient/family education based on risk assessment, Educate patient/family to call staff for assistance when getting out of bed, Place fall precaution signage outside patient door, Place patient in room close to nursing station, Utilize bed/chair fall alarm, Notify charge of high risk for huddle, and Bed alarm connected correctly  Bed type: Regular (Jefferson Score less than 17 interventions in place)  Patient on cardiac monitor: Yes  IVF/IV medications: Infusion per MAR (List Med(s)) Octreotide  Oxygen: How many liters 6L  Bedside sitter: Not Applicable   Isolation: Not applicable

## 2025-07-08 ENCOUNTER — APPOINTMENT (OUTPATIENT)
Dept: RADIOLOGY | Facility: MEDICAL CENTER | Age: 60
DRG: 263 | End: 2025-07-08
Attending: NURSE PRACTITIONER
Payer: COMMERCIAL

## 2025-07-08 ENCOUNTER — APPOINTMENT (OUTPATIENT)
Dept: RADIOLOGY | Facility: MEDICAL CENTER | Age: 60
DRG: 263 | End: 2025-07-08
Attending: INTERNAL MEDICINE
Payer: COMMERCIAL

## 2025-07-08 ENCOUNTER — APPOINTMENT (OUTPATIENT)
Dept: RADIOLOGY | Facility: MEDICAL CENTER | Age: 60
DRG: 263 | End: 2025-07-08
Attending: STUDENT IN AN ORGANIZED HEALTH CARE EDUCATION/TRAINING PROGRAM
Payer: COMMERCIAL

## 2025-07-08 LAB
ALBUMIN SERPL BCP-MCNC: 2.5 G/DL (ref 3.2–4.9)
ALBUMIN/GLOB SERPL: 1.1 G/DL
ALP SERPL-CCNC: 88 U/L (ref 30–99)
ALT SERPL-CCNC: 24 U/L (ref 2–50)
ANION GAP SERPL CALC-SCNC: 10 MMOL/L (ref 7–16)
AST SERPL-CCNC: 79 U/L (ref 12–45)
BILIRUB SERPL-MCNC: 18.4 MG/DL (ref 0.1–1.5)
BUN SERPL-MCNC: 18 MG/DL (ref 8–22)
CALCIUM ALBUM COR SERPL-MCNC: 8.9 MG/DL (ref 8.5–10.5)
CALCIUM SERPL-MCNC: 7.7 MG/DL (ref 8.5–10.5)
CHLORIDE SERPL-SCNC: 102 MMOL/L (ref 96–112)
CO2 SERPL-SCNC: 22 MMOL/L (ref 20–33)
CREAT SERPL-MCNC: 0.74 MG/DL (ref 0.5–1.4)
ERYTHROCYTE [DISTWIDTH] IN BLOOD BY AUTOMATED COUNT: 67.6 FL (ref 35.9–50)
GFR SERPLBLD CREATININE-BSD FMLA CKD-EPI: 93 ML/MIN/1.73 M 2
GLOBULIN SER CALC-MCNC: 2.2 G/DL (ref 1.9–3.5)
GLUCOSE SERPL-MCNC: 128 MG/DL (ref 65–99)
HCT VFR BLD AUTO: 27.6 % (ref 37–47)
HGB BLD-MCNC: 8.3 G/DL (ref 12–16)
MAGNESIUM SERPL-MCNC: 1.8 MG/DL (ref 1.5–2.5)
MCH RBC QN AUTO: 25.7 PG (ref 27–33)
MCHC RBC AUTO-ENTMCNC: 30.1 G/DL (ref 32.2–35.5)
MCV RBC AUTO: 85.4 FL (ref 81.4–97.8)
PLATELET # BLD AUTO: 101 K/UL (ref 164–446)
PLATELETS.RETICULATED NFR BLD AUTO: 8 % (ref 0.6–13.1)
POTASSIUM SERPL-SCNC: 3.3 MMOL/L (ref 3.6–5.5)
PROT SERPL-MCNC: 4.7 G/DL (ref 6–8.2)
RBC # BLD AUTO: 3.23 M/UL (ref 4.2–5.4)
SODIUM SERPL-SCNC: 134 MMOL/L (ref 135–145)
WBC # BLD AUTO: 9.1 K/UL (ref 4.8–10.8)

## 2025-07-08 PROCEDURE — 700102 HCHG RX REV CODE 250 W/ 637 OVERRIDE(OP): Performed by: NURSE PRACTITIONER

## 2025-07-08 PROCEDURE — A9270 NON-COVERED ITEM OR SERVICE: HCPCS | Performed by: STUDENT IN AN ORGANIZED HEALTH CARE EDUCATION/TRAINING PROGRAM

## 2025-07-08 PROCEDURE — 700102 HCHG RX REV CODE 250 W/ 637 OVERRIDE(OP): Performed by: STUDENT IN AN ORGANIZED HEALTH CARE EDUCATION/TRAINING PROGRAM

## 2025-07-08 PROCEDURE — 700102 HCHG RX REV CODE 250 W/ 637 OVERRIDE(OP): Performed by: INTERNAL MEDICINE

## 2025-07-08 PROCEDURE — 700111 HCHG RX REV CODE 636 W/ 250 OVERRIDE (IP): Performed by: INTERNAL MEDICINE

## 2025-07-08 PROCEDURE — 83735 ASSAY OF MAGNESIUM: CPT

## 2025-07-08 PROCEDURE — 770020 HCHG ROOM/CARE - TELE (206)

## 2025-07-08 PROCEDURE — 700111 HCHG RX REV CODE 636 W/ 250 OVERRIDE (IP): Mod: JZ | Performed by: STUDENT IN AN ORGANIZED HEALTH CARE EDUCATION/TRAINING PROGRAM

## 2025-07-08 PROCEDURE — 85027 COMPLETE CBC AUTOMATED: CPT

## 2025-07-08 PROCEDURE — A9270 NON-COVERED ITEM OR SERVICE: HCPCS | Performed by: INTERNAL MEDICINE

## 2025-07-08 PROCEDURE — 36415 COLL VENOUS BLD VENIPUNCTURE: CPT

## 2025-07-08 PROCEDURE — 76705 ECHO EXAM OF ABDOMEN: CPT

## 2025-07-08 PROCEDURE — 74018 RADEX ABDOMEN 1 VIEW: CPT

## 2025-07-08 PROCEDURE — 700105 HCHG RX REV CODE 258: Performed by: INTERNAL MEDICINE

## 2025-07-08 PROCEDURE — 99232 SBSQ HOSP IP/OBS MODERATE 35: CPT | Performed by: NURSE PRACTITIONER

## 2025-07-08 PROCEDURE — 99232 SBSQ HOSP IP/OBS MODERATE 35: CPT | Performed by: INTERNAL MEDICINE

## 2025-07-08 PROCEDURE — 80053 COMPREHEN METABOLIC PANEL: CPT

## 2025-07-08 PROCEDURE — A9270 NON-COVERED ITEM OR SERVICE: HCPCS | Performed by: NURSE PRACTITIONER

## 2025-07-08 PROCEDURE — 85055 RETICULATED PLATELET ASSAY: CPT

## 2025-07-08 RX ORDER — SPIRONOLACTONE 25 MG/1
25 TABLET ORAL
Status: DISCONTINUED | OUTPATIENT
Start: 2025-07-08 | End: 2025-07-09

## 2025-07-08 RX ORDER — POTASSIUM CHLORIDE 1500 MG/1
40 TABLET, EXTENDED RELEASE ORAL ONCE
Status: COMPLETED | OUTPATIENT
Start: 2025-07-08 | End: 2025-07-08

## 2025-07-08 RX ADMIN — CEFTRIAXONE SODIUM 1000 MG: 10 INJECTION, POWDER, FOR SOLUTION INTRAVENOUS at 17:22

## 2025-07-08 RX ADMIN — PANTOPRAZOLE SODIUM 40 MG: 40 INJECTION, POWDER, FOR SOLUTION INTRAVENOUS at 17:21

## 2025-07-08 RX ADMIN — Medication 100 MG: at 06:40

## 2025-07-08 RX ADMIN — SULFAMETHOXAZOLE AND TRIMETHOPRIM 1 TABLET: 800; 160 TABLET ORAL at 17:21

## 2025-07-08 RX ADMIN — FOLIC ACID 1 MG: 1 TABLET ORAL at 06:40

## 2025-07-08 RX ADMIN — SULFAMETHOXAZOLE AND TRIMETHOPRIM 1 TABLET: 800; 160 TABLET ORAL at 06:40

## 2025-07-08 RX ADMIN — POTASSIUM CHLORIDE 40 MEQ: 1500 TABLET, EXTENDED RELEASE ORAL at 17:21

## 2025-07-08 RX ADMIN — LACTULOSE 30 ML: 10 SOLUTION ORAL at 17:21

## 2025-07-08 RX ADMIN — PANTOPRAZOLE SODIUM 40 MG: 40 INJECTION, POWDER, FOR SOLUTION INTRAVENOUS at 06:40

## 2025-07-08 RX ADMIN — LACTULOSE 30 ML: 10 SOLUTION ORAL at 12:08

## 2025-07-08 RX ADMIN — LACTULOSE 30 ML: 10 SOLUTION ORAL at 06:40

## 2025-07-08 RX ADMIN — FUROSEMIDE 60 MG: 10 INJECTION, SOLUTION INTRAVENOUS at 17:21

## 2025-07-08 RX ADMIN — FUROSEMIDE 60 MG: 10 INJECTION, SOLUTION INTRAVENOUS at 06:40

## 2025-07-08 RX ADMIN — SPIRONOLACTONE 25 MG: 25 TABLET ORAL at 17:27

## 2025-07-08 ASSESSMENT — COGNITIVE AND FUNCTIONAL STATUS - GENERAL
MOVING TO AND FROM BED TO CHAIR: A LITTLE
WALKING IN HOSPITAL ROOM: A LITTLE
TOILETING: A LITTLE
MOBILITY SCORE: 20
DRESSING REGULAR UPPER BODY CLOTHING: A LITTLE
HELP NEEDED FOR BATHING: A LITTLE
SUGGESTED CMS G CODE MODIFIER DAILY ACTIVITY: CJ
CLIMB 3 TO 5 STEPS WITH RAILING: A LITTLE
DRESSING REGULAR LOWER BODY CLOTHING: A LITTLE
DAILY ACTIVITIY SCORE: 20
SUGGESTED CMS G CODE MODIFIER MOBILITY: CJ
STANDING UP FROM CHAIR USING ARMS: A LITTLE

## 2025-07-08 ASSESSMENT — ENCOUNTER SYMPTOMS
COUGH: 0
NAUSEA: 0
CHILLS: 0
BLOOD IN STOOL: 0
ORTHOPNEA: 0
DIZZINESS: 0
SPEECH CHANGE: 0
MYALGIAS: 0
HEARTBURN: 0
BLURRED VISION: 0
ABDOMINAL PAIN: 1
CONSTIPATION: 0
FEVER: 0
DOUBLE VISION: 0
BACK PAIN: 1
PALPITATIONS: 0
PHOTOPHOBIA: 0
VOMITING: 0
SHORTNESS OF BREATH: 0
NECK PAIN: 0
SINUS PAIN: 0
DIARRHEA: 0
WEIGHT LOSS: 0
WEAKNESS: 1
NAUSEA: 1
HEMOPTYSIS: 0
CLAUDICATION: 0
DEPRESSION: 0

## 2025-07-08 ASSESSMENT — PAIN DESCRIPTION - PAIN TYPE
TYPE: ACUTE PAIN
TYPE: ACUTE PAIN

## 2025-07-08 ASSESSMENT — LIFESTYLE VARIABLES: SUBSTANCE_ABUSE: 1

## 2025-07-08 ASSESSMENT — FIBROSIS 4 INDEX: FIB4 SCORE: 7.73

## 2025-07-08 NOTE — PROGRESS NOTES
..Gastroenterology Progress Note               Author:  TED Beck   Date & Time Created: 7/8/2025 3:18 PM       Patient ID:  Name:             Chiara Pacheco  YOB: 1965  Age:                 59 y.o.  female  MRN:               [id number]    Medical Decision Making, by Problem:  Active Hospital Problems    Diagnosis     ACP (advance care planning) [Z71.89]     Acute GI bleeding [K92.2]     Leukocytosis [D72.829]     Cirrhosis (HCC) [K74.60]     Alcohol abuse [F10.10]     Hypoxia [R09.02]     Metabolic acidosis [E87.20]      Presenting Chief Complaint:  hematemesis     HISTORY OF PRESENT ILLNESS: history per chart as she isn't fully answering questions when interviewed  Chiara Pacheco is a 59 y.o. female who presents to the emergency department today for evaluation of hematemesis.  She has a past medical history significant for alcoholic cirrhosis, was hospitalized a little over a year ago for hematemesis.  EGD on 2/20/2023 showed Gastric varix, portal hypertension gastropathy, gastric ulcer and esophagitis, not bleeding, Esmer enrique tear, clip still in place and no bleeding EGD on 9/23/2023 showed:small esophageal varices, varix with erosion and stigmata of recent bleeding, banded x 1, gastric varix without stigmata, large amount of blood in stomach precluding complete exam      Due to gastric varices, interventional radiology consultation was recommended by gastroenterology for BRTO/CARTO procedure, however patient declined, stated that she did not want any further intervention or procedures.  She was provided with outpatient follow-up information, on my discussion today she did not call IR, and never scheduled follow-up for these issues.     She continues to use alcohol, at midnight she began to have recurrent hematemesis.  She states that she has stopped all NSAID use.  She does have some mild epigastric pain as well.  She also reports dark stools for a few weeks,  uncertain as to the exact duration of time.  She does not report any recent fevers, no other abnormal bleeding or bruising. She had dark vomitus times one while in hospital.     Interval History:  7/5/2025: EGD:   Esophagus: large esophageal ulcer from 36 to 40 cm. Not bleeding but prominent. Minimal to no esophageal varices  Stomach: stomach full of blood, large gastric varices, source of bleeding. Also portal hypertensive gastropathy that is oozing minimally.   Duodenum: normal bulb and second portion of duodenum    7/6/2025: Patient resting comfortably.  N.p.o. pending Carto with IR today.  Hgb 8.5  Last bloody BM yesterday, also having blood in urine.     MELD 3.0-28 points  Child Class C    7/7/2025: Patient seen. Feeling better, more alert. Had several brown bowel movements and still having hematuria. Underwent Carto yesterday. Hgb 8.1.     MELD 3.0 - 28 points    7/8/2025: PPD#2 s/p CARTO with IR. Sleeping quietly, arouses appropriately. No BM overnight- last at 7/7/2025 at 1500. Reports abdominal bloating, inability to lay on back due to pain. Hypoactive bowel sounds.  KUB ordered.  Tolerating clear liquids. Abd ultrasound 7/7/2025 with CBD dilation at 7.3. gallbladder not seen well due to ascites, scattered ascites, nodule changes of cirrhosis, splenomegaly. IVC and hepatic veins patent.  Abd soft tissue US 7/8 showing small amount of ascites, paracentesis deferred.   Na 134, K 3.3, BUN 18, Cr 0.74, Ca 8.9, AST 79, ALt 24, AP 88, Tbili 18.4, albumin 2.5, mag 1.8    Update 1652: KUB neg for obstructive process.     Hospital Medications:  Current Facility-Administered Medications   Medication Dose Frequency Provider Last Rate Last Admin    furosemide (Lasix) injection 60 mg  60 mg BID Lisandra Enriquez M.D.   60 mg at 07/08/25 0640    sulfamethoxazole-trimethoprim (Bactrim DS) 800-160 MG tablet 1 Tablet  1 Tablet Q12HRS Lisandra Enriquez M.D.   1 Tablet at 07/08/25 0640    labetalol (Normodyne/Trandate)  injection 10 mg  10 mg Q4HRS PRN ROSALBA CruzO.        ondansetron (Zofran) syringe/vial injection 4 mg  4 mg Q4HRS PRN ROSALBA CruzO.   4 mg at 07/05/25 0848    ondansetron (Zofran ODT) dispertab 4 mg  4 mg Q4HRS PRN Miguelito Chambers D.O.        promethazine (Phenergan) tablet 12.5-25 mg  12.5-25 mg Q4HRS PRN ROSALBA CruzO.        promethazine (Phenergan) suppository 12.5-25 mg  12.5-25 mg Q4HRS PRN Miguelito Chambers D.O.        prochlorperazine (Compazine) injection 5-10 mg  5-10 mg Q4HRS PRN ROSALBA CruzO.   10 mg at 07/05/25 1041    pantoprazole (Protonix) injection 40 mg  40 mg BID ROSALBA CruzORoshan   40 mg at 07/08/25 0640    folic acid (Folvite) tablet 1 mg  1 mg DAILY ROSALBA CruzO.   1 mg at 07/08/25 0640    thiamine (Vitamin B-1) tablet 100 mg  100 mg DAILY ROSALBA CruzORoshan   100 mg at 07/08/25 0640    lactulose 20 GM/30ML solution 30 mL  30 mL TID Anabela Swift, DNP,  APRN   30 mL at 07/08/25 1208   Last reviewed on 7/5/2025  3:58 PM by Ca Viera R.N.       Review of Systems:  Review of Systems   Constitutional:  Negative for chills, fever and malaise/fatigue.   HENT:  Positive for hearing loss. Negative for sinus pain.    Respiratory:  Negative for cough and shortness of breath.    Cardiovascular:  Positive for leg swelling. Negative for chest pain.   Gastrointestinal:  Positive for abdominal pain. Negative for blood in stool, constipation, diarrhea, heartburn, melena, nausea and vomiting.   Genitourinary:  Negative for dysuria and hematuria.   Musculoskeletal:  Positive for back pain.   Skin:  Negative for rash.   Neurological:  Positive for weakness. Negative for dizziness.   Psychiatric/Behavioral:  Positive for substance abuse. Negative for depression.    All other systems reviewed and are negative.    Vital signs:  Weight/BMI: Body mass index is 33.27 kg/m².  /65   Pulse 91   Temp 37.1 °C (98.7 °F) (Temporal)   Resp 19   Ht 1.753 m (5'  "9\")   Wt 102 kg (225 lb 5 oz)   SpO2 96%   Vitals:    07/07/25 1904 07/07/25 2351 07/08/25 0435 07/08/25 0734   BP: 138/72 110/59 132/74 126/65   Pulse: 60 70 89 91   Resp: 18 18 18 19   Temp: 36.4 °C (97.5 °F) 36.5 °C (97.7 °F) 36.5 °C (97.7 °F) 37.1 °C (98.7 °F)   TempSrc: Temporal Temporal Temporal Temporal   SpO2: 95% 98% 88% 96%   Weight:   102 kg (225 lb 5 oz)    Height:         Oxygen Therapy:  Pulse Oximetry: 96 %, O2 (LPM): 4, O2 Delivery Device: Silicone Nasal Cannula    Intake/Output Summary (Last 24 hours) at 7/8/2025 1518  Last data filed at 7/8/2025 0800  Gross per 24 hour   Intake 300 ml   Output 1000 ml   Net -700 ml       Physical Exam  Vitals and nursing note reviewed.   Constitutional:       General: She is awake. She is not in acute distress.     Appearance: She is ill-appearing.   HENT:      Head: Normocephalic and atraumatic.      Nose: Nose normal. No congestion.      Mouth/Throat:      Mouth: Mucous membranes are moist.      Pharynx: Oropharynx is clear. No oropharyngeal exudate.   Eyes:      General: Scleral icterus present.   Cardiovascular:      Rate and Rhythm: Normal rate and regular rhythm.      Pulses: Normal pulses.      Heart sounds: Normal heart sounds.   Pulmonary:      Effort: Pulmonary effort is normal. No respiratory distress.      Breath sounds: Normal breath sounds.   Abdominal:      General: There is distension.      Tenderness: There is no abdominal tenderness. There is no guarding.      Comments: Hypoactive throughout   Musculoskeletal:         General: Normal range of motion.      Cervical back: Normal range of motion.   Skin:     General: Skin is warm and dry.      Capillary Refill: Capillary refill takes less than 2 seconds.      Coloration: Skin is jaundiced.   Neurological:      Mental Status: She is alert and oriented to person, place, and time.      Motor: Weakness present.   Psychiatric:         Mood and Affect: Mood normal.         Behavior: Behavior normal. " Behavior is cooperative.         Labs:  Recent Labs     07/06/25  0007 07/07/25  0115 07/08/25  0806   SODIUM 137 136 134*   POTASSIUM 4.5 4.3 3.3*   CHLORIDE 110 105 102   CO2 21 22 22   BUN 23* 20 18   CREATININE 0.65 0.69 0.74   MAGNESIUM  --  1.8 1.8   PHOSPHORUS  --  2.3*  --    CALCIUM 7.4* 7.6* 7.7*     Recent Labs     07/06/25  0007 07/07/25  0115 07/08/25  0806   ALTSGPT  --  15 24   ASTSGOT  --  48* 79*   ALKPHOSPHAT  --  77 88   TBILIRUBIN  --  10.4* 18.4*   DBILIRUBIN  --  6.0*  --    GLUCOSE 167* 116* 128*     Recent Labs     07/06/25  0007 07/07/25 0115 07/08/25  0806   WBC 10.7 12.6* 9.1   ASTSGOT  --  48* 79*   ALTSGPT  --  15 24   ALKPHOSPHAT  --  77 88   TBILIRUBIN  --  10.4* 18.4*     Recent Labs     07/06/25  0007 07/06/25  0430 07/07/25  0115 07/07/25  0905 07/08/25  0806   RBC 2.76*  --  3.17*  --  3.23*   HEMOGLOBIN 6.6*   < > 8.1* 8.4* 8.3*   HEMATOCRIT 23.0*   < > 26.3* 28.0* 27.6*   PLATELETCT 107*  --  93*  --  101*   PROTHROMBTM  --   --  24.8*  --   --    INR  --   --  2.23*  --   --     < > = values in this interval not displayed.     Recent Results (from the past 24 hours)   MAGNESIUM    Collection Time: 07/08/25  8:06 AM   Result Value Ref Range    Magnesium 1.8 1.5 - 2.5 mg/dL   CBC WITHOUT DIFFERENTIAL    Collection Time: 07/08/25  8:06 AM   Result Value Ref Range    WBC 9.1 4.8 - 10.8 K/uL    RBC 3.23 (L) 4.20 - 5.40 M/uL    Hemoglobin 8.3 (L) 12.0 - 16.0 g/dL    Hematocrit 27.6 (L) 37.0 - 47.0 %    MCV 85.4 81.4 - 97.8 fL    MCH 25.7 (L) 27.0 - 33.0 pg    MCHC 30.1 (L) 32.2 - 35.5 g/dL    RDW 67.6 (H) 35.9 - 50.0 fL    Platelet Count 101 (L) 164 - 446 K/uL   Comp Metabolic Panel    Collection Time: 07/08/25  8:06 AM   Result Value Ref Range    Sodium 134 (L) 135 - 145 mmol/L    Potassium 3.3 (L) 3.6 - 5.5 mmol/L    Chloride 102 96 - 112 mmol/L    Co2 22 20 - 33 mmol/L    Anion Gap 10.0 7.0 - 16.0    Glucose 128 (H) 65 - 99 mg/dL    Bun 18 8 - 22 mg/dL    Creatinine 0.74 0.50 -  1.40 mg/dL    Calcium 7.7 (L) 8.5 - 10.5 mg/dL    Correct Calcium 8.9 8.5 - 10.5 mg/dL    AST(SGOT) 79 (H) 12 - 45 U/L    ALT(SGPT) 24 2 - 50 U/L    Alkaline Phosphatase 88 30 - 99 U/L    Total Bilirubin 18.4 (H) 0.1 - 1.5 mg/dL    Albumin 2.5 (L) 3.2 - 4.9 g/dL    Total Protein 4.7 (L) 6.0 - 8.2 g/dL    Globulin 2.2 1.9 - 3.5 g/dL    A-G Ratio 1.1 g/dL   ESTIMATED GFR    Collection Time: 07/08/25  8:06 AM   Result Value Ref Range    GFR (CKD-EPI) 93 >60 mL/min/1.73 m 2   IMMATURE PLT FRACTION    Collection Time: 07/08/25  8:06 AM   Result Value Ref Range    Imm. Plt Fraction 8.0 0.6 - 13.1 %       Radiology Review:  US-ABDOMEN LTD (SOFT TISSUE)   Final Result      Small amount of ascites noted in the abdomen, paracentesis deferred.         IR-MIDLINE CATHETER INSERTION WO GUIDANCE > AGE 3   Final Result                  Ultrasound-guided midline placement performed by qualified nursing staff    as above.          US-ABDOMEN COMPLETE SURVEY   Final Result         1.  Common bile duct dilatation, consider causes of biliary obstruction with additional workup as clinically appropriate.   2.  Nodular hepatic contour compatible with changes of cirrhosis.   3.  Ascites   4.  Splenomegaly.      IR-EMBOLIZATION   Final Result      1.  Left renal venogram demonstrating conventional anatomy.   2.  Gastrorenal shunt venogram demonstrating a markedly enlarged gastric variceal complex.   3.  Successful coil embolization of the gastrorenal shunt vessel.   4.  Successful Gelfoam embolization of gastric varices.      DX-CHEST-PORTABLE (1 VIEW)   Final Result         1.  No acute cardiopulmonary disease.      BP-CIHOUGC-E/O    (Results Pending)         MDM (Data Review):   -Records reviewed and summarized in current documentation  -I personally reviewed and interpreted the laboratory results  -I personally reviewed the radiology images    Assessment/Recommendations:  Hematemesis  Melena  Alcohol abuse disorder  Cirrhosis secondary  to alcohol  Jaundice  Hyperbilirubinemia  9.5-10.4-18.4. suspect multifactorial alcohol cirrhosis, hepatitis C, ongoing alcohol intake with last drink ~7/4/2025. LFTs in nonobstructive pattern.   Anemia due to GI blood loss  Hepatic encephalopathy   Gastric varices  Esophageal ulcer  Portal hypertensive gastropathy  Hematuria   Hx of hepatitis C (2023)     Recommendations:  PPI BID  KUB ordered.- negative for obstructive process.   Lactulose titrated to 2-3 bowel movements a day  Trend H/H and transfuse for hemoglobin <7  Octreotide completed 72 hour infusion  Consider evaluation of hematuria deferred to primary team  Recommend Diuretics at a ratio of 100 mg spironolactone to furosemide 40 mg  Okay for low-sodium diet as tolerated  Trend LFTs, Mag, Phos, INR  Agree with C3 for SBP prophylaxis since not enough fluid to be obtained with paracentesis    GI will follow    Discussed with patient, Dr. Hernandez, Dr. Lopez    ..Mercy Laurent, A.P.R.N.    Core Quality Measures   Reviewed items::  Labs, Medications and Radiology reports reviewed

## 2025-07-08 NOTE — PROGRESS NOTES
Hospital Medicine Daily Progress Note    Date of Service  7/8/2025    Chief Complaint  Chiara Ju Pacheco is a 59 y.o. female admitted 7/5/2025 with melena    Hospital Course  Myrna Pacheco is a 59-year-old female with PMHx alcoholic liver cirrhosis with history of variceal bleeding.  Admitted 7/5 for bloody stools.    Per history patient was admitted 4/30-5/3 for variceal bleeding.  At that time, recommendation for BRTO/CARTO recommended by GI several times, however she did not want any further procedures or consultations done and wished to discharge.     Patient is now again presenting with bloody stools and abdominal pain.    In the ED: Vitals notable for tachycardia.  Hemoglobin 6.4.  Patient was started on Protonix, octreotide and ceftriaxone.  2 units RBCs administered.  GI was consulted.     Patient underwent EGD 7/5.  This showed large gastric varices on the greater curvature of the stomach, large esophageal ulcer and portal hypertension.  Again recommending IR consult for CARTO.  CARTO completed 7/6.    7/6: Vitals notable for SBP ranging .  Patient is on 4 L/min supplemental O2.  Hb improved to 8.1 from 6.6 after 1 unit RBCs overnight.  She is n.p.o. for IR consult for possible CARTO today.  Continue to trend H&H.    7/7: Vitals largely stable overnight.  SBP ranging .  Patient is on 4 L/min supplemental O2.  Hb 8.1 from 8.8.  Bilirubin 6 from 9.  INR 2.23.  Abdominal ultrasound with ascites.  Paracentesis ordered.  Continue octreotide infusion.  Start IV Lasix today.  Strict I's and O's.      Interval Problem Update  Evaluated and examined the patient at bedside, patient still having generalized, patient is alert and oriented x 4  - Elevated bilirubin, continue lactulose, Lasix and aspirin lactone  - Continue PPI, advance diet and start ceftriaxone  - Appreciate GI recommendation.    I have discussed this patient's plan of care and discharge plan at IDT rounds today with Case Management,  Nursing, Nursing leadership, and other members of the IDT team.    Consultants/Specialty  GI and interventional radiology    Code Status  Full Code    Disposition  The patient is not medically cleared for discharge to home or a post-acute facility.  Anticipate discharge to: skilled nursing facility    I have placed the appropriate orders for post-discharge needs.    Review of Systems  Review of Systems   Constitutional:  Positive for malaise/fatigue. Negative for chills, fever and weight loss.   HENT:  Negative for ear pain, hearing loss and tinnitus.    Eyes:  Negative for blurred vision, double vision and photophobia.   Respiratory:  Negative for cough, hemoptysis and shortness of breath.    Cardiovascular:  Negative for chest pain, palpitations, orthopnea, claudication and leg swelling.   Gastrointestinal:  Positive for abdominal pain and nausea. Negative for constipation, diarrhea, melena and vomiting.   Genitourinary:  Negative for dysuria, frequency and urgency.   Musculoskeletal:  Negative for myalgias and neck pain.   Skin:  Negative for rash.   Neurological:  Positive for weakness. Negative for dizziness and speech change.        Physical Exam  Temp:  [36.4 °C (97.5 °F)-37.1 °C (98.7 °F)] 37 °C (98.6 °F)  Pulse:  [60-97] 97  Resp:  [17-19] 17  BP: (110-138)/(59-74) 137/72  SpO2:  [88 %-98 %] 94 %    Physical Exam  Vitals and nursing note reviewed.   Constitutional:       General: She is sleeping. She is not in acute distress.     Appearance: Normal appearance. She is ill-appearing.   Cardiovascular:      Rate and Rhythm: Normal rate and regular rhythm.   Pulmonary:      Effort: Pulmonary effort is normal. No respiratory distress.      Breath sounds: Rales present. No wheezing.   Abdominal:      General: Bowel sounds are normal. There is distension.      Tenderness: There is abdominal tenderness.   Musculoskeletal:      Right lower leg: Edema present.      Left lower leg: Edema present.   Skin:     General:  Skin is warm and dry.      Coloration: Skin is jaundiced.   Neurological:      Mental Status: She is oriented to person, place, and time. She is lethargic.      Motor: Weakness present.   Psychiatric:         Mood and Affect: Mood normal.         Behavior: Behavior normal.         Fluids    Intake/Output Summary (Last 24 hours) at 7/8/2025 1651  Last data filed at 7/8/2025 1200  Gross per 24 hour   Intake 550 ml   Output 1000 ml   Net -450 ml        Laboratory  Recent Labs     07/06/25  0007 07/06/25  0430 07/07/25  0115 07/07/25  0905 07/08/25  0806   WBC 10.7  --  12.6*  --  9.1   RBC 2.76*  --  3.17*  --  3.23*   HEMOGLOBIN 6.6*   < > 8.1* 8.4* 8.3*   HEMATOCRIT 23.0*   < > 26.3* 28.0* 27.6*   MCV 83.3  --  83.0  --  85.4   MCH 23.9*  --  25.6*  --  25.7*   MCHC 28.7*  --  30.8*  --  30.1*   RDW 68.6*  --  63.7*  --  67.6*   PLATELETCT 107*  --  93*  --  101*   MPV 11.1  --   --   --   --     < > = values in this interval not displayed.     Recent Labs     07/06/25  0007 07/07/25  0115 07/08/25  0806   SODIUM 137 136 134*   POTASSIUM 4.5 4.3 3.3*   CHLORIDE 110 105 102   CO2 21 22 22   GLUCOSE 167* 116* 128*   BUN 23* 20 18   CREATININE 0.65 0.69 0.74   CALCIUM 7.4* 7.6* 7.7*     Recent Labs     07/07/25  0115   INR 2.23*               Imaging  [id number] VIEW   Final Result      1.  Normal bowel gas pattern.   2.  Small left pleural effusion.   3.  Atherosclerosis.      US-ABDOMEN LTD (SOFT TISSUE)   Final Result      Small amount of ascites noted in the abdomen, paracentesis deferred.         IR-MIDLINE CATHETER INSERTION WO GUIDANCE > AGE 3   Final Result                  Ultrasound-guided midline placement performed by qualified nursing staff    as above.          US-ABDOMEN COMPLETE SURVEY   Final Result         1.  Common bile duct dilatation, consider causes of biliary obstruction with additional workup as clinically appropriate.   2.  Nodular hepatic contour compatible with changes of cirrhosis.   3.   Ascites   4.  Splenomegaly.      IR-EMBOLIZATION   Final Result      1.  Left renal venogram demonstrating conventional anatomy.   2.  Gastrorenal shunt venogram demonstrating a markedly enlarged gastric variceal complex.   3.  Successful coil embolization of the gastrorenal shunt vessel.   4.  Successful Gelfoam embolization of gastric varices.      DX-CHEST-PORTABLE (1 VIEW)   Final Result         1.  No acute cardiopulmonary disease.      [id number]    (Results Pending)        Assessment/Plan  * Acute GI bleeding- (present on admission)  Assessment & Plan  Due to portal hypertension and variceal bleeding and ulcer  EGD 7/5:  large gastric varices on the greater curvature of the stomach, large esophageal ulcer and portal hypertension.  S/p 6/7 CARTO   Finish course of octreotide 72 hours  PPI twice daily  Advance diet as tolerated  Ceftriaxone for 5 days  Monitor hemoglobin daily    Cirrhosis (HCC)- (present on admission)  Assessment & Plan  Due to alcoholism MELD: 28 and elevated bilirubin  CT scan showed signs of cirrhosis  Started spironolactone and continue IV Lasix  Not able to get paracentesis due to no any fluid  Continue lactulose  Encourage the patient to avoid drinking alcohol  Appreciate GI recommendation.    ACP (advance care planning)  Assessment & Plan  I had a long conversation with patient today regarding her alcohol consumption.  She states that she is living with roommates that are alcoholics as well.  It is a triggering and stressful situation.  I explained to patient that if she continues to consume alcohol this will lead to fulminant liver failure, cardiovascular and renal failure and ultimately patient's untimely death.  Patient states understanding.  She is looking for ways to discontinue alcohol consumption.  She is going to reach out to her dad to see if she is able to go live with him to help maintain her sobriety.  ACP time spent 20 minutes.    Leukocytosis- (present on  admission)  Assessment & Plan  WBC 10.7 --> 12.6  Continue labs daily  Ceftriaxone for prophylaxis    Alcohol abuse- (present on admission)  Assessment & Plan  Continue to encourage cessation    Hypoxia- (present on admission)  Assessment & Plan  Secondary to volume overload  Continue IV Lasix and add spironolactone    Metabolic acidosis- (present on admission)  Assessment & Plan  Improving           VTE prophylaxis:   SCDs/TEDs   enoxaparin ppx      I have performed a physical exam and reviewed and updated ROS and Plan today (7/8/2025). In review of yesterday's note (7/7/2025), there are no changes except as documented above.

## 2025-07-08 NOTE — DISCHARGE PLANNING
Case Management Discharge Planning    Admission Date: 7/5/2025  GMLOS: 8.7  ALOS: 3    6-Clicks ADL Score: 20  6-Clicks Mobility Score: 20      Anticipated Discharge Dispo: Discharge Disposition: Discharged to home/self care (01)    DME Needed: Pending hospital course     Action(s) Taken: LMSW rounded with pt to conduct assessment and pt stated that she needed to use the restroom before she can answer questions. LMSW to attempt to round again.    Chart reviewed and pt discussed in IDT Rounds pt is not MC due to volume overload. Paracentesis ordered.     Escalations Completed: None    Medically Clear: No    Next Steps: F/U with HCM needs     Barriers to Discharge: Medical clearance    Is the patient up for discharge tomorrow: No

## 2025-07-08 NOTE — CARE PLAN
The patient is Watcher - Medium risk of patient condition declining or worsening    Shift Goals  Clinical Goals: monitor H/H, monitor vital signs, monitor for signs of bleeding  Patient Goals: comfort, pain control, reduce anxiety  Family Goals: Not present    Progress made toward(s) clinical / shift goals:        Problem: Safety  Goal: Will remain free from injury  Outcome: Progressing     Problem: Venous Thromboembolism (VTW)/Deep Vein Thrombosis (DVT) Prevention:  Goal: Patient will participate in Venous Thrombosis (VTE)/Deep Vein Thrombosis (DVT)Prevention Measures  Outcome: Progressing     Problem: Psychosocial Needs:  Goal: Level of anxiety will decrease  Outcome: Progressing     Problem: Fluid Volume:  Goal: Will maintain balanced intake and output  Outcome: Progressing     Problem: Respiratory:  Goal: Respiratory status will improve  Outcome: Progressing

## 2025-07-08 NOTE — CARE PLAN
Problem: Knowledge Deficit - Standard  Goal: Patient and family/care givers will demonstrate understanding of plan of care, disease process/condition, diagnostic tests and medications  Outcome: Progressing     Problem: Fall Risk  Goal: Patient will remain free from falls  Outcome: Progressing     Problem: Pain - Standard  Goal: Alleviation of pain or a reduction in pain to the patient’s comfort goal  Outcome: Progressing     Problem: Safety  Goal: Will remain free from injury  Outcome: Progressing   The patient is Watcher - Medium risk of patient condition declining or worsening    Shift Goals  Clinical Goals: monitor hgb, paracentesis  Patient Goals: paracentesis  Family Goals: not present    Progress made toward(s) clinical / shift goals:  echo completedd    Patient is not progressing towards the following goals:

## 2025-07-08 NOTE — PROGRESS NOTES
Fall Risk Score: HIGH RISK  Fall risk interventions in place: Place yellow fall risk ID band on patient, Provide patient/family education based on risk assessment, Educate patient/family to call staff for assistance when getting out of bed, Place fall precaution signage outside patient door, Place patient in room close to nursing station, and Utilize bed/chair fall alarm  Bed type: Regular (Jefferson Score less than 17 interventions in place)  Patient on cardiac monitor: Yes  IVF/IV medications: Infusion per MAR (List Med(s)) Octreotide 50mcg/hr   Oxygen: How many liters 4L, Traced the line to wall oxygen, and No oxygen tank in room  Bedside sitter: Not Applicable   Isolation: Not applicable

## 2025-07-09 ENCOUNTER — APPOINTMENT (OUTPATIENT)
Dept: RADIOLOGY | Facility: MEDICAL CENTER | Age: 60
DRG: 263 | End: 2025-07-09
Attending: INTERNAL MEDICINE
Payer: COMMERCIAL

## 2025-07-09 ENCOUNTER — APPOINTMENT (OUTPATIENT)
Dept: RADIOLOGY | Facility: MEDICAL CENTER | Age: 60
DRG: 263 | End: 2025-07-09
Attending: NURSE PRACTITIONER
Payer: COMMERCIAL

## 2025-07-09 PROBLEM — R18.8 ASCITES: Status: ACTIVE | Noted: 2025-07-09

## 2025-07-09 PROBLEM — K70.31 ALCOHOLIC CIRRHOSIS OF LIVER WITH ASCITES (HCC): Status: ACTIVE | Noted: 2024-04-30

## 2025-07-09 LAB
25(OH)D3 SERPL-MCNC: 29 NG/ML (ref 30–100)
ALBUMIN FLD-MCNC: 0.2 G/DL
ALBUMIN SERPL BCP-MCNC: 2.4 G/DL (ref 3.2–4.9)
ALBUMIN/GLOB SERPL: 1.2 G/DL
ALP SERPL-CCNC: 88 U/L (ref 30–99)
ALT SERPL-CCNC: 25 U/L (ref 2–50)
ANION GAP SERPL CALC-SCNC: 11 MMOL/L (ref 7–16)
APPEARANCE FLD: NORMAL
AST SERPL-CCNC: 73 U/L (ref 12–45)
BILIRUB SERPL-MCNC: 18.7 MG/DL (ref 0.1–1.5)
BODY FLD TYPE: NORMAL
BODY FLD TYPE: NORMAL
BUN SERPL-MCNC: 18 MG/DL (ref 8–22)
CALCIUM ALBUM COR SERPL-MCNC: 8.8 MG/DL (ref 8.5–10.5)
CALCIUM SERPL-MCNC: 7.5 MG/DL (ref 8.5–10.5)
CHLORIDE SERPL-SCNC: 100 MMOL/L (ref 96–112)
CO2 SERPL-SCNC: 23 MMOL/L (ref 20–33)
COLOR FLD: YELLOW
CREAT SERPL-MCNC: 1.02 MG/DL (ref 0.5–1.4)
CYTOLOGY REG CYTOL: NORMAL
ERYTHROCYTE [DISTWIDTH] IN BLOOD BY AUTOMATED COUNT: 71.7 FL (ref 35.9–50)
GFR SERPLBLD CREATININE-BSD FMLA CKD-EPI: 63 ML/MIN/1.73 M 2
GLOBULIN SER CALC-MCNC: 2 G/DL (ref 1.9–3.5)
GLUCOSE SERPL-MCNC: 181 MG/DL (ref 65–99)
GRAM STN SPEC: NORMAL
HCT VFR BLD AUTO: 26.2 % (ref 37–47)
HGB BLD-MCNC: 8.1 G/DL (ref 12–16)
INR PPP: 2.62 (ref 0.87–1.13)
LYMPHOCYTES NFR FLD: 24 %
MAGNESIUM SERPL-MCNC: 1.7 MG/DL (ref 1.5–2.5)
MCH RBC QN AUTO: 26.3 PG (ref 27–33)
MCHC RBC AUTO-ENTMCNC: 30.9 G/DL (ref 32.2–35.5)
MCV RBC AUTO: 85.1 FL (ref 81.4–97.8)
MONOS+MACROS NFR FLD MANUAL: 18 %
NEUTROPHILS NFR FLD: 58 %
NUC CELL # FLD: 70 CELLS/UL
PHOSPHATE SERPL-MCNC: 2.9 MG/DL (ref 2.5–4.5)
PLATELET # BLD AUTO: 94 K/UL (ref 164–446)
PLATELETS.RETICULATED NFR BLD AUTO: 5.7 % (ref 0.6–13.1)
POTASSIUM SERPL-SCNC: 3.2 MMOL/L (ref 3.6–5.5)
PROT FLD-MCNC: 0.3 G/DL
PROT SERPL-MCNC: 4.4 G/DL (ref 6–8.2)
PROTHROMBIN TIME: 28.2 SEC (ref 12–14.6)
RBC # BLD AUTO: 3.08 M/UL (ref 4.2–5.4)
RBC # FLD: <2000 CELLS/UL
SIGNIFICANT IND 70042: NORMAL
SITE SITE: NORMAL
SODIUM SERPL-SCNC: 134 MMOL/L (ref 135–145)
SOURCE SOURCE: NORMAL
TSH SERPL DL<=0.005 MIU/L-ACNC: 0.6 UIU/ML (ref 0.38–5.33)
VIT B12 SERPL-MCNC: 2590 PG/ML (ref 211–911)
WBC # BLD AUTO: 6.7 K/UL (ref 4.8–10.8)

## 2025-07-09 PROCEDURE — 0W9G3ZZ DRAINAGE OF PERITONEAL CAVITY, PERCUTANEOUS APPROACH: ICD-10-PCS

## 2025-07-09 PROCEDURE — 99233 SBSQ HOSP IP/OBS HIGH 50: CPT | Performed by: INTERNAL MEDICINE

## 2025-07-09 PROCEDURE — 82105 ALPHA-FETOPROTEIN SERUM: CPT

## 2025-07-09 PROCEDURE — 84100 ASSAY OF PHOSPHORUS: CPT

## 2025-07-09 PROCEDURE — 700102 HCHG RX REV CODE 250 W/ 637 OVERRIDE(OP): Performed by: INTERNAL MEDICINE

## 2025-07-09 PROCEDURE — 82607 VITAMIN B-12: CPT

## 2025-07-09 PROCEDURE — P9047 ALBUMIN (HUMAN), 25%, 50ML: HCPCS | Mod: JZ | Performed by: INTERNAL MEDICINE

## 2025-07-09 PROCEDURE — 82042 OTHER SOURCE ALBUMIN QUAN EA: CPT

## 2025-07-09 PROCEDURE — 84443 ASSAY THYROID STIM HORMONE: CPT

## 2025-07-09 PROCEDURE — 700111 HCHG RX REV CODE 636 W/ 250 OVERRIDE (IP): Performed by: INTERNAL MEDICINE

## 2025-07-09 PROCEDURE — A9270 NON-COVERED ITEM OR SERVICE: HCPCS | Performed by: INTERNAL MEDICINE

## 2025-07-09 PROCEDURE — 700102 HCHG RX REV CODE 250 W/ 637 OVERRIDE(OP): Performed by: NURSE PRACTITIONER

## 2025-07-09 PROCEDURE — 74176 CT ABD & PELVIS W/O CONTRAST: CPT

## 2025-07-09 PROCEDURE — 84157 ASSAY OF PROTEIN OTHER: CPT

## 2025-07-09 PROCEDURE — 85055 RETICULATED PLATELET ASSAY: CPT

## 2025-07-09 PROCEDURE — A9270 NON-COVERED ITEM OR SERVICE: HCPCS | Performed by: STUDENT IN AN ORGANIZED HEALTH CARE EDUCATION/TRAINING PROGRAM

## 2025-07-09 PROCEDURE — 89051 BODY FLUID CELL COUNT: CPT

## 2025-07-09 PROCEDURE — 700105 HCHG RX REV CODE 258: Performed by: INTERNAL MEDICINE

## 2025-07-09 PROCEDURE — C1729 CATH, DRAINAGE: HCPCS

## 2025-07-09 PROCEDURE — 85027 COMPLETE CBC AUTOMATED: CPT

## 2025-07-09 PROCEDURE — 97166 OT EVAL MOD COMPLEX 45 MIN: CPT

## 2025-07-09 PROCEDURE — 99232 SBSQ HOSP IP/OBS MODERATE 35: CPT | Performed by: NURSE PRACTITIONER

## 2025-07-09 PROCEDURE — 700111 HCHG RX REV CODE 636 W/ 250 OVERRIDE (IP): Mod: JZ | Performed by: STUDENT IN AN ORGANIZED HEALTH CARE EDUCATION/TRAINING PROGRAM

## 2025-07-09 PROCEDURE — 87205 SMEAR GRAM STAIN: CPT

## 2025-07-09 PROCEDURE — 770006 HCHG ROOM/CARE - MED/SURG/GYN SEMI*

## 2025-07-09 PROCEDURE — 85610 PROTHROMBIN TIME: CPT

## 2025-07-09 PROCEDURE — 87015 SPECIMEN INFECT AGNT CONCNTJ: CPT

## 2025-07-09 PROCEDURE — 71045 X-RAY EXAM CHEST 1 VIEW: CPT

## 2025-07-09 PROCEDURE — 82306 VITAMIN D 25 HYDROXY: CPT

## 2025-07-09 PROCEDURE — 83735 ASSAY OF MAGNESIUM: CPT

## 2025-07-09 PROCEDURE — 87070 CULTURE OTHR SPECIMN AEROBIC: CPT

## 2025-07-09 PROCEDURE — 700102 HCHG RX REV CODE 250 W/ 637 OVERRIDE(OP): Performed by: STUDENT IN AN ORGANIZED HEALTH CARE EDUCATION/TRAINING PROGRAM

## 2025-07-09 PROCEDURE — 36415 COLL VENOUS BLD VENIPUNCTURE: CPT

## 2025-07-09 PROCEDURE — 88305 TISSUE EXAM BY PATHOLOGIST: CPT | Performed by: PATHOLOGY

## 2025-07-09 PROCEDURE — 80053 COMPREHEN METABOLIC PANEL: CPT

## 2025-07-09 PROCEDURE — A9270 NON-COVERED ITEM OR SERVICE: HCPCS | Performed by: NURSE PRACTITIONER

## 2025-07-09 PROCEDURE — 88112 CYTOPATH CELL ENHANCE TECH: CPT | Performed by: PATHOLOGY

## 2025-07-09 RX ORDER — ALBUMIN (HUMAN) 12.5 G/50ML
50 SOLUTION INTRAVENOUS ONCE
Status: COMPLETED | OUTPATIENT
Start: 2025-07-09 | End: 2025-07-09

## 2025-07-09 RX ORDER — OMEPRAZOLE 20 MG/1
40 CAPSULE, DELAYED RELEASE ORAL 2 TIMES DAILY
Status: DISCONTINUED | OUTPATIENT
Start: 2025-07-09 | End: 2025-07-10

## 2025-07-09 RX ORDER — SPIRONOLACTONE 100 MG/1
100 TABLET, FILM COATED ORAL
Status: DISCONTINUED | OUTPATIENT
Start: 2025-07-10 | End: 2025-07-13 | Stop reason: HOSPADM

## 2025-07-09 RX ORDER — POTASSIUM CHLORIDE 1500 MG/1
40 TABLET, EXTENDED RELEASE ORAL ONCE
Status: COMPLETED | OUTPATIENT
Start: 2025-07-09 | End: 2025-07-09

## 2025-07-09 RX ORDER — LACTULOSE 10 G/15ML
45 SOLUTION ORAL 3 TIMES DAILY
Status: DISCONTINUED | OUTPATIENT
Start: 2025-07-09 | End: 2025-07-13 | Stop reason: HOSPADM

## 2025-07-09 RX ORDER — SPIRONOLACTONE 25 MG/1
75 TABLET ORAL ONCE
Status: COMPLETED | OUTPATIENT
Start: 2025-07-09 | End: 2025-07-09

## 2025-07-09 RX ORDER — MAGNESIUM SULFATE HEPTAHYDRATE 40 MG/ML
2 INJECTION, SOLUTION INTRAVENOUS ONCE
Status: COMPLETED | OUTPATIENT
Start: 2025-07-09 | End: 2025-07-09

## 2025-07-09 RX ADMIN — ALBUMIN (HUMAN) 50 G: 0.25 INJECTION, SOLUTION INTRAVENOUS at 17:18

## 2025-07-09 RX ADMIN — SPIRONOLACTONE 75 MG: 25 TABLET ORAL at 12:52

## 2025-07-09 RX ADMIN — CEFTRIAXONE SODIUM 1000 MG: 10 INJECTION, POWDER, FOR SOLUTION INTRAVENOUS at 04:57

## 2025-07-09 RX ADMIN — SULFAMETHOXAZOLE AND TRIMETHOPRIM 1 TABLET: 800; 160 TABLET ORAL at 04:56

## 2025-07-09 RX ADMIN — LACTULOSE 30 ML: 10 SOLUTION ORAL at 04:56

## 2025-07-09 RX ADMIN — PANTOPRAZOLE SODIUM 40 MG: 40 INJECTION, POWDER, FOR SOLUTION INTRAVENOUS at 04:56

## 2025-07-09 RX ADMIN — FOLIC ACID 1 MG: 1 TABLET ORAL at 04:56

## 2025-07-09 RX ADMIN — Medication 100 MG: at 04:56

## 2025-07-09 RX ADMIN — FUROSEMIDE 60 MG: 10 INJECTION, SOLUTION INTRAVENOUS at 18:06

## 2025-07-09 RX ADMIN — SPIRONOLACTONE 25 MG: 25 TABLET ORAL at 04:56

## 2025-07-09 RX ADMIN — POTASSIUM CHLORIDE 40 MEQ: 1500 TABLET, EXTENDED RELEASE ORAL at 12:52

## 2025-07-09 RX ADMIN — OMEPRAZOLE 40 MG: 20 CAPSULE, DELAYED RELEASE ORAL at 17:15

## 2025-07-09 RX ADMIN — FUROSEMIDE 60 MG: 10 INJECTION, SOLUTION INTRAVENOUS at 04:56

## 2025-07-09 RX ADMIN — MAGNESIUM SULFATE HEPTAHYDRATE 2 G: 2 INJECTION, SOLUTION INTRAVENOUS at 12:56

## 2025-07-09 RX ADMIN — LACTULOSE 45 ML: 10 SOLUTION ORAL at 17:16

## 2025-07-09 ASSESSMENT — ENCOUNTER SYMPTOMS
SPEECH CHANGE: 0
PALPITATIONS: 0
NERVOUS/ANXIOUS: 0
SHORTNESS OF BREATH: 0
MYALGIAS: 0
NECK PAIN: 0
DIZZINESS: 0
COUGH: 0
VOMITING: 0
DOUBLE VISION: 0
ORTHOPNEA: 0
ABDOMINAL PAIN: 1
BACK PAIN: 1
WEIGHT LOSS: 0
HEMOPTYSIS: 0
CHILLS: 0
CLAUDICATION: 0
DIARRHEA: 0
PHOTOPHOBIA: 0
WEAKNESS: 1
SORE THROAT: 0
HEARTBURN: 0
FEVER: 0
DEPRESSION: 0
BLURRED VISION: 0
BLOOD IN STOOL: 0
CONSTIPATION: 0
SINUS PAIN: 0
NAUSEA: 0

## 2025-07-09 ASSESSMENT — COGNITIVE AND FUNCTIONAL STATUS - GENERAL
HELP NEEDED FOR BATHING: A LOT
PERSONAL GROOMING: A LITTLE
SUGGESTED CMS G CODE MODIFIER DAILY ACTIVITY: CK
TOILETING: A LITTLE
DRESSING REGULAR LOWER BODY CLOTHING: A LOT
DRESSING REGULAR UPPER BODY CLOTHING: A LITTLE
DAILY ACTIVITIY SCORE: 17

## 2025-07-09 ASSESSMENT — PAIN DESCRIPTION - PAIN TYPE: TYPE: ACUTE PAIN

## 2025-07-09 ASSESSMENT — FIBROSIS 4 INDEX: FIB4 SCORE: 9.26

## 2025-07-09 ASSESSMENT — ACTIVITIES OF DAILY LIVING (ADL): TOILETING: INDEPENDENT

## 2025-07-09 ASSESSMENT — LIFESTYLE VARIABLES: SUBSTANCE_ABUSE: 1

## 2025-07-09 NOTE — ASSESSMENT & PLAN NOTE
Due to liver cirrhosis  Paracentesis was done on 7/9 with removing 6.4 L received albumin after.  Fluid did not show infection  Continue spironolactone and Lasix

## 2025-07-09 NOTE — DISCHARGE PLANNING
Care Transition Team Assessment  LMSW met with pt to conduct assessment and verify demographics. Pt is AOX4 and provided all of the following information below. Pt was admitted on 7/5/25 for Acute GI bleeding. Please see pt's H&P for prior medical history.    Pt stated that she does not have a PCP.    Pt verified address on file. Pt lives with a roommate in a trailer with 5-6 steps to enter.    Pt's emergency contact on file is is her roommate Valentin Colin [phone].    Prior to admission pt was independent with ADLS and IADLS with with no use of DME. Pt does not use O2 at [facility].    Pt denies history of SNF/HH/IPR.Pt has history os SA.    Pt's preferred pharmacy is [facility] on [address].    Pt's insurance is [employer].    Upon DC pt has means of transportation home.     Information Source  Orientation Level: Oriented X4  Information Given By: Patient  Who is responsible for making decisions for patient? : Patient    Readmission Evaluation  Is this a readmission?: No    Elopement Risk  Legal Hold: No  Ambulatory or Self Mobile in Wheelchair: Yes  Disoriented: No  Psychiatric Symptoms: None  History of Wandering: No  Elopement this Admit: No  Vocalizing Wanting to Leave: No  Displays Behaviors, Body Language Wanting to Leave: No-Not at Risk for Elopement  Elopement Risk: Not at Risk for Elopement    Interdisciplinary Discharge Planning  Lives with - Patient's Self Care Capacity: Unrelated Adult, Significant Other  Patient or legal guardian wants to designate a caregiver: No  Support Systems: Friends / Neighbors, Spouse / Significant Other  Housing / Facility: [address]    Discharge Preparedness  What is your plan after discharge?: Home with help  What are your discharge supports?: Other (comment) (Roommate)  Prior Functional Level: Ambulatory, Independent with Activities of Daily Living, Independent with Medication Management  Difficulity with ADLs: None  Difficulity with IADLs: None    Functional  Assesment  Prior Functional Level: Ambulatory, Independent with Activities of Daily Living, Independent with Medication Management    Finances  Financial Barriers to Discharge: No  Prescription Coverage: Yes    Vision / Hearing Impairment  Vision Impairment : No  Hearing Impairment : Yes  Hearing Impairment: Both Ears, Hearing Device(s) Available    Advance Directive  Advance Directive?: None    Domestic Abuse  Possible Abuse/Neglect Reported to:: Not Applicable    Psychological Assessment  History of Substance Abuse: Alcohol  History of Psychiatric Problems: No  Non-compliant with Treatment: No  Newly Diagnosed Illness: No    Discharge Risks or Barriers  Discharge risks or barriers?: Substance abuse  Patient risk factors: Substance abuse    Anticipated Discharge Information  Discharge Disposition: Discharged to home/self care (01)  Discharge Address: [address] [place] [address]  Mark [place] [id number]  Discharge Contact Phone Number: [phone]

## 2025-07-09 NOTE — PROGRESS NOTES
Monitor Summary  Rhythm: Normal Sinus Rhythm  Rate: 69-84  Ectopy: None Noted   8 bts of vtach   .13 / .08 / .95

## 2025-07-09 NOTE — PROGRESS NOTES
Hospital Medicine Daily Progress Note    Date of Service  7/9/2025    Chief Complaint  Chiara Pacheco is a 59 y.o. female admitted 7/5/2025 with melena    Hospital Course      86-year-old female with history of alcoholic liver cirrhosis and varices bleeding who presented 7/5 with the bloody stool.  Patient has history of excessive drinking alcohol with admitting on 4/30-5/3 for variceal bleeding.  At that time, recommendation for BRTO/CARTO recommended by GI several times, however she did not want any further procedures or consultations done and wished to discharge.  Patient came with bloody stool and abdominal pain.  Patient had tachycardia and hemoglobin 6.4 on admission.  Started octreotide and PPI with ceftriaxone and GI was consulted. Patient underwent EGD 7/5.  This showed large gastric varices on the greater curvature of the stomach, large esophageal ulcer and portal hypertension.  Again recommending IR consult for CARTO.  CARTO completed 7/6.    Also patient was found to have volume overloaded, paracentesis was done on 7/9 with removing around 6 and half liter, Lasix IV and spironolactone were added.    Interval Problem Update  Evaluated and examined the patient at bedside, denied any new symptoms, no events last night.  Generalized weakness, PT and OT evaluation and check vitamin D and TSH.  - Alert and oriented x 4, continue lactulose  - Chest x-ray reviewed, still pulmonary edema and patient's overall volume, increased spironolactone to 100 mg daily and continue Lasix with fluid restriction  - Appreciate GI recommendation  -Replace potassium  -Paracentesis was done and removing around 60 ordered, albumin was given after.  - Refused to talk about the CODE STATUS and tell her boyfriend at bedside.    I have discussed this patient's plan of care and discharge plan at IDT rounds today with Case Management, Nursing, Nursing leadership, and other members of the IDT team.    Consultants/Specialty  GI and  interventional radiology    Code Status  Full Code    Disposition  The patient is not medically cleared for discharge to home or a post-acute facility.  Anticipate discharge to: skilled nursing facility    I have placed the appropriate orders for post-discharge needs.    Review of Systems  Review of Systems   Constitutional:  Positive for malaise/fatigue. Negative for chills, fever and weight loss.   HENT:  Negative for ear pain, hearing loss and tinnitus.    Eyes:  Negative for blurred vision, double vision and photophobia.   Respiratory:  Negative for cough, hemoptysis and shortness of breath.    Cardiovascular:  Negative for chest pain, palpitations, orthopnea, claudication and leg swelling.   Gastrointestinal:  Positive for abdominal pain. Negative for constipation, diarrhea, melena, nausea and vomiting.   Genitourinary:  Negative for dysuria, frequency and urgency.   Musculoskeletal:  Negative for myalgias and neck pain.   Skin:  Negative for rash.   Neurological:  Positive for weakness. Negative for dizziness and speech change.        Physical Exam  Temp:  [36.1 °C (97 °F)-36.7 °C (98.1 °F)] 36.3 °C (97.3 °F)  Pulse:  [68-99] 68  Resp:  [18-20] 18  BP: (107-140)/(66-89) 128/66  SpO2:  [90 %-97 %] 93 %    Physical Exam  Vitals and nursing note reviewed.   Constitutional:       General: She is sleeping. She is not in acute distress.     Appearance: Normal appearance. She is ill-appearing.   Cardiovascular:      Rate and Rhythm: Normal rate and regular rhythm.   Pulmonary:      Effort: Pulmonary effort is normal. No respiratory distress.      Breath sounds: Rales present. No wheezing.   Abdominal:      General: Bowel sounds are normal. There is distension.      Tenderness: There is abdominal tenderness.   Musculoskeletal:      Right lower leg: Edema present.      Left lower leg: Edema present.   Skin:     General: Skin is warm and dry.      Coloration: Skin is jaundiced.   Neurological:      Mental Status: She is  oriented to person, place, and time. She is lethargic.      Motor: Weakness present.   Psychiatric:         Mood and Affect: Mood normal.         Behavior: Behavior normal.         Fluids    Intake/Output Summary (Last 24 hours) at 7/9/2025 1634  Last data filed at 7/9/2025 0505  Gross per 24 hour   Intake 760 ml   Output --   Net 760 ml        Laboratory  Recent Labs     07/07/25  0115 07/07/25  0905 07/08/25  0806 07/09/25  0627   WBC 12.6*  --  9.1 6.7   RBC 3.17*  --  3.23* 3.08*   HEMOGLOBIN 8.1* 8.4* 8.3* 8.1*   HEMATOCRIT 26.3* 28.0* 27.6* 26.2*   MCV 83.0  --  85.4 85.1   MCH 25.6*  --  25.7* 26.3*   MCHC 30.8*  --  30.1* 30.9*   RDW 63.7*  --  67.6* 71.7*   PLATELETCT 93*  --  101* 94*     Recent Labs     07/07/25  0115 07/08/25  0806 07/09/25  0627   SODIUM 136 134* 134*   POTASSIUM 4.3 3.3* 3.2*   CHLORIDE 105 102 100   CO2 22 22 23   GLUCOSE 116* 128* 181*   BUN 20 18 18   CREATININE 0.69 0.74 1.02   CALCIUM 7.6* 7.7* 7.5*     Recent Labs     07/07/25  0115 07/09/25  0625   INR 2.23* 2.62*               Imaging  US-PARACENTESIS, ABD WITH IMAGING   Final Result      1. Ultrasound-guided diagnostic and therapeutic paracentesis of the right/midline lower quadrant/pelvis.      2. 6450 mL of fluid withdrawn.      DX-CHEST-LIMITED (1 VIEW)   Final Result         1.  [Pulmonary changes, new since prior study.      [id number] VIEW   Final Result      1.  Normal bowel gas pattern.   2.  Small left pleural effusion.   3.  Atherosclerosis.      US-ABDOMEN LTD (SOFT TISSUE)   Final Result      Small amount of ascites noted in the abdomen, paracentesis deferred.         IR-MIDLINE CATHETER INSERTION WO GUIDANCE > AGE 3   Final Result                  Ultrasound-guided midline placement performed by qualified nursing staff    as above.          US-ABDOMEN COMPLETE SURVEY   Final Result         1.  Common bile duct dilatation, consider causes of biliary obstruction with additional workup as clinically appropriate.    2.  Nodular hepatic contour compatible with changes of cirrhosis.   3.  Ascites   4.  Splenomegaly.      IR-EMBOLIZATION   Final Result      1.  Left renal venogram demonstrating conventional anatomy.   2.  Gastrorenal shunt venogram demonstrating a markedly enlarged gastric variceal complex.   3.  Successful coil embolization of the gastrorenal shunt vessel.   4.  Successful Gelfoam embolization of gastric varices.      DX-CHEST-PORTABLE (1 VIEW)   Final Result         1.  No acute cardiopulmonary disease.      [id number]    (Results Pending)        Assessment/Plan  * Alcoholic cirrhosis of liver with ascites (HCC)- (present on admission)  Assessment & Plan  With decompensation with GI bleeding, volume overloaded and hepatic encephalopathy   due to alcoholism MELD: 28 and elevated bilirubin  CT scan showed signs of cirrhosis  Started spironolactone and continue IV Lasix  Not able to get paracentesis due to no any fluid  Continue lactulose, will consider rifaximin if there is no improving  Encourage the patient to avoid drinking alcohol  Appreciate GI recommendation.  PT and OT    Ascites  Assessment & Plan  Due to liver cirrhosis  Paracentesis was done on 7/9 with removing 6.4 L received albumin after.  Waiting for fluid analysis  Continue spironolactone and Lasix    ACP (advance care planning)  Assessment & Plan  I had a long conversation with patient today regarding her alcohol consumption.  She states that she is living with roommates that are alcoholics as well.  It is a triggering and stressful situation.  I explained to patient that if she continues to consume alcohol this will lead to fulminant liver failure, cardiovascular and renal failure and ultimately patient's untimely death.  Patient states understanding.  She is looking for ways to discontinue alcohol consumption.  She is going to reach out to her dad to see if she is able to go live with him to help maintain her sobriety.  ACP time spent 20  minutes.    Leukocytosis- (present on admission)  Assessment & Plan  WBC 10.7 --> 12.6  Continue labs daily  Ceftriaxone for prophylaxis    Acute GI bleeding- (present on admission)  Assessment & Plan  Due to portal hypertension and variceal bleeding and ulcer  EGD 7/5:  large gastric varices on the greater curvature of the stomach, large esophageal ulcer and portal hypertension.  S/p 6/7 CARTO   Finish course of octreotide 72 hours  PPI twice daily  Advance diet as tolerated  Received 5 days of ceftriaxone  Monitor hemoglobin daily    Alcohol abuse- (present on admission)  Assessment & Plan  Continue to encourage cessation    Bleeding gastric varices- (present on admission)  Assessment & Plan  Underwent CARTO   Hemoglobin stable  PPI    Hypoxia- (present on admission)  Assessment & Plan  Secondary to volume overload  Continue IV Lasix and add spironolactone    Metabolic acidosis- (present on admission)  Assessment & Plan  Improving           VTE prophylaxis:   SCDs/TEDs   heparin ppx      I have performed a physical exam and reviewed and updated ROS and Plan today (7/9/2025). In review of yesterday's note (7/8/2025), there are no changes except as documented above.    Patient is has a high medical complexity, complex decision making and is at high risk for complication, morbidity, and mortality.  I spent 65 minutes, reviewing the chart, obtaining and/or reviewing separately obtained history. Performing a medically appropriate examination and evaluation.  Counseling and educating the patient. Ordering and reviewing medications, tests, or procedures.   Documenting clinical information in EPIC. Independently interpreting results and communicating results to patient. Discussing future disposition of care with patient, RN and case management.

## 2025-07-09 NOTE — PROGRESS NOTES
Bedside report received from off going RN Suly, assumed care of patient.     Fall Risk Score: MODERATE RISK  Fall risk interventions in place: Provide patient/family education based on risk assessment, Educate patient/family to call staff for assistance when getting out of bed, Place fall precaution signage outside patient door, Place patient in room close to nursing station, and Utilize bed/chair fall alarm  Bed type: Regular (Jefferson Score less than 17 interventions in place)  Patient on cardiac monitor: Yes  IVF/IV medications: Not Applicable   Oxygen: How many liters 3L  Bedside sitter: Not Applicable   Isolation: Not applicable

## 2025-07-09 NOTE — CARE PLAN
The patient is Stable - Low risk of patient condition declining or worsening    Shift Goals  Clinical Goals: monitor hgb  Patient Goals: comfort  Family Goals: MARTINE    Progress made toward(s) clinical / shift goals:      Patient is not progressing towards the following goals:    Problem: Knowledge Deficit - Standard  Goal: Patient and family/care givers will demonstrate understanding of plan of care, disease process/condition, diagnostic tests and medications  Outcome: Progressing  Note: Discuss and review POC with patient. Re-educate as needed.      Problem: Fall Risk  Goal: Patient will remain free from falls  Outcome: Progressing  Note: Fall risk assessment complete. Fall precautions implemented; bed alarm on, patient wearing non-slip socks, call light within reach, hourly rounding in progress, and tolieting needs assessed.       Problem: Respiratory:  Goal: Respiratory status will improve  Outcome: Progressing  Note: Assess and monitor pulmonary status. Adjust oxygen as needed to maintain adequate saturation. Encourage ambulation, turning, coughing and deep breathing.

## 2025-07-09 NOTE — PROGRESS NOTES
..Gastroenterology Progress Note               Author:  TED Beck   Date & Time Created: 7/9/2025 12:41 PM       Patient ID:  Name:             Chiara Pacheco  YOB: 1965  Age:                 59 y.o.  female  MRN:               [id number]    Medical Decision Making, by Problem:  Active Hospital Problems    Diagnosis     ACP (advance care planning) [Z71.89]     Acute GI bleeding [K92.2]     Leukocytosis [D72.829]     Cirrhosis (HCC) [K74.60]     Alcohol abuse [F10.10]     Hypoxia [R09.02]     Metabolic acidosis [E87.20]      Presenting Chief Complaint:  hematemesis     HISTORY OF PRESENT ILLNESS: history per chart as she isn't fully answering questions when interviewed  Chiara Pacheco is a 59 y.o. female who presents to the emergency department today for evaluation of hematemesis.  She has a past medical history significant for alcoholic cirrhosis, was hospitalized a little over a year ago for hematemesis.  EGD on 2/20/2023 showed Gastric varix, portal hypertension gastropathy, gastric ulcer and esophagitis, not bleeding, Esmer enrique tear, clip still in place and no bleeding EGD on 9/23/2023 showed:small esophageal varices, varix with erosion and stigmata of recent bleeding, banded x 1, gastric varix without stigmata, large amount of blood in stomach precluding complete exam      Due to gastric varices, interventional radiology consultation was recommended by gastroenterology for BRTO/CARTO procedure, however patient declined, stated that she did not want any further intervention or procedures.  She was provided with outpatient follow-up information, on my discussion today she did not call IR, and never scheduled follow-up for these issues.     She continues to use alcohol, at midnight she began to have recurrent hematemesis.  She states that she has stopped all NSAID use.  She does have some mild epigastric pain as well.  She also reports dark stools for a few weeks,  uncertain as to the exact duration of time.  She does not report any recent fevers, no other abnormal bleeding or bruising. She had dark vomitus times one while in hospital.     Interval History:  7/5/2025: EGD:   Esophagus: large esophageal ulcer from 36 to 40 cm. Not bleeding but prominent. Minimal to no esophageal varices  Stomach: stomach full of blood, large gastric varices, source of bleeding. Also portal hypertensive gastropathy that is oozing minimally.   Duodenum: normal bulb and second portion of duodenum    7/6/2025: Patient resting comfortably.  N.p.o. pending Carto with IR today.  Hgb 8.5  Last bloody BM yesterday, also having blood in urine.     MELD 3.0-28 points  Child Class C    7/7/2025: Patient seen. Feeling better, more alert. Had several brown bowel movements and still having hematuria. Underwent Carto yesterday. Hgb 8.1.     MELD 3.0 - 28 points    7/8/2025: PPD#2 s/p CARTO with IR. Sleeping quietly, arouses appropriately. No BM overnight- last at 7/7/2025 at 1500. Reports abdominal bloating, inability to lay on back due to pain. Hypoactive bowel sounds.  KUB ordered.  Tolerating clear liquids. Abd ultrasound 7/7/2025 with CBD dilation at 7.3. gallbladder not seen well due to ascites, scattered ascites, nodule changes of cirrhosis, splenomegaly. IVC and hepatic veins patent.  Abd soft tissue US 7/8 showing small amount of ascites, paracentesis deferred.   Na 134, K 3.3, BUN 18, Cr 0.74, Ca 8.9, AST 79, ALt 24, AP 88, Tbili 18.4, albumin 2.5, mag 1.8    Update 1652: KUB neg for obstructive process.     7/9/2025: Patient seen at bedside.  Sleeping quietly but arouses apparently.  Bloody bowel movement x 3 overnight.  Patient reports ongoing back pain, abdominal pain/fullness.  Dullness to percussion. Repeat paracentesis ordered to attempt. WBC 6.7, hgb 8.1, plt 94. Na 134, K 3.2, BUN 18 Cr 1.02, AST 73, ALT 25, AP 88, Tbili 18.7, albumin 2.4. INR 2.62    MELD 3.0; 32 63.5% estimated 90 day  survival   Child-Jimenez Class C    Hospital Medications:  Current Facility-Administered Medications   Medication Dose Frequency Provider Last Rate Last Admin    [START ON 7/10/2025] spironolactone (Aldactone) tablet 100 mg  100 mg Q DAY Serafin Hernandez M.D.        omeprazole (PriLOSEC) capsule 40 mg  40 mg BID Serafin Hernandez M.D.        lactulose 20 GM/30ML solution 45 mL  45 mL TID Serafin Hernandez M.D.        spironolactone (Aldactone) tablet 75 mg  75 mg Once Serafin Hernandez M.D.        potassium chloride SA (Kdur) tablet 40 mEq  40 mEq Once Serafin Hernandez M.D.        magnesium sulfate IVPB premix 2 g  2 g Once Serafin Hernandez M.D.        furosemide (Lasix) injection 60 mg  60 mg BID Lisandra Enriquez M.D.   60 mg at 07/09/25 0456    labetalol (Normodyne/Trandate) injection 10 mg  10 mg Q4HRS PRN BAL Cruz.O.        ondansetron (Zofran) syringe/vial injection 4 mg  4 mg Q4HRS PRN ROSALBA CruzO.   4 mg at 07/05/25 0848    ondansetron (Zofran ODT) dispertab 4 mg  4 mg Q4HRS PRN ROSALBA CruzO.        promethazine (Phenergan) tablet 12.5-25 mg  12.5-25 mg Q4HRS PRN ROSALBA CruzO.        promethazine (Phenergan) suppository 12.5-25 mg  12.5-25 mg Q4HRS PRN ROSALBA CruzO.        prochlorperazine (Compazine) injection 5-10 mg  5-10 mg Q4HRS PRN ROSALBA CruzO.   10 mg at 07/05/25 1041    folic acid (Folvite) tablet 1 mg  1 mg DAILY BAL Cruz.O.   1 mg at 07/09/25 0456    thiamine (Vitamin B-1) tablet 100 mg  100 mg DAILY BAL Cruz.O.   100 mg at 07/09/25 0456   Last reviewed on 7/5/2025  3:58 PM by Ca Viera R.N.       Review of Systems:  Review of Systems   Constitutional:  Negative for chills, fever and malaise/fatigue.   HENT:  Negative for sinus pain and sore throat.    Respiratory:  Negative for cough and shortness of breath.    Cardiovascular:  Positive for leg swelling. Negative for chest pain.   Gastrointestinal:  Positive for  "abdominal pain. Negative for blood in stool, constipation, diarrhea, heartburn, melena, nausea and vomiting.   Genitourinary:  Negative for dysuria and hematuria.   Musculoskeletal:  Positive for back pain.   Skin:  Negative for rash.   Neurological:  Positive for weakness. Negative for dizziness.   Psychiatric/Behavioral:  Positive for substance abuse. Negative for depression. The patient is not nervous/anxious.    All other systems reviewed and are negative.    Vital signs:  Weight/BMI: Body mass index is 29.85 kg/m².  /75   Pulse 70   Temp 36.7 °C (98.1 °F) (Temporal)   Resp 18   Ht 1.753 m (5' 9\")   Wt 91.7 kg (202 lb 2.6 oz)   SpO2 97%   Vitals:    07/08/25 0435 07/09/25 0443 07/09/25 0737 07/09/25 0747   BP:  122/70 107/67 117/75   Pulse:  96 99 70   Resp:  20 18 18   Temp:  36.3 °C (97.3 °F) 36.3 °C (97.3 °F) 36.7 °C (98.1 °F)   TempSrc:  Temporal Temporal Temporal   SpO2:  94% 92% 97%   Weight: 102 kg (225 lb 5 oz) 91.7 kg (202 lb 2.6 oz)     Height:         Oxygen Therapy:  Pulse Oximetry: 97 %, O2 (LPM): 3, O2 Delivery Device: Nasal Cannula    Intake/Output Summary (Last 24 hours) at 7/9/2025 1241  Last data filed at 7/9/2025 0505  Gross per 24 hour   Intake 760 ml   Output --   Net 760 ml       Physical Exam  Vitals and nursing note reviewed.   Constitutional:       General: She is awake. She is not in acute distress.     Appearance: She is ill-appearing.   HENT:      Head: Normocephalic and atraumatic.      Nose: Nose normal. No congestion.      Mouth/Throat:      Mouth: Mucous membranes are moist.      Pharynx: Oropharynx is clear. No oropharyngeal exudate.   Eyes:      General: Scleral icterus present.   Cardiovascular:      Rate and Rhythm: Normal rate and regular rhythm.      Pulses: Normal pulses.      Heart sounds: Normal heart sounds.   Pulmonary:      Effort: Pulmonary effort is normal. No respiratory distress.      Breath sounds: Normal breath sounds.   Abdominal:      General: There " is distension.      Tenderness: There is no abdominal tenderness. There is no guarding.      Comments: Hypoactive throughout  Dullness to percussion   Musculoskeletal:         General: Normal range of motion.      Cervical back: Normal range of motion.      Right lower leg: Edema present.      Left lower leg: Edema present.   Skin:     General: Skin is warm and dry.      Capillary Refill: Capillary refill takes less than 2 seconds.      Coloration: Skin is jaundiced.   Neurological:      Mental Status: She is alert, oriented to person, place, and time and easily aroused.      Motor: Weakness present.   Psychiatric:         Mood and Affect: Mood normal.         Behavior: Behavior normal. Behavior is cooperative.         Labs:  Recent Labs     07/07/25 0115 07/08/25 0806 07/09/25 0627   SODIUM 136 134* 134*   POTASSIUM 4.3 3.3* 3.2*   CHLORIDE 105 102 100   CO2 22 22 23   BUN 20 18 18   CREATININE 0.69 0.74 1.02   MAGNESIUM 1.8 1.8 1.7   PHOSPHORUS 2.3*  --  2.9   CALCIUM 7.6* 7.7* 7.5*     Recent Labs     07/07/25 0115 07/08/25  0806 07/09/25 0627   ALTSGPT 15 24 25   ASTSGOT 48* 79* 73*   ALKPHOSPHAT 77 88 88   TBILIRUBIN 10.4* 18.4* 18.7*   DBILIRUBIN 6.0*  --   --    GLUCOSE 116* 128* 181*     Recent Labs     07/07/25 0115 07/08/25  0806 07/09/25 0627   WBC 12.6* 9.1 6.7   ASTSGOT 48* 79* 73*   ALTSGPT 15 24 25   ALKPHOSPHAT 77 88 88   TBILIRUBIN 10.4* 18.4* 18.7*     Recent Labs     07/07/25  0115 07/07/25  0905 07/08/25  0806 07/09/25 0625 07/09/25  0627   RBC 3.17*  --  3.23*  --  3.08*   HEMOGLOBIN 8.1* 8.4* 8.3*  --  8.1*   HEMATOCRIT 26.3* 28.0* 27.6*  --  26.2*   PLATELETCT 93*  --  101*  --  94*   PROTHROMBTM 24.8*  --   --  28.2*  --    INR 2.23*  --   --  2.62*  --      Recent Results (from the past 24 hours)   Prothrombin Time    Collection Time: 07/09/25  6:25 AM   Result Value Ref Range    PT 28.2 (H) 12.0 - 14.6 sec    INR 2.62 (H) 0.87 - 1.13   CBC WITHOUT DIFFERENTIAL    Collection Time:  07/09/25  6:27 AM   Result Value Ref Range    WBC 6.7 4.8 - 10.8 K/uL    RBC 3.08 (L) 4.20 - 5.40 M/uL    Hemoglobin 8.1 (L) 12.0 - 16.0 g/dL    Hematocrit 26.2 (L) 37.0 - 47.0 %    MCV 85.1 81.4 - 97.8 fL    MCH 26.3 (L) 27.0 - 33.0 pg    MCHC 30.9 (L) 32.2 - 35.5 g/dL    RDW 71.7 (H) 35.9 - 50.0 fL    Platelet Count 94 (L) 164 - 446 K/uL   Comp Metabolic Panel    Collection Time: 07/09/25  6:27 AM   Result Value Ref Range    Sodium 134 (L) 135 - 145 mmol/L    Potassium 3.2 (L) 3.6 - 5.5 mmol/L    Chloride 100 96 - 112 mmol/L    Co2 23 20 - 33 mmol/L    Anion Gap 11.0 7.0 - 16.0    Glucose 181 (H) 65 - 99 mg/dL    Bun 18 8 - 22 mg/dL    Creatinine 1.02 0.50 - 1.40 mg/dL    Calcium 7.5 (L) 8.5 - 10.5 mg/dL    Correct Calcium 8.8 8.5 - 10.5 mg/dL    AST(SGOT) 73 (H) 12 - 45 U/L    ALT(SGPT) 25 2 - 50 U/L    Alkaline Phosphatase 88 30 - 99 U/L    Total Bilirubin 18.7 (H) 0.1 - 1.5 mg/dL    Albumin 2.4 (L) 3.2 - 4.9 g/dL    Total Protein 4.4 (L) 6.0 - 8.2 g/dL    Globulin 2.0 1.9 - 3.5 g/dL    A-G Ratio 1.2 g/dL   MAGNESIUM    Collection Time: 07/09/25  6:27 AM   Result Value Ref Range    Magnesium 1.7 1.5 - 2.5 mg/dL   PHOSPHORUS    Collection Time: 07/09/25  6:27 AM   Result Value Ref Range    Phosphorus 2.9 2.5 - 4.5 mg/dL   IMMATURE PLT FRACTION    Collection Time: 07/09/25  6:27 AM   Result Value Ref Range    Imm. Plt Fraction 5.7 0.6 - 13.1 %   VITAMIN D,25 HYDROXY (DEFICIENCY)    Collection Time: 07/09/25  6:27 AM   Result Value Ref Range    25-Hydroxy   Vitamin D 25 29 (L) 30 - 100 ng/mL   TSH WITH REFLEX TO FT4    Collection Time: 07/09/25  6:27 AM   Result Value Ref Range    TSH 0.599 0.380 - 5.330 uIU/mL   VITAMIN B12    Collection Time: 07/09/25  6:27 AM   Result Value Ref Range    Vitamin B12 -True Cobalamin 2590 (H) 211 - 911 pg/mL   ESTIMATED GFR    Collection Time: 07/09/25  6:27 AM   Result Value Ref Range    GFR (CKD-EPI) 63 >60 mL/min/1.73 m 2       Radiology Review:  DX-CHEST-LIMITED (1 VIEW)    Final Result         1.  [Pulmonary changes, new since prior study.      SW-VAARHGX-3 VIEW   Final Result      1.  Normal bowel gas pattern.   2.  Small left pleural effusion.   3.  Atherosclerosis.      US-ABDOMEN LTD (SOFT TISSUE)   Final Result      Small amount of ascites noted in the abdomen, paracentesis deferred.         IR-MIDLINE CATHETER INSERTION WO GUIDANCE > AGE 3   Final Result                  Ultrasound-guided midline placement performed by qualified nursing staff    as above.          US-ABDOMEN COMPLETE SURVEY   Final Result         1.  Common bile duct dilatation, consider causes of biliary obstruction with additional workup as clinically appropriate.   2.  Nodular hepatic contour compatible with changes of cirrhosis.   3.  Ascites   4.  Splenomegaly.      IR-EMBOLIZATION   Final Result      1.  Left renal venogram demonstrating conventional anatomy.   2.  Gastrorenal shunt venogram demonstrating a markedly enlarged gastric variceal complex.   3.  Successful coil embolization of the gastrorenal shunt vessel.   4.  Successful Gelfoam embolization of gastric varices.      DX-CHEST-PORTABLE (1 VIEW)   Final Result         1.  No acute cardiopulmonary disease.      [id number]    (Results Pending)   US-PARACENTESIS, ABD WITH IMAGING    (Results Pending)         MDM (Data Review):   -Records reviewed and summarized in current documentation  -I personally reviewed and interpreted the laboratory results  -I personally reviewed the radiology images    Assessment/Recommendations:  Hematemesis  Melena  Alcohol abuse disorder  Cirrhosis secondary to alcohol  Jaundice  Hyperbilirubinemia  9.5-10.4-18.4-18.7. suspect multifactorial alcohol cirrhosis, hepatitis C, ongoing alcohol intake with last drink ~7/4/2025. LFTs in nonobstructive pattern.   Anemia due to GI blood loss  Hepatic encephalopathy   Gastric varices  Esophageal ulcer  Portal hypertensive gastropathy  Hematuria   Hx of hepatitis C (2023)      Recommendations:  PPI BID  Lactulose titrated to 2-3 bowel movements a day  Trend H/H and transfuse for hemoglobin <7  Consider evaluation of hematuria deferred to primary team  Recommend Diuretics at a ratio of 100 mg spironolactone to furosemide 40 mg  Okay for low-sodium diet as tolerated  AFP ordered  Re-attempt paracentesis- orders placed    GI will follow    Discussed with patient, Dr. Hernandez, Dr. Al    ..Mercy Laurent, ESTELLA.P.R.N.    Core Quality Measures   Reviewed items::  Labs, Medications and Radiology reports reviewed

## 2025-07-09 NOTE — CARE PLAN
The patient is Stable - Low risk of patient condition declining or worsening    Shift Goals  Clinical Goals: monitor labs, VS  Patient Goals: comfort  Family Goals: MARTINE    Progress made toward(s) clinical / shift goals:    Problem: Knowledge Deficit - Standard  Goal: Patient and family/care givers will demonstrate understanding of plan of care, disease process/condition, diagnostic tests and medications  Outcome: Progressing     Problem: Fall Risk  Goal: Patient will remain free from falls  Outcome: Progressing     Problem: Pain - Standard  Goal: Alleviation of pain or a reduction in pain to the patient’s comfort goal  Outcome: Progressing     Problem: Safety  Goal: Will remain free from injury  Outcome: Progressing  Goal: Will remain free from falls  Outcome: Progressing     Problem: Venous Thromboembolism (VTW)/Deep Vein Thrombosis (DVT) Prevention:  Goal: Patient will participate in Venous Thrombosis (VTE)/Deep Vein Thrombosis (DVT)Prevention Measures  Outcome: Progressing     Problem: Psychosocial Needs:  Goal: Level of anxiety will decrease  Outcome: Progressing     Problem: Fluid Volume:  Goal: Will maintain balanced intake and output  Outcome: Progressing     Problem: Respiratory:  Goal: Respiratory status will improve  Outcome: Progressing

## 2025-07-10 ENCOUNTER — ANESTHESIA EVENT (OUTPATIENT)
Dept: SURGERY | Facility: MEDICAL CENTER | Age: 60
DRG: 263 | End: 2025-07-10
Payer: COMMERCIAL

## 2025-07-10 PROBLEM — K76.6 PORTAL HYPERTENSION (HCC): Status: ACTIVE | Noted: 2025-07-10

## 2025-07-10 LAB
ABO GROUP BLD: NORMAL
ALBUMIN SERPL BCP-MCNC: 2.7 G/DL (ref 3.2–4.9)
AMMONIA PLAS-SCNC: 30 UMOL/L (ref 11–45)
ANION GAP SERPL CALC-SCNC: 12 MMOL/L (ref 7–16)
BARCODED ABORH UBTYP: 5100
BARCODED PRD CODE UBPRD: NORMAL
BARCODED UNIT NUM UBUNT: NORMAL
BLD GP AB SCN SERPL QL: NORMAL
BUN SERPL-MCNC: 17 MG/DL (ref 8–22)
CALCIUM ALBUM COR SERPL-MCNC: 8.6 MG/DL (ref 8.5–10.5)
CALCIUM SERPL-MCNC: 7.6 MG/DL (ref 8.5–10.5)
CHLORIDE SERPL-SCNC: 99 MMOL/L (ref 96–112)
CO2 SERPL-SCNC: 24 MMOL/L (ref 20–33)
COMPONENT R 8504R: NORMAL
CREAT SERPL-MCNC: 1.01 MG/DL (ref 0.5–1.4)
ERYTHROCYTE [DISTWIDTH] IN BLOOD BY AUTOMATED COUNT: 78.6 FL (ref 35.9–50)
GFR SERPLBLD CREATININE-BSD FMLA CKD-EPI: 64 ML/MIN/1.73 M 2
GLUCOSE SERPL-MCNC: 173 MG/DL (ref 65–99)
HCT VFR BLD AUTO: 22.7 % (ref 37–47)
HCT VFR BLD AUTO: 29.4 % (ref 37–47)
HGB BLD-MCNC: 6.9 G/DL (ref 12–16)
HGB BLD-MCNC: 9.1 G/DL (ref 12–16)
HOLDING TUBE BB 8507: NORMAL
MAGNESIUM SERPL-MCNC: 1.9 MG/DL (ref 1.5–2.5)
MCH RBC QN AUTO: 25.9 PG (ref 27–33)
MCHC RBC AUTO-ENTMCNC: 30.4 G/DL (ref 32.2–35.5)
MCV RBC AUTO: 85.3 FL (ref 81.4–97.8)
PHOSPHATE SERPL-MCNC: 2.5 MG/DL (ref 2.5–4.5)
PLATELET # BLD AUTO: 80 K/UL (ref 164–446)
PLATELETS.RETICULATED NFR BLD AUTO: 6.6 % (ref 0.6–13.1)
POTASSIUM SERPL-SCNC: 3.4 MMOL/L (ref 3.6–5.5)
PRODUCT TYPE UPROD: NORMAL
RBC # BLD AUTO: 2.66 M/UL (ref 4.2–5.4)
RH BLD: NORMAL
SODIUM SERPL-SCNC: 135 MMOL/L (ref 135–145)
UNIT STATUS USTAT: NORMAL
WBC # BLD AUTO: 4.9 K/UL (ref 4.8–10.8)

## 2025-07-10 PROCEDURE — 700102 HCHG RX REV CODE 250 W/ 637 OVERRIDE(OP)

## 2025-07-10 PROCEDURE — 86923 COMPATIBILITY TEST ELECTRIC: CPT

## 2025-07-10 PROCEDURE — 86850 RBC ANTIBODY SCREEN: CPT

## 2025-07-10 PROCEDURE — 82140 ASSAY OF AMMONIA: CPT

## 2025-07-10 PROCEDURE — 99497 ADVNCD CARE PLAN 30 MIN: CPT

## 2025-07-10 PROCEDURE — 700105 HCHG RX REV CODE 258: Performed by: PHYSICIAN ASSISTANT

## 2025-07-10 PROCEDURE — 80069 RENAL FUNCTION PANEL: CPT

## 2025-07-10 PROCEDURE — A9270 NON-COVERED ITEM OR SERVICE: HCPCS

## 2025-07-10 PROCEDURE — 85018 HEMOGLOBIN: CPT

## 2025-07-10 PROCEDURE — 85055 RETICULATED PLATELET ASSAY: CPT

## 2025-07-10 PROCEDURE — 97162 PT EVAL MOD COMPLEX 30 MIN: CPT

## 2025-07-10 PROCEDURE — 86901 BLOOD TYPING SEROLOGIC RH(D): CPT

## 2025-07-10 PROCEDURE — P9016 RBC LEUKOCYTES REDUCED: HCPCS

## 2025-07-10 PROCEDURE — A9270 NON-COVERED ITEM OR SERVICE: HCPCS | Performed by: INTERNAL MEDICINE

## 2025-07-10 PROCEDURE — 86900 BLOOD TYPING SEROLOGIC ABO: CPT

## 2025-07-10 PROCEDURE — 700111 HCHG RX REV CODE 636 W/ 250 OVERRIDE (IP): Mod: JZ | Performed by: STUDENT IN AN ORGANIZED HEALTH CARE EDUCATION/TRAINING PROGRAM

## 2025-07-10 PROCEDURE — 36415 COLL VENOUS BLD VENIPUNCTURE: CPT

## 2025-07-10 PROCEDURE — 85027 COMPLETE CBC AUTOMATED: CPT

## 2025-07-10 PROCEDURE — 99233 SBSQ HOSP IP/OBS HIGH 50: CPT | Performed by: INTERNAL MEDICINE

## 2025-07-10 PROCEDURE — 36430 TRANSFUSION BLD/BLD COMPNT: CPT

## 2025-07-10 PROCEDURE — 700102 HCHG RX REV CODE 250 W/ 637 OVERRIDE(OP): Performed by: INTERNAL MEDICINE

## 2025-07-10 PROCEDURE — 85014 HEMATOCRIT: CPT

## 2025-07-10 PROCEDURE — 83735 ASSAY OF MAGNESIUM: CPT

## 2025-07-10 PROCEDURE — 770006 HCHG ROOM/CARE - MED/SURG/GYN SEMI*

## 2025-07-10 PROCEDURE — 700111 HCHG RX REV CODE 636 W/ 250 OVERRIDE (IP): Performed by: INTERNAL MEDICINE

## 2025-07-10 PROCEDURE — 99254 IP/OBS CNSLTJ NEW/EST MOD 60: CPT

## 2025-07-10 PROCEDURE — 99232 SBSQ HOSP IP/OBS MODERATE 35: CPT | Performed by: NURSE PRACTITIONER

## 2025-07-10 RX ORDER — SODIUM CHLORIDE 9 MG/ML
INJECTION, SOLUTION INTRAVENOUS CONTINUOUS
Status: DISCONTINUED | OUTPATIENT
Start: 2025-07-10 | End: 2025-07-10

## 2025-07-10 RX ORDER — PANTOPRAZOLE SODIUM 40 MG/10ML
40 INJECTION, POWDER, LYOPHILIZED, FOR SOLUTION INTRAVENOUS 2 TIMES DAILY
Status: DISCONTINUED | OUTPATIENT
Start: 2025-07-10 | End: 2025-07-13 | Stop reason: HOSPADM

## 2025-07-10 RX ORDER — HYDROXYZINE HYDROCHLORIDE 50 MG/1
25 TABLET, FILM COATED ORAL 3 TIMES DAILY PRN
Status: DISCONTINUED | OUTPATIENT
Start: 2025-07-10 | End: 2025-07-13 | Stop reason: HOSPADM

## 2025-07-10 RX ORDER — POTASSIUM CHLORIDE 1500 MG/1
40 TABLET, EXTENDED RELEASE ORAL ONCE
Status: COMPLETED | OUTPATIENT
Start: 2025-07-10 | End: 2025-07-10

## 2025-07-10 RX ADMIN — SODIUM CHLORIDE: 9 INJECTION, SOLUTION INTRAVENOUS at 03:24

## 2025-07-10 RX ADMIN — PANTOPRAZOLE SODIUM 40 MG: 40 INJECTION, POWDER, FOR SOLUTION INTRAVENOUS at 18:34

## 2025-07-10 RX ADMIN — PANTOPRAZOLE SODIUM 40 MG: 40 INJECTION, POWDER, FOR SOLUTION INTRAVENOUS at 08:13

## 2025-07-10 RX ADMIN — OMEPRAZOLE 40 MG: 20 CAPSULE, DELAYED RELEASE ORAL at 05:16

## 2025-07-10 RX ADMIN — LACTULOSE 45 ML: 10 SOLUTION ORAL at 18:32

## 2025-07-10 RX ADMIN — FUROSEMIDE 60 MG: 10 INJECTION, SOLUTION INTRAVENOUS at 05:16

## 2025-07-10 RX ADMIN — SPIRONOLACTONE 100 MG: 100 TABLET ORAL at 05:16

## 2025-07-10 RX ADMIN — FOLIC ACID 1 MG: 1 TABLET ORAL at 05:16

## 2025-07-10 RX ADMIN — POTASSIUM CHLORIDE 40 MEQ: 1500 TABLET, EXTENDED RELEASE ORAL at 08:14

## 2025-07-10 RX ADMIN — Medication 100 MG: at 05:16

## 2025-07-10 RX ADMIN — HYDROXYZINE HYDROCHLORIDE 25 MG: 50 TABLET ORAL at 20:09

## 2025-07-10 RX ADMIN — FUROSEMIDE 60 MG: 10 INJECTION, SOLUTION INTRAVENOUS at 18:35

## 2025-07-10 ASSESSMENT — COGNITIVE AND FUNCTIONAL STATUS - GENERAL
MOVING FROM LYING ON BACK TO SITTING ON SIDE OF FLAT BED: A LITTLE
WALKING IN HOSPITAL ROOM: A LITTLE
SUGGESTED CMS G CODE MODIFIER MOBILITY: CK
MOBILITY SCORE: 19
STANDING UP FROM CHAIR USING ARMS: A LITTLE
MOVING TO AND FROM BED TO CHAIR: A LITTLE
CLIMB 3 TO 5 STEPS WITH RAILING: A LITTLE

## 2025-07-10 ASSESSMENT — ENCOUNTER SYMPTOMS
FEVER: 0
BACK PAIN: 0
CHILLS: 0
NECK PAIN: 0
SINUS PAIN: 0
SPEECH CHANGE: 0
PHOTOPHOBIA: 0
BLOOD IN STOOL: 1
SORE THROAT: 0
CLAUDICATION: 0
SHORTNESS OF BREATH: 0
ORTHOPNEA: 0
DOUBLE VISION: 0
NERVOUS/ANXIOUS: 0
WEIGHT LOSS: 0
DIARRHEA: 0
COUGH: 0
ABDOMINAL PAIN: 0
ABDOMINAL PAIN: 1
WEAKNESS: 1
PALPITATIONS: 0
CONSTIPATION: 0
DIZZINESS: 0
HEMOPTYSIS: 0
NAUSEA: 0
BLURRED VISION: 0
MYALGIAS: 0
DEPRESSION: 0
HEARTBURN: 0
VOMITING: 0

## 2025-07-10 ASSESSMENT — PAIN DESCRIPTION - PAIN TYPE
TYPE: ACUTE PAIN
TYPE: ACUTE PAIN

## 2025-07-10 ASSESSMENT — GAIT ASSESSMENTS
DISTANCE (FEET): 20
GAIT LEVEL OF ASSIST: CONTACT GUARD ASSIST
DEVIATION: BRADYKINETIC

## 2025-07-10 ASSESSMENT — LIFESTYLE VARIABLES: SUBSTANCE_ABUSE: 1

## 2025-07-10 NOTE — PROGRESS NOTES
..Gastroenterology Progress Note               Author:  TED Beck   Date & Time Created: 7/10/2025 9:54 AM       Patient ID:  Name:             Chiara Pacheco  YOB: 1965  Age:                 59 y.o.  female  MRN:               [id number]    Medical Decision Making, by Problem:  Active Hospital Problems    Diagnosis     Ascites [R18.8]     ACP (advance care planning) [Z71.89]     Acute GI bleeding [K92.2]     Leukocytosis [D72.829]     Alcoholic cirrhosis of liver with ascites (HCC) [K70.31]     Alcohol abuse [F10.10]     Bleeding gastric varices [I86.4]     Hypoxia [R09.02]     Metabolic acidosis [E87.20]      Presenting Chief Complaint:  hematemesis     HISTORY OF PRESENT ILLNESS: history per chart as she isn't fully answering questions when interviewed  Chiara Pacheco is a 59 y.o. female who presents to the emergency department today for evaluation of hematemesis.  She has a past medical history significant for alcoholic cirrhosis, was hospitalized a little over a year ago for hematemesis.  EGD on 2/20/2023 showed Gastric varix, portal hypertension gastropathy, gastric ulcer and esophagitis, not bleeding, Esmer enrique tear, clip still in place and no bleeding EGD on 9/23/2023 showed:small esophageal varices, varix with erosion and stigmata of recent bleeding, banded x 1, gastric varix without stigmata, large amount of blood in stomach precluding complete exam      Due to gastric varices, interventional radiology consultation was recommended by gastroenterology for BRTO/CARTO procedure, however patient declined, stated that she did not want any further intervention or procedures.  She was provided with outpatient follow-up information, on my discussion today she did not call IR, and never scheduled follow-up for these issues.     She continues to use alcohol, at midnight she began to have recurrent hematemesis.  She states that she has stopped all NSAID use.  She  does have some mild epigastric pain as well.  She also reports dark stools for a few weeks, uncertain as to the exact duration of time.  She does not report any recent fevers, no other abnormal bleeding or bruising. She had dark vomitus times one while in hospital.     Interval History:  7/5/2025: EGD:   Esophagus: large esophageal ulcer from 36 to 40 cm. Not bleeding but prominent. Minimal to no esophageal varices  Stomach: stomach full of blood, large gastric varices, source of bleeding. Also portal hypertensive gastropathy that is oozing minimally.   Duodenum: normal bulb and second portion of duodenum    7/6/2025: Patient resting comfortably.  N.p.o. pending Carto with IR today.  Hgb 8.5  Last bloody BM yesterday, also having blood in urine.     MELD 3.0-28 points  Child Class C    7/7/2025: Patient seen. Feeling better, more alert. Had several brown bowel movements and still having hematuria. Underwent Carto yesterday. Hgb 8.1.     MELD 3.0 - 28 points    7/8/2025: PPD#2 s/p CARTO with IR. Sleeping quietly, arouses appropriately. No BM overnight- last at 7/7/2025 at 1500. Reports abdominal bloating, inability to lay on back due to pain. Hypoactive bowel sounds.  KUB ordered.  Tolerating clear liquids. Abd ultrasound 7/7/2025 with CBD dilation at 7.3. gallbladder not seen well due to ascites, scattered ascites, nodule changes of cirrhosis, splenomegaly. IVC and hepatic veins patent.  Abd soft tissue US 7/8 showing small amount of ascites, paracentesis deferred.   Na 134, K 3.3, BUN 18, Cr 0.74, Ca 8.9, AST 79, ALt 24, AP 88, Tbili 18.4, albumin 2.5, mag 1.8    Update 1652: KUB neg for obstructive process.     7/9/2025: Patient seen at bedside.  Sleeping quietly but arouses apparently.  Bloody bowel movement x 3 overnight.  Patient reports ongoing back pain, abdominal pain/fullness.  Dullness to percussion. Repeat paracentesis ordered to attempt. WBC 6.7, hgb 8.1, plt 94. Na 134, K 3.2, BUN 18 Cr 1.02, AST 73,  ALT 25, AP 88, Tbili 18.7, albumin 2.4. INR 2.62    MELD 3.0; 32 63.5% estimated 90 day survival   Child-Jimenez Class C    7/10/2025: Paracentesis done yesterday.  6450 mL drained.  Negative for SBP, patient was also had a course of antibiotics.  Cytology pending.  She reports abdominal and back pain relieved status post paracentesis.  She had bloody bowel movement x 3 since 3 AM.  This was associated with hemoglobin drop from 8.1-6.9.  Hypotensive with systolic blood pressure 88.  Platelets 80.  Sodium 135, potassium 3.4, BUN 17, creatinine 1.01.  Ammonia 30.  Discussed concern for GI bleeding as well as endoscopic evaluation.  Patient had eaten pieces of her Vietnamese toast in addition to 5 bites of cream a week.  She is hemodynamically stable at this time.    Hospital Medications:  Current Facility-Administered Medications   Medication Dose Frequency Provider Last Rate Last Admin    pantoprazole (Protonix) injection 40 mg  40 mg BID Serafin Hernandez M.D.   40 mg at 07/10/25 0813    spironolactone (Aldactone) tablet 100 mg  100 mg Q DAY Serafin Hernandez M.D.   100 mg at 07/10/25 0516    lactulose 20 GM/30ML solution 45 mL  45 mL TID Serafin Hernandez M.D.   45 mL at 07/09/25 1716    furosemide (Lasix) injection 60 mg  60 mg BID Lisandra Enriquez M.D.   60 mg at 07/10/25 0516    labetalol (Normodyne/Trandate) injection 10 mg  10 mg Q4HRS PRN ROSALBA CruzO.        ondansetron (Zofran) syringe/vial injection 4 mg  4 mg Q4HRS PRN ROSALBA CruzO.   4 mg at 07/05/25 0848    ondansetron (Zofran ODT) dispertab 4 mg  4 mg Q4HRS PRN ROSALBA CruzO.        promethazine (Phenergan) tablet 12.5-25 mg  12.5-25 mg Q4HRS PRN ROSALBA CruzO.        promethazine (Phenergan) suppository 12.5-25 mg  12.5-25 mg Q4HRS PRN Miguelito Chambers D.O.        prochlorperazine (Compazine) injection 5-10 mg  5-10 mg Q4HRS PRN ROSALBA CruzO.   10 mg at 07/05/25 1041    folic acid (Folvite) tablet 1 mg  1 mg DAILY  "Miguelito Chambers D.O.   1 mg at 07/10/25 0516    thiamine (Vitamin B-1) tablet 100 mg  100 mg DAILY Miguelito Chambers D.O.   100 mg at 07/10/25 0516   Last reviewed on 7/5/2025  3:58 PM by Ca Viera R.N.       Review of Systems:  Review of Systems   Constitutional:  Negative for chills, fever and malaise/fatigue.   HENT:  Negative for sinus pain and sore throat.    Respiratory:  Negative for cough and shortness of breath.    Cardiovascular:  Positive for leg swelling. Negative for chest pain.   Gastrointestinal:  Positive for blood in stool and melena. Negative for abdominal pain, constipation, diarrhea, heartburn, nausea and vomiting.   Genitourinary:  Negative for dysuria and hematuria.   Musculoskeletal:  Negative for back pain and myalgias.   Skin:  Negative for rash.   Neurological:  Positive for weakness. Negative for dizziness.   Psychiatric/Behavioral:  Positive for substance abuse. Negative for depression. The patient is not nervous/anxious.    All other systems reviewed and are negative.    Vital signs:  Weight/BMI: Body mass index is 29.85 kg/m².  /56   Pulse 90   Temp 36.4 °C (97.5 °F) (Temporal)   Resp 18   Ht 1.753 m (5' 9\")   Wt 91.7 kg (202 lb 2.6 oz)   SpO2 90%   Vitals:    07/10/25 0318 07/10/25 0339 07/10/25 0515 07/10/25 0718   BP: (!) 88/55 91/49 116/69 118/56   Pulse: 74 73 72 90   Resp: 16 16 18 18   Temp: 36.5 °C (97.7 °F) 36.4 °C (97.5 °F) 36.4 °C (97.5 °F) 36.4 °C (97.5 °F)   TempSrc: Temporal Temporal Temporal Temporal   SpO2: 93% 91% 95% 90%   Weight:       Height:         Oxygen Therapy:  Pulse Oximetry: 90 %, O2 (LPM): 3, O2 Delivery Device: Silicone Nasal Cannula    Intake/Output Summary (Last 24 hours) at 7/10/2025 0954  Last data filed at 7/10/2025 0528  Gross per 24 hour   Intake 1050 ml   Output 600 ml   Net 450 ml       Physical Exam  Vitals and nursing note reviewed.   Constitutional:       General: She is awake. She is not in acute distress.     Appearance: She is " ill-appearing.   HENT:      Head: Normocephalic and atraumatic.      Nose: Nose normal. No congestion.      Mouth/Throat:      Mouth: Mucous membranes are moist.      Pharynx: Oropharynx is clear. No oropharyngeal exudate.   Eyes:      General: Scleral icterus present.   Cardiovascular:      Rate and Rhythm: Normal rate and regular rhythm.      Pulses: Normal pulses.      Heart sounds: Normal heart sounds.   Pulmonary:      Effort: Pulmonary effort is normal. No respiratory distress.      Breath sounds: Normal breath sounds.   Abdominal:      General: There is distension (Improved).      Palpations: Abdomen is soft.      Tenderness: There is no abdominal tenderness. There is no guarding.      Comments: Hypoactive throughout     Musculoskeletal:         General: Normal range of motion.      Cervical back: Normal range of motion.      Right lower leg: Edema present.      Left lower leg: Edema present.   Skin:     General: Skin is warm and dry.      Capillary Refill: Capillary refill takes less than 2 seconds.      Coloration: Skin is jaundiced.   Neurological:      Mental Status: She is alert, oriented to person, place, and time and easily aroused.      Motor: Weakness present.   Psychiatric:         Mood and Affect: Mood normal.         Behavior: Behavior normal. Behavior is cooperative.         Labs:  Recent Labs     07/08/25  0806 07/09/25  0627 07/10/25  0040   SODIUM 134* 134* 135   POTASSIUM 3.3* 3.2* 3.4*   CHLORIDE 102 100 99   CO2 22 23 24   BUN 18 18 17   CREATININE 0.74 1.02 1.01   MAGNESIUM 1.8 1.7 1.9   PHOSPHORUS  --  2.9 2.5   CALCIUM 7.7* 7.5* 7.6*     Recent Labs     07/08/25  0806 07/09/25  0627 07/10/25  0040   ALTSGPT 24 25  --    ASTSGOT 79* 73*  --    ALKPHOSPHAT 88 88  --    TBILIRUBIN 18.4* 18.7*  --    GLUCOSE 128* 181* 173*     Recent Labs     07/08/25  0806 07/09/25  0627 07/10/25  0040   WBC 9.1 6.7 4.9   ASTSGOT 79* 73*  --    ALTSGPT 24 25  --    ALKPHOSPHAT 88 88  --    TBILIRUBIN 18.4*  18.7*  --      Recent Labs     07/08/25  0806 07/09/25  0625 07/09/25  0627 07/10/25  0040   RBC 3.23*  --  3.08* 2.66*   HEMOGLOBIN 8.3*  --  8.1* 6.9*   HEMATOCRIT 27.6*  --  26.2* 22.7*   PLATELETCT 101*  --  94* 80*   PROTHROMBTM  --  28.2*  --   --    INR  --  2.62*  --   --      Recent Results (from the past 24 hours)   Cytology (if malignancy suspected)    Collection Time: 07/09/25  2:24 PM   Result Value Ref Range    Cytology Reg See Path Report    Fluid Cell Count w/Diff    Collection Time: 07/09/25  2:24 PM   Result Value Ref Range    Fluid Type Ascites     Color-Body Fluid Yellow     Character-Body Fluid Hazy     Total RBC Count <2000 cells/uL    FL Total Nucleated Cells 70 cells/uL    Polys 58 %    Lymphs 24 %    Fl Mono Macrophages 18 %   Fluid Total Protein    Collection Time: 07/09/25  2:24 PM   Result Value Ref Range    Fluid Type Ascites     Total Protein Fluid 0.3 g/dL   FLUID ALBUMIN    Collection Time: 07/09/25  2:24 PM   Result Value Ref Range    Albumin 0.2 g/dL   FLUID CULTURE W/GRAM STAIN    Collection Time: 07/09/25  2:24 PM    Specimen: Body Fluid   Result Value Ref Range    Significant Indicator NEG     Source BF     Site Paracentesis     Culture Result -     Gram Stain Result Few WBCs.  No organisms seen.      GRAM STAIN    Collection Time: 07/09/25  2:24 PM    Specimen: Body Fluid   Result Value Ref Range    Significant Indicator .     Source BF     Site Paracentesis     Gram Stain Result Few WBCs.  No organisms seen.      CBC WITHOUT DIFFERENTIAL    Collection Time: 07/10/25 12:40 AM   Result Value Ref Range    WBC 4.9 4.8 - 10.8 K/uL    RBC 2.66 (L) 4.20 - 5.40 M/uL    Hemoglobin 6.9 (L) 12.0 - 16.0 g/dL    Hematocrit 22.7 (L) 37.0 - 47.0 %    MCV 85.3 81.4 - 97.8 fL    MCH 25.9 (L) 27.0 - 33.0 pg    MCHC 30.4 (L) 32.2 - 35.5 g/dL    RDW 78.6 (H) 35.9 - 50.0 fL    Platelet Count 80 (L) 164 - 446 K/uL   Renal Function Panel    Collection Time: 07/10/25 12:40 AM   Result Value Ref Range     Sodium 135 135 - 145 mmol/L    Potassium 3.4 (L) 3.6 - 5.5 mmol/L    Chloride 99 96 - 112 mmol/L    Co2 24 20 - 33 mmol/L    Anion Gap 12.0 7.0 - 16.0    Glucose 173 (H) 65 - 99 mg/dL    Creatinine 1.01 0.50 - 1.40 mg/dL    Bun 17 8 - 22 mg/dL    Calcium 7.6 (L) 8.5 - 10.5 mg/dL    Correct Calcium 8.6 8.5 - 10.5 mg/dL    Phosphorus 2.5 2.5 - 4.5 mg/dL    Albumin 2.7 (L) 3.2 - 4.9 g/dL   MAGNESIUM    Collection Time: 07/10/25 12:40 AM   Result Value Ref Range    Magnesium 1.9 1.5 - 2.5 mg/dL   AMMONIA    Collection Time: 07/10/25 12:40 AM   Result Value Ref Range    Ammonia 30 11 - 45 umol/L   IMMATURE PLT FRACTION    Collection Time: 07/10/25 12:40 AM   Result Value Ref Range    Imm. Plt Fraction 6.6 0.6 - 13.1 %   ESTIMATED GFR    Collection Time: 07/10/25 12:40 AM   Result Value Ref Range    GFR (CKD-EPI) 64 >60 mL/min/1.73 m 2   HOLD BLOOD BANK SPECIMEN (NOT TESTED)    Collection Time: 07/10/25  1:44 AM   Result Value Ref Range    Holding Tube - Bb DONE    COD (Adult) - Type and Crossmatch only order if transfusing RBC'S    Collection Time: 07/10/25  1:46 AM   Result Value Ref Range    ABO Grouping Only O     Rh Grouping Only POS     Antibody Screen-Cod NEG     Component R       R99                 Red Cells, LR       [id number]   issued       07/10/25   03:13      Product Type R99     Dispense Status issued     Unit Number (Barcoded) [id number]     Product Code (Barcoded) Q1503T94     Blood Type (Barcoded) 5100        Radiology Review:  CT-RENAL COLIC EVALUATION(A/P W/O)   Final Result      1.  There is no renal or ureteral calculus. There is no hydronephrosis.   2.  Cirrhosis with portal hypertension.   3.  Cholelithiasis.   4.  Ascites.   5.  Anasarca.   6.  Atherosclerosis.   7.  Fibroid uterus.      US-PARACENTESIS, ABD WITH IMAGING   Final Result      1. Ultrasound-guided diagnostic and therapeutic paracentesis of the right/midline lower quadrant/pelvis.      2. 6450 mL of fluid withdrawn.       DX-CHEST-LIMITED (1 VIEW)   Final Result         1.  [Pulmonary changes, new since prior study.      YE-[id number]-3 VIEW   Final Result      1.  Normal bowel gas pattern.   2.  Small left pleural effusion.   3.  Atherosclerosis.      US-ABDOMEN LTD (SOFT TISSUE)   Final Result      Small amount of ascites noted in the abdomen, paracentesis deferred.         IR-MIDLINE CATHETER INSERTION WO GUIDANCE > AGE 3   Final Result                  Ultrasound-guided midline placement performed by qualified nursing staff    as above.          US-ABDOMEN COMPLETE SURVEY   Final Result         1.  Common bile duct dilatation, consider causes of biliary obstruction with additional workup as clinically appropriate.   2.  Nodular hepatic contour compatible with changes of cirrhosis.   3.  Ascites   4.  Splenomegaly.      IR-EMBOLIZATION   Final Result      1.  Left renal venogram demonstrating conventional anatomy.   2.  Gastrorenal shunt venogram demonstrating a markedly enlarged gastric variceal complex.   3.  Successful coil embolization of the gastrorenal shunt vessel.   4.  Successful Gelfoam embolization of gastric varices.      DX-CHEST-PORTABLE (1 VIEW)   Final Result         1.  No acute cardiopulmonary disease.      [id number]-GJYOGKE-M/O    (Results Pending)         MDM (Data Review):   -Records reviewed and summarized in current documentation  -I personally reviewed and interpreted the laboratory results  -I personally reviewed the radiology images    Assessment/Recommendations:  Hematemesis  Melena  Alcohol abuse disorder  Cirrhosis secondary to alcohol  Jaundice  Hyperbilirubinemia  suspect multifactorial alcohol cirrhosis, hepatitis C, ongoing alcohol intake with last drink ~7/4/2025. LFTs in nonobstructive pattern.   Anemia due to GI blood loss  Hepatic encephalopathy   Gastric varices s/p CARTO 7/6/2025  Esophageal ulcer  Portal hypertensive gastropathy  Hematuria   Hx of hepatitis C (2023)     Recommendations:  PPI  BID  Lactulose titrated to 2-3 bowel movements a day  Trend H/H and transfuse for hemoglobin <7  Recommend Diuretics at a ratio of 100 mg spironolactone to furosemide 40 mg  If clinical response or desired effect not evident after 3-5 days, doses can be increased by 100 mg and 40 mg, respectively.  Uptitrate as needed maintaining the same ratio with a maximum recommended doses are spironolactone 400 mg per day and furosemide 160 mg per day.     AFP pending  Okay for low-sodium diet as tolerated  NPO at MN  Supportive care for anemia today  Repeat egd tomorrow      Discussed with patient, Dr. Hernandez at bedside, nursing, Dr. Lopez    ..Mercy Laurent, A.P.R.N.    Core Quality Measures   Reviewed items::  Labs, Medications and Radiology reports reviewed

## 2025-07-10 NOTE — PROGRESS NOTES
Notified CARIN Cuenca on patient having bloody stools and recent hgb of 6.9. Orders in for 1 unit of RBC and COD.

## 2025-07-10 NOTE — PROGRESS NOTES
Bedside report received from off going RN Lisandra, assumed care of patient.     Fall Risk Score: HIGH RISK  Fall risk interventions in place: Provide patient/family education based on risk assessment, Educate patient/family to call staff for assistance when getting out of bed, Place fall precaution signage outside patient door, Place patient in room close to nursing station, and Utilize bed/chair fall alarm  Bed type: Regular (Jefferson Score less than 17 interventions in place)  Patient on cardiac monitor: Yes  IVF/IV medications: Infusion per MAR (List Med(s)) Albumin 150 mL/hr   Oxygen: How many liters 3L  Bedside sitter: Not Applicable   Isolation: Not applicable

## 2025-07-10 NOTE — PROGRESS NOTES
Hospital Medicine Daily Progress Note    Date of Service  7/10/2025    Chief Complaint  Chiara Pacheco is a 59 y.o. female admitted 7/5/2025 with melena    Hospital Course      86-year-old female with history of alcoholic liver cirrhosis and varices bleeding who presented 7/5 with the bloody stool.  Patient has history of excessive drinking alcohol with admitting on 4/30-5/3 for variceal bleeding.  At that time, recommendation for BRTO/CARTO recommended by GI several times, however she did not want any further procedures or consultations done and wished to discharge.  Patient came with bloody stool and abdominal pain.  Patient had tachycardia and hemoglobin 6.4 on admission.  Started octreotide and PPI with ceftriaxone and GI was consulted. Patient underwent EGD 7/5.  This showed large gastric varices on the greater curvature of the stomach, large esophageal ulcer and portal hypertension.  Again recommending IR consult for CARTO.  CARTO completed 7/6.    Also patient was found to have volume overloaded, paracentesis was done on 7/9 with removing around 6 and half liter, Lasix IV and spironolactone were added.      Interval Problem Update  Evaluated and examined the patient at bedside, patient is alert and oriented x 3  -Continue IV Lasix and spironolactone, improving on volume status.  - Hemoglobin dropped 6.9, received 1 unit of red blood cells, still having melena, no hematuria.  Continue monitor hemoglobin every 8 hours  - Continue PPI twice daily IV, the case was discussed with GI, continue monitoring for today and possible EGD tomorrow if needed.  - PT and OT, order home health.      I have discussed this patient's plan of care and discharge plan at IDT rounds today with Case Management, Nursing, Nursing leadership, and other members of the IDT team.    Consultants/Specialty  GI and interventional radiology    Code Status  Full Code    Disposition  The patient is not medically cleared for discharge to  home or a post-acute facility.  Anticipate discharge to: skilled nursing facility    I have placed the appropriate orders for post-discharge needs.    Review of Systems  Review of Systems   Constitutional:  Positive for malaise/fatigue. Negative for chills, fever and weight loss.   HENT:  Negative for ear pain, hearing loss and tinnitus.    Eyes:  Negative for blurred vision, double vision and photophobia.   Respiratory:  Negative for cough, hemoptysis and shortness of breath.    Cardiovascular:  Negative for chest pain, palpitations, orthopnea, claudication and leg swelling.   Gastrointestinal:  Positive for abdominal pain. Negative for constipation, diarrhea, melena, nausea and vomiting.   Genitourinary:  Negative for dysuria, frequency and urgency.   Musculoskeletal:  Negative for myalgias and neck pain.   Skin:  Negative for rash.   Neurological:  Positive for weakness. Negative for dizziness and speech change.        Physical Exam  Temp:  [36.1 °C (97 °F)-36.5 °C (97.7 °F)] 36.4 °C (97.5 °F)  Pulse:  [68-99] 90  Resp:  [16-18] 18  BP: ()/(49-89) 118/56  SpO2:  [90 %-95 %] 90 %    Physical Exam  Vitals and nursing note reviewed.   Constitutional:       General: She is sleeping. She is not in acute distress.     Appearance: Normal appearance. She is ill-appearing.   Cardiovascular:      Rate and Rhythm: Normal rate and regular rhythm.   Pulmonary:      Effort: Pulmonary effort is normal. No respiratory distress.      Breath sounds: Rales present. No wheezing.   Abdominal:      General: Bowel sounds are normal. There is distension.      Tenderness: There is abdominal tenderness.   Musculoskeletal:      Right lower leg: Edema present.      Left lower leg: Edema present.   Skin:     General: Skin is warm and dry.      Coloration: Skin is jaundiced.   Neurological:      Mental Status: She is oriented to person, place, and time. She is lethargic.      Motor: Weakness present.   Psychiatric:         Mood and  Affect: Mood normal.         Behavior: Behavior normal.         Fluids    Intake/Output Summary (Last 24 hours) at 7/10/2025 1116  Last data filed at 7/10/2025 0830  Gross per 24 hour   Intake 1290 ml   Output 600 ml   Net 690 ml        Laboratory  Recent Labs     07/08/25  0806 07/09/25  0627 07/10/25  0040   WBC 9.1 6.7 4.9   RBC 3.23* 3.08* 2.66*   HEMOGLOBIN 8.3* 8.1* 6.9*   HEMATOCRIT 27.6* 26.2* 22.7*   MCV 85.4 85.1 85.3   MCH 25.7* 26.3* 25.9*   MCHC 30.1* 30.9* 30.4*   RDW 67.6* 71.7* 78.6*   PLATELETCT 101* 94* 80*     Recent Labs     07/08/25  0806 07/09/25  0627 07/10/25  0040   SODIUM 134* 134* 135   POTASSIUM 3.3* 3.2* 3.4*   CHLORIDE 102 100 99   CO2 22 23 24   GLUCOSE 128* 181* 173*   BUN 18 18 17   CREATININE 0.74 1.02 1.01   CALCIUM 7.7* 7.5* 7.6*     Recent Labs     07/09/25  0625   INR 2.62*               Imaging  CT-RENAL COLIC EVALUATION(A/P W/O)   Final Result      1.  There is no renal or ureteral calculus. There is no hydronephrosis.   2.  Cirrhosis with portal hypertension.   3.  Cholelithiasis.   4.  Ascites.   5.  Anasarca.   6.  Atherosclerosis.   7.  Fibroid uterus.      US-PARACENTESIS, ABD WITH IMAGING   Final Result      1. Ultrasound-guided diagnostic and therapeutic paracentesis of the right/midline lower quadrant/pelvis.      2. 6450 mL of fluid withdrawn.      DX-CHEST-LIMITED (1 VIEW)   Final Result         1.  [Pulmonary changes, new since prior study.      [id number] VIEW   Final Result      1.  Normal bowel gas pattern.   2.  Small left pleural effusion.   3.  Atherosclerosis.      US-ABDOMEN LTD (SOFT TISSUE)   Final Result      Small amount of ascites noted in the abdomen, paracentesis deferred.         IR-MIDLINE CATHETER INSERTION WO GUIDANCE > AGE 3   Final Result                  Ultrasound-guided midline placement performed by qualified nursing staff    as above.          US-ABDOMEN COMPLETE SURVEY   Final Result         1.  Common bile duct dilatation, consider  causes of biliary obstruction with additional workup as clinically appropriate.   2.  Nodular hepatic contour compatible with changes of cirrhosis.   3.  Ascites   4.  Splenomegaly.      IR-EMBOLIZATION   Final Result      1.  Left renal venogram demonstrating conventional anatomy.   2.  Gastrorenal shunt venogram demonstrating a markedly enlarged gastric variceal complex.   3.  Successful coil embolization of the gastrorenal shunt vessel.   4.  Successful Gelfoam embolization of gastric varices.      DX-CHEST-PORTABLE (1 VIEW)   Final Result         1.  No acute cardiopulmonary disease.      CF-[id number]-H/O    (Results Pending)        Assessment/Plan  * Alcoholic cirrhosis of liver with ascites (HCC)- (present on admission)  Assessment & Plan  With decompensation with GI bleeding, volume overloaded and hepatic encephalopathy   due to alcoholism MELD: 28 and elevated bilirubin  CT scan showed signs of cirrhosis  Started spironolactone and continue IV Lasix  Not able to get paracentesis due to no any fluid  Continue lactulose, will consider rifaximin if there is no improving  Encourage the patient to avoid drinking alcohol  Appreciate GI recommendation.  PT and OT    Ascites  Assessment & Plan  Due to liver cirrhosis  Paracentesis was done on 7/9 with removing 6.4 L received albumin after.  Fluid did not show infection  Continue spironolactone and Lasix    ACP (advance care planning)  Assessment & Plan  I had a long conversation with patient today regarding her alcohol consumption.  She states that she is living with roommates that are alcoholics as well.  It is a triggering and stressful situation.  I explained to patient that if she continues to consume alcohol this will lead to fulminant liver failure, cardiovascular and renal failure and ultimately patient's untimely death.  Patient states understanding.  She is looking for ways to discontinue alcohol consumption.  She is going to reach out to her dad to see if  she is able to go live with him to help maintain her sobriety.  ACP time spent 20 minutes.    Leukocytosis- (present on admission)  Assessment & Plan  WBC 10.7 --> 12.6  Continue labs daily  Ceftriaxone for prophylaxis    Acute GI bleeding- (present on admission)  Assessment & Plan  Due to portal hypertension and variceal bleeding and ulcer  EGD 7/5:  large gastric varices on the greater curvature of the stomach, large esophageal ulcer and portal hypertension.  S/p 6/7 CARTO   Finish course of octreotide 72 hours  PPI twice daily  Advance diet as tolerated  Received 5 days of ceftriaxone    Hemoglobin dropped again on 7/10 and needed 1 unit of red blood cell  Continue monitoring hemoglobin every 8 hours  GI on board, appreciate recommendation    Alcohol abuse- (present on admission)  Assessment & Plan  Continue to encourage cessation    Bleeding gastric varices- (present on admission)  Assessment & Plan  Underwent CARTO   Hemoglobin stable  PPI    Hypoxia- (present on admission)  Assessment & Plan  Secondary to volume overload  Continue IV Lasix and add spironolactone    Metabolic acidosis- (present on admission)  Assessment & Plan  Improving           VTE prophylaxis: VTE Selection    I have performed a physical exam and reviewed and updated ROS and Plan today (7/10/2025). In review of yesterday's note (7/9/2025), there are no changes except as documented above.    Patient is has a high medical complexity, complex decision making and is at high risk for complication, morbidity, and mortality.  I spent 65 minutes, reviewing the chart, obtaining and/or reviewing separately obtained history. Performing a medically appropriate examination and evaluation.  Counseling and educating the patient. Ordering and reviewing medications, tests, or procedures.   Documenting clinical information in EPIC. Independently interpreting results and communicating results to patient. Discussing future disposition of care with patient, RN  and case management.

## 2025-07-10 NOTE — THERAPY
Occupational Therapy   Initial Evaluation     Patient Name:  Chiara Pacheco  Age:  59 y.o., Sex:  female  Medical Record #:  [id number]  Today's Date:  7/9/2025     Precautions  Medical: Fall Risk    Assessment    Patient is 59 y.o. female admitted with hematemesis, pmhx includes etoh cirrhosis, EGD found large gastric varices and portal hypertensive gastropathy. Pt is now s/p CARTO with IR, presents to OT eval with discomfort from abdominal distention as primary complaint. Of note, her urine/bowel movement was bloody. Pt reports she is getting around and completing self-care tasks at/near her baseline, but LB dressing and activity tolerance is most limited by the abdominal distention. Pt is eager to DC home once medically cleared, pt reports very supportive boyfriend, Valentin, able to assist. Acute OT to follow while admitted.     Plan    Occupational Therapy Initial Treatment Plan   Treatment Interventions: Self Care / Activities of Daily Living, Therapeutic Exercises, Therapeutic Activity, Adaptive Equipment  Treatment Frequency: 3 Times per Week  Duration: Until Therapy Goals Met    DC Equipment Recommendations: Sock Aide, Reacher  Discharge Recommendations: Recommend home health for continued occupational therapy services      Objective     07/09/25 0925   Prior Living Situation   Prior Services None   Housing / Facility Mobile Home   Steps Into Home 5   Bathroom Set up Bathtub / Shower Combination;Shower Chair   Equipment Owned Tub / Shower Seat   Lives with - Patient's Self Care Capacity Significant Other   Comments pt reports she lives with her boyfriend Valentin who is able to assist, she states her trailer is too narrow for a FWW but she holds the walls   Prior Level of ADL Function   Self Feeding Independent   Grooming / Hygiene Independent   Bathing Requires Assist   Dressing Requires Assist   Toileting Independent   Comments Valentin assists   Prior Level of IADL Function   Medication Management Requires  Assist   Laundry Requires Assist   Kitchen Mobility Requires Assist   Finances Requires Assist   Home Management Requires Assist   Shopping Requires Assist   Prior Level Of Mobility Independent Without Device in Home   Driving / Transportation Relatives / Others Provide Transportation   Precautions   Medical Fall Risk   Vitals   O2 (LPM) 3   O2 Delivery Device Silicone Nasal Cannula   Pain 0 - 10 Group   Therapist Pain Assessment Nurse Notified;Post Activity Pain Same as Prior to Activity  (discomfort from distended abdomen)   Cognition    Cognition / Consciousness X   Speech/ Communication Hard of Hearing   Level of Consciousness Alert   Safety Awareness Impaired   New Learning Impaired   Attention Impaired   Sequencing Impaired   Strength Upper Body   Upper Body Strength  X   Gross Strength Generalized Weakness, Equal Bilaterally.    Coordination Upper Body   Coordination WDL   Balance Assessment   Sitting Balance (Static) Fair   Sitting Balance (Dynamic) Fair -   Standing Balance (Static) Fair -   Standing Balance (Dynamic) Poor +   Weight Shift Sitting Fair   Weight Shift Standing Poor   Comments FWW   Bed Mobility    Supine to Sit Minimal Assist   Sit to Supine Standby Assist   Scooting Supervised   Rolling Minimal Assist to Rt.   ADL Assessment   Eating Independent   Grooming Supervision;Seated  (declined standing hand hygiene)   Upper Body Dressing Minimal Assist   Lower Body Dressing Maximal Assist   Toileting Standby Assist   How much help from another person does the patient currently need...   Putting on and taking off regular lower body clothing? 2   Bathing (including washing, rinsing, and drying)? 2   Toileting, which includes using a toilet, bedpan, or urinal? 3   Putting on and taking off regular upper body clothing? 3   Taking care of personal grooming such as brushing teeth? 3   Eating meals? 4   6 Clicks Daily Activity Score 17   Functional Mobility   Sit to Stand Supervised   Bed, Chair,  Wheelchair Transfer Supervised   Toilet Transfers Supervised   Transfer Method Stand Step   Mobility FWW   Comments unfamiliar with FWW use, cues to keep it closer to her body   Activity Tolerance   Sitting in Chair 5 min (toilet)   Sitting Edge of Bed 5min   Standing 3min x2   Patient / Family Goals   Patient / Family Goal #1 home ASAP   Short Term Goals   Short Term Goal # 1 pt will complete LB dress with AE PRN at SPV level   Short Term Goal # 2 pt will complete standing grooming at sink with SPV   Short Term Goal # 3 pt will complete UB dress with SPV   Education Group   Education Provided Adaptive Equipment;Activities of Daily Living;Role of Occupational Therapist;Transfers;Home Safety;Pathology of bedrest   Role of Occupational Therapist Patient Response Patient;Acceptance;Explanation;Verbal Demonstration;Reinforcement Needed   Home Safety Patient Response Patient;Acceptance;Explanation;Verbal Demonstration;Reinforcement Needed   Transfers Patient Response Patient;Acceptance;Explanation;Verbal Demonstration;Reinforcement Needed   ADL Patient Response Patient;Acceptance;Explanation;Verbal Demonstration;Reinforcement Needed   Adaptive Equipment Patient Response Patient;Acceptance;Explanation;Verbal Demonstration;Reinforcement Needed   Pathology of Bedrest Patient Response Patient;Acceptance;Explanation;Verbal Demonstration;Reinforcement Needed   Occupational Therapy Initial Treatment Plan    Treatment Interventions Self Care / Activities of Daily Living;Therapeutic Exercises;Therapeutic Activity;Adaptive Equipment   Treatment Frequency 3 Times per Week   Duration Until Therapy Goals Met   Problem List   Problem List Decreased Active Daily Living Skills;Decreased Homemaking Skills;Decreased Upper Extremity Strength Right;Decreased Upper Extremity Strength Left;Decreased Functional Mobility;Decreased Activity Tolerance;Safety Awareness Deficits / Cognition;Impaired Postural Control / Balance   Anticipated  Discharge Equipment and Recommendations   DC Equipment Recommendations Sock Aide;Reacher   Discharge Recommendations Recommend home health for continued occupational therapy services   Interdisciplinary Plan of Care Collaboration   IDT Collaboration with  Nursing   Patient Position at End of Therapy In Bed;Bed Alarm On;Call Light within Reach;Phone within Reach;Tray Table within Reach

## 2025-07-10 NOTE — CARE PLAN
The patient is Stable - Low risk of patient condition declining or worsening    Shift Goals  Clinical Goals: wean O2, monitor h/h  Patient Goals: go home  Family Goals: kely    Progress made toward(s) clinical / shift goals:    Problem: Knowledge Deficit - Standard  Goal: Patient and family/care givers will demonstrate understanding of plan of care, disease process/condition, diagnostic tests and medications  Outcome: Progressing     Problem: Fall Risk  Goal: Patient will remain free from falls  Outcome: Progressing     Problem: Pain - Standard  Goal: Alleviation of pain or a reduction in pain to the patient’s comfort goal  Outcome: Progressing       Patient is not progressing towards the following goals:

## 2025-07-10 NOTE — CARE PLAN
The patient is Watcher - Medium risk of patient condition declining or worsening    Shift Goals  Clinical Goals: monitor labs, wean oxygen, safety  Patient Goals: go home tomorrow  Family Goals: MATRINE    Progress made toward(s) clinical / shift goals:      Patient is not progressing towards the following goals:        Problem: Knowledge Deficit - Standard  Goal: Patient and family/care givers will demonstrate understanding of plan of care, disease process/condition, diagnostic tests and medications  Outcome: Progressing  Note: Discuss and review POC with patient. Re-educate as needed.       Problem: Fall Risk  Goal: Patient will remain free from falls  7/10/2025 0155 by Megan Zeng R.N.  Outcome: Progressing  Note: Fall risk assessment complete. Fall precautions implemented; bed alarm on, patient wearing non-slip socks, call light within reach, hourly rounding in progress, and tolieting needs assessed.          Problem: Respiratory:  Goal: Respiratory status will improve  7/10/2025 0155 by Megan Zeng RQIANA.  Outcome: Progressing  Note: Assess and monitor pulmonary status. Adjust oxygen as needed to maintain adequate saturation. Encourage ambulation, turning, coughing and deep breathing.

## 2025-07-10 NOTE — CONSULTS
MRN: [id number]  Date of palliative consult: 7/10/2025  Reason for consult: Goals of care/advance care planning  Referring provider: Dr. Hernandez  Location of consult: [facility]02  Additional consulting services: GI    HPI:   Chiara Pacheco is a 59 y.o. female with medical history significant for alcoholic liver cirrhosis with bleeding varices (multiple hospital admissions for same), ETOH admitted  with melena. Hemoglobin 6.4 on arrival. EGD revealed large gastric varices, esophageal ulcer, and portal HTN. CARTO completed . Subsequently with volume overload; paracentesis performed  with 6.5L removed, lasix and spironolactone started. 7/10 with continued melena. Possible EGD planned again for . Palliative care consulted during this hospitalization for goals of care/advance care planning conversation.     Additional Pertinent Medical History: none    ROS:    Review of Systems   Constitutional:  Positive for malaise/fatigue.   Gastrointestinal:  Positive for blood in stool.       PE:   Recent vital signs  BMI: Body mass index is 29.85 kg/m².    Temp (24hrs), Av.4 °C (97.5 °F), Min:36.3 °C (97.3 °F), Max:36.5 °C (97.7 °F)  Temperature: 36.4 °C (97.5 °F)  Pulse  Av.2  Min: 60  Max: 119   Blood Pressure: 102/46       Physical Exam  Vitals and nursing note reviewed.   Constitutional:       General: She is awake.      Appearance: She is overweight. She is ill-appearing. She is not toxic-appearing.   Eyes:      General: Scleral icterus present.   Pulmonary:      Effort: Pulmonary effort is normal.   Abdominal:      General: There is distension.   Musculoskeletal:      Comments: Generalized muscle mass loss   Skin:     Coloration: Skin is jaundiced and pale.   Neurological:      Mental Status: She is alert.      Motor: Weakness present.   Psychiatric:         Attention and Perception: Attention normal.         Mood and Affect: Mood is anxious.         Speech: Speech is delayed and slurred.          "Behavior: Behavior is slowed.         ASSESSMENT/PLAN WITH SHARED DECISION MAKING:   PHYSICAL ASPECTS OF CARE  Palliative Performance Scale: 50%    # Alcoholic liver cirrhosis with bleeding varices and ascites  # Acute hypoxic respiratory failure  # GI bleed  # Anemia  # Protein-calorie malnutrition  # ETOH abuse  # Volume overload    SOCIAL ASPECTS OF CARE  Myrna lives [place] with her boyfriend Valentin; they have been together 5 years. They also live with a room mate. She shares that they all drink too much and she needs to move out if she is to stop drinking. Explains that she talked to Valentin about this and she plans to leave him if he won't stop drinking. Myrna has no children. She has 3 siblings. Her father is still living though she describes him as being very ill. Her mother is .     SPIRITUAL ASPECTS OF CARE   Not discussed during this conversation.    GOALS OF CARE/SERIOUS ILLNESS CONVERSATION  Introduced myself to Myrna. Discussed role of palliative care and reason for consult. Myrna agreeable to discuss goals of care. Myrna understands why she is in the hospital. Explains that she has had some \"talks\" with her boyfriend Valentin and her father. I asked her to expand on what she has spoken with them about- shares she needs to stop drinking and so does Valentin. Wants to move out if he won't, as both he and their roommate drink a lot and she cannot be around it if she wants to quit drinking.    I asked Myrna if she has ever discussed goals of care/medical wishes with anyone before. Myrna looked confused so I attempted to explain further, asking if she has an AD or POA. She shares she does not; explains why she would not name her sister, brother, or her father at this time, all for multiple reasons. She shares her cousin Blanca (does not know her last name) may be a good choice, though she is unable to say for certain who she would want.    We then spent some time discussing resuscitation wishes and " "her current CODE STATUS. Myrna appeared confused at this conversation in the beginning and I worked through clarifying it, explaining that she is currently full code and what this involves. I explained my worry that, due to her liver cirrhosis and overall medical picture, resuscitation may lead to suffering with the outcome a poor quality of life if resuscitation is successful. Myrna initially shares that she would never want a \"tube\" because her mother had that and she watched her suffer. She then goes on to explain that she has to talk to her father about it and does not want to make any kind of decision today.    Myrna is open to meeting with palliative care at another time to continue a GOC conversation and to see about completing an AD.    Plan: Continue full code, full treatment. Palliative care  Mary Zamorano will follow up with patient regarding CODE STATUS wishes as well as AD completion as appropriate.    Code Status: Full    ACP Documents: None on file    30 minutes spent discussing advance care planning, this time excludes any other billed services.    Interval diagnostic studies and medical documentation entries pertinent to this case were reviewed independently by me. This patient has at least one acute or chronic illness or injury that poses a threat to life or bodily function. This patient suffers from a high risk of morbidity from additional invasive diagnostic testing or intensive treatment. Discussion of recommendations and coordination of care undertaken with primary provider/treatment team.      Erika Dockery DNP, [facility]  Inpatient Palliative Care Provider  [phone]     "

## 2025-07-10 NOTE — DISCHARGE PLANNING
Case Management Discharge Planning    Admission Date: 7/5/2025  GMLOS: 8.7  ALOS: 5    6-Clicks ADL Score: 17  6-Clicks Mobility Score: 20  PT and/or OT Eval ordered: Yes  PT:Pending   OT:Recommending HH  Post-acute Referrals Ordered: NA  Post-acute Choice Obtained: NA  Has referral(s) been sent to post-acute provider:  NA      Anticipated Discharge Dispo: Discharge Disposition: Discharged to home/self care (01)  Discharge Address: [address] [place] [address]  [place] [id number]  Discharge Contact Phone Number: [phone]    DME Needed: Pending hospital course     Action(s) Taken: Chart reviewed and pt discussed in IDT Rounds pt is not MC due to volume overload. Attempting to wean O2.     Escalations Completed: None    Medically Clear: No    Next Steps: F/U with HCM needs    Barriers to Discharge: Medical clearance    Is the patient up for discharge tomorrow: No

## 2025-07-10 NOTE — THERAPY
Physical Therapy   Initial Evaluation     Patient Name:  Chiara Pacheco  Age:  59 y.o., Sex:  female  Medical Record #:  [id number]  Today's Date: 7/10/2025     Precautions  Medical: Fall Risk    Assessment  Patient is 59 y.o. female who presented 7/5 with the bloody stool    Currently been managed for Alcoholic cirrhosis of liver, ascites, Acute GI bleeding, Bleeding gastric varices, hypoxia, metabolic acidosis     Underwent CARTO ((Coil-assisted Retrograde Transvenous Obliteration) on 7/6/2025, EGD on 7/5/2025, paracentesis on 7/9/20251673-7702 mL fluid withdrawn     PMH: alcoholic liver cirrhosis, varices bleeding, alcohol abuse      Patient seen for PT evaluation. Patient in bed, agreeable for the session with encouragement. Able to demonstrate functional mobility tasks as detailed below. Currently appears to be below baseline level of functional mobility. Will continue to benefit from PT services to help improve overall functional mobility. Recommend HH PT as long as family able to provide required assistance/supervision, otherwise recommend placement.     Plan    Physical Therapy Initial Treatment Plan   Treatment Plan : Bed Mobility, Equipment, Family / Caregiver Training, Gait Training, Neuro Re-Education / Balance, Stair Training, Therapeutic Activities, Therapeutic Exercise  Treatment Frequency: 4 Times per Week  Duration: Until Therapy Goals Met    DC Equipment Recommendations: Unable to determine at this time  Discharge Recommendations: Recommend home health for continued physical therapy services (With family support/assistance; If family unable to provide assistance, will require placement)     Objective     07/10/25 0937   Time In/Time Out   Therapy Start Time 0912   Therapy End Time 0937   Total Therapy Time 25   Initial Contact Note    Initial Contact Note Order Received and Verified, Physical Therapy Evaluation in Progress with Full Report to Follow.   Precautions   Medical Fall Risk   Vitals    Pulse 81   Patient BP Position Sitting  (EOB, post activity)   Blood Pressure 121/59   Pulse Oximetry 91 %   O2 (LPM) 3   O2 Delivery Device Nasal Cannula   Pain   Pain Scales 0 to 10 Scale    Intervention Declines   Pain 0 - 10 Group   Therapist Pain Assessment Prior to Activity;During Activity;Post Activity   Prior Living Situation   Prior Services None   Housing / Facility Motor Home   Steps Into Home 5  (Front entrance; additional 4 steps with B/L rails-back entrance)   Rail Left Rail  (Steps into Home)   Equipment Owned None   Lives with - Patient's Self Care Capacity Significant Other;Unrelated Adult   Comments Patient mentioned that she lives with a friend (who owns the trailer) and her boyfriend. Both of them are able to assist as needed.   Prior Level of Functional Mobility   Bed Mobility Independent   Transfer Status Independent   Ambulation Independent   Ambulation Distance Limited community   Assistive Devices Used None   Stairs Independent   Comments Patient mentioned that her trailer is too narrow to use AD, she mostly does furniture/wall cruising inside her trailer.   History of Falls   History of Falls No   Date of Last Fall   (No falls since 2023)   Cognition    Cognition / Consciousness X   Speech/ Communication Hard of Hearing  (B/L hearing aids)   Orientation Level Oriented x 4   Level of Consciousness Alert   Safety Awareness Impaired   New Learning Impaired   Attention Impaired   Comments Patient was emotional and tearful this session.   Passive ROM Lower Body   Passive ROM Lower Body X   Comments Mild impairment with B/L knee extension and B/L ankle DF due to stiffness associated with edema   Active ROM Lower Body    Active ROM Lower Body  X   Comments B/L hip flexion-mild impairment   Strength Lower Body   Lower Body Strength  X   Comments Grossly B/L hip flexor 3/5, B/L knee extensor 3+/5, B/L ankle DF/PF 4/5, B/L knee flexor 3+/5   Sensation Lower Body   Lower Extremity Sensation   WDL    Comments Denies any numbness/tingling in LE   Other Treatments   Other Treatments Provided Patient was assisted to the bathroom to void, performed hand hygiene by the sink. Reinforced importance of daily & frequent mobility, OOB to chair for meals and ambulating multiple times during the day with supervision/assistance from nursing staff. Discussed DC recommendations-patient refusing placement at this time, prefers to return home, agreeable for  PT.   Balance Assessment   Sitting Balance (Static) Fair   Sitting Balance (Dynamic) Fair   Standing Balance (Static) Fair -   Standing Balance (Dynamic) Fair -   Weight Shift Sitting Good   Weight Shift Standing Fair   Comments Seated EOB; Standing with FWW   Bed Mobility    Supine to Sit Standby Assist   Sit to Supine Standby Assist   Scooting Standby Assist   Rolling Standby Assist   Comments HOB flat, use of bed rail, towards R side; increased time to initiate the task   Gait Analysis   Gait Level Of Assist Contact Guard Assist   Assistive Device None   Distance (Feet) 20   Deviation Bradykinetic   # of Stairs Climbed   (Refused)   Comments Further ambulation deferred by patient; cues for pacing, balance   Functional Mobility   Sit to Stand Contact Guard Assist   Bed, Chair, Wheelchair Transfer Refused   Toilet Transfers Contact Guard Assist   Mobility Bed-EOB sitting-sit-stand-ambulate to bathroom-toilet transfer-hand hygiene by sink-ambulate in the room-EOB-bed-HOB raised   Comments W/O AD; cues for hand placement, LE placement   6 Clicks Assessment - How much HELP from from another person do you currently need... (If the patient hasn't done an activity recently, how much help from another person do you think he/she would need if he/she tried?)   Turning from your back to your side while in a flat bed without using bedrails? 4   Moving from lying on your back to sitting on the side of a flat bed without using bedrails? 3   Moving to and from a bed to a chair  (including a wheelchair)? 3   Standing up from a chair using your arms (e.g., wheelchair, or bedside chair)? 3   Walking in hospital room? 3   Climbing 3-5 steps with a railing? 3   6 clicks Mobility Score 19   Edema / Skin Assessment   Edema / Skin  X   Comments BLE edema (+)   Patient / Family Goals    Patient / Family Goal #1 To return home   Short Term Goals    Short Term Goal # 1 Patient will perform supine-sit, sit-supine with HOB flat without rails with supervision in 6 visits to safely get in & out of bed   Short Term Goal # 2 Patient will perform sit-stand with LRAD with supervision in 6 visits to progress with functional mobility   Short Term Goal # 3 Patient will perform chair transfers with LRAD with supervision in 6 visits to safely get OOB to chair   Short Term Goal # 4 Patient will ambulate 150 feet with LRAD with supervision in 6 visits to safely ambulate household & limited community distance   Short Term Goal # 5 Patient will negotiate 5 steps with L side rail with supervision in 6 visits to safely get in & out home   Education Group   Education Provided Role of Physical Therapist   Role of Physical Therapist Patient Response Patient;Acceptance;Explanation;Verbal Demonstration   Physical Therapy Initial Treatment Plan    Treatment Plan  Bed Mobility;Equipment;Family / Caregiver Training;Gait Training;Neuro Re-Education / Balance;Stair Training;Therapeutic Activities;Therapeutic Exercise   Treatment Frequency 4 Times per Week   Duration Until Therapy Goals Met   Problem List    Problems Impaired Bed Mobility;Impaired Transfers;Impaired Ambulation;Functional Strength Deficit;Impaired Balance;Decreased Activity Tolerance;Safety Awareness Deficits / Cognition   Anticipated Discharge Equipment and Recommendations   DC Equipment Recommendations Unable to determine at this time   Discharge Recommendations Recommend home health for continued physical therapy services  (With family support/assistance; If  family unable to provide assistance, will require placement)   Interdisciplinary Plan of Care Collaboration   IDT Collaboration with  Nursing;Physician   Patient Position at End of Therapy Call Light within Reach;Tray Table within Reach;In Bed;Bed Alarm On   Session Information   Date / Session Number  7/10-1(1/4, 7/16)   Priority 4  (DC-home, 5STE)

## 2025-07-10 NOTE — PROGRESS NOTES
Bedside report received from off going RN/tech: Megan, assumed care of patient.     Fall Risk Score: HIGH RISK  Fall risk interventions in place: Place yellow fall risk ID band on patient, Provide patient/family education based on risk assessment, Educate patient/family to call staff for assistance when getting out of bed, Place fall precaution signage outside patient door, Place patient in room close to nursing station, and Utilize bed/chair fall alarm  Bed type: Regular (Jefferson Score less than 17 interventions in place)  Patient on cardiac monitor: No   IVF/IV medications: Not Applicable   Oxygen: How many liters 3L, Traced the line to wall oxygen, and No oxygen tank in room  Bedside sitter: Not Applicable   Isolation: Not applicable

## 2025-07-11 ENCOUNTER — ANESTHESIA (OUTPATIENT)
Dept: SURGERY | Facility: MEDICAL CENTER | Age: 60
DRG: 263 | End: 2025-07-11
Payer: COMMERCIAL

## 2025-07-11 PROBLEM — D62 ACUTE BLOOD LOSS ANEMIA: Status: ACTIVE | Noted: 2025-07-11

## 2025-07-11 PROBLEM — E87.1 HYPONATREMIA: Status: ACTIVE | Noted: 2025-07-11

## 2025-07-11 PROBLEM — E87.6 HYPOKALEMIA: Status: ACTIVE | Noted: 2025-07-11

## 2025-07-11 LAB
ALBUMIN SERPL BCP-MCNC: 2.6 G/DL (ref 3.2–4.9)
ALBUMIN/GLOB SERPL: 1.4 G/DL
ALP SERPL-CCNC: 88 U/L (ref 30–99)
ALT SERPL-CCNC: 24 U/L (ref 2–50)
ANION GAP SERPL CALC-SCNC: 11 MMOL/L (ref 7–16)
AST SERPL-CCNC: 69 U/L (ref 12–45)
BILIRUB SERPL-MCNC: 24.9 MG/DL (ref 0.1–1.5)
BUN SERPL-MCNC: 14 MG/DL (ref 8–22)
CALCIUM ALBUM COR SERPL-MCNC: 9.1 MG/DL (ref 8.5–10.5)
CALCIUM SERPL-MCNC: 8 MG/DL (ref 8.5–10.5)
CHLORIDE SERPL-SCNC: 99 MMOL/L (ref 96–112)
CO2 SERPL-SCNC: 22 MMOL/L (ref 20–33)
CREAT SERPL-MCNC: 0.93 MG/DL (ref 0.5–1.4)
ERYTHROCYTE [DISTWIDTH] IN BLOOD BY AUTOMATED COUNT: 75.8 FL (ref 35.9–50)
GFR SERPLBLD CREATININE-BSD FMLA CKD-EPI: 71 ML/MIN/1.73 M 2
GLOBULIN SER CALC-MCNC: 1.8 G/DL (ref 1.9–3.5)
GLUCOSE SERPL-MCNC: 139 MG/DL (ref 65–99)
HCT VFR BLD AUTO: 28.9 % (ref 37–47)
HCT VFR BLD AUTO: 29 % (ref 37–47)
HCT VFR BLD AUTO: 30.7 % (ref 37–47)
HGB BLD-MCNC: 9 G/DL (ref 12–16)
HGB BLD-MCNC: 9.1 G/DL (ref 12–16)
HGB BLD-MCNC: 9.8 G/DL (ref 12–16)
INR PPP: 2.54 (ref 0.87–1.13)
MCH RBC QN AUTO: 26.4 PG (ref 27–33)
MCHC RBC AUTO-ENTMCNC: 31.4 G/DL (ref 32.2–35.5)
MCV RBC AUTO: 84.1 FL (ref 81.4–97.8)
PLATELET # BLD AUTO: 91 K/UL (ref 164–446)
PLATELETS.RETICULATED NFR BLD AUTO: 5.9 % (ref 0.6–13.1)
PMV BLD AUTO: 11.3 FL (ref 9–12.9)
POTASSIUM SERPL-SCNC: 3.5 MMOL/L (ref 3.6–5.5)
PROT SERPL-MCNC: 4.4 G/DL (ref 6–8.2)
PROTHROMBIN TIME: 27.5 SEC (ref 12–14.6)
RBC # BLD AUTO: 3.45 M/UL (ref 4.2–5.4)
SODIUM SERPL-SCNC: 132 MMOL/L (ref 135–145)
WBC # BLD AUTO: 6.5 K/UL (ref 4.8–10.8)

## 2025-07-11 PROCEDURE — A9270 NON-COVERED ITEM OR SERVICE: HCPCS

## 2025-07-11 PROCEDURE — 700102 HCHG RX REV CODE 250 W/ 637 OVERRIDE(OP): Performed by: INTERNAL MEDICINE

## 2025-07-11 PROCEDURE — A9270 NON-COVERED ITEM OR SERVICE: HCPCS | Performed by: INTERNAL MEDICINE

## 2025-07-11 PROCEDURE — 85027 COMPLETE CBC AUTOMATED: CPT

## 2025-07-11 PROCEDURE — 700111 HCHG RX REV CODE 636 W/ 250 OVERRIDE (IP): Performed by: STUDENT IN AN ORGANIZED HEALTH CARE EDUCATION/TRAINING PROGRAM

## 2025-07-11 PROCEDURE — 160197 HCHG MAC ANESTHESIA: Performed by: INTERNAL MEDICINE

## 2025-07-11 PROCEDURE — 0DJ68ZZ INSPECTION OF STOMACH, VIA NATURAL OR ARTIFICIAL OPENING ENDOSCOPIC: ICD-10-PCS | Performed by: INTERNAL MEDICINE

## 2025-07-11 PROCEDURE — 770006 HCHG ROOM/CARE - MED/SURG/GYN SEMI*

## 2025-07-11 PROCEDURE — 85014 HEMATOCRIT: CPT | Mod: 91

## 2025-07-11 PROCEDURE — 700101 HCHG RX REV CODE 250: Performed by: STUDENT IN AN ORGANIZED HEALTH CARE EDUCATION/TRAINING PROGRAM

## 2025-07-11 PROCEDURE — 80053 COMPREHEN METABOLIC PANEL: CPT

## 2025-07-11 PROCEDURE — 99233 SBSQ HOSP IP/OBS HIGH 50: CPT | Performed by: INTERNAL MEDICINE

## 2025-07-11 PROCEDURE — 160048 HCHG OR STATISTICAL LEVEL 1-5: Performed by: INTERNAL MEDICINE

## 2025-07-11 PROCEDURE — 85018 HEMOGLOBIN: CPT | Mod: 91

## 2025-07-11 PROCEDURE — 160193 HCHG PACU STANDARD - 1ST 60 MINS: Performed by: INTERNAL MEDICINE

## 2025-07-11 PROCEDURE — 160002 HCHG RECOVERY MINUTES (STAT): Performed by: INTERNAL MEDICINE

## 2025-07-11 PROCEDURE — 160015 HCHG STAT PREOP MINUTES: Performed by: INTERNAL MEDICINE

## 2025-07-11 PROCEDURE — 700102 HCHG RX REV CODE 250 W/ 637 OVERRIDE(OP)

## 2025-07-11 PROCEDURE — 43235 EGD DIAGNOSTIC BRUSH WASH: CPT | Performed by: INTERNAL MEDICINE

## 2025-07-11 PROCEDURE — 85055 RETICULATED PLATELET ASSAY: CPT

## 2025-07-11 PROCEDURE — 36415 COLL VENOUS BLD VENIPUNCTURE: CPT

## 2025-07-11 PROCEDURE — 700111 HCHG RX REV CODE 636 W/ 250 OVERRIDE (IP): Performed by: INTERNAL MEDICINE

## 2025-07-11 PROCEDURE — 160202 HCHG ENDO MINUTES - 1ST 30 MINS LEVEL 3: Performed by: INTERNAL MEDICINE

## 2025-07-11 PROCEDURE — 85610 PROTHROMBIN TIME: CPT

## 2025-07-11 PROCEDURE — 700105 HCHG RX REV CODE 258: Performed by: STUDENT IN AN ORGANIZED HEALTH CARE EDUCATION/TRAINING PROGRAM

## 2025-07-11 RX ORDER — EPHEDRINE SULFATE 50 MG/ML
5 INJECTION, SOLUTION INTRAVENOUS
Status: DISCONTINUED | OUTPATIENT
Start: 2025-07-11 | End: 2025-07-11 | Stop reason: HOSPADM

## 2025-07-11 RX ORDER — HALOPERIDOL 5 MG/ML
1 INJECTION INTRAMUSCULAR
Status: DISCONTINUED | OUTPATIENT
Start: 2025-07-11 | End: 2025-07-11 | Stop reason: HOSPADM

## 2025-07-11 RX ORDER — LABETALOL HYDROCHLORIDE 5 MG/ML
5 INJECTION, SOLUTION INTRAVENOUS
Status: DISCONTINUED | OUTPATIENT
Start: 2025-07-11 | End: 2025-07-11 | Stop reason: HOSPADM

## 2025-07-11 RX ORDER — LIDOCAINE HYDROCHLORIDE 20 MG/ML
INJECTION, SOLUTION EPIDURAL; INFILTRATION; INTRACAUDAL; PERINEURAL PRN
Status: DISCONTINUED | OUTPATIENT
Start: 2025-07-11 | End: 2025-07-11 | Stop reason: SURG

## 2025-07-11 RX ORDER — SODIUM CHLORIDE, SODIUM LACTATE, POTASSIUM CHLORIDE, CALCIUM CHLORIDE 600; 310; 30; 20 MG/100ML; MG/100ML; MG/100ML; MG/100ML
INJECTION, SOLUTION INTRAVENOUS
Status: DISCONTINUED | OUTPATIENT
Start: 2025-07-11 | End: 2025-07-11 | Stop reason: SURG

## 2025-07-11 RX ORDER — HYDRALAZINE HYDROCHLORIDE 20 MG/ML
5 INJECTION INTRAMUSCULAR; INTRAVENOUS
Status: DISCONTINUED | OUTPATIENT
Start: 2025-07-11 | End: 2025-07-11 | Stop reason: HOSPADM

## 2025-07-11 RX ORDER — ALBUTEROL SULFATE 5 MG/ML
2.5 SOLUTION RESPIRATORY (INHALATION)
Status: DISCONTINUED | OUTPATIENT
Start: 2025-07-11 | End: 2025-07-11 | Stop reason: HOSPADM

## 2025-07-11 RX ORDER — ONDANSETRON 2 MG/ML
4 INJECTION INTRAMUSCULAR; INTRAVENOUS
Status: DISCONTINUED | OUTPATIENT
Start: 2025-07-11 | End: 2025-07-11 | Stop reason: HOSPADM

## 2025-07-11 RX ORDER — SODIUM CHLORIDE, SODIUM LACTATE, POTASSIUM CHLORIDE, CALCIUM CHLORIDE 600; 310; 30; 20 MG/100ML; MG/100ML; MG/100ML; MG/100ML
INJECTION, SOLUTION INTRAVENOUS CONTINUOUS
Status: DISCONTINUED | OUTPATIENT
Start: 2025-07-11 | End: 2025-07-11 | Stop reason: HOSPADM

## 2025-07-11 RX ORDER — POTASSIUM CHLORIDE 1500 MG/1
40 TABLET, EXTENDED RELEASE ORAL ONCE
Status: COMPLETED | OUTPATIENT
Start: 2025-07-11 | End: 2025-07-11

## 2025-07-11 RX ORDER — CARVEDILOL 3.12 MG/1
3.12 TABLET ORAL 2 TIMES DAILY WITH MEALS
Status: DISCONTINUED | OUTPATIENT
Start: 2025-07-11 | End: 2025-07-13 | Stop reason: HOSPADM

## 2025-07-11 RX ORDER — DIPHENHYDRAMINE HYDROCHLORIDE 50 MG/ML
12.5 INJECTION, SOLUTION INTRAMUSCULAR; INTRAVENOUS
Status: DISCONTINUED | OUTPATIENT
Start: 2025-07-11 | End: 2025-07-11 | Stop reason: HOSPADM

## 2025-07-11 RX ADMIN — PANTOPRAZOLE SODIUM 40 MG: 40 INJECTION, POWDER, FOR SOLUTION INTRAVENOUS at 05:26

## 2025-07-11 RX ADMIN — FUROSEMIDE 60 MG: 10 INJECTION, SOLUTION INTRAVENOUS at 05:26

## 2025-07-11 RX ADMIN — PROPOFOL 50 MG: 10 INJECTION, EMULSION INTRAVENOUS at 12:44

## 2025-07-11 RX ADMIN — FOLIC ACID 1 MG: 1 TABLET ORAL at 05:26

## 2025-07-11 RX ADMIN — POTASSIUM CHLORIDE 40 MEQ: 1500 TABLET, EXTENDED RELEASE ORAL at 07:58

## 2025-07-11 RX ADMIN — SODIUM CHLORIDE, POTASSIUM CHLORIDE, SODIUM LACTATE AND CALCIUM CHLORIDE: 600; 310; 30; 20 INJECTION, SOLUTION INTRAVENOUS at 12:32

## 2025-07-11 RX ADMIN — PROPOFOL 50 MG: 10 INJECTION, EMULSION INTRAVENOUS at 12:42

## 2025-07-11 RX ADMIN — LIDOCAINE HYDROCHLORIDE 25 MG: 20 INJECTION, SOLUTION EPIDURAL; INFILTRATION; INTRACAUDAL; PERINEURAL at 12:44

## 2025-07-11 RX ADMIN — SPIRONOLACTONE 100 MG: 100 TABLET ORAL at 05:26

## 2025-07-11 RX ADMIN — HYDROXYZINE HYDROCHLORIDE 25 MG: 50 TABLET ORAL at 21:53

## 2025-07-11 RX ADMIN — LACTULOSE 45 ML: 10 SOLUTION ORAL at 17:39

## 2025-07-11 RX ADMIN — PROPOFOL 120 MCG/KG/MIN: 10 INJECTION, EMULSION INTRAVENOUS at 12:43

## 2025-07-11 RX ADMIN — Medication 100 MG: at 05:26

## 2025-07-11 RX ADMIN — HYDROXYZINE HYDROCHLORIDE 25 MG: 50 TABLET ORAL at 03:49

## 2025-07-11 RX ADMIN — PANTOPRAZOLE SODIUM 40 MG: 40 INJECTION, POWDER, FOR SOLUTION INTRAVENOUS at 17:39

## 2025-07-11 RX ADMIN — CARVEDILOL 3.12 MG: 3.12 TABLET, FILM COATED ORAL at 17:38

## 2025-07-11 RX ADMIN — LIDOCAINE HYDROCHLORIDE 25 MG: 20 INJECTION, SOLUTION EPIDURAL; INFILTRATION; INTRACAUDAL; PERINEURAL at 12:42

## 2025-07-11 ASSESSMENT — PATIENT HEALTH QUESTIONNAIRE - PHQ9
SUM OF ALL RESPONSES TO PHQ9 QUESTIONS 1 AND 2: 0
2. FEELING DOWN, DEPRESSED, IRRITABLE, OR HOPELESS: NOT AT ALL
1. LITTLE INTEREST OR PLEASURE IN DOING THINGS: NOT AT ALL

## 2025-07-11 ASSESSMENT — ENCOUNTER SYMPTOMS
FEVER: 0
CHILLS: 0
SPEECH CHANGE: 0
COUGH: 0
VOMITING: 0
DOUBLE VISION: 0
PALPITATIONS: 0
HEADACHES: 0
BLURRED VISION: 0
DIARRHEA: 0
CONSTIPATION: 0
ABDOMINAL PAIN: 1
MYALGIAS: 0
SHORTNESS OF BREATH: 0
WEAKNESS: 1

## 2025-07-11 ASSESSMENT — LIFESTYLE VARIABLES: SUBSTANCE_ABUSE: 0

## 2025-07-11 ASSESSMENT — PAIN DESCRIPTION - PAIN TYPE
TYPE: ACUTE PAIN
TYPE: ACUTE PAIN
TYPE: SURGICAL PAIN

## 2025-07-11 ASSESSMENT — PAIN SCALES - GENERAL: PAIN_LEVEL: 0

## 2025-07-11 NOTE — PROGRESS NOTES
Pt refused skin check at this time. Pt educated on importance and continues to refuse. No further needs at this time. Charge RN notified.

## 2025-07-11 NOTE — ANESTHESIA TIME REPORT
Anesthesia Start and Stop Event Times       Date Time Event    7/11/2025 1232 Anesthesia Start     1256 Anesthesia Stop          Responsible Staff  07/11/25      Name Role Begin End    Rom Park M.D. Anesth 1232 1256          Overtime Reason:  no overtime (within assigned shift)    Comments:

## 2025-07-11 NOTE — ANESTHESIA POSTPROCEDURE EVALUATION
Patient: Chiara Pacheco    Procedure Summary       Date: 07/11/25 Room / Location: [facility] ROOM 26 / SURGERY SAME DAY [facility]    Anesthesia Start: 1232 Anesthesia Stop: 1256    Procedure: GASTROSCOPY (Left: Abdomen) Diagnosis: (gastric varix, portal gastropathy)    Surgeons: Jm Lopez M.D. Responsible Provider: Rom Park M.D.    Anesthesia Type: MAC ASA Status: 4 - Emergent            Final Anesthesia Type: MAC  Last vitals  BP   Blood Pressure: 112/70    Temp   36.3 °C (97.4 °F)    Pulse   83   Resp   16    SpO2   91 %      Anesthesia Post Evaluation    Patient location during evaluation: PACU  Patient participation: complete - patient participated  Level of consciousness: awake and alert  Pain score: 0    Airway patency: patent  Anesthetic complications: no  Cardiovascular status: hemodynamically stable  Respiratory status: acceptable  Hydration status: euvolemic    PONV: none          No notable events documented.     Nurse Pain Score: 0 (NPRS)

## 2025-07-11 NOTE — PROGRESS NOTES
Jay from Lab called with critical result of Total bilirubin 24.9 at 0218. Critical lab result read back to Mariana Cuenca.   Dr. Cuenca notified of critical lab result at 0219.  Critical lab result read back by Dr. Cuenca.

## 2025-07-11 NOTE — PROGRESS NOTES
Pt was given water and cranberry juice upon returning from PACU, pt tolerated clear liquid diet. No abdominal pain, bowel sounds heard. Pt diet advanced to full liquid per MD order and patient request.

## 2025-07-11 NOTE — ANESTHESIA PREPROCEDURE EVALUATION
Case: [id number] Date/Time: 07/11/25 1250    Procedure: GASTROSCOPY    Location: [facility] ROOM 26 / SURGERY SAME DAY [facility]    Surgeons: Jm Lopez M.D.          59F w/PMH alcoholic liver cirrhosis, esophageal ulcer, gastric varices, ascites (6.5L drained on 7/9) admitted with melena and severe anemia presents for gastroscopy    Relevant Problems   CARDIAC   (positive) Bleeding gastric varices   (positive) Portal hypertension (HCC)         (positive) Alcoholic cirrhosis of liver with ascites (HCC)   (positive) Hepatitis C      Other   (positive) Acute GI bleeding   (positive) Alcohol abuse   (positive) Ascites   (positive) Hypoxia     Lab Results   Component Value Date/Time    WBC 4.9 07/10/2025 12:40 AM    RBC 2.66 (L) 07/10/2025 12:40 AM    HEMOGLOBIN 9.1 (L) 07/10/2025 04:54 PM    HEMATOCRIT 29.4 (L) 07/10/2025 04:54 PM    MCV 85.3 07/10/2025 12:40 AM    MCH 25.9 (L) 07/10/2025 12:40 AM    MCHC 30.4 (L) 07/10/2025 12:40 AM    RDW 78.6 (H) 07/10/2025 12:40 AM    PLATELETCT 80 (L) 07/10/2025 12:40 AM    MPV 11.1 07/06/2025 12:07 AM    NEUTSPOLYS 67.00 07/05/2025 06:25 AM    LYMPHOCYTES 20.90 (L) 07/05/2025 06:25 AM    MONOCYTES 9.70 07/05/2025 06:25 AM    EOSINOPHILS 0.70 07/05/2025 06:25 AM    BASOPHILS 0.70 07/05/2025 06:25 AM    HYPOCHROMIA 2+ (A) 07/05/2025 06:25 AM    ANISOCYTOSIS 1+ 07/05/2025 06:25 AM        Lab Results   Component Value Date/Time    SODIUM 135 07/10/2025 12:40 AM    POTASSIUM 3.4 (L) 07/10/2025 12:40 AM    CHLORIDE 99 07/10/2025 12:40 AM    CO2 24 07/10/2025 12:40 AM    GLUCOSE 173 (H) 07/10/2025 12:40 AM    BUN 17 07/10/2025 12:40 AM    CREATININE 1.01 07/10/2025 12:40 AM    BUNCREATRAT 11.7 04/30/2022 12:39 PM    eGFR 64    EKG  Results for orders placed or performed during the hospital encounter of 07/05/25   EKG   Result Value Ref Range    Report       [facility] Emergency Dept.    Test Date:  2025-07-05  Pt Name:    FELIPE COOK                Department:  ER  MRN:        [id number]                      Room:       RD 02  Gender:     Female                       Technician: [id number]  :        1965                   Requested By:ER TRIAGE PROTOCOL  Order #:    [id number]                    Reading MD: RUBY FRANKS MD    Measurements  Intervals                                Axis  Rate:       117                          P:          76  AZ:         115                          QRS:        45  QRSD:       74                           T:          42  QT:         345  QTc:        482    Interpretive Statements  Rate 117, sinus tachycardia, no ST elevation or depression, artifact present  in V5  Electronically Signed On 2025 06:51:12 PDT by RUBY FRANKS MD        Echo   No prior study is available for comparison.   The left ventricular ejection fraction is visually estimated to be 75%.  No significant valvular disease.     Physical Exam    Airway   Mallampati: II  TM distance: >3 FB  Neck ROM: full       Cardiovascular - normal exam  Rhythm: regular  Rate: normal     Dental - normal exam           Pulmonary - normal examBreath sounds clear to auscultation     Abdominal    Neurological - normal exam         Other findings: Jaundice  Scleral icterus  Distended abdomen            Anesthesia Plan    ASA 4 (alcohol abuse, cirrhosis, gastric varices and esophageal ulcers, portal hypertension with acute hemorrhage, severe anemia and hypotension)- EMERGENT   ASA physical status 4 criteria: other (comment) and decompensated cirrhosisASA physical status emergent criteria: acute hemorrhage    Plan - MAC               Induction: intravenous    Postoperative Plan: Postoperative administration of opioids is intended.    Pertinent diagnostic labs and testing reviewed    Informed Consent:    Anesthetic plan and risks discussed with patient.    Use of blood products discussed with: patient whom consented to blood products.       Risks of MAC including conversion to general  anesthesia discussed including sore throat, damage to lips/teeth, anaphylaxis/drug reaction, aspiration, awareness, post-op nausea/vomiting, post-op delirium, myocardial infarction, cerebral vascular accident up to and including death.

## 2025-07-11 NOTE — CARE PLAN
The patient is Stable - Low risk of patient condition declining or worsening    Shift Goals  Clinical Goals: wean O2, pt will remain free from falls, monitorh/h  Patient Goals: go home, anxiety relief  Family Goals: MARTINE    Progress made toward(s) clinical / shift goals:  Patient and family educated on plan of care and verbalized understanding, being able to identify barriers for discharge. Patient has remained free of falls this shift with appropriate fall precautions in place throughout. Patient skin integrity maintained throughout shift with skin interventions and checks. Pt rounded on hourly and all needs met.     Problem: Knowledge Deficit - Standard  Goal: Patient and family/care givers will demonstrate understanding of plan of care, disease process/condition, diagnostic tests and medications  Outcome: Progressing     Problem: Fall Risk  Goal: Patient will remain free from falls  Outcome: Progressing     Problem: Pain - Standard  Goal: Alleviation of pain or a reduction in pain to the patient’s comfort goal  Outcome: Progressing     Problem: Safety  Goal: Will remain free from injury  Outcome: Progressing  Goal: Will remain free from falls  Outcome: Progressing     Problem: Psychosocial Needs:  Goal: Level of anxiety will decrease  Outcome: Progressing     Problem: Fluid Volume:  Goal: Will maintain balanced intake and output  Outcome: Progressing       Patient is not progressing towards the following goals:

## 2025-07-11 NOTE — PROGRESS NOTES
Pt arrived to unit via wheelchair, escorted by transport staff. Pt has all personal belongings at bedside. Call light and personal belongings are within reach. Bed is in lowest and locked position. All needs are met and questions answered at this time.

## 2025-07-11 NOTE — PROGRESS NOTES
Assumed care at 1900. Received report from DENISE Carvajal. Pt is resting in bed. Assessment completed. A&O X 4. VSS. No complaints of chest pain, SOB, or N/V. Lung sounds clear. No cardiac murmurs noted. Normoactive bowel sounds in all quadrants. Pt reports 0/10 pain at this time. Pt is on 3L. Pt is X1 FWW. Educated on POC and verbalizes understanding. Bed is locked and in the lowest position. Call light and belongings are within reach. Bed alarm on. No further needs at this time.

## 2025-07-11 NOTE — OR NURSING
1253- pt arrives from OR to PACU 3, report received from RN and anesthesia. Pt place on monitor. VSS,  NAD noted. O2 6L via mask.      1305-Dr Lopez at bedside,  pt tolerates sip of water.   Updated on POC    1320-report called to S5 RN    1336- Patient transported back up to S5 via transport service in her gurney. Left with all of her personal belongings.

## 2025-07-11 NOTE — PROGRESS NOTES
GI Interim Note    Patient s/p CARTO for gastric varices.  She still has moderate sized gastric varices with stigmata of recent bleeding.  No blood in lumen today.  Her MELD 3.0 is 32, so she is not a candidate for TIPS.  We do not inject cyanoacrylate glue into gastric varices here nor EUS guided coil embolization.  We will start carvedilol 3.125 mg BID in hopes this will decrease risk of bleeding as well as CARTO.

## 2025-07-11 NOTE — CARE PLAN
The patient is Stable - Low risk of patient condition declining or worsening    Shift Goals  Clinical Goals: wean O2, monitor H&H, remain free from falls  Patient Goals: go home  Family Goals: MARTINE    Progress made toward(s) clinical / shift goals:      Problem: Fall Risk  Goal: Patient will remain free from falls  Outcome: Progressing  Note: Patient has remained free of falls throughout shift. Patient has proper fall preventions and protocols in place at this time. Patients fall risk will continue to be assessed each shift. Patient will continue to remain free of falls and will call appropriately.      Problem: Pain - Standard  Goal: Alleviation of pain or a reduction in pain to the patient’s comfort goal  Outcome: Progressing  Note: Patients pain has remained controlled throughout shift through pharmacological and nonpharmacological methods of pain management. Patients pain will continue to be assessed throughout shift and controlled appropriately.        Patient is not progressing towards the following goals:

## 2025-07-11 NOTE — OP REPORT
PreOp Diagnosis: Anemia due to GI blood loss, recent CARTO of gastric varix      PostOp Diagnosis:   - No esophageal varices  - Improving erythema/ulceration in the cardia stomach  - Residual moderate size gastric varix with nipple.  No blood within the stomach.  - Portal hypertensive gastropathy  - Portal hypertensive duodenopathy      Procedure(s):  GASTROSCOPY - Wound Class: Clean Contaminated    Surgeon(s):  Jm Lopez M.D.    Anesthesiologist/Type of Anesthesia:  Anesthesiologist: Rom Park M.D./MAC    Surgical Staff:  Endoscopy Technician: Johanne Reyes  Endoscopy Nurse: Ellen Ratliff R.N.; Yuli Mills R.N.    Specimens removed if any:  * No specimens in log *      CONSENT: The risks, benefits and alternatives of the procedure were discussed in detail. The risks include and are not limited to bleeding, infection, perforation, missed lesions, and sedations risks (cardiopulmonary compromise and allergic reaction to medications).    DESCRIPTION:   The patient presented to the operating room.  A time out was performed prior to beginning the procedure.   The patient was placed in the left lateral position.   Patient was sedated by anesthesia: Propofol.    OPERATIVE FINDINGS:    Esophagus: No gross lesions.  No varices.  Stomach: Significant erythema in the cardia of the stomach with no high or stigmata.  No ulceration present at this time.  On retroflexed view a moderate size gastric varix is present.  There is a nipple sign present.  There is no active bleeding.  There is no stigmata of recent bleeding.  There is diffuse portal hypertensive gastropathy.  Duodenum: Diffuse portal hypertensive duodenopathy.    Blood loss: None    The patient tolerated the procedure well.      There were no immediate complications.    IMPRESSION:  - No esophageal varices  - Improving erythema/ulceration in the cardia stomach  - Residual moderate size gastric varix with nipple.  No blood within the stomach.  -  Portal hypertensive gastropathy  - Portal hypertensive duodenopathy    RECOMMENDATIONS:  Continue current medications  Observe clinical course  Further management to be determined based on rebleeding  2 g sodium restriction diet  Nonselective beta-blocker  Repeat upper endoscopy, timing to be determined:   Investigate with interventional radiology whether further follow-on procedures are deemed likely to be successful versus referring patient to a center capable of injecting glue into the gastric varix

## 2025-07-11 NOTE — PROGRESS NOTES
Received report and assumed care of patient at change of shift. Patient is A&Ox4, on 2L NC, and reports no pain at this time. Patient assessment completed, bed in lowest position, and call light and personal belongings are within reach. Patient expressed no further needs at this time.

## 2025-07-11 NOTE — ASSESSMENT & PLAN NOTE
EGD - Large gastric varices on greater curvature of stomach near cardia, Large esophageal ulcer, Portal hypertension gastropathy  Total of PRBC x4 hospitalization  Hemoglobin stable last 24 hours  Additional EGD with GI today

## 2025-07-11 NOTE — PROGRESS NOTES
Hospital Medicine Daily Progress Note    Date of Service  7/11/2025    Chief Complaint  Chiara Pacheco is a 59 y.o. female admitted 7/5/2025 with melena    Hospital Course      86-year-old female with history of alcoholic liver cirrhosis and varices bleeding who presented 7/5 with the bloody stool.  Patient has history of excessive drinking alcohol with admitting on 4/30-5/3 for variceal bleeding.  At that time, recommendation for BRTO/CARTO recommended by GI several times, however she did not want any further procedures or consultations done and wished to discharge.  Patient came with bloody stool and abdominal pain.  Patient had tachycardia and hemoglobin 6.4 on admission.  Started octreotide and PPI with ceftriaxone and GI was consulted. Patient underwent EGD 7/5.  This showed large gastric varices on the greater curvature of the stomach, large esophageal ulcer and portal hypertension.  Again recommending IR consult for CARTO.  CARTO completed 7/6.    Also patient was found to have volume overloaded, paracentesis was done on 7/9 with removing around 6 and half liter, Lasix IV and spironolactone were added.      Interval Problem Update  Patient was seen and examined at bedside.  No acute events overnight. Patient is resting comfortably in bed and in no acute distress.     Paracentesis 7/9 with 6.4 L removed  Plan for EGD today  Hemoglobin stable last 24 hours, s/p PRBC x 1 on 7/10  Will optimize volume status for cirrhosis with IV Lasix and spironolactone  Cr 0.93    I have discussed this patient's plan of care and discharge plan at IDT rounds today with Case Management, Nursing, Nursing leadership, and other members of the IDT team.    Consultants/Specialty  GI and interventional radiology    Code Status  Full Code    Disposition  The patient is not medically cleared for discharge to home or a post-acute facility.      I have placed the appropriate orders for post-discharge needs.    Review of  Systems  Review of Systems   Constitutional:  Positive for malaise/fatigue. Negative for chills and fever.   HENT:  Negative for ear pain and hearing loss.    Eyes:  Negative for blurred vision and double vision.   Respiratory:  Negative for cough and shortness of breath.    Cardiovascular:  Negative for chest pain and palpitations.   Gastrointestinal:  Positive for abdominal pain. Negative for constipation, diarrhea and vomiting.   Genitourinary:  Negative for dysuria and frequency.   Musculoskeletal:  Negative for myalgias.   Skin:  Negative for rash.   Neurological:  Positive for weakness. Negative for speech change and headaches.   Psychiatric/Behavioral:  Negative for substance abuse.         Physical Exam  Temp:  [36 °C (96.8 °F)-36.8 °C (98.3 °F)] 36 °C (96.8 °F)  Pulse:  [72-91] 77  Resp:  [16-20] 20  BP: (105-131)/(53-71) 106/53  SpO2:  [92 %-97 %] 92 %    Physical Exam  Vitals and nursing note reviewed.   Constitutional:       General: She is sleeping. She is not in acute distress.     Appearance: Normal appearance. She is ill-appearing.   HENT:      Right Ear: External ear normal.      Left Ear: External ear normal.      Nose: No congestion.   Eyes:      General: No scleral icterus.  Cardiovascular:      Rate and Rhythm: Normal rate and regular rhythm.   Pulmonary:      Effort: Pulmonary effort is normal.      Breath sounds: Rales present. No wheezing.   Abdominal:      General: Bowel sounds are normal.      Tenderness: There is abdominal tenderness. There is no guarding or rebound.   Musculoskeletal:      Right lower leg: Edema present.      Left lower leg: Edema present.   Skin:     General: Skin is warm and dry.      Coloration: Skin is jaundiced.   Neurological:      Mental Status: She is oriented to person, place, and time. She is lethargic.      Motor: Weakness present.   Psychiatric:         Mood and Affect: Mood normal.         Behavior: Behavior normal.         Fluids    Intake/Output Summary  (Last 24 hours) at 7/11/2025 1248  Last data filed at 7/11/2025 0600  Gross per 24 hour   Intake 360 ml   Output 600 ml   Net -240 ml        Laboratory  Recent Labs     07/09/25  0627 07/10/25  0040 07/10/25  1654 07/11/25  0113 07/11/25  0946   WBC 6.7 4.9  --  6.5  --    RBC 3.08* 2.66*  --  3.45*  --    HEMOGLOBIN 8.1* 6.9* 9.1* 9.1* 9.8*   HEMATOCRIT 26.2* 22.7* 29.4* 29.0* 30.7*   MCV 85.1 85.3  --  84.1  --    MCH 26.3* 25.9*  --  26.4*  --    MCHC 30.9* 30.4*  --  31.4*  --    RDW 71.7* 78.6*  --  75.8*  --    PLATELETCT 94* 80*  --  91*  --    MPV  --   --   --  11.3  --      Recent Labs     07/09/25  0627 07/10/25  0040 07/11/25  0113   SODIUM 134* 135 132*   POTASSIUM 3.2* 3.4* 3.5*   CHLORIDE 100 99 99   CO2 23 24 22   GLUCOSE 181* 173* 139*   BUN 18 17 14   CREATININE 1.02 1.01 0.93   CALCIUM 7.5* 7.6* 8.0*     Recent Labs     07/09/25  0625 07/11/25  0113   INR 2.62* 2.54*               Imaging  CT-RENAL COLIC EVALUATION(A/P W/O)   Final Result      1.  There is no renal or ureteral calculus. There is no hydronephrosis.   2.  Cirrhosis with portal hypertension.   3.  Cholelithiasis.   4.  Ascites.   5.  Anasarca.   6.  Atherosclerosis.   7.  Fibroid uterus.      US-PARACENTESIS, ABD WITH IMAGING   Final Result      1. Ultrasound-guided diagnostic and therapeutic paracentesis of the right/midline lower quadrant/pelvis.      2. 6450 mL of fluid withdrawn.      DX-CHEST-LIMITED (1 VIEW)   Final Result         1.  [Pulmonary changes, new since prior study.      [id number] VIEW   Final Result      1.  Normal bowel gas pattern.   2.  Small left pleural effusion.   3.  Atherosclerosis.      US-ABDOMEN LTD (SOFT TISSUE)   Final Result      Small amount of ascites noted in the abdomen, paracentesis deferred.         IR-MIDLINE CATHETER INSERTION WO GUIDANCE > AGE 3   Final Result                  Ultrasound-guided midline placement performed by qualified nursing staff    as above.          US-ABDOMEN COMPLETE  SURVEY   Final Result         1.  Common bile duct dilatation, consider causes of biliary obstruction with additional workup as clinically appropriate.   2.  Nodular hepatic contour compatible with changes of cirrhosis.   3.  Ascites   4.  Splenomegaly.      IR-EMBOLIZATION   Final Result      1.  Left renal venogram demonstrating conventional anatomy.   2.  Gastrorenal shunt venogram demonstrating a markedly enlarged gastric variceal complex.   3.  Successful coil embolization of the gastrorenal shunt vessel.   4.  Successful Gelfoam embolization of gastric varices.      DX-CHEST-PORTABLE (1 VIEW)   Final Result         1.  No acute cardiopulmonary disease.           Assessment/Plan  * Alcoholic cirrhosis of liver with ascites (HCC)- (present on admission)  Assessment & Plan  With decompensation with GI bleeding, volume overloaded and hepatic encephalopathy   due to alcoholism MELD: 28 and elevated bilirubin  CT scan showed signs of cirrhosis  Optimize volume status with Aldactone, Lasix  Continue lactulose    Acute blood loss anemia  Assessment & Plan  EGD - Large gastric varices on greater curvature of stomach near cardia, Large esophageal ulcer, Portal hypertension gastropathy  Total of PRBC x4 hospitalization  Hemoglobin stable last 24 hours  Additional EGD with GI today    Ascites  Assessment & Plan  Due to liver cirrhosis  Paracentesis was done on 7/9 with removing 6.4 L received albumin after.  Fluid did not show infection  Continue spironolactone and Lasix    Acute GI bleeding- (present on admission)  Assessment & Plan  Due to portal hypertension and variceal bleeding and ulcer  EGD 7/5:  large gastric varices on the greater curvature of the stomach, large esophageal ulcer and portal hypertension.  S/p 6/7 CARTO   Finish course of octreotide 72 hours  PPI twice daily  Advance diet as tolerated  Received 5 days of ceftriaxone      Hyponatremia  Assessment & Plan  Na 132    Leukocytosis- (present on  admission)  Assessment & Plan  WBC 6.5  Continue labs daily  Ceftriaxone for prophylaxis    Alcohol abuse- (present on admission)  Assessment & Plan  Continue to encourage cessation    Bleeding gastric varices- (present on admission)  Assessment & Plan  Underwent CARTO   Hemoglobin stable  PPI    Metabolic acidosis- (present on admission)  Assessment & Plan  Improving    Hypokalemia  Assessment & Plan  Monitor and replace    ACP (advance care planning)  Assessment & Plan  Previous conversations per my colleague    Hypoxia- (present on admission)  Assessment & Plan  Secondary to volume overload  Continue IV Lasix and add spironolactone           VTE prophylaxis: VTE Selection    I have performed a physical exam and reviewed and updated ROS and Plan today (7/11/2025). In review of yesterday's note (7/10/2025), there are no changes except as documented above.    Greater than 51 minutes spent prepping to see patient (e.g. review of tests) obtaining and/or reviewing separately obtained history. Performing a medically appropriate examination and/ evaluation.  Counseling and educating the patient/family/caregiver.  Ordering medications, tests, or procedures.  Referring and communicating with other health care professionals.  Documenting clinical information in EPIC.  Independently interpreting results and communicating results to patient/family/caregiver.  Care coordination.

## 2025-07-12 LAB
ALBUMIN SERPL BCP-MCNC: 2.6 G/DL (ref 3.2–4.9)
ALBUMIN/GLOB SERPL: 1.6 G/DL
ALP SERPL-CCNC: 96 U/L (ref 30–99)
ALT SERPL-CCNC: 24 U/L (ref 2–50)
ANION GAP SERPL CALC-SCNC: 12 MMOL/L (ref 7–16)
AST SERPL-CCNC: 67 U/L (ref 12–45)
BACTERIA FLD AEROBE CULT: NORMAL
BILIRUB SERPL-MCNC: 25.8 MG/DL (ref 0.1–1.5)
BUN SERPL-MCNC: 18 MG/DL (ref 8–22)
CALCIUM ALBUM COR SERPL-MCNC: 9.3 MG/DL (ref 8.5–10.5)
CALCIUM SERPL-MCNC: 8.2 MG/DL (ref 8.5–10.5)
CHLORIDE SERPL-SCNC: 98 MMOL/L (ref 96–112)
CO2 SERPL-SCNC: 21 MMOL/L (ref 20–33)
CREAT SERPL-MCNC: 1.05 MG/DL (ref 0.5–1.4)
ERYTHROCYTE [DISTWIDTH] IN BLOOD BY AUTOMATED COUNT: 80.1 FL (ref 35.9–50)
GFR SERPLBLD CREATININE-BSD FMLA CKD-EPI: 61 ML/MIN/1.73 M 2
GLOBULIN SER CALC-MCNC: 1.6 G/DL (ref 1.9–3.5)
GLUCOSE SERPL-MCNC: 191 MG/DL (ref 65–99)
GRAM STN SPEC: NORMAL
HCT VFR BLD AUTO: 29.6 % (ref 37–47)
HGB BLD-MCNC: 9.1 G/DL (ref 12–16)
MAGNESIUM SERPL-MCNC: 1.6 MG/DL (ref 1.5–2.5)
MCH RBC QN AUTO: 26.1 PG (ref 27–33)
MCHC RBC AUTO-ENTMCNC: 30.7 G/DL (ref 32.2–35.5)
MCV RBC AUTO: 85.1 FL (ref 81.4–97.8)
PHOSPHATE SERPL-MCNC: 3.1 MG/DL (ref 2.5–4.5)
PLATELET # BLD AUTO: 103 K/UL (ref 164–446)
POTASSIUM SERPL-SCNC: 3.9 MMOL/L (ref 3.6–5.5)
PROT SERPL-MCNC: 4.2 G/DL (ref 6–8.2)
RBC # BLD AUTO: 3.48 M/UL (ref 4.2–5.4)
SIGNIFICANT IND 70042: NORMAL
SITE SITE: NORMAL
SODIUM SERPL-SCNC: 131 MMOL/L (ref 135–145)
SOURCE SOURCE: NORMAL
WBC # BLD AUTO: 6.3 K/UL (ref 4.8–10.8)

## 2025-07-12 PROCEDURE — A9270 NON-COVERED ITEM OR SERVICE: HCPCS | Performed by: INTERNAL MEDICINE

## 2025-07-12 PROCEDURE — 700102 HCHG RX REV CODE 250 W/ 637 OVERRIDE(OP): Performed by: INTERNAL MEDICINE

## 2025-07-12 PROCEDURE — A9270 NON-COVERED ITEM OR SERVICE: HCPCS

## 2025-07-12 PROCEDURE — 99232 SBSQ HOSP IP/OBS MODERATE 35: CPT | Performed by: NURSE PRACTITIONER

## 2025-07-12 PROCEDURE — 700102 HCHG RX REV CODE 250 W/ 637 OVERRIDE(OP)

## 2025-07-12 PROCEDURE — 80053 COMPREHEN METABOLIC PANEL: CPT

## 2025-07-12 PROCEDURE — 36415 COLL VENOUS BLD VENIPUNCTURE: CPT

## 2025-07-12 PROCEDURE — 700111 HCHG RX REV CODE 636 W/ 250 OVERRIDE (IP): Performed by: INTERNAL MEDICINE

## 2025-07-12 PROCEDURE — 99233 SBSQ HOSP IP/OBS HIGH 50: CPT | Performed by: INTERNAL MEDICINE

## 2025-07-12 PROCEDURE — 83735 ASSAY OF MAGNESIUM: CPT

## 2025-07-12 PROCEDURE — 700111 HCHG RX REV CODE 636 W/ 250 OVERRIDE (IP): Performed by: STUDENT IN AN ORGANIZED HEALTH CARE EDUCATION/TRAINING PROGRAM

## 2025-07-12 PROCEDURE — 84100 ASSAY OF PHOSPHORUS: CPT

## 2025-07-12 PROCEDURE — 85027 COMPLETE CBC AUTOMATED: CPT

## 2025-07-12 PROCEDURE — 770006 HCHG ROOM/CARE - MED/SURG/GYN SEMI*

## 2025-07-12 RX ORDER — FUROSEMIDE 40 MG/1
40 TABLET ORAL
Status: DISCONTINUED | OUTPATIENT
Start: 2025-07-13 | End: 2025-07-13 | Stop reason: HOSPADM

## 2025-07-12 RX ORDER — HYDROMORPHONE HYDROCHLORIDE 1 MG/ML
0.25 INJECTION, SOLUTION INTRAMUSCULAR; INTRAVENOUS; SUBCUTANEOUS
Status: DISCONTINUED | OUTPATIENT
Start: 2025-07-12 | End: 2025-07-13 | Stop reason: HOSPADM

## 2025-07-12 RX ORDER — OXYCODONE HYDROCHLORIDE 5 MG/1
5 TABLET ORAL
Refills: 0 | Status: DISCONTINUED | OUTPATIENT
Start: 2025-07-12 | End: 2025-07-13 | Stop reason: HOSPADM

## 2025-07-12 RX ORDER — OXYCODONE HYDROCHLORIDE 5 MG/1
2.5 TABLET ORAL
Refills: 0 | Status: DISCONTINUED | OUTPATIENT
Start: 2025-07-12 | End: 2025-07-13 | Stop reason: HOSPADM

## 2025-07-12 RX ADMIN — LACTULOSE 45 ML: 10 SOLUTION ORAL at 17:39

## 2025-07-12 RX ADMIN — LACTULOSE 45 ML: 10 SOLUTION ORAL at 05:26

## 2025-07-12 RX ADMIN — CARVEDILOL 3.12 MG: 3.12 TABLET, FILM COATED ORAL at 08:55

## 2025-07-12 RX ADMIN — LACTULOSE 45 ML: 10 SOLUTION ORAL at 10:37

## 2025-07-12 RX ADMIN — SPIRONOLACTONE 100 MG: 100 TABLET ORAL at 05:26

## 2025-07-12 RX ADMIN — PANTOPRAZOLE SODIUM 40 MG: 40 INJECTION, POWDER, FOR SOLUTION INTRAVENOUS at 05:26

## 2025-07-12 RX ADMIN — CARVEDILOL 3.12 MG: 3.12 TABLET, FILM COATED ORAL at 17:39

## 2025-07-12 RX ADMIN — OXYCODONE 5 MG: 5 TABLET ORAL at 21:34

## 2025-07-12 RX ADMIN — Medication 100 MG: at 05:26

## 2025-07-12 RX ADMIN — FUROSEMIDE 60 MG: 10 INJECTION, SOLUTION INTRAVENOUS at 05:26

## 2025-07-12 RX ADMIN — PANTOPRAZOLE SODIUM 40 MG: 40 INJECTION, POWDER, FOR SOLUTION INTRAVENOUS at 17:39

## 2025-07-12 RX ADMIN — FOLIC ACID 1 MG: 1 TABLET ORAL at 05:26

## 2025-07-12 ASSESSMENT — ENCOUNTER SYMPTOMS
DIARRHEA: 0
SORE THROAT: 0
SHORTNESS OF BREATH: 0
NAUSEA: 0
SPEECH CHANGE: 0
HEADACHES: 0
CHILLS: 0
DOUBLE VISION: 0
PALPITATIONS: 0
MYALGIAS: 0
DEPRESSION: 0
SINUS PAIN: 0
NERVOUS/ANXIOUS: 0
BLOOD IN STOOL: 0
WEAKNESS: 1
BLURRED VISION: 0
HEARTBURN: 0
FEVER: 0
DIZZINESS: 0
ABDOMINAL PAIN: 0
CONSTIPATION: 0
COUGH: 0
ABDOMINAL PAIN: 1
VOMITING: 0
BACK PAIN: 0

## 2025-07-12 ASSESSMENT — PAIN DESCRIPTION - PAIN TYPE
TYPE: ACUTE PAIN

## 2025-07-12 ASSESSMENT — LIFESTYLE VARIABLES
SUBSTANCE_ABUSE: 0
SUBSTANCE_ABUSE: 1

## 2025-07-12 ASSESSMENT — FIBROSIS 4 INDEX: FIB4 SCORE: 7.7

## 2025-07-12 NOTE — CARE PLAN
The patient is Stable - Low risk of patient condition declining or worsening    Shift Goals  Clinical Goals: pt will remain free from falls, wean O2 and monitor SPO2  Patient Goals: comfort, discharge  Family Goals: MARTINE    Progress made toward(s) clinical / shift goals:      Problem: Fall Risk  Goal: Patient will remain free from falls  Outcome: Progressing  Note: Patient has remained free of falls throughout shift. Patient has proper fall preventions and protocols in place at this time. Patients fall risk will continue to be assessed each shift. Patient will continue to remain free of falls and will call appropriately.      Problem: Pain - Standard  Goal: Alleviation of pain or a reduction in pain to the patient’s comfort goal  Outcome: Progressing  Note: Patients pain has remained controlled throughout shift through pharmacological and nonpharmacological methods of pain management. Patients pain will continue to be assessed throughout shift and controlled appropriately.        Patient is not progressing towards the following goals:

## 2025-07-12 NOTE — PROGRESS NOTES
..Gastroenterology Progress Note               Author:  TED Beck   Date & Time Created: 7/12/2025 11:47 AM       Patient ID:  Name:             Chiara Pacheco  YOB: 1965  Age:                 59 y.o.  female  MRN:               [id number]    Medical Decision Making, by Problem:  Active Hospital Problems    Diagnosis     Hyponatremia [E87.1]     Hypokalemia [E87.6]     Acute blood loss anemia [D62]     Portal hypertension (HCC) [K76.6]     Ascites [R18.8]     ACP (advance care planning) [Z71.89]     Acute GI bleeding [K92.2]     Leukocytosis [D72.829]     Alcoholic cirrhosis of liver with ascites (HCC) [K70.31]     Alcohol abuse [F10.10]     Bleeding gastric varices [I86.4]     Hypoxia [R09.02]     Metabolic acidosis [E87.20]      Presenting Chief Complaint:  hematemesis     HISTORY OF PRESENT ILLNESS: history per chart as she isn't fully answering questions when interviewed  Chiara Pacheco is a 59 y.o. female who presents to the emergency department today for evaluation of hematemesis.  She has a past medical history significant for alcoholic cirrhosis, was hospitalized a little over a year ago for hematemesis.  EGD on 2/20/2023 showed Gastric varix, portal hypertension gastropathy, gastric ulcer and esophagitis, not bleeding, Esmer enrique tear, clip still in place and no bleeding EGD on 9/23/2023 showed:small esophageal varices, varix with erosion and stigmata of recent bleeding, banded x 1, gastric varix without stigmata, large amount of blood in stomach precluding complete exam      Due to gastric varices, interventional radiology consultation was recommended by gastroenterology for BRTO/CARTO procedure, however patient declined, stated that she did not want any further intervention or procedures.  She was provided with outpatient follow-up information, on my discussion today she did not call IR, and never scheduled follow-up for these issues.     She continues to  use alcohol, at midnight she began to have recurrent hematemesis.  She states that she has stopped all NSAID use.  She does have some mild epigastric pain as well.  She also reports dark stools for a few weeks, uncertain as to the exact duration of time.  She does not report any recent fevers, no other abnormal bleeding or bruising. She had dark vomitus times one while in hospital.     Interval History:  7/5/2025: EGD:   Esophagus: large esophageal ulcer from 36 to 40 cm. Not bleeding but prominent. Minimal to no esophageal varices  Stomach: stomach full of blood, large gastric varices, source of bleeding. Also portal hypertensive gastropathy that is oozing minimally.   Duodenum: normal bulb and second portion of duodenum    7/6/2025: Patient resting comfortably.  N.p.o. pending Carto with IR today.  Hgb 8.5  Last bloody BM yesterday, also having blood in urine.     MELD 3.0-28 points  Child Class C    7/7/2025: Patient seen. Feeling better, more alert. Had several brown bowel movements and still having hematuria. Underwent Carto yesterday. Hgb 8.1.     MELD 3.0 - 28 points    7/8/2025: PPD#2 s/p CARTO with IR. Sleeping quietly, arouses appropriately. No BM overnight- last at 7/7/2025 at 1500. Reports abdominal bloating, inability to lay on back due to pain. Hypoactive bowel sounds.  KUB ordered.  Tolerating clear liquids. Abd ultrasound 7/7/2025 with CBD dilation at 7.3. gallbladder not seen well due to ascites, scattered ascites, nodule changes of cirrhosis, splenomegaly. IVC and hepatic veins patent.  Abd soft tissue US 7/8 showing small amount of ascites, paracentesis deferred.   Na 134, K 3.3, BUN 18, Cr 0.74, Ca 8.9, AST 79, ALt 24, AP 88, Tbili 18.4, albumin 2.5, mag 1.8    Update 1652: KUB neg for obstructive process.     7/9/2025: Patient seen at bedside.  Sleeping quietly but arouses apparently.  Bloody bowel movement x 3 overnight.  Patient reports ongoing back pain, abdominal pain/fullness.  Dullness to  percussion. Repeat paracentesis ordered to attempt. WBC 6.7, hgb 8.1, plt 94. Na 134, K 3.2, BUN 18 Cr 1.02, AST 73, ALT 25, AP 88, Tbili 18.7, albumin 2.4. INR 2.62    MELD 3.0; 32 63.5% estimated 90 day survival   Child-Jimenez Class C    7/10/2025: Paracentesis done yesterday.  6450 mL drained.  Negative for SBP, patient was also had a course of antibiotics.  Cytology pending.  She reports abdominal and back pain relieved status post paracentesis.  She had bloody bowel movement x 3 since 3 AM.  This was associated with hemoglobin drop from 8.1-6.9.  Hypotensive with systolic blood pressure 88.  Platelets 80.  Sodium 135, potassium 3.4, BUN 17, creatinine 1.01.  Ammonia 30.  Discussed concern for GI bleeding as well as endoscopic evaluation.  Patient had eaten pieces of her Urdu toast in addition to 5 bites of cream a week.  She is hemodynamically stable at this time.    7/12/2025: Postprocedure day #1 s/p EGD.  Patient seen at bedside.  AO x 4.  No nausea, vomiting, abdominal pain.  She is tolerating oral intake without difficulty.  Medium-large brown bowel movement x 3 overnight.  Started on Coreg yesterday for recurrent gastric variceal bleeding despite undergoing cautery earlier this week.  Hemoglobin stable 9.1-9.8-9-9.1.  BUN 18.  AST 67, ALT 24, alk phos 96, total bilirubin 25.8.    Hospital Medications:  Current Facility-Administered Medications   Medication Dose Frequency Provider Last Rate Last Admin    carvedilol (Coreg) tablet 3.125 mg  3.125 mg BID WITH MEALS Nova Al M.D.   3.125 mg at 07/12/25 0855    pantoprazole (Protonix) injection 40 mg  40 mg BID Serafin Hernandez M.D.   40 mg at 07/12/25 0526    hydrOXYzine HCl (Atarax) tablet 25 mg  25 mg TID PRN Shanice Martinez A.P.R.NRoshan   25 mg at 07/11/25 2153    spironolactone (Aldactone) tablet 100 mg  100 mg Q DAY Serafin Hernandez M.D.   100 mg at 07/12/25 0526    lactulose 20 GM/30ML solution 45 mL  45 mL TID Serafin Hernandez M.D.    "45 mL at 07/12/25 1037    furosemide (Lasix) injection 60 mg  60 mg BID Lisandra Enriquez M.D.   60 mg at 07/12/25 0526    labetalol (Normodyne/Trandate) injection 10 mg  10 mg Q4HRS PRN ROSALBA CruzO.        ondansetron (Zofran) syringe/vial injection 4 mg  4 mg Q4HRS PRN ROSALBA CruzO.   4 mg at 07/05/25 0848    ondansetron (Zofran ODT) dispertab 4 mg  4 mg Q4HRS PRN ROSALBA CruzO.        promethazine (Phenergan) tablet 12.5-25 mg  12.5-25 mg Q4HRS PRN ROSALBA CruzO.        promethazine (Phenergan) suppository 12.5-25 mg  12.5-25 mg Q4HRS PRN ROSALBA CruzO.        prochlorperazine (Compazine) injection 5-10 mg  5-10 mg Q4HRS PRN ROSALBA CruzO.   10 mg at 07/05/25 1041    folic acid (Folvite) tablet 1 mg  1 mg DAILY BAL Cruz.O.   1 mg at 07/12/25 0526    thiamine (Vitamin B-1) tablet 100 mg  100 mg DAILY BAL Cruz.O.   100 mg at 07/12/25 0526   Last reviewed on 7/11/2025 11:26 AM by Nicole Roche R.N.       Review of Systems:  Review of Systems   Constitutional:  Negative for chills, fever and malaise/fatigue.   HENT:  Negative for sinus pain and sore throat.    Respiratory:  Negative for cough and shortness of breath.    Cardiovascular:  Positive for leg swelling. Negative for chest pain.   Gastrointestinal:  Negative for abdominal pain, blood in stool, constipation, diarrhea, heartburn, melena, nausea and vomiting.   Genitourinary:  Negative for dysuria and hematuria.   Musculoskeletal:  Negative for back pain and myalgias.   Skin:  Negative for rash.   Neurological:  Positive for weakness. Negative for dizziness.   Psychiatric/Behavioral:  Positive for substance abuse. Negative for depression. The patient is not nervous/anxious.    All other systems reviewed and are negative.    Vital signs:  Weight/BMI: Body mass index is 29.85 kg/m².  /59   Pulse 72   Temp 36.6 °C (97.8 °F) (Temporal)   Resp 14   Ht 1.753 m (5' 9\")   Wt 91.7 kg (202 lb " 2.6 oz)   SpO2 96%   Vitals:    07/11/25 2309 07/12/25 0316 07/12/25 0738 07/12/25 1130   BP: 105/60 108/53 114/63 115/59   Pulse: 73 70 79 72   Resp: 17 15 14 14   Temp: 36.8 °C (98.2 °F) 36.7 °C (98 °F) 36.7 °C (98 °F) 36.6 °C (97.8 °F)   TempSrc: Temporal Temporal Temporal Temporal   SpO2: 97% 93% 93% 96%   Weight:       Height:         Oxygen Therapy:  Pulse Oximetry: 96 %, O2 (LPM): 2, O2 Delivery Device: Nasal Cannula    Intake/Output Summary (Last 24 hours) at 7/12/2025 1147  Last data filed at 7/11/2025 1500  Gross per 24 hour   Intake 1020 ml   Output --   Net 1020 ml       Physical Exam  Vitals and nursing note reviewed.   Constitutional:       General: She is awake. She is not in acute distress.     Appearance: She is ill-appearing.   HENT:      Head: Normocephalic and atraumatic.      Nose: Nose normal. No congestion.      Mouth/Throat:      Mouth: Mucous membranes are moist.      Pharynx: Oropharynx is clear. No oropharyngeal exudate.   Eyes:      General: Scleral icterus present.   Cardiovascular:      Rate and Rhythm: Normal rate and regular rhythm.      Pulses: Normal pulses.      Heart sounds: Normal heart sounds.   Pulmonary:      Effort: Pulmonary effort is normal. No respiratory distress.      Breath sounds: Normal breath sounds.   Abdominal:      General: Bowel sounds are normal. There is distension.      Tenderness: There is no abdominal tenderness. There is no guarding.   Musculoskeletal:         General: Normal range of motion.      Cervical back: Normal range of motion.      Right lower leg: Edema present.      Left lower leg: Edema present.   Skin:     General: Skin is warm and dry.      Capillary Refill: Capillary refill takes less than 2 seconds.      Coloration: Skin is jaundiced.   Neurological:      Mental Status: She is alert, oriented to person, place, and time and easily aroused.      Motor: Weakness present.      Comments: Negative for asterixis   Psychiatric:         Mood and  Affect: Mood normal.         Behavior: Behavior normal. Behavior is cooperative.         Labs:  Recent Labs     07/10/25  0040 07/11/25  0113 07/12/25  0405   SODIUM 135 132* 131*   POTASSIUM 3.4* 3.5* 3.9   CHLORIDE 99 99 98   CO2 24 22 21   BUN 17 14 18   CREATININE 1.01 0.93 1.05   MAGNESIUM 1.9  --  1.6   PHOSPHORUS 2.5  --  3.1   CALCIUM 7.6* 8.0* 8.2*     Recent Labs     07/10/25  0040 07/11/25  0113 07/12/25  0405   ALTSGPT  --  24 24   ASTSGOT  --  69* 67*   ALKPHOSPHAT  --  88 96   TBILIRUBIN  --  24.9* 25.8*   GLUCOSE 173* 139* 191*     Recent Labs     07/10/25  0040 07/11/25  0113 07/12/25  0405   WBC 4.9 6.5 6.3   ASTSGOT  --  69* 67*   ALTSGPT  --  24 24   ALKPHOSPHAT  --  88 96   TBILIRUBIN  --  24.9* 25.8*     Recent Labs     07/10/25  0040 07/10/25  1654 07/11/25  0113 07/11/25  0946 07/11/25  2143 07/12/25  0405   RBC 2.66*  --  3.45*  --   --  3.48*   HEMOGLOBIN 6.9*   < > 9.1* 9.8* 9.0* 9.1*   HEMATOCRIT 22.7*   < > 29.0* 30.7* 28.9* 29.6*   PLATELETCT 80*  --  91*  --   --  103*   PROTHROMBTM  --   --  27.5*  --   --   --    INR  --   --  2.54*  --   --   --     < > = values in this interval not displayed.     Recent Results (from the past 24 hours)   HEMOGLOBIN AND HEMATOCRIT    Collection Time: 07/11/25  9:43 PM   Result Value Ref Range    Hemoglobin 9.0 (L) 12.0 - 16.0 g/dL    Hematocrit 28.9 (L) 37.0 - 47.0 %   Comp Metabolic Panel    Collection Time: 07/12/25  4:05 AM   Result Value Ref Range    Sodium 131 (L) 135 - 145 mmol/L    Potassium 3.9 3.6 - 5.5 mmol/L    Chloride 98 96 - 112 mmol/L    Co2 21 20 - 33 mmol/L    Anion Gap 12.0 7.0 - 16.0    Glucose 191 (H) 65 - 99 mg/dL    Bun 18 8 - 22 mg/dL    Creatinine 1.05 0.50 - 1.40 mg/dL    Calcium 8.2 (L) 8.5 - 10.5 mg/dL    Correct Calcium 9.3 8.5 - 10.5 mg/dL    AST(SGOT) 67 (H) 12 - 45 U/L    ALT(SGPT) 24 2 - 50 U/L    Alkaline Phosphatase 96 30 - 99 U/L    Total Bilirubin 25.8 (H) 0.1 - 1.5 mg/dL    Albumin 2.6 (L) 3.2 - 4.9 g/dL    Total  Protein 4.2 (L) 6.0 - 8.2 g/dL    Globulin 1.6 (L) 1.9 - 3.5 g/dL    A-G Ratio 1.6 g/dL   MAGNESIUM    Collection Time: 07/12/25  4:05 AM   Result Value Ref Range    Magnesium 1.6 1.5 - 2.5 mg/dL   PHOSPHORUS    Collection Time: 07/12/25  4:05 AM   Result Value Ref Range    Phosphorus 3.1 2.5 - 4.5 mg/dL   CBC WITHOUT DIFFERENTIAL    Collection Time: 07/12/25  4:05 AM   Result Value Ref Range    WBC 6.3 4.8 - 10.8 K/uL    RBC 3.48 (L) 4.20 - 5.40 M/uL    Hemoglobin 9.1 (L) 12.0 - 16.0 g/dL    Hematocrit 29.6 (L) 37.0 - 47.0 %    MCV 85.1 81.4 - 97.8 fL    MCH 26.1 (L) 27.0 - 33.0 pg    MCHC 30.7 (L) 32.2 - 35.5 g/dL    RDW 80.1 (H) 35.9 - 50.0 fL    Platelet Count 103 (L) 164 - 446 K/uL   ESTIMATED GFR    Collection Time: 07/12/25  4:05 AM   Result Value Ref Range    GFR (CKD-EPI) 61 >60 mL/min/1.73 m 2       Radiology Review:  CT-RENAL COLIC EVALUATION(A/P W/O)   Final Result      1.  There is no renal or ureteral calculus. There is no hydronephrosis.   2.  Cirrhosis with portal hypertension.   3.  Cholelithiasis.   4.  Ascites.   5.  Anasarca.   6.  Atherosclerosis.   7.  Fibroid uterus.      US-PARACENTESIS, ABD WITH IMAGING   Final Result      1. Ultrasound-guided diagnostic and therapeutic paracentesis of the right/midline lower quadrant/pelvis.      2. 6450 mL of fluid withdrawn.      DX-CHEST-LIMITED (1 VIEW)   Final Result         1.  [Pulmonary changes, new since prior study.      [id number] VIEW   Final Result      1.  Normal bowel gas pattern.   2.  Small left pleural effusion.   3.  Atherosclerosis.      US-ABDOMEN LTD (SOFT TISSUE)   Final Result      Small amount of ascites noted in the abdomen, paracentesis deferred.         IR-MIDLINE CATHETER INSERTION WO GUIDANCE > AGE 3   Final Result                  Ultrasound-guided midline placement performed by qualified nursing staff    as above.          US-ABDOMEN COMPLETE SURVEY   Final Result         1.  Common bile duct dilatation, consider causes of  biliary obstruction with additional workup as clinically appropriate.   2.  Nodular hepatic contour compatible with changes of cirrhosis.   3.  Ascites   4.  Splenomegaly.      IR-EMBOLIZATION   Final Result      1.  Left renal venogram demonstrating conventional anatomy.   2.  Gastrorenal shunt venogram demonstrating a markedly enlarged gastric variceal complex.   3.  Successful coil embolization of the gastrorenal shunt vessel.   4.  Successful Gelfoam embolization of gastric varices.      DX-CHEST-PORTABLE (1 VIEW)   Final Result         1.  No acute cardiopulmonary disease.            MDM (Data Review):   -Records reviewed and summarized in current documentation  -I personally reviewed and interpreted the laboratory results  -I personally reviewed the radiology images    Assessment/Recommendations:  Hematemesis  Melena  Alcohol abuse disorder  Cirrhosis secondary to alcohol  Jaundice  Hyperbilirubinemia  suspect multifactorial alcohol cirrhosis, hepatitis C, ongoing alcohol intake with last drink ~7/4/2025. LFTs in nonobstructive pattern.   Anemia due to GI blood loss  Hepatic encephalopathy   Gastric varices s/p CARTO 7/6/2025  Esophageal ulcer  Portal hypertensive gastropathy  Hematuria   Hx of hepatitis C (2023)     Recommendations:  PPI BID  Lactulose titrated to 2-3 bowel movements a day  Trend H/H and transfuse for hemoglobin <7  Recommend adjusting diuretic dosages:     Diuretics at a ratio of spironolactone 100 mg PO to furosemide 40 mg PO daily  If clinical response or desired effect not evident after 3-5 days, doses can be increased by 100 mg and 40 mg, respectively.  Uptitrate as needed maintaining the same ratio with a maximum recommended doses are spironolactone 400 mg per day and furosemide 160 mg per day.     AFP pending  Okay for low-sodium diet as tolerated  Continue Coreg  Will need OP GI follow up- referral placed    Discussed with patient, , Dr. Lopez    ..Mercy Laurent,  TED    Core Quality Measures   Reviewed items::  Labs, Medications and Radiology reports reviewed

## 2025-07-12 NOTE — CARE PLAN
The patient is Stable - Low risk of patient condition declining or worsening    Shift Goals  Clinical Goals: pt will remain free of falls, spo2 90% or greater and have h&h monitored this shift  Patient Goals: rest  Family Goals: MARTINE    Progress made toward(s) clinical / shift goals:    Problem: Knowledge Deficit - Standard  Goal: Patient and family/care givers will demonstrate understanding of plan of care, disease process/condition, diagnostic tests and medications  Outcome: Progressing     Problem: Fall Risk  Goal: Patient will remain free from falls  Outcome: Progressing     Problem: Psychosocial Needs:  Goal: Level of anxiety will decrease  Outcome: Progressing       Patient is not progressing towards the following goals:

## 2025-07-12 NOTE — PROGRESS NOTES
Lab called with critical result of total bilirubin 25.8  at 0500. Critical lab result read back to lab.   Provider altagracia kahn notified and acknowledged critical lab result at 0507.  No additional orders received. Will continue to monitor

## 2025-07-12 NOTE — PROGRESS NOTES
Hospital Medicine Daily Progress Note    Date of Service  7/12/2025    Chief Complaint  Chiara Pacheco is a 59 y.o. female admitted 7/5/2025 with melena    Hospital Course      86-year-old female with history of alcoholic liver cirrhosis and varices bleeding who presented 7/5 with the bloody stool.  Patient has history of excessive drinking alcohol with admitting on 4/30-5/3 for variceal bleeding.  At that time, recommendation for BRTO/CARTO recommended by GI several times, however she did not want any further procedures or consultations done and wished to discharge.  Patient came with bloody stool and abdominal pain.  Patient had tachycardia and hemoglobin 6.4 on admission.  Started octreotide and PPI with ceftriaxone and GI was consulted. Patient underwent EGD 7/5.  This showed large gastric varices on the greater curvature of the stomach, large esophageal ulcer and portal hypertension.  Again recommending IR consult for CARTO.  CARTO completed 7/6.    Also patient was found to have volume overloaded, paracentesis was done on 7/9 with removing around 6 and half liter, Lasix IV and spironolactone were added.      Interval Problem Update  Patient was seen and examined at bedside.  No acute events overnight. Patient is resting comfortably in bed and in no acute distress.     Paracentesis 7/9 with 6.4 L removed  Plan for EGD today  Hemoglobin stable last 24 hours, s/p PRBC x 1 on 7/10  Cr 1.05  Hb stable  Lasix 40mg daily, aldactone 100mg daily  Titrate lactulose to 2-3 stools daily    I have discussed this patient's plan of care and discharge plan at IDT rounds today with Case Management, Nursing, Nursing leadership, and other members of the IDT team.    Consultants/Specialty  GI and interventional radiology    Code Status  Full Code    Disposition  The patient is not medically cleared for discharge to home or a post-acute facility.      I have placed the appropriate orders for post-discharge needs.    Review  of Systems  Review of Systems   Constitutional:  Positive for malaise/fatigue. Negative for chills and fever.   HENT:  Negative for ear pain and hearing loss.    Eyes:  Negative for blurred vision and double vision.   Respiratory:  Negative for cough and shortness of breath.    Cardiovascular:  Negative for chest pain and palpitations.   Gastrointestinal:  Positive for abdominal pain. Negative for constipation, diarrhea and vomiting.   Genitourinary:  Negative for dysuria and frequency.   Musculoskeletal:  Negative for myalgias.   Skin:  Negative for rash.   Neurological:  Positive for weakness. Negative for speech change and headaches.   Psychiatric/Behavioral:  Negative for substance abuse.         Physical Exam  Temp:  [35.9 °C (96.6 °F)-36.8 °C (98.2 °F)] 36.6 °C (97.8 °F)  Pulse:  [62-84] 72  Resp:  [14-19] 14  BP: (105-126)/(53-82) 115/59  SpO2:  [91 %-97 %] 96 %    Physical Exam  Vitals and nursing note reviewed.   Constitutional:       General: She is sleeping. She is not in acute distress.     Appearance: Normal appearance. She is ill-appearing.   HENT:      Right Ear: External ear normal.      Left Ear: External ear normal.      Nose: No congestion.   Eyes:      General: No scleral icterus.  Cardiovascular:      Rate and Rhythm: Normal rate and regular rhythm.   Pulmonary:      Effort: Pulmonary effort is normal.      Breath sounds: Rales present. No wheezing.   Abdominal:      General: Bowel sounds are normal.      Tenderness: There is abdominal tenderness. There is no guarding or rebound.   Musculoskeletal:      Right lower leg: Edema present.      Left lower leg: Edema present.   Skin:     General: Skin is warm and dry.      Coloration: Skin is jaundiced.   Neurological:      Mental Status: She is oriented to person, place, and time. She is lethargic.      Motor: Weakness present.   Psychiatric:         Mood and Affect: Mood normal.         Behavior: Behavior normal.         Fluids    Intake/Output  Summary (Last 24 hours) at 7/12/2025 1354  Last data filed at 7/11/2025 1500  Gross per 24 hour   Intake 720 ml   Output --   Net 720 ml        Laboratory  Recent Labs     07/10/25  0040 07/10/25  1654 07/11/25  0113 07/11/25  0946 07/11/25  2143 07/12/25  0405   WBC 4.9  --  6.5  --   --  6.3   RBC 2.66*  --  3.45*  --   --  3.48*   HEMOGLOBIN 6.9*   < > 9.1* 9.8* 9.0* 9.1*   HEMATOCRIT 22.7*   < > 29.0* 30.7* 28.9* 29.6*   MCV 85.3  --  84.1  --   --  85.1   MCH 25.9*  --  26.4*  --   --  26.1*   MCHC 30.4*  --  31.4*  --   --  30.7*   RDW 78.6*  --  75.8*  --   --  80.1*   PLATELETCT 80*  --  91*  --   --  103*   MPV  --   --  11.3  --   --   --     < > = values in this interval not displayed.     Recent Labs     07/10/25  0040 07/11/25  0113 07/12/25  0405   SODIUM 135 132* 131*   POTASSIUM 3.4* 3.5* 3.9   CHLORIDE 99 99 98   CO2 24 22 21   GLUCOSE 173* 139* 191*   BUN 17 14 18   CREATININE 1.01 0.93 1.05   CALCIUM 7.6* 8.0* 8.2*     Recent Labs     07/11/25  0113   INR 2.54*               Imaging  CT-RENAL COLIC EVALUATION(A/P W/O)   Final Result      1.  There is no renal or ureteral calculus. There is no hydronephrosis.   2.  Cirrhosis with portal hypertension.   3.  Cholelithiasis.   4.  Ascites.   5.  Anasarca.   6.  Atherosclerosis.   7.  Fibroid uterus.      US-PARACENTESIS, ABD WITH IMAGING   Final Result      1. Ultrasound-guided diagnostic and therapeutic paracentesis of the right/midline lower quadrant/pelvis.      2. 6450 mL of fluid withdrawn.      DX-CHEST-LIMITED (1 VIEW)   Final Result         1.  [Pulmonary changes, new since prior study.      LD-VHEFIYU-2 VIEW   Final Result      1.  Normal bowel gas pattern.   2.  Small left pleural effusion.   3.  Atherosclerosis.      US-ABDOMEN LTD (SOFT TISSUE)   Final Result      Small amount of ascites noted in the abdomen, paracentesis deferred.         IR-MIDLINE CATHETER INSERTION WO GUIDANCE > AGE 3   Final Result                  Ultrasound-guided  midline placement performed by qualified nursing staff    as above.          US-ABDOMEN COMPLETE SURVEY   Final Result         1.  Common bile duct dilatation, consider causes of biliary obstruction with additional workup as clinically appropriate.   2.  Nodular hepatic contour compatible with changes of cirrhosis.   3.  Ascites   4.  Splenomegaly.      IR-EMBOLIZATION   Final Result      1.  Left renal venogram demonstrating conventional anatomy.   2.  Gastrorenal shunt venogram demonstrating a markedly enlarged gastric variceal complex.   3.  Successful coil embolization of the gastrorenal shunt vessel.   4.  Successful Gelfoam embolization of gastric varices.      DX-CHEST-PORTABLE (1 VIEW)   Final Result         1.  No acute cardiopulmonary disease.           Assessment/Plan  * Alcoholic cirrhosis of liver with ascites (HCC)- (present on admission)  Assessment & Plan  With decompensation with GI bleeding, volume overloaded and hepatic encephalopathy   due to alcoholism MELD: 28 and elevated bilirubin  CT scan showed signs of cirrhosis  Optimize volume status with Aldactone, Lasix  Continue lactulose    Acute blood loss anemia  Assessment & Plan  EGD - Large gastric varices on greater curvature of stomach near cardia, Large esophageal ulcer, Portal hypertension gastropathy  Total of PRBC x4 hospitalization  Hemoglobin stable last 24 hours  Additional EGD with GI today    Ascites  Assessment & Plan  Due to liver cirrhosis  Paracentesis was done on 7/9 with removing 6.4 L received albumin after.  Fluid did not show infection  Continue spironolactone and Lasix    Acute GI bleeding- (present on admission)  Assessment & Plan  Due to portal hypertension and variceal bleeding and ulcer  EGD 7/5:  large gastric varices on the greater curvature of the stomach, large esophageal ulcer and portal hypertension.  S/p 6/7 CARTO   Finish course of octreotide 72 hours  PPI twice daily  Advance diet as tolerated  Received 5 days  of ceftriaxone      Hyponatremia  Assessment & Plan  Na 132    Leukocytosis- (present on admission)  Assessment & Plan  WBC 6.5  Continue labs daily  Ceftriaxone for prophylaxis    Alcohol abuse- (present on admission)  Assessment & Plan  Continue to encourage cessation    Bleeding gastric varices- (present on admission)  Assessment & Plan  Underwent CARTO   Hemoglobin stable  PPI    Metabolic acidosis- (present on admission)  Assessment & Plan  Improving    Hypokalemia  Assessment & Plan  Monitor and replace    ACP (advance care planning)  Assessment & Plan  Previous conversations per my colleague    Hypoxia- (present on admission)  Assessment & Plan  Secondary to volume overload  Continue IV Lasix and add spironolactone           VTE prophylaxis: VTE Selection    I have performed a physical exam and reviewed and updated ROS and Plan today (7/12/2025). In review of yesterday's note (7/11/2025), there are no changes except as documented above.    Greater than 52 minutes spent prepping to see patient (e.g. review of tests) obtaining and/or reviewing separately obtained history. Performing a medically appropriate examination and/ evaluation.  Counseling and educating the patient/family/caregiver.  Ordering medications, tests, or procedures.  Referring and communicating with other health care professionals.  Documenting clinical information in EPIC.  Independently interpreting results and communicating results to patient/family/caregiver.  Care coordination.

## 2025-07-12 NOTE — PROGRESS NOTES
"Assumed care of patient at 1900 from day RN. Patient is A&O x 4. Bed locked in the lowest position, 2 side rails up, call light is within reach, belongings at bedside. Pt reports pain level of 0 /10. Mobility SB with FWW. Oxygen 3L. Explained plan of care and safety precautions to pt, pt has no questions at this time. Hourly rounding is in place. /66   Pulse 62   Temp 36.4 °C (97.5 °F) (Temporal)   Resp 15   Ht 1.753 m (5' 9\")   Wt 91.7 kg (202 lb 2.6 oz)   LMP  (LMP Unknown)   SpO2 96%   BMI 29.85 kg/m²   "

## 2025-07-12 NOTE — PROGRESS NOTES
Received report and assumed care of patient at change of shift. Patient is A&Ox4, on 2L NC, and reports mild abdominal pain at this time. Patient assessment completed, bed in lowest position, and call light and personal belongings are within reach. Patient expressed no further needs at this time.

## 2025-07-13 ENCOUNTER — PHARMACY VISIT (OUTPATIENT)
Dept: PHARMACY | Facility: MEDICAL CENTER | Age: 60
End: 2025-07-13
Payer: COMMERCIAL

## 2025-07-13 VITALS
HEIGHT: 69 IN | RESPIRATION RATE: 16 BRPM | TEMPERATURE: 96.8 F | DIASTOLIC BLOOD PRESSURE: 67 MMHG | WEIGHT: 195.77 LBS | SYSTOLIC BLOOD PRESSURE: 100 MMHG | BODY MASS INDEX: 29 KG/M2 | HEART RATE: 62 BPM | OXYGEN SATURATION: 93 %

## 2025-07-13 LAB — AFP-TM SERPL-MCNC: 4 NG/ML (ref 0–9)

## 2025-07-13 PROCEDURE — A9270 NON-COVERED ITEM OR SERVICE: HCPCS | Performed by: INTERNAL MEDICINE

## 2025-07-13 PROCEDURE — 700102 HCHG RX REV CODE 250 W/ 637 OVERRIDE(OP): Performed by: INTERNAL MEDICINE

## 2025-07-13 PROCEDURE — 700111 HCHG RX REV CODE 636 W/ 250 OVERRIDE (IP): Performed by: INTERNAL MEDICINE

## 2025-07-13 PROCEDURE — 99232 SBSQ HOSP IP/OBS MODERATE 35: CPT | Performed by: NURSE PRACTITIONER

## 2025-07-13 PROCEDURE — 99239 HOSP IP/OBS DSCHRG MGMT >30: CPT | Performed by: INTERNAL MEDICINE

## 2025-07-13 PROCEDURE — RXMED WILLOW AMBULATORY MEDICATION CHARGE: Performed by: INTERNAL MEDICINE

## 2025-07-13 RX ORDER — PANTOPRAZOLE SODIUM 40 MG/1
40 TABLET, DELAYED RELEASE ORAL 2 TIMES DAILY
Qty: 60 TABLET | Refills: 1 | Status: SHIPPED | OUTPATIENT
Start: 2025-07-13 | End: 2025-09-11

## 2025-07-13 RX ORDER — CARVEDILOL 3.12 MG/1
3.12 TABLET ORAL 2 TIMES DAILY WITH MEALS
Qty: 60 TABLET | Refills: 1 | Status: SHIPPED | OUTPATIENT
Start: 2025-07-13

## 2025-07-13 RX ORDER — LACTULOSE 10 G/15ML
45 SOLUTION ORAL 3 TIMES DAILY
Qty: 473 ML | Refills: 1 | Status: SHIPPED | OUTPATIENT
Start: 2025-07-13

## 2025-07-13 RX ORDER — SPIRONOLACTONE 100 MG/1
100 TABLET, FILM COATED ORAL DAILY
Qty: 30 TABLET | Refills: 3 | Status: SHIPPED | OUTPATIENT
Start: 2025-07-14

## 2025-07-13 RX ORDER — FUROSEMIDE 40 MG/1
40 TABLET ORAL DAILY
Qty: 30 TABLET | Refills: 1 | Status: SHIPPED | OUTPATIENT
Start: 2025-07-14

## 2025-07-13 RX ADMIN — CARVEDILOL 3.12 MG: 3.12 TABLET, FILM COATED ORAL at 08:52

## 2025-07-13 RX ADMIN — SPIRONOLACTONE 100 MG: 100 TABLET ORAL at 04:49

## 2025-07-13 RX ADMIN — FOLIC ACID 1 MG: 1 TABLET ORAL at 04:49

## 2025-07-13 RX ADMIN — Medication 100 MG: at 04:49

## 2025-07-13 RX ADMIN — CARVEDILOL 3.12 MG: 3.12 TABLET, FILM COATED ORAL at 18:01

## 2025-07-13 RX ADMIN — PANTOPRAZOLE SODIUM 40 MG: 40 INJECTION, POWDER, FOR SOLUTION INTRAVENOUS at 04:49

## 2025-07-13 ASSESSMENT — ENCOUNTER SYMPTOMS
FEVER: 0
NAUSEA: 0
SHORTNESS OF BREATH: 0
BACK PAIN: 0
DIZZINESS: 0
ABDOMINAL PAIN: 0
DIARRHEA: 0
COUGH: 0
CHILLS: 0
HEARTBURN: 0
VOMITING: 0
SORE THROAT: 0
MYALGIAS: 0
DEPRESSION: 0
BLOOD IN STOOL: 0
WEAKNESS: 1
SINUS PAIN: 0
NERVOUS/ANXIOUS: 0
CONSTIPATION: 0

## 2025-07-13 ASSESSMENT — PAIN DESCRIPTION - PAIN TYPE
TYPE: ACUTE PAIN

## 2025-07-13 ASSESSMENT — LIFESTYLE VARIABLES: SUBSTANCE_ABUSE: 1

## 2025-07-13 NOTE — PROGRESS NOTES
Received report from day shift RN and assumed care of patient at 1900. Patient is AOX4, on room air, and bed alarm on. Patient declines pain. Discussed plan and reinforced the need to call for assistance. Bed locked/lowest position, call light within reach, and patient expresses no other needs at this time.

## 2025-07-13 NOTE — PROGRESS NOTES
..Gastroenterology Progress Note               Author:  TED Beck   Date & Time Created: 7/13/2025 11:21 AM       Patient ID:  Name:             Chiara Pacheco  YOB: 1965  Age:                 59 y.o.  female  MRN:               [id number]    Medical Decision Making, by Problem:  Active Hospital Problems    Diagnosis     Hyponatremia [E87.1]     Hypokalemia [E87.6]     Acute blood loss anemia [D62]     Portal hypertension (HCC) [K76.6]     Ascites [R18.8]     ACP (advance care planning) [Z71.89]     Acute GI bleeding [K92.2]     Leukocytosis [D72.829]     Alcoholic cirrhosis of liver with ascites (HCC) [K70.31]     Alcohol abuse [F10.10]     Bleeding gastric varices [I86.4]     Hypoxia [R09.02]     Metabolic acidosis [E87.20]      Presenting Chief Complaint:  hematemesis     HISTORY OF PRESENT ILLNESS: history per chart as she isn't fully answering questions when interviewed  Chiara Pacheco is a 59 y.o. female who presents to the emergency department today for evaluation of hematemesis.  She has a past medical history significant for alcoholic cirrhosis, was hospitalized a little over a year ago for hematemesis.  EGD on 2/20/2023 showed Gastric varix, portal hypertension gastropathy, gastric ulcer and esophagitis, not bleeding, Esmer enrique tear, clip still in place and no bleeding EGD on 9/23/2023 showed:small esophageal varices, varix with erosion and stigmata of recent bleeding, banded x 1, gastric varix without stigmata, large amount of blood in stomach precluding complete exam      Due to gastric varices, interventional radiology consultation was recommended by gastroenterology for BRTO/CARTO procedure, however patient declined, stated that she did not want any further intervention or procedures.  She was provided with outpatient follow-up information, on my discussion today she did not call IR, and never scheduled follow-up for these issues.     She continues to  use alcohol, at midnight she began to have recurrent hematemesis.  She states that she has stopped all NSAID use.  She does have some mild epigastric pain as well.  She also reports dark stools for a few weeks, uncertain as to the exact duration of time.  She does not report any recent fevers, no other abnormal bleeding or bruising. She had dark vomitus times one while in hospital.     Interval History:  7/5/2025: EGD:   Esophagus: large esophageal ulcer from 36 to 40 cm. Not bleeding but prominent. Minimal to no esophageal varices  Stomach: stomach full of blood, large gastric varices, source of bleeding. Also portal hypertensive gastropathy that is oozing minimally.   Duodenum: normal bulb and second portion of duodenum    7/6/2025: Patient resting comfortably.  N.p.o. pending Carto with IR today.  Hgb 8.5  Last bloody BM yesterday, also having blood in urine.     MELD 3.0-28 points  Child Class C    7/7/2025: Patient seen. Feeling better, more alert. Had several brown bowel movements and still having hematuria. Underwent Carto yesterday. Hgb 8.1.     MELD 3.0 - 28 points    7/8/2025: PPD#2 s/p CARTO with IR. Sleeping quietly, arouses appropriately. No BM overnight- last at 7/7/2025 at 1500. Reports abdominal bloating, inability to lay on back due to pain. Hypoactive bowel sounds.  KUB ordered.  Tolerating clear liquids. Abd ultrasound 7/7/2025 with CBD dilation at 7.3. gallbladder not seen well due to ascites, scattered ascites, nodule changes of cirrhosis, splenomegaly. IVC and hepatic veins patent.  Abd soft tissue US 7/8 showing small amount of ascites, paracentesis deferred.   Na 134, K 3.3, BUN 18, Cr 0.74, Ca 8.9, AST 79, ALt 24, AP 88, Tbili 18.4, albumin 2.5, mag 1.8    Update 1652: KUB neg for obstructive process.     7/9/2025: Patient seen at bedside.  Sleeping quietly but arouses apparently.  Bloody bowel movement x 3 overnight.  Patient reports ongoing back pain, abdominal pain/fullness.  Dullness to  percussion. Repeat paracentesis ordered to attempt. WBC 6.7, hgb 8.1, plt 94. Na 134, K 3.2, BUN 18 Cr 1.02, AST 73, ALT 25, AP 88, Tbili 18.7, albumin 2.4. INR 2.62    MELD 3.0; 32 63.5% estimated 90 day survival   Child-Jimenez Class C    7/10/2025: Paracentesis done yesterday.  6450 mL drained.  Negative for SBP, patient was also had a course of antibiotics.  Cytology pending.  She reports abdominal and back pain relieved status post paracentesis.  She had bloody bowel movement x 3 since 3 AM.  This was associated with hemoglobin drop from 8.1-6.9.  Hypotensive with systolic blood pressure 88.  Platelets 80.  Sodium 135, potassium 3.4, BUN 17, creatinine 1.01.  Ammonia 30.  Discussed concern for GI bleeding as well as endoscopic evaluation.  Patient had eaten pieces of her [place] toast in addition to 5 bites of cream a week.  She is hemodynamically stable at this time.    7/12/2025: Postprocedure day #1 s/p EGD.  Patient seen at bedside.  AO x 4.  No nausea, vomiting, abdominal pain.  She is tolerating oral intake without difficulty.  Medium-large brown bowel movement x 3 overnight.  Started on Coreg yesterday for recurrent gastric variceal bleeding despite undergoing cautery earlier this week.  Hemoglobin stable 9.1-9.8-9-9.1.  BUN 18.  AST 67, ALT 24, alk phos 96, total bilirubin 25.8.    7/13/2025: seen at bedside. AAOx4, slow to respond. Feels abd distention less.  X4 yesterday. 1 BM overnight.  Cytology path negative for malignant cells. Background of inflammatory and mesothelial cells.     Hospital Medications:  Current Facility-Administered Medications   Medication Dose Frequency Provider Last Rate Last Admin    furosemide (Lasix) tablet 40 mg  40 mg Q DAY Ralf Abdul D.O.        oxyCODONE immediate-release (Roxicodone) tablet 2.5 mg  2.5 mg Q3HRS PRN Shanice Martinez, A.P.R.N.        Or    oxyCODONE immediate-release (Roxicodone) tablet 5 mg  5 mg Q3HRS PRN Shanice Martinez A.P.R.N.   5 mg at  07/12/25 2134    Or    HYDROmorphone (Dilaudid) injection 0.25 mg  0.25 mg Q3HRS PRN ESTELLA Medina.P.R.NRoshan        carvedilol (Coreg) tablet 3.125 mg  3.125 mg BID WITH MEALS Nova Al M.D.   3.125 mg at 07/13/25 0852    pantoprazole (Protonix) injection 40 mg  40 mg BID Serafin Hernandez M.D.   40 mg at 07/13/25 0449    hydrOXYzine HCl (Atarax) tablet 25 mg  25 mg TID PRN LALITA MedinaP.R.N.   25 mg at 07/11/25 2153    spironolactone (Aldactone) tablet 100 mg  100 mg Q DAY Serafin Hernandez M.D.   100 mg at 07/13/25 0449    lactulose 20 GM/30ML solution 45 mL  45 mL TID Serafin Hernandez M.D.   45 mL at 07/12/25 1739    labetalol (Normodyne/Trandate) injection 10 mg  10 mg Q4HRS PRN ROSALBA CruzO.        ondansetron (Zofran) syringe/vial injection 4 mg  4 mg Q4HRS PRN BAL Cruz.O.   4 mg at 07/05/25 0848    ondansetron (Zofran ODT) dispertab 4 mg  4 mg Q4HRS PRN ROSALBA CruzO.        promethazine (Phenergan) tablet 12.5-25 mg  12.5-25 mg Q4HRS PRN ROSALBA CruzO.        promethazine (Phenergan) suppository 12.5-25 mg  12.5-25 mg Q4HRS PRN ROSALBA CruzO.        prochlorperazine (Compazine) injection 5-10 mg  5-10 mg Q4HRS PRN ROSALBA CruzO.   10 mg at 07/05/25 1041    folic acid (Folvite) tablet 1 mg  1 mg DAILY ROSALBA CruzO.   1 mg at 07/13/25 0449    thiamine (Vitamin B-1) tablet 100 mg  100 mg DAILY Miguelito Chambers D.O.   100 mg at 07/13/25 0449   Last reviewed on 7/11/2025 11:26 AM by Nicole Roche R.N.       Review of Systems:  Review of Systems   Constitutional:  Negative for chills, fever and malaise/fatigue.   HENT:  Negative for sinus pain and sore throat.    Respiratory:  Negative for cough and shortness of breath.    Cardiovascular:  Positive for leg swelling. Negative for chest pain.   Gastrointestinal:  Negative for abdominal pain, blood in stool, constipation, diarrhea, heartburn, melena, nausea and vomiting.  "  Genitourinary:  Negative for dysuria and hematuria.   Musculoskeletal:  Negative for back pain and myalgias.   Skin:  Negative for rash.   Neurological:  Positive for weakness. Negative for dizziness.   Psychiatric/Behavioral:  Positive for substance abuse. Negative for depression. The patient is not nervous/anxious.    All other systems reviewed and are negative.    Vital signs:  Weight/BMI: Body mass index is 28.91 kg/m².  BP 90/49   Pulse 66   Temp 35.8 °C (96.5 °F) (Temporal)   Resp 18   Ht 1.753 m (5' 9\")   Wt 88.8 kg (195 lb 12.3 oz)   SpO2 90%   Vitals:    07/12/25 1914 07/12/25 2251 07/13/25 0419 07/13/25 0700   BP: 107/62  100/54 90/49   Pulse: 66  67 66   Resp: 19  16 18   Temp: 35.9 °C (96.7 °F)  36.1 °C (97 °F) 35.8 °C (96.5 °F)   TempSrc: Temporal  Temporal Temporal   SpO2: 92%  91% 90%   Weight:  88.8 kg (195 lb 12.3 oz)     Height:         Oxygen Therapy:  Pulse Oximetry: 90 %, O2 (LPM): 2, O2 Delivery Device: Nasal Cannula    Intake/Output Summary (Last 24 hours) at 7/13/2025 1121  Last data filed at 7/12/2025 1433  Gross per 24 hour   Intake 300 ml   Output --   Net 300 ml       Physical Exam  Vitals and nursing note reviewed.   Constitutional:       General: She is awake. She is not in acute distress.     Appearance: She is ill-appearing.   HENT:      Head: Normocephalic and atraumatic.      Nose: Nose normal. No congestion.      Mouth/Throat:      Mouth: Mucous membranes are moist.      Pharynx: Oropharynx is clear. No oropharyngeal exudate.   Eyes:      General: Scleral icterus present.   Cardiovascular:      Rate and Rhythm: Normal rate and regular rhythm.      Pulses: Normal pulses.      Heart sounds: Normal heart sounds.   Pulmonary:      Effort: Pulmonary effort is normal. No respiratory distress.      Breath sounds: Normal breath sounds.   Abdominal:      General: Bowel sounds are normal. There is distension.      Tenderness: There is no abdominal tenderness. There is no guarding. "   Musculoskeletal:         General: Normal range of motion.      Cervical back: Normal range of motion.      Right lower leg: Edema present.      Left lower leg: Edema present.   Skin:     General: Skin is warm and dry.      Capillary Refill: Capillary refill takes less than 2 seconds.      Coloration: Skin is jaundiced.   Neurological:      Mental Status: She is alert, oriented to person, place, and time and easily aroused.      Motor: Weakness present.      Comments: Negative for asterixis   Psychiatric:         Mood and Affect: Mood normal.         Behavior: Behavior normal. Behavior is cooperative.         Labs:  Recent Labs     07/11/25 0113 07/12/25  0405   SODIUM 132* 131*   POTASSIUM 3.5* 3.9   CHLORIDE 99 98   CO2 22 21   BUN 14 18   CREATININE 0.93 1.05   MAGNESIUM  --  1.6   PHOSPHORUS  --  3.1   CALCIUM 8.0* 8.2*     Recent Labs     07/11/25  0113 07/12/25  0405   ALTSGPT 24 24   ASTSGOT 69* 67*   ALKPHOSPHAT 88 96   TBILIRUBIN 24.9* 25.8*   GLUCOSE 139* 191*     Recent Labs     07/11/25  0113 07/12/25  0405   WBC 6.5 6.3   ASTSGOT 69* 67*   ALTSGPT 24 24   ALKPHOSPHAT 88 96   TBILIRUBIN 24.9* 25.8*     Recent Labs     07/11/25  0113 07/11/25  0946 07/11/25  2143 07/12/25  0405   RBC 3.45*  --   --  3.48*   HEMOGLOBIN 9.1* 9.8* 9.0* 9.1*   HEMATOCRIT 29.0* 30.7* 28.9* 29.6*   PLATELETCT 91*  --   --  103*   PROTHROMBTM 27.5*  --   --   --    INR 2.54*  --   --   --      No results found for this or any previous visit (from the past 24 hours).      Radiology Review:  CT-RENAL COLIC EVALUATION(A/P W/O)   Final Result      1.  There is no renal or ureteral calculus. There is no hydronephrosis.   2.  Cirrhosis with portal hypertension.   3.  Cholelithiasis.   4.  Ascites.   5.  Anasarca.   6.  Atherosclerosis.   7.  Fibroid uterus.      US-PARACENTESIS, ABD WITH IMAGING   Final Result      1. Ultrasound-guided diagnostic and therapeutic paracentesis of the right/midline lower quadrant/pelvis.      2. 9565  mL of fluid withdrawn.      DX-CHEST-LIMITED (1 VIEW)   Final Result         1.  [Pulmonary changes, new since prior study.      UN-ZIWEOLN-3 VIEW   Final Result      1.  Normal bowel gas pattern.   2.  Small left pleural effusion.   3.  Atherosclerosis.      US-ABDOMEN LTD (SOFT TISSUE)   Final Result      Small amount of ascites noted in the abdomen, paracentesis deferred.         IR-MIDLINE CATHETER INSERTION WO GUIDANCE > AGE 3   Final Result                  Ultrasound-guided midline placement performed by qualified nursing staff    as above.          US-ABDOMEN COMPLETE SURVEY   Final Result         1.  Common bile duct dilatation, consider causes of biliary obstruction with additional workup as clinically appropriate.   2.  Nodular hepatic contour compatible with changes of cirrhosis.   3.  Ascites   4.  Splenomegaly.      IR-EMBOLIZATION   Final Result      1.  Left renal venogram demonstrating conventional anatomy.   2.  Gastrorenal shunt venogram demonstrating a markedly enlarged gastric variceal complex.   3.  Successful coil embolization of the gastrorenal shunt vessel.   4.  Successful Gelfoam embolization of gastric varices.      DX-CHEST-PORTABLE (1 VIEW)   Final Result         1.  No acute cardiopulmonary disease.            MDM (Data Review):   -Records reviewed and summarized in current documentation  -I personally reviewed and interpreted the laboratory results  -I personally reviewed the radiology images    Assessment/Recommendations:  Hematemesis  Melena  Alcohol abuse disorder  Cirrhosis secondary to alcohol  Jaundice  Hyperbilirubinemia  suspect multifactorial alcohol cirrhosis, hepatitis C, ongoing alcohol intake with last drink ~7/4/2025. LFTs in nonobstructive pattern.   Anemia due to GI blood loss  Hepatic encephalopathy   Gastric varices s/p CARTO 7/6/2025  Esophageal ulcer  Portal hypertensive gastropathy  Hematuria   Hx of hepatitis C (2023)     Recommendations:  PPI BID  Lactulose  titrated to 2-3 bowel movements a day  Trend H/H and transfuse for hemoglobin <7  Recommend adjusting diuretic dosages:     Diuretics at a ratio of spironolactone 100 mg PO to furosemide 40 mg PO daily  If clinical response or desired effect not evident after 3-5 days, doses can be increased by 100 mg and 40 mg, respectively.  Uptitrate as needed maintaining the same ratio with a maximum recommended doses are spironolactone 400 mg per day and furosemide 160 mg per day.     AFP pending  Okay for low-sodium diet as tolerated  Continue Coreg  Will need OP GI follow up- referral placed    No further inventions from acute GI team.  GI to sign off.  Please reconsult for any further questions or concerns.      Discussed with patient, , Dr. Al    ..LALITA BeckP.RHEATHER    Core Quality Measures   Reviewed items::  Labs, Medications and Radiology reports reviewed

## 2025-07-13 NOTE — DISCHARGE PLANNING
Agency/Facility Name: [facility], [place]   Outcome: EMELY tasked with follow up on referral sent earlier in day.  Spoke with Gila and she took relevant information and processed orders. Process total phone time 25 minutes with confirmation on order added to drivers day.

## 2025-07-13 NOTE — CARE PLAN
The patient is Stable - Low risk of patient condition declining or worsening    Shift Goals  Clinical Goals: Patient will remain free from falls  Patient Goals: Pain control; sleep  Family Goals: MARTINE    Progress made toward(s) clinical / shift goals:      Problem: Fall Risk  Goal: Patient will remain free from falls  Outcome: Progressing     Problem: Pain - Standard  Goal: Alleviation of pain or a reduction in pain to the patient’s comfort goal  Outcome: Progressing     Patient reports a reduction in pain after PRN pain medication and was able to sleep throughout the night. Patient has remained free from falls. Bed locked/lowest position, bed alarm on, and call light within reach.     Patient is not progressing towards the following goals:

## 2025-07-13 NOTE — DISCHARGE PLANNING
Case Management Discharge Planning    Admission Date: 7/5/2025  GMLOS: 8.7  ALOS: 8    6-Clicks ADL Score: 17  6-Clicks Mobility Score: 19  PT and/or OT Eval ordered: Yes  Post-acute Referrals Ordered: Yes  Post-acute Choice Obtained: Yes  Has referral(s) been sent to post-acute provider:  Yes      Anticipated Discharge Dispo: Discharge Disposition: Discharged to home/self care (01)  Discharge Address: [address] [place] [address]  Discharge Contact Phone Number: [phone]    DME Needed: Yes    DME Ordered: Yes    Action(s) Taken:     Chart review completed.     PT/OT recommending HH.    Pending walking O2.     1035: RN CM obtained HH choice from patient. 1st choice: [facility]. RN CM obtained DME choice in anticipation of O2 needed on discharge.     1040: RN CM called [facility] and requested they look at referral today.     1111: [facility] requesting hard fax referral. F: [phone]    1116: RN CM faxed referral.     1137: Walking O2 completed. Pt is requiring O2 on discharge. RN CM requested DPA send referral out to first choice.    1155: RN NANCY left vm for Jose with [facility].     1238: RN NANCY received update that [facility] is unable to accept at this time. Referral sent to [facility].     1255: RN NANCY received update from [facility] that they can accept this patient. Pt currently does not have a PCP. They are requesting we let pt know that they need to get setup with a PCP. RN NANCY will provide pt with phone number for [facility] if patient wants to get setup with [facility] PCP.     1258: RN NANCY requested DPA reach out [facility] regarding referral.     1315: RN NANCY spoke with patient and told her she needed to get setup with PCP. Provided number for [facility] if she wanted to get on service with them.     1430: RN CM requested DPA follow up with [facility].    1512: Per EMELY, [facility] processed order. Added to drivers day.     1518: RN CM updated bedside RN and charge RN.       Escalations  Completed: other    Medically Clear: Yes    Next Steps: Follow up with Delia TENA    Barriers to Discharge: DME and Outpatient referrals pending    Is the patient up for discharge tomorrow: Pt anticipated to discharge today.

## 2025-07-13 NOTE — DISCHARGE PLANNING
Received Choice Form at: 1139  Agency/Facility Name: [facility], [facility]   Sent Referral per Choice Form at: 1143

## 2025-07-13 NOTE — DISCHARGE PLANNING
EMELY requested to place follow up call to [facility] to inquire about referral sent. Spoke with Timothy and he had to decline as they are not contracted with Pt's insurance.  Will wait for CM SW to close chart to send referral to next choice which is [facility].

## 2025-07-13 NOTE — DISCHARGE SUMMARY
Discharge Summary    CHIEF COMPLAINT ON ADMISSION  Chief Complaint   Patient presents with    Abdominal Pain    Bloody Stools    N/V     Pt BIB [facility] for dark, black blood in vomit today. Pt reports associated RUQ abdominal pain when she needs to have a BM and has had blood in stool for an unknown time, pt reports it has been a couple weeks.     Hx ETOH, 2-4 shots per day       Reason for Admission  ems     Admission Date  7/5/2025    CODE STATUS  Full Code    HPI & HOSPITAL COURSE  Myrna Pacheco is a 59-year-old female with PMHx alcoholic liver cirrhosis with history of variceal bleeding.  Admitted 7/5 for bloody stools. Recently admitted 4/30-5/3 for variceal bleeding.  GI was consulted. Patient underwent EGD 7/5.  This showed large gastric varices on the greater curvature of the stomach, large esophageal ulcer and portal hypertension.  IR consulted for CARTO on 07/06. Hb was trended and demonstrated stabilization. Underwent paracentesis 07/09 with 6.4L removed. I have ordered outpatient paracentesis in 1 week. Patients lactulose, lasix/aldactone were titrated for volume optimization. Case management assisted in organizing home oxygen. Patient improved clinically and was agreeable to discharge. Patient was determined satisfactory for discharge with appropriate follow up.    Therefore, she is discharged in fair and stable condition to home with close outpatient follow-up.    The patient met 2-midnight criteria for an inpatient stay at the time of discharge.    Discharge Date  07/13/2025    FOLLOW UP ITEMS POST DISCHARGE  Please follow up with PCP in 3-5 days for post hospitalization follow up and medication reconciliation.       DISCHARGE DIAGNOSES  Principal Problem:    Alcoholic cirrhosis of liver with ascites (HCC) (POA: Yes)  Active Problems:    Acute blood loss anemia (POA: Unknown)    Acute GI bleeding (POA: Yes)    Ascites (POA: Unknown)    Metabolic acidosis (POA: Yes)    Bleeding gastric varices (POA: Yes)     Alcohol abuse (POA: Yes)    Leukocytosis (POA: Yes)    Portal hypertension (HCC) (POA: Unknown)    Hyponatremia (POA: Unknown)    Hypoxia (POA: Yes)    ACP (advance care planning) (POA: Unknown)    Hypokalemia (POA: Unknown)  Resolved Problems:    * No resolved hospital problems. *      FOLLOW UP  No future appointments.  No follow-up provider specified.    MEDICATIONS ON DISCHARGE     Medication List        START taking these medications        Instructions   carvedilol 3.125 MG Tabs  Commonly known as: Coreg   Take 1 Tablet by mouth 2 times a day with meals.  Dose: 3.125 mg     furosemide 40 MG Tabs  Start taking on: July 14, 2025  Commonly known as: Lasix   Take 1 Tablet by mouth every day.  Dose: 40 mg     lactulose 20 GM/30ML Soln   Take 45 mL by mouth 3 times a day.  Dose: 45 mL     pantoprazole 40 MG Tbec  Commonly known as: Protonix   Take 1 Tablet by mouth 2 times a day for 60 days.  Dose: 40 mg     spironolactone 100 MG Tabs  Start taking on: July 14, 2025  Commonly known as: Aldactone   Take 1 Tablet by mouth every day.  Dose: 100 mg            CONTINUE taking these medications        Instructions   CALCIUM PO   Take 1 Tablet by mouth every day.  Dose: 1 Tablet     MAGNESIUM PO   Take 1 Tablet by mouth every day.  Dose: 1 Tablet     Potassium 99 MG Tabs   Take 99 mg by mouth every day. OTC  Dose: 99 mg     VITAMIN C PO   Take 1 Tablet by mouth every day.  Dose: 1 Tablet              Allergies  Allergies[1]    DIET  Orders Placed This Encounter   Procedures    Diet Order Diet: Low Fiber(GI Soft); Second Modifier: (optional): 2 Gram Sodium     Standing Status:   Standing     Number of Occurrences:   1     Diet::   Low Fiber(GI Soft) [2]     Second Modifier: (optional):   2 Gram Sodium [7]         LABORATORY  Lab Results   Component Value Date    SODIUM 131 (L) 07/12/2025    POTASSIUM 3.9 07/12/2025    CHLORIDE 98 07/12/2025    CO2 21 07/12/2025    GLUCOSE 191 (H) 07/12/2025    BUN 18 07/12/2025     CREATININE 1.05 07/12/2025        Lab Results   Component Value Date    WBC 6.3 07/12/2025    HEMOGLOBIN 9.1 (L) 07/12/2025    HEMATOCRIT 29.6 (L) 07/12/2025    PLATELETCT 103 (L) 07/12/2025        Total time of the discharge process exceeds 37 minutes.       [1] No Known Allergies

## 2025-07-13 NOTE — CARE PLAN
The patient is Stable - Low risk of patient condition declining or worsening    Shift Goals  Clinical Goals: pt will remain free from falls, home O2 test  Patient Goals: manage pain, comfort  Family Goals: MARTINE    Progress made toward(s) clinical / shift goals:      Problem: Fall Risk  Goal: Patient will remain free from falls  Outcome: Progressing  Note: Patient has remained free of falls throughout shift. Patient has proper fall preventions and protocols in place at this time. Patients fall risk will continue to be assessed each shift. Patient will continue to remain free of falls and will call appropriately.      Problem: Pain - Standard  Goal: Alleviation of pain or a reduction in pain to the patient’s comfort goal  Outcome: Progressing  Note: Patients pain has remained controlled throughout shift through pharmacological and nonpharmacological methods of pain management. Patients pain will continue to be assessed throughout shift and controlled appropriately.        Patient is not progressing towards the following goals:

## 2025-07-13 NOTE — PROGRESS NOTES
Received report and assumed care of patient at change of shift. Patient is A&Ox4, on 2L NC, and reports feeling tired at this time. Patient assessment completed, bed in lowest position, and call light and personal belongings are within reach. Patient expressed no further needs at this time.

## 2025-07-13 NOTE — DISCHARGE PLANNING
Received Choice Form at: 5922  Agency/Facility Name: [facility]  Sent Referral per Choice Form at: 4023

## 2025-07-13 NOTE — FACE TO FACE
"Face to Face Note  -  Durable Medical Equipment    Ralf Abdul D.O. - NPI: [id number]  I certify that this patient is under my care and that they had a durable medical equipment(DME)face to face encounter by myself that meets the physician DME face-to-face encounter requirements with this patient on:    Date of encounter:   Patient:                    MRN:                       YOB: 2024  Chiara Pacheco  [id number]  1965     The encounter with the patient was in whole, or in part, for the following medical condition, which is the primary reason for durable medical equipment:  COPD    I certify that, based on my findings, the following durable medical equipment is medically necessary:    Oxygen   HOME O2 Saturation Measurements:(Values must be present for Home Oxygen orders)  Room air sat at rest: 89  Room air sat with amb: 87  With liters of O2: 2, O2 sat at rest with O2: 92  With Liters of O2: 2, O2 sat with amb with O2 : 90  Is the patient mobile?: Yes  If patient feels more short of breath, they can go up to 6 liters per minute and contact healthcare provider.    Supporting Symptoms: The patient requires supplemental oxygen, as the following interventions have been tried with limited or no improvement: \"Ambulation with oximetry and \"Incentive spirometry.    My Clinical findings support the need for the above equipment due to:  Hypoxia  "

## 2025-07-14 NOTE — Clinical Note
REFERRAL APPROVAL NOTICE         Sent on July 14, 2025                   Myrna Dodd [place]  [address]  [facility] [address]                   Dear Ms. Pacheco,    After a careful review of the medical information and benefit coverage, [facility] has processed your referral. See below for additional details.    If applicable, you must be actively enrolled with your insurance for coverage of the authorized service. If you have any questions regarding your coverage, please contact your insurance directly.    REFERRAL INFORMATION   Referral #:  [id number]  Referred-To Provider    Referred-By Provider:  Gastroenterology    TED Beck   [facility]      [address] [place] [address] [address] [place] [id number]  [phone] [address] [place] [id number] [phone]    Referral Start Date:  07/12/2025  Referral End Date:   07/12/2026             SCHEDULING  If you do not already have an appointment, please call [phone] to make an appointment.     MORE INFORMATION  If you do not already have a adicate timeads account, sign up at: [email or web address]  You can access your medical information, make appointments, see lab results, billing information, and more.  If you have questions regarding this referral, please contact  the [facility] Referrals department at:             [phone]. Monday - Friday 8:00AM - 5:00PM.     Sincerely,    [facility]

## (undated) DEVICE — SET LEADWIRE 5 LEAD BEDSIDE DISPOSABLE ECG (1SET OF 5/EA)

## (undated) DEVICE — FILM CASSETTE ENDO

## (undated) DEVICE — TUBE CONNECTING SUCTION - CLEAR PLASTIC STERILE 72 IN (50EA/CA)

## (undated) DEVICE — KIT  I.V. START (100EA/CA)

## (undated) DEVICE — BLOCK BITE MAXI DENTAL RETENTION RIM (100EA/BX)

## (undated) DEVICE — NEPTUNE 4 PORT MANIFOLD - (20/PK)

## (undated) DEVICE — CANISTER SUCTION 3000ML MECHANICAL FILTER AUTO SHUTOFF MEDI-VAC NONSTERILE LF DISP  (40EA/CA)

## (undated) DEVICE — BUTTON ENDOSCOPY DISPOSABLE

## (undated) DEVICE — ELECTRODE 850 FOAM ADHESIVE - HYDROGEL RADIOTRNSPRNT (50/PK)

## (undated) DEVICE — CATHERTER CLEAR SINGLE USE INJECTION THERAPY NEEDLE 25GA X 4MM  2.3MM X 240CM (5EA/BX)

## (undated) DEVICE — KIT CUSTOM PROCEDURE SINGLE FOR ENDO  (15/CA)

## (undated) DEVICE — CANISTER SUCTION RIGID RED 1500CC (40EA/CA)

## (undated) DEVICE — LACTATED RINGERS INJ 1000 ML - (14EA/CA 60CA/PF)

## (undated) DEVICE — SUCTION INSTRUMENT YANKAUER BULBOUS TIP W/O VENT (50EA/CA)

## (undated) DEVICE — TUBING CLEARLINK DUO-VENT - C-FLO (48EA/CA)

## (undated) DEVICE — SOD. CHL. INJ. 0.9% 1000 ML - (14EA/CA 60CA/PF)

## (undated) DEVICE — PORT AUXILLARY WATER (50EA/BX)

## (undated) DEVICE — MASK PANORAMIC OXYGEN PRO2 (30EA/CA)

## (undated) DEVICE — WATER IRRIGATION STERILE 1000ML (12EA/CA)

## (undated) DEVICE — SPEEDBAND SUPERVIEW SUPER 7 (4/BX)

## (undated) DEVICE — GOLD PROBE 7FR (5/BX)

## (undated) DEVICE — SODIUM CHL IRRIGATION 0.9% 1000ML (12EA/CA)

## (undated) DEVICE — TUBE E-T HI-LO CUFF 7.0MM (10EA/PK)

## (undated) DEVICE — SENSOR SPO2 ADULT LNCS ADTX (20/BX) ORDER ITEM #19593

## (undated) DEVICE — TUBE SUCTION YANKAUER  1/4 X 6FT (20EA/CA)"

## (undated) DEVICE — TOWEL STOP TIMEOUT SAFETY FLAG (40EA/CA)

## (undated) DEVICE — MASK OXYGEN VNYL ADLT MED CONC WITH 7 FOOT TUBING  - (50EA/CA)

## (undated) DEVICE — BITE BLOCK, DISP.

## (undated) DEVICE — KIT ANESTHESIA W/CIRCUIT & 3/LT BAG W/FILTER (20EA/CA)

## (undated) DEVICE — SENSOR OXIMETER ADULT SPO2 RD SET (20EA/BX)

## (undated) DEVICE — KIT CUSTOM PROCEDURE SINGLE FOR ENDO (15/CA)

## (undated) DEVICE — RESCUE RETRIEVAL NET (5EA/BX)

## (undated) DEVICE — SET EXTENSION WITH 2 PORTS (48EA/CA) ***PART #2C8610 IS A SUBSTITUTE*****

## (undated) DEVICE — MASK ANESTHESIA ADULT  - (100/CA)